# Patient Record
Sex: FEMALE | Race: WHITE | Employment: OTHER | URBAN - NONMETROPOLITAN AREA
[De-identification: names, ages, dates, MRNs, and addresses within clinical notes are randomized per-mention and may not be internally consistent; named-entity substitution may affect disease eponyms.]

---

## 2017-07-14 ENCOUNTER — HOSPITAL ENCOUNTER (EMERGENCY)
Age: 77
Discharge: HOME | End: 2017-07-14
Attending: EMERGENCY MEDICINE
Payer: MEDICARE

## 2017-07-14 ENCOUNTER — APPOINTMENT (OUTPATIENT)
Dept: GENERAL RADIOLOGY | Age: 77
End: 2017-07-14
Payer: MEDICARE

## 2017-07-14 VITALS
HEART RATE: 87 BPM | SYSTOLIC BLOOD PRESSURE: 153 MMHG | DIASTOLIC BLOOD PRESSURE: 86 MMHG | TEMPERATURE: 98.8 F | RESPIRATION RATE: 20 BRPM | OXYGEN SATURATION: 97 %

## 2017-07-14 DIAGNOSIS — M25.552 LEFT HIP PAIN: Primary | ICD-10-CM

## 2017-07-14 DIAGNOSIS — M16.12 PRIMARY OSTEOARTHRITIS OF LEFT HIP: ICD-10-CM

## 2017-07-14 PROCEDURE — 99283 EMERGENCY DEPT VISIT LOW MDM: CPT

## 2017-07-14 PROCEDURE — 73502 X-RAY EXAM HIP UNI 2-3 VIEWS: CPT

## 2017-07-14 PROCEDURE — 6360000002 HC RX W HCPCS: Performed by: EMERGENCY MEDICINE

## 2017-07-14 PROCEDURE — 96372 THER/PROPH/DIAG INJ SC/IM: CPT

## 2017-07-14 RX ORDER — OXYCODONE HYDROCHLORIDE AND ACETAMINOPHEN 5; 325 MG/1; MG/1
1 TABLET ORAL ONCE
Status: DISCONTINUED | OUTPATIENT
Start: 2017-07-14 | End: 2017-07-14 | Stop reason: HOSPADM

## 2017-07-14 RX ORDER — METHYLPREDNISOLONE SODIUM SUCCINATE 125 MG/2ML
125 INJECTION, POWDER, LYOPHILIZED, FOR SOLUTION INTRAMUSCULAR; INTRAVENOUS ONCE
Status: COMPLETED | OUTPATIENT
Start: 2017-07-14 | End: 2017-07-14

## 2017-07-14 RX ORDER — OXYCODONE HYDROCHLORIDE AND ACETAMINOPHEN 5; 325 MG/1; MG/1
1 TABLET ORAL EVERY 4 HOURS PRN
Qty: 20 TABLET | Refills: 0 | Status: SHIPPED | OUTPATIENT
Start: 2017-07-14 | End: 2017-07-21

## 2017-07-14 RX ORDER — LATANOPROST 50 UG/ML
1 SOLUTION/ DROPS OPHTHALMIC NIGHTLY
COMMUNITY

## 2017-07-14 RX ORDER — LISINOPRIL 5 MG/1
5 TABLET ORAL DAILY
COMMUNITY
End: 2018-03-06 | Stop reason: ALTCHOICE

## 2017-07-14 RX ADMIN — METHYLPREDNISOLONE SODIUM SUCCINATE 125 MG: 125 INJECTION, POWDER, FOR SOLUTION INTRAMUSCULAR; INTRAVENOUS at 10:46

## 2017-07-14 ASSESSMENT — ENCOUNTER SYMPTOMS
BACK PAIN: 1
COLOR CHANGE: 0

## 2017-07-14 ASSESSMENT — PAIN SCALES - GENERAL: PAINLEVEL_OUTOF10: 8

## 2017-07-14 ASSESSMENT — PAIN DESCRIPTION - PAIN TYPE: TYPE: ACUTE PAIN

## 2017-07-24 ENCOUNTER — HOSPITAL ENCOUNTER (OUTPATIENT)
Dept: PREADMISSION TESTING | Age: 77
Discharge: HOME | End: 2017-07-24
Payer: MEDICARE

## 2017-07-24 VITALS
HEART RATE: 87 BPM | TEMPERATURE: 97.8 F | RESPIRATION RATE: 20 BRPM | SYSTOLIC BLOOD PRESSURE: 127 MMHG | OXYGEN SATURATION: 97 % | WEIGHT: 151.2 LBS | HEIGHT: 64 IN | BODY MASS INDEX: 25.81 KG/M2 | DIASTOLIC BLOOD PRESSURE: 70 MMHG

## 2017-07-24 LAB
ABSOLUTE EOS #: 0.1 K/UL (ref 0–0.4)
ABSOLUTE LYMPH #: 2.6 K/UL (ref 1–4.8)
ABSOLUTE MONO #: 0.5 K/UL (ref 0–1)
ANION GAP SERPL CALCULATED.3IONS-SCNC: 13 MMOL/L (ref 9–17)
BASOPHILS # BLD: 0 %
BASOPHILS ABSOLUTE: 0 K/UL (ref 0–0.2)
BUN BLDV-MCNC: 11 MG/DL (ref 8–23)
BUN/CREAT BLD: 20 (ref 9–20)
CALCIUM SERPL-MCNC: 9.3 MG/DL (ref 8.6–10.4)
CHLORIDE BLD-SCNC: 98 MMOL/L (ref 98–107)
CO2: 27 MMOL/L (ref 20–31)
CREAT SERPL-MCNC: 0.54 MG/DL (ref 0.5–0.9)
DIFFERENTIAL TYPE: NORMAL
EKG ATRIAL RATE: 80 BPM
EKG P AXIS: 45 DEGREES
EKG P-R INTERVAL: 140 MS
EKG Q-T INTERVAL: 352 MS
EKG QRS DURATION: 82 MS
EKG QTC CALCULATION (BAZETT): 405 MS
EKG R AXIS: 31 DEGREES
EKG T AXIS: 35 DEGREES
EKG VENTRICULAR RATE: 80 BPM
EOSINOPHILS RELATIVE PERCENT: 1 %
GFR AFRICAN AMERICAN: >60 ML/MIN
GFR NON-AFRICAN AMERICAN: >60 ML/MIN
GFR SERPL CREATININE-BSD FRML MDRD: ABNORMAL ML/MIN/{1.73_M2}
GFR SERPL CREATININE-BSD FRML MDRD: ABNORMAL ML/MIN/{1.73_M2}
GLUCOSE BLD-MCNC: 104 MG/DL (ref 70–99)
HCT VFR BLD CALC: 37.7 % (ref 36–46)
HEMOGLOBIN: 12.9 G/DL (ref 12–16)
LYMPHOCYTES # BLD: 25 %
MCH RBC QN AUTO: 32 PG (ref 26–34)
MCHC RBC AUTO-ENTMCNC: 34.2 G/DL (ref 31–37)
MCV RBC AUTO: 93.4 FL (ref 80–100)
MONOCYTES # BLD: 5 %
PDW BLD-RTO: 13.6 % (ref 12.1–15.2)
PLATELET # BLD: 236 K/UL (ref 140–450)
PLATELET ESTIMATE: NORMAL
PMV BLD AUTO: 9.8 FL (ref 6–12)
POTASSIUM SERPL-SCNC: 4.4 MMOL/L (ref 3.7–5.3)
RBC # BLD: 4.04 M/UL (ref 4–5.2)
RBC # BLD: NORMAL 10*6/UL
SEG NEUTROPHILS: 69 %
SEGMENTED NEUTROPHILS ABSOLUTE COUNT: 7.2 K/UL (ref 1.8–7.7)
SODIUM BLD-SCNC: 138 MMOL/L (ref 135–144)
WBC # BLD: 10.5 K/UL (ref 3.5–11)
WBC # BLD: NORMAL 10*3/UL

## 2017-07-24 PROCEDURE — 80048 BASIC METABOLIC PNL TOTAL CA: CPT

## 2017-07-24 PROCEDURE — 93005 ELECTROCARDIOGRAM TRACING: CPT

## 2017-07-24 PROCEDURE — 85025 COMPLETE CBC W/AUTO DIFF WBC: CPT

## 2017-07-24 PROCEDURE — 87641 MR-STAPH DNA AMP PROBE: CPT

## 2017-07-24 PROCEDURE — 36415 COLL VENOUS BLD VENIPUNCTURE: CPT

## 2017-07-24 RX ORDER — ACETAMINOPHEN 325 MG/1
650 TABLET ORAL ONCE
Status: CANCELLED | OUTPATIENT
Start: 2017-07-24 | End: 2017-07-24

## 2017-07-24 ASSESSMENT — PAIN DESCRIPTION - PAIN TYPE: TYPE: CHRONIC PAIN

## 2017-07-24 ASSESSMENT — PAIN DESCRIPTION - LOCATION: LOCATION: HIP

## 2017-07-24 ASSESSMENT — PAIN DESCRIPTION - ORIENTATION: ORIENTATION: LEFT

## 2017-07-24 ASSESSMENT — PAIN SCALES - GENERAL: PAINLEVEL_OUTOF10: 5

## 2017-07-24 ASSESSMENT — PAIN DESCRIPTION - DESCRIPTORS: DESCRIPTORS: ACHING;CONSTANT;DULL

## 2017-07-25 LAB
MRSA, DNA, NASAL: NORMAL
SPECIMEN DESCRIPTION: NORMAL

## 2017-08-03 ENCOUNTER — HOSPITAL ENCOUNTER (OUTPATIENT)
Dept: LAB | Age: 77
Discharge: HOME | End: 2017-08-03
Payer: MEDICARE

## 2017-08-03 PROCEDURE — 86901 BLOOD TYPING SEROLOGIC RH(D): CPT

## 2017-08-03 PROCEDURE — 36415 COLL VENOUS BLD VENIPUNCTURE: CPT

## 2017-08-03 PROCEDURE — 86850 RBC ANTIBODY SCREEN: CPT

## 2017-08-03 PROCEDURE — 86920 COMPATIBILITY TEST SPIN: CPT

## 2017-08-03 PROCEDURE — 86900 BLOOD TYPING SEROLOGIC ABO: CPT

## 2017-08-04 ENCOUNTER — ANESTHESIA EVENT (OUTPATIENT)
Dept: OPERATING ROOM | Age: 77
DRG: 470 | End: 2017-08-04
Payer: MEDICARE

## 2017-08-07 ENCOUNTER — ANESTHESIA (OUTPATIENT)
Dept: OPERATING ROOM | Age: 77
DRG: 470 | End: 2017-08-07
Payer: MEDICARE

## 2017-08-07 ENCOUNTER — APPOINTMENT (OUTPATIENT)
Dept: GENERAL RADIOLOGY | Age: 77
DRG: 470 | End: 2017-08-07
Attending: ORTHOPAEDIC SURGERY
Payer: MEDICARE

## 2017-08-07 ENCOUNTER — HOSPITAL ENCOUNTER (INPATIENT)
Age: 77
LOS: 3 days | Discharge: NURSING, REHAB OR LONG-TERM CARE | DRG: 470 | End: 2017-08-10
Attending: ORTHOPAEDIC SURGERY | Admitting: ORTHOPAEDIC SURGERY
Payer: MEDICARE

## 2017-08-07 VITALS
DIASTOLIC BLOOD PRESSURE: 72 MMHG | SYSTOLIC BLOOD PRESSURE: 120 MMHG | TEMPERATURE: 97.4 F | RESPIRATION RATE: 10 BRPM | OXYGEN SATURATION: 98 %

## 2017-08-07 DIAGNOSIS — M25.552 LEFT HIP PAIN: ICD-10-CM

## 2017-08-07 LAB — GLUCOSE BLD-MCNC: 120 MG/DL (ref 74–100)

## 2017-08-07 PROCEDURE — 82947 ASSAY GLUCOSE BLOOD QUANT: CPT

## 2017-08-07 PROCEDURE — 1200000000 HC SEMI PRIVATE

## 2017-08-07 PROCEDURE — 2500000003 HC RX 250 WO HCPCS

## 2017-08-07 PROCEDURE — 94664 DEMO&/EVAL PT USE INHALER: CPT

## 2017-08-07 PROCEDURE — 7100000000 HC PACU RECOVERY - FIRST 15 MIN: Performed by: ORTHOPAEDIC SURGERY

## 2017-08-07 PROCEDURE — C9290 INJ, BUPIVACAINE LIPOSOME: HCPCS | Performed by: ORTHOPAEDIC SURGERY

## 2017-08-07 PROCEDURE — 6360000002 HC RX W HCPCS: Performed by: NURSE ANESTHETIST, CERTIFIED REGISTERED

## 2017-08-07 PROCEDURE — 6360000002 HC RX W HCPCS: Performed by: ORTHOPAEDIC SURGERY

## 2017-08-07 PROCEDURE — 6370000000 HC RX 637 (ALT 250 FOR IP): Performed by: ORTHOPAEDIC SURGERY

## 2017-08-07 PROCEDURE — C1713 ANCHOR/SCREW BN/BN,TIS/BN: HCPCS | Performed by: ORTHOPAEDIC SURGERY

## 2017-08-07 PROCEDURE — 7100000001 HC PACU RECOVERY - ADDTL 15 MIN: Performed by: ORTHOPAEDIC SURGERY

## 2017-08-07 PROCEDURE — 3600000015 HC SURGERY LEVEL 5 ADDTL 15MIN: Performed by: ORTHOPAEDIC SURGERY

## 2017-08-07 PROCEDURE — S0028 INJECTION, FAMOTIDINE, 20 MG: HCPCS | Performed by: NURSE ANESTHETIST, CERTIFIED REGISTERED

## 2017-08-07 PROCEDURE — 2580000003 HC RX 258: Performed by: ORTHOPAEDIC SURGERY

## 2017-08-07 PROCEDURE — 2500000003 HC RX 250 WO HCPCS: Performed by: ORTHOPAEDIC SURGERY

## 2017-08-07 PROCEDURE — 3700000000 HC ANESTHESIA ATTENDED CARE: Performed by: ORTHOPAEDIC SURGERY

## 2017-08-07 PROCEDURE — 0SRB0J9 REPLACEMENT OF LEFT HIP JOINT WITH SYNTHETIC SUBSTITUTE, CEMENTED, OPEN APPROACH: ICD-10-PCS | Performed by: ORTHOPAEDIC SURGERY

## 2017-08-07 PROCEDURE — 73501 X-RAY EXAM HIP UNI 1 VIEW: CPT

## 2017-08-07 PROCEDURE — 2500000003 HC RX 250 WO HCPCS: Performed by: NURSE ANESTHETIST, CERTIFIED REGISTERED

## 2017-08-07 PROCEDURE — 3700000001 HC ADD 15 MINUTES (ANESTHESIA): Performed by: ORTHOPAEDIC SURGERY

## 2017-08-07 PROCEDURE — 94761 N-INVAS EAR/PLS OXIMETRY MLT: CPT

## 2017-08-07 PROCEDURE — 3600000005 HC SURGERY LEVEL 5 BASE: Performed by: ORTHOPAEDIC SURGERY

## 2017-08-07 PROCEDURE — C1776 JOINT DEVICE (IMPLANTABLE): HCPCS | Performed by: ORTHOPAEDIC SURGERY

## 2017-08-07 DEVICE — SHELL ACET SZ JJ DIA54MM HIP TIV TRABECULAR MTL CLUS H: Type: IMPLANTABLE DEVICE | Site: HIP | Status: FUNCTIONAL

## 2017-08-07 DEVICE — HEAD FEMORAL COCR 12/14 36MM +0: Type: IMPLANTABLE DEVICE | Site: HIP | Status: FUNCTIONAL

## 2017-08-07 DEVICE — IMPLANTABLE DEVICE: Type: IMPLANTABLE DEVICE | Site: HIP | Status: FUNCTIONAL

## 2017-08-07 RX ORDER — ONDANSETRON 2 MG/ML
INJECTION INTRAMUSCULAR; INTRAVENOUS PRN
Status: DISCONTINUED | OUTPATIENT
Start: 2017-08-07 | End: 2017-08-07 | Stop reason: SDUPTHER

## 2017-08-07 RX ORDER — SODIUM CHLORIDE 0.9 % (FLUSH) 0.9 %
10 SYRINGE (ML) INJECTION EVERY 12 HOURS SCHEDULED
Status: DISCONTINUED | OUTPATIENT
Start: 2017-08-07 | End: 2017-08-10 | Stop reason: HOSPADM

## 2017-08-07 RX ORDER — FENTANYL CITRATE 50 UG/ML
INJECTION, SOLUTION INTRAMUSCULAR; INTRAVENOUS PRN
Status: DISCONTINUED | OUTPATIENT
Start: 2017-08-07 | End: 2017-08-07 | Stop reason: SDUPTHER

## 2017-08-07 RX ORDER — ONDANSETRON 2 MG/ML
4 INJECTION INTRAMUSCULAR; INTRAVENOUS
Status: DISCONTINUED | OUTPATIENT
Start: 2017-08-07 | End: 2017-08-07 | Stop reason: HOSPADM

## 2017-08-07 RX ORDER — LATANOPROST 50 UG/ML
1 SOLUTION/ DROPS OPHTHALMIC NIGHTLY
Status: DISCONTINUED | OUTPATIENT
Start: 2017-08-07 | End: 2017-08-10 | Stop reason: HOSPADM

## 2017-08-07 RX ORDER — ACETAMINOPHEN 325 MG/1
650 TABLET ORAL EVERY 6 HOURS PRN
Status: DISCONTINUED | OUTPATIENT
Start: 2017-08-07 | End: 2017-08-07

## 2017-08-07 RX ORDER — HYDROCODONE BITARTRATE AND ACETAMINOPHEN 5; 325 MG/1; MG/1
2 TABLET ORAL EVERY 4 HOURS PRN
Status: DISCONTINUED | OUTPATIENT
Start: 2017-08-07 | End: 2017-08-10 | Stop reason: HOSPADM

## 2017-08-07 RX ORDER — LABETALOL HYDROCHLORIDE 5 MG/ML
5 INJECTION, SOLUTION INTRAVENOUS EVERY 10 MIN PRN
Status: DISCONTINUED | OUTPATIENT
Start: 2017-08-07 | End: 2017-08-07 | Stop reason: HOSPADM

## 2017-08-07 RX ORDER — HYDROCODONE BITARTRATE AND ACETAMINOPHEN 5; 325 MG/1; MG/1
2 TABLET ORAL EVERY 6 HOURS PRN
Status: DISCONTINUED | OUTPATIENT
Start: 2017-08-07 | End: 2017-08-07

## 2017-08-07 RX ORDER — ACETAMINOPHEN 325 MG/1
650 TABLET ORAL EVERY 4 HOURS PRN
Status: DISCONTINUED | OUTPATIENT
Start: 2017-08-07 | End: 2017-08-07

## 2017-08-07 RX ORDER — HYDROCODONE BITARTRATE AND ACETAMINOPHEN 5; 325 MG/1; MG/1
1 TABLET ORAL EVERY 4 HOURS PRN
Status: DISCONTINUED | OUTPATIENT
Start: 2017-08-07 | End: 2017-08-10 | Stop reason: HOSPADM

## 2017-08-07 RX ORDER — FENTANYL CITRATE 50 UG/ML
25 INJECTION, SOLUTION INTRAMUSCULAR; INTRAVENOUS EVERY 5 MIN PRN
Status: DISCONTINUED | OUTPATIENT
Start: 2017-08-07 | End: 2017-08-07 | Stop reason: HOSPADM

## 2017-08-07 RX ORDER — LISINOPRIL 5 MG/1
5 TABLET ORAL DAILY
Status: DISCONTINUED | OUTPATIENT
Start: 2017-08-07 | End: 2017-08-10 | Stop reason: HOSPADM

## 2017-08-07 RX ORDER — ONDANSETRON 2 MG/ML
4 INJECTION INTRAMUSCULAR; INTRAVENOUS EVERY 6 HOURS PRN
Status: DISCONTINUED | OUTPATIENT
Start: 2017-08-07 | End: 2017-08-10 | Stop reason: HOSPADM

## 2017-08-07 RX ORDER — SODIUM CHLORIDE, SODIUM LACTATE, POTASSIUM CHLORIDE, CALCIUM CHLORIDE 600; 310; 30; 20 MG/100ML; MG/100ML; MG/100ML; MG/100ML
INJECTION, SOLUTION INTRAVENOUS CONTINUOUS
Status: DISCONTINUED | OUTPATIENT
Start: 2017-08-07 | End: 2017-08-10 | Stop reason: HOSPADM

## 2017-08-07 RX ORDER — FERROUS SULFATE 325(65) MG
325 TABLET ORAL
Status: DISCONTINUED | OUTPATIENT
Start: 2017-08-08 | End: 2017-08-10 | Stop reason: HOSPADM

## 2017-08-07 RX ORDER — METOCLOPRAMIDE HYDROCHLORIDE 5 MG/ML
10 INJECTION INTRAMUSCULAR; INTRAVENOUS
Status: DISCONTINUED | OUTPATIENT
Start: 2017-08-07 | End: 2017-08-07 | Stop reason: HOSPADM

## 2017-08-07 RX ORDER — ACETAMINOPHEN 325 MG/1
650 TABLET ORAL EVERY 4 HOURS PRN
Status: DISCONTINUED | OUTPATIENT
Start: 2017-08-07 | End: 2017-08-10 | Stop reason: HOSPADM

## 2017-08-07 RX ORDER — SODIUM CHLORIDE, SODIUM LACTATE, POTASSIUM CHLORIDE, CALCIUM CHLORIDE 600; 310; 30; 20 MG/100ML; MG/100ML; MG/100ML; MG/100ML
INJECTION, SOLUTION INTRAVENOUS CONTINUOUS
Status: DISCONTINUED | OUTPATIENT
Start: 2017-08-07 | End: 2017-08-07

## 2017-08-07 RX ORDER — SODIUM CHLORIDE 0.9 % (FLUSH) 0.9 %
10 SYRINGE (ML) INJECTION PRN
Status: DISCONTINUED | OUTPATIENT
Start: 2017-08-07 | End: 2017-08-10 | Stop reason: HOSPADM

## 2017-08-07 RX ORDER — LIDOCAINE HYDROCHLORIDE 20 MG/ML
INJECTION, SOLUTION INFILTRATION; PERINEURAL PRN
Status: DISCONTINUED | OUTPATIENT
Start: 2017-08-07 | End: 2017-08-07 | Stop reason: SDUPTHER

## 2017-08-07 RX ORDER — PROPOFOL 10 MG/ML
INJECTION, EMULSION INTRAVENOUS PRN
Status: DISCONTINUED | OUTPATIENT
Start: 2017-08-07 | End: 2017-08-07 | Stop reason: SDUPTHER

## 2017-08-07 RX ORDER — ROCURONIUM BROMIDE 10 MG/ML
INJECTION, SOLUTION INTRAVENOUS PRN
Status: DISCONTINUED | OUTPATIENT
Start: 2017-08-07 | End: 2017-08-07 | Stop reason: SDUPTHER

## 2017-08-07 RX ORDER — HYDROCODONE BITARTRATE AND ACETAMINOPHEN 5; 325 MG/1; MG/1
1 TABLET ORAL EVERY 4 HOURS PRN
Status: DISCONTINUED | OUTPATIENT
Start: 2017-08-07 | End: 2017-08-07

## 2017-08-07 RX ORDER — DOCUSATE SODIUM 100 MG/1
100 CAPSULE, LIQUID FILLED ORAL 2 TIMES DAILY
Status: DISCONTINUED | OUTPATIENT
Start: 2017-08-07 | End: 2017-08-10 | Stop reason: HOSPADM

## 2017-08-07 RX ORDER — METOCLOPRAMIDE HYDROCHLORIDE 5 MG/ML
INJECTION INTRAMUSCULAR; INTRAVENOUS PRN
Status: DISCONTINUED | OUTPATIENT
Start: 2017-08-07 | End: 2017-08-07 | Stop reason: SDUPTHER

## 2017-08-07 RX ORDER — MEPERIDINE HYDROCHLORIDE 50 MG/ML
12.5 INJECTION INTRAMUSCULAR; INTRAVENOUS; SUBCUTANEOUS EVERY 5 MIN PRN
Status: DISCONTINUED | OUTPATIENT
Start: 2017-08-07 | End: 2017-08-07 | Stop reason: HOSPADM

## 2017-08-07 RX ORDER — ACETAMINOPHEN 325 MG/1
650 TABLET ORAL ONCE
Status: COMPLETED | OUTPATIENT
Start: 2017-08-07 | End: 2017-08-07

## 2017-08-07 RX ORDER — GLYCOPYRROLATE 0.2 MG/ML
INJECTION INTRAMUSCULAR; INTRAVENOUS PRN
Status: DISCONTINUED | OUTPATIENT
Start: 2017-08-07 | End: 2017-08-07 | Stop reason: SDUPTHER

## 2017-08-07 RX ADMIN — METFORMIN HYDROCHLORIDE 500 MG: 500 TABLET, FILM COATED ORAL at 18:51

## 2017-08-07 RX ADMIN — ONDANSETRON 4 MG: 2 INJECTION INTRAMUSCULAR; INTRAVENOUS at 15:26

## 2017-08-07 RX ADMIN — ACETAMINOPHEN 650 MG: 325 TABLET, FILM COATED ORAL at 13:13

## 2017-08-07 RX ADMIN — NEOSTIGMINE METHYLSULFATE 3 MG: 1 INJECTION, SOLUTION INTRAMUSCULAR; INTRAVENOUS; SUBCUTANEOUS at 15:50

## 2017-08-07 RX ADMIN — PHENYLEPHRINE HYDROCHLORIDE 100 MCG: 10 INJECTION INTRAVENOUS at 13:49

## 2017-08-07 RX ADMIN — CEFAZOLIN SODIUM 1 G: 1 SOLUTION INTRAVENOUS at 15:35

## 2017-08-07 RX ADMIN — SODIUM CHLORIDE, POTASSIUM CHLORIDE, SODIUM LACTATE AND CALCIUM CHLORIDE: 600; 310; 30; 20 INJECTION, SOLUTION INTRAVENOUS at 13:16

## 2017-08-07 RX ADMIN — METOCLOPRAMIDE 10 MG: 5 INJECTION, SOLUTION INTRAMUSCULAR; INTRAVENOUS at 14:25

## 2017-08-07 RX ADMIN — PHENYLEPHRINE HYDROCHLORIDE 100 MCG: 10 INJECTION INTRAVENOUS at 15:22

## 2017-08-07 RX ADMIN — PROPOFOL 50 MG: 10 INJECTION, EMULSION INTRAVENOUS at 14:50

## 2017-08-07 RX ADMIN — PROPOFOL 150 MG: 10 INJECTION, EMULSION INTRAVENOUS at 13:35

## 2017-08-07 RX ADMIN — PHENYLEPHRINE HYDROCHLORIDE 100 MCG: 10 INJECTION INTRAVENOUS at 13:56

## 2017-08-07 RX ADMIN — SODIUM CHLORIDE, POTASSIUM CHLORIDE, SODIUM LACTATE AND CALCIUM CHLORIDE: 600; 310; 30; 20 INJECTION, SOLUTION INTRAVENOUS at 13:13

## 2017-08-07 RX ADMIN — LIDOCAINE HYDROCHLORIDE 100 MG: 20 INJECTION, SOLUTION INFILTRATION; PERINEURAL at 13:35

## 2017-08-07 RX ADMIN — SODIUM CHLORIDE, POTASSIUM CHLORIDE, SODIUM LACTATE AND CALCIUM CHLORIDE: 600; 310; 30; 20 INJECTION, SOLUTION INTRAVENOUS at 14:35

## 2017-08-07 RX ADMIN — FENTANYL CITRATE 50 MCG: 50 INJECTION INTRAMUSCULAR; INTRAVENOUS at 13:35

## 2017-08-07 RX ADMIN — FENTANYL CITRATE 50 MCG: 50 INJECTION INTRAMUSCULAR; INTRAVENOUS at 14:58

## 2017-08-07 RX ADMIN — HYDROMORPHONE HYDROCHLORIDE 0.25 MG: 1 INJECTION, SOLUTION INTRAMUSCULAR; INTRAVENOUS; SUBCUTANEOUS at 15:06

## 2017-08-07 RX ADMIN — PHENYLEPHRINE HYDROCHLORIDE 100 MCG: 10 INJECTION INTRAVENOUS at 14:01

## 2017-08-07 RX ADMIN — LISINOPRIL 5 MG: 5 TABLET ORAL at 21:14

## 2017-08-07 RX ADMIN — HYDROMORPHONE HYDROCHLORIDE 0.25 MG: 1 INJECTION, SOLUTION INTRAMUSCULAR; INTRAVENOUS; SUBCUTANEOUS at 15:00

## 2017-08-07 RX ADMIN — Medication 40 MG: at 13:35

## 2017-08-07 RX ADMIN — GLYCOPYRROLATE 0.6 MG: 0.2 INJECTION INTRAMUSCULAR; INTRAVENOUS at 15:50

## 2017-08-07 RX ADMIN — SODIUM CHLORIDE, POTASSIUM CHLORIDE, SODIUM LACTATE AND CALCIUM CHLORIDE: 600; 310; 30; 20 INJECTION, SOLUTION INTRAVENOUS at 18:17

## 2017-08-07 RX ADMIN — CEFAZOLIN SODIUM 2 G: 2 SOLUTION INTRAVENOUS at 13:26

## 2017-08-07 RX ADMIN — FAMOTIDINE 20 MG: 10 INJECTION INTRAVENOUS at 14:25

## 2017-08-07 RX ADMIN — SODIUM CHLORIDE, POTASSIUM CHLORIDE, SODIUM LACTATE AND CALCIUM CHLORIDE: 600; 310; 30; 20 INJECTION, SOLUTION INTRAVENOUS at 16:00

## 2017-08-07 RX ADMIN — CEFAZOLIN SODIUM 2 G: 2 SOLUTION INTRAVENOUS at 21:14

## 2017-08-07 RX ADMIN — DOCUSATE SODIUM 100 MG: 100 CAPSULE, LIQUID FILLED ORAL at 21:15

## 2017-08-07 ASSESSMENT — PAIN DESCRIPTION - ORIENTATION: ORIENTATION: LEFT

## 2017-08-07 ASSESSMENT — PAIN DESCRIPTION - DESCRIPTORS
DESCRIPTORS: ACHING;CONSTANT
DESCRIPTORS: ACHING

## 2017-08-07 ASSESSMENT — PAIN SCALES - GENERAL
PAINLEVEL_OUTOF10: 2
PAINLEVEL_OUTOF10: 3

## 2017-08-07 ASSESSMENT — PAIN DESCRIPTION - PAIN TYPE: TYPE: ACUTE PAIN

## 2017-08-07 ASSESSMENT — PAIN - FUNCTIONAL ASSESSMENT: PAIN_FUNCTIONAL_ASSESSMENT: 0-10

## 2017-08-07 ASSESSMENT — PAIN DESCRIPTION - LOCATION: LOCATION: HIP

## 2017-08-08 ENCOUNTER — APPOINTMENT (OUTPATIENT)
Dept: GENERAL RADIOLOGY | Age: 77
DRG: 470 | End: 2017-08-08
Attending: ORTHOPAEDIC SURGERY
Payer: MEDICARE

## 2017-08-08 PROBLEM — Z96.642 STATUS POST LEFT HIP REPLACEMENT: Status: ACTIVE | Noted: 2017-08-08

## 2017-08-08 LAB
ANION GAP SERPL CALCULATED.3IONS-SCNC: 11 MMOL/L (ref 9–17)
BUN BLDV-MCNC: 11 MG/DL (ref 8–23)
BUN/CREAT BLD: 19 (ref 9–20)
CALCIUM SERPL-MCNC: 8.1 MG/DL (ref 8.6–10.4)
CHLORIDE BLD-SCNC: 96 MMOL/L (ref 98–107)
CO2: 26 MMOL/L (ref 20–31)
CREAT SERPL-MCNC: 0.59 MG/DL (ref 0.5–0.9)
GFR AFRICAN AMERICAN: >60 ML/MIN
GFR NON-AFRICAN AMERICAN: >60 ML/MIN
GFR SERPL CREATININE-BSD FRML MDRD: ABNORMAL ML/MIN/{1.73_M2}
GFR SERPL CREATININE-BSD FRML MDRD: ABNORMAL ML/MIN/{1.73_M2}
GLUCOSE BLD-MCNC: 173 MG/DL (ref 74–100)
GLUCOSE BLD-MCNC: 189 MG/DL (ref 70–99)
GLUCOSE BLD-MCNC: 202 MG/DL (ref 74–100)
HCT VFR BLD CALC: 29.2 % (ref 36–46)
HEMOGLOBIN: 10 G/DL (ref 12–16)
POTASSIUM SERPL-SCNC: 4.7 MMOL/L (ref 3.7–5.3)
SODIUM BLD-SCNC: 133 MMOL/L (ref 135–144)

## 2017-08-08 PROCEDURE — 97166 OT EVAL MOD COMPLEX 45 MIN: CPT

## 2017-08-08 PROCEDURE — 6370000000 HC RX 637 (ALT 250 FOR IP): Performed by: PHYSICIAN ASSISTANT

## 2017-08-08 PROCEDURE — 97110 THERAPEUTIC EXERCISES: CPT

## 2017-08-08 PROCEDURE — 97535 SELF CARE MNGMENT TRAINING: CPT

## 2017-08-08 PROCEDURE — 6370000000 HC RX 637 (ALT 250 FOR IP): Performed by: ORTHOPAEDIC SURGERY

## 2017-08-08 PROCEDURE — 97116 GAIT TRAINING THERAPY: CPT

## 2017-08-08 PROCEDURE — 6360000002 HC RX W HCPCS: Performed by: ORTHOPAEDIC SURGERY

## 2017-08-08 PROCEDURE — G8988 SELF CARE GOAL STATUS: HCPCS

## 2017-08-08 PROCEDURE — 85018 HEMOGLOBIN: CPT

## 2017-08-08 PROCEDURE — G8978 MOBILITY CURRENT STATUS: HCPCS

## 2017-08-08 PROCEDURE — 1200000000 HC SEMI PRIVATE

## 2017-08-08 PROCEDURE — 97162 PT EVAL MOD COMPLEX 30 MIN: CPT

## 2017-08-08 PROCEDURE — 36415 COLL VENOUS BLD VENIPUNCTURE: CPT

## 2017-08-08 PROCEDURE — G8987 SELF CARE CURRENT STATUS: HCPCS

## 2017-08-08 PROCEDURE — 85014 HEMATOCRIT: CPT

## 2017-08-08 PROCEDURE — 2580000003 HC RX 258: Performed by: ORTHOPAEDIC SURGERY

## 2017-08-08 PROCEDURE — 73502 X-RAY EXAM HIP UNI 2-3 VIEWS: CPT

## 2017-08-08 PROCEDURE — G8979 MOBILITY GOAL STATUS: HCPCS

## 2017-08-08 PROCEDURE — 82947 ASSAY GLUCOSE BLOOD QUANT: CPT

## 2017-08-08 PROCEDURE — 80048 BASIC METABOLIC PNL TOTAL CA: CPT

## 2017-08-08 RX ORDER — POLYETHYLENE GLYCOL 3350 17 G/17G
17 POWDER, FOR SOLUTION ORAL DAILY
Status: DISCONTINUED | OUTPATIENT
Start: 2017-08-08 | End: 2017-08-10 | Stop reason: HOSPADM

## 2017-08-08 RX ORDER — NICOTINE POLACRILEX 4 MG
15 LOZENGE BUCCAL PRN
Status: DISCONTINUED | OUTPATIENT
Start: 2017-08-08 | End: 2017-08-10 | Stop reason: HOSPADM

## 2017-08-08 RX ORDER — DEXTROSE MONOHYDRATE 50 MG/ML
100 INJECTION, SOLUTION INTRAVENOUS PRN
Status: DISCONTINUED | OUTPATIENT
Start: 2017-08-08 | End: 2017-08-10 | Stop reason: HOSPADM

## 2017-08-08 RX ORDER — ACETAMINOPHEN 325 MG/1
650 TABLET ORAL EVERY 4 HOURS PRN
Qty: 120 TABLET | Refills: 3 | Status: ON HOLD | OUTPATIENT
Start: 2017-08-08 | End: 2021-11-15 | Stop reason: SINTOL

## 2017-08-08 RX ORDER — HYDROCODONE BITARTRATE AND ACETAMINOPHEN 5; 325 MG/1; MG/1
TABLET ORAL
Qty: 50 TABLET | Refills: 0 | Status: SHIPPED | OUTPATIENT
Start: 2017-08-08 | End: 2017-08-15

## 2017-08-08 RX ORDER — DEXTROSE MONOHYDRATE 25 G/50ML
12.5 INJECTION, SOLUTION INTRAVENOUS PRN
Status: DISCONTINUED | OUTPATIENT
Start: 2017-08-08 | End: 2017-08-10 | Stop reason: HOSPADM

## 2017-08-08 RX ADMIN — METFORMIN HYDROCHLORIDE 500 MG: 500 TABLET, FILM COATED ORAL at 08:33

## 2017-08-08 RX ADMIN — HYDROCODONE BITARTRATE AND ACETAMINOPHEN 2 TABLET: 5; 325 TABLET ORAL at 07:25

## 2017-08-08 RX ADMIN — METFORMIN HYDROCHLORIDE 500 MG: 500 TABLET, FILM COATED ORAL at 17:01

## 2017-08-08 RX ADMIN — FERROUS SULFATE TAB 325 MG (65 MG ELEMENTAL FE) 325 MG: 325 (65 FE) TAB at 08:33

## 2017-08-08 RX ADMIN — CEFAZOLIN SODIUM 2 G: 2 SOLUTION INTRAVENOUS at 04:33

## 2017-08-08 RX ADMIN — POLYETHYLENE GLYCOL 3350 17 G: 17 POWDER, FOR SOLUTION ORAL at 08:34

## 2017-08-08 RX ADMIN — RIVAROXABAN 10 MG: 10 TABLET, FILM COATED ORAL at 08:33

## 2017-08-08 RX ADMIN — DOCUSATE SODIUM 100 MG: 100 CAPSULE, LIQUID FILLED ORAL at 20:26

## 2017-08-08 RX ADMIN — Medication 10 ML: at 10:43

## 2017-08-08 RX ADMIN — INSULIN LISPRO 2 UNITS: 100 INJECTION, SOLUTION INTRAVENOUS; SUBCUTANEOUS at 08:34

## 2017-08-08 RX ADMIN — ONDANSETRON 4 MG: 2 INJECTION INTRAMUSCULAR; INTRAVENOUS at 10:43

## 2017-08-08 RX ADMIN — HYDROMORPHONE HYDROCHLORIDE 0.5 MG: 1 INJECTION, SOLUTION INTRAMUSCULAR; INTRAVENOUS; SUBCUTANEOUS at 01:08

## 2017-08-08 RX ADMIN — INSULIN LISPRO 4 UNITS: 100 INJECTION, SOLUTION INTRAVENOUS; SUBCUTANEOUS at 20:04

## 2017-08-08 RX ADMIN — LISINOPRIL 5 MG: 5 TABLET ORAL at 08:33

## 2017-08-08 RX ADMIN — SODIUM CHLORIDE, POTASSIUM CHLORIDE, SODIUM LACTATE AND CALCIUM CHLORIDE: 600; 310; 30; 20 INJECTION, SOLUTION INTRAVENOUS at 05:57

## 2017-08-08 RX ADMIN — SODIUM CHLORIDE, POTASSIUM CHLORIDE, SODIUM LACTATE AND CALCIUM CHLORIDE: 600; 310; 30; 20 INJECTION, SOLUTION INTRAVENOUS at 19:05

## 2017-08-08 RX ADMIN — DOCUSATE SODIUM 100 MG: 100 CAPSULE, LIQUID FILLED ORAL at 08:33

## 2017-08-08 ASSESSMENT — PAIN DESCRIPTION - FREQUENCY
FREQUENCY: CONTINUOUS
FREQUENCY: CONTINUOUS

## 2017-08-08 ASSESSMENT — PAIN DESCRIPTION - LOCATION
LOCATION: HIP
LOCATION: LEG
LOCATION: LEG

## 2017-08-08 ASSESSMENT — PAIN DESCRIPTION - DESCRIPTORS
DESCRIPTORS: ACHING

## 2017-08-08 ASSESSMENT — PAIN DESCRIPTION - PAIN TYPE
TYPE: SURGICAL PAIN

## 2017-08-08 ASSESSMENT — PAIN SCALES - GENERAL
PAINLEVEL_OUTOF10: 3
PAINLEVEL_OUTOF10: 4
PAINLEVEL_OUTOF10: 3
PAINLEVEL_OUTOF10: 7
PAINLEVEL_OUTOF10: 2

## 2017-08-08 ASSESSMENT — PAIN DESCRIPTION - ORIENTATION
ORIENTATION: LEFT

## 2017-08-09 LAB
GLUCOSE BLD-MCNC: 153 MG/DL (ref 74–100)
GLUCOSE BLD-MCNC: 154 MG/DL (ref 74–100)
HCT VFR BLD CALC: 28.3 % (ref 36–46)
HEMOGLOBIN: 9.6 G/DL (ref 12–16)

## 2017-08-09 PROCEDURE — 97535 SELF CARE MNGMENT TRAINING: CPT

## 2017-08-09 PROCEDURE — 85014 HEMATOCRIT: CPT

## 2017-08-09 PROCEDURE — 85018 HEMOGLOBIN: CPT

## 2017-08-09 PROCEDURE — 1200000000 HC SEMI PRIVATE

## 2017-08-09 PROCEDURE — 97116 GAIT TRAINING THERAPY: CPT

## 2017-08-09 PROCEDURE — 6370000000 HC RX 637 (ALT 250 FOR IP): Performed by: PHYSICIAN ASSISTANT

## 2017-08-09 PROCEDURE — 2580000003 HC RX 258: Performed by: ORTHOPAEDIC SURGERY

## 2017-08-09 PROCEDURE — 6370000000 HC RX 637 (ALT 250 FOR IP): Performed by: ORTHOPAEDIC SURGERY

## 2017-08-09 PROCEDURE — 82947 ASSAY GLUCOSE BLOOD QUANT: CPT

## 2017-08-09 PROCEDURE — 97110 THERAPEUTIC EXERCISES: CPT

## 2017-08-09 PROCEDURE — 36415 COLL VENOUS BLD VENIPUNCTURE: CPT

## 2017-08-09 RX ADMIN — INSULIN LISPRO 2 UNITS: 100 INJECTION, SOLUTION INTRAVENOUS; SUBCUTANEOUS at 17:06

## 2017-08-09 RX ADMIN — METFORMIN HYDROCHLORIDE 500 MG: 500 TABLET, FILM COATED ORAL at 08:46

## 2017-08-09 RX ADMIN — DOCUSATE SODIUM 100 MG: 100 CAPSULE, LIQUID FILLED ORAL at 08:46

## 2017-08-09 RX ADMIN — METFORMIN HYDROCHLORIDE 500 MG: 500 TABLET, FILM COATED ORAL at 17:06

## 2017-08-09 RX ADMIN — FERROUS SULFATE TAB 325 MG (65 MG ELEMENTAL FE) 325 MG: 325 (65 FE) TAB at 08:46

## 2017-08-09 RX ADMIN — DOCUSATE SODIUM 100 MG: 100 CAPSULE, LIQUID FILLED ORAL at 20:03

## 2017-08-09 RX ADMIN — INSULIN LISPRO 2 UNITS: 100 INJECTION, SOLUTION INTRAVENOUS; SUBCUTANEOUS at 08:49

## 2017-08-09 RX ADMIN — POLYETHYLENE GLYCOL 3350 17 G: 17 POWDER, FOR SOLUTION ORAL at 08:46

## 2017-08-09 RX ADMIN — ACETAMINOPHEN 650 MG: 325 TABLET, FILM COATED ORAL at 00:45

## 2017-08-09 RX ADMIN — RIVAROXABAN 10 MG: 10 TABLET, FILM COATED ORAL at 08:46

## 2017-08-09 RX ADMIN — Medication 10 ML: at 20:03

## 2017-08-09 RX ADMIN — LISINOPRIL 5 MG: 5 TABLET ORAL at 20:03

## 2017-08-09 RX ADMIN — Medication 10 ML: at 08:47

## 2017-08-09 RX ADMIN — ACETAMINOPHEN 650 MG: 325 TABLET, FILM COATED ORAL at 13:39

## 2017-08-09 ASSESSMENT — PAIN SCALES - GENERAL
PAINLEVEL_OUTOF10: 5
PAINLEVEL_OUTOF10: 0
PAINLEVEL_OUTOF10: 2
PAINLEVEL_OUTOF10: 0
PAINLEVEL_OUTOF10: 4
PAINLEVEL_OUTOF10: 0

## 2017-08-09 ASSESSMENT — PAIN DESCRIPTION - PAIN TYPE
TYPE: SURGICAL PAIN
TYPE: SURGICAL PAIN

## 2017-08-09 ASSESSMENT — PAIN DESCRIPTION - ORIENTATION
ORIENTATION: LEFT
ORIENTATION: LEFT

## 2017-08-09 ASSESSMENT — PAIN DESCRIPTION - LOCATION
LOCATION: HIP
LOCATION: HIP

## 2017-08-09 ASSESSMENT — PAIN DESCRIPTION - DESCRIPTORS: DESCRIPTORS: ACHING

## 2017-08-10 VITALS
SYSTOLIC BLOOD PRESSURE: 112 MMHG | RESPIRATION RATE: 16 BRPM | HEART RATE: 84 BPM | TEMPERATURE: 98 F | OXYGEN SATURATION: 94 % | HEIGHT: 64 IN | DIASTOLIC BLOOD PRESSURE: 57 MMHG | WEIGHT: 157.9 LBS | BODY MASS INDEX: 26.96 KG/M2

## 2017-08-10 LAB
GLUCOSE BLD-MCNC: 151 MG/DL (ref 74–100)
GLUCOSE BLD-MCNC: 152 MG/DL (ref 74–100)
HCT VFR BLD CALC: 28.2 % (ref 36–46)
HEMOGLOBIN: 9.4 G/DL (ref 12–16)

## 2017-08-10 PROCEDURE — 85018 HEMOGLOBIN: CPT

## 2017-08-10 PROCEDURE — 97110 THERAPEUTIC EXERCISES: CPT

## 2017-08-10 PROCEDURE — 6370000000 HC RX 637 (ALT 250 FOR IP): Performed by: ORTHOPAEDIC SURGERY

## 2017-08-10 PROCEDURE — 6370000000 HC RX 637 (ALT 250 FOR IP): Performed by: PHYSICIAN ASSISTANT

## 2017-08-10 PROCEDURE — 36415 COLL VENOUS BLD VENIPUNCTURE: CPT

## 2017-08-10 PROCEDURE — 2580000003 HC RX 258: Performed by: ORTHOPAEDIC SURGERY

## 2017-08-10 PROCEDURE — 6370000000 HC RX 637 (ALT 250 FOR IP): Performed by: FAMILY MEDICINE

## 2017-08-10 PROCEDURE — 82947 ASSAY GLUCOSE BLOOD QUANT: CPT

## 2017-08-10 PROCEDURE — 85014 HEMATOCRIT: CPT

## 2017-08-10 PROCEDURE — 97535 SELF CARE MNGMENT TRAINING: CPT

## 2017-08-10 PROCEDURE — 97116 GAIT TRAINING THERAPY: CPT

## 2017-08-10 RX ORDER — BISACODYL 10 MG
10 SUPPOSITORY, RECTAL RECTAL ONCE
Status: COMPLETED | OUTPATIENT
Start: 2017-08-10 | End: 2017-08-10

## 2017-08-10 RX ADMIN — ACETAMINOPHEN 650 MG: 325 TABLET, FILM COATED ORAL at 04:12

## 2017-08-10 RX ADMIN — Medication 10 ML: at 08:31

## 2017-08-10 RX ADMIN — METFORMIN HYDROCHLORIDE 500 MG: 500 TABLET, FILM COATED ORAL at 08:30

## 2017-08-10 RX ADMIN — FERROUS SULFATE TAB 325 MG (65 MG ELEMENTAL FE) 325 MG: 325 (65 FE) TAB at 08:30

## 2017-08-10 RX ADMIN — INSULIN LISPRO 2 UNITS: 100 INJECTION, SOLUTION INTRAVENOUS; SUBCUTANEOUS at 08:33

## 2017-08-10 RX ADMIN — ACETAMINOPHEN 650 MG: 325 TABLET, FILM COATED ORAL at 14:26

## 2017-08-10 RX ADMIN — METFORMIN HYDROCHLORIDE 500 MG: 500 TABLET, FILM COATED ORAL at 16:49

## 2017-08-10 RX ADMIN — RIVAROXABAN 10 MG: 10 TABLET, FILM COATED ORAL at 08:30

## 2017-08-10 RX ADMIN — POLYETHYLENE GLYCOL 3350 17 G: 17 POWDER, FOR SOLUTION ORAL at 08:31

## 2017-08-10 RX ADMIN — BISACODYL 10 MG: 10 SUPPOSITORY RECTAL at 09:00

## 2017-08-10 RX ADMIN — INSULIN LISPRO 2 UNITS: 100 INJECTION, SOLUTION INTRAVENOUS; SUBCUTANEOUS at 16:50

## 2017-08-10 RX ADMIN — DOCUSATE SODIUM 100 MG: 100 CAPSULE, LIQUID FILLED ORAL at 08:30

## 2017-08-10 ASSESSMENT — PAIN DESCRIPTION - PAIN TYPE
TYPE: SURGICAL PAIN
TYPE: SURGICAL PAIN

## 2017-08-10 ASSESSMENT — PAIN SCALES - GENERAL
PAINLEVEL_OUTOF10: 1
PAINLEVEL_OUTOF10: 0
PAINLEVEL_OUTOF10: 3
PAINLEVEL_OUTOF10: 3
PAINLEVEL_OUTOF10: 0

## 2017-08-10 ASSESSMENT — PAIN DESCRIPTION - DESCRIPTORS: DESCRIPTORS: ACHING

## 2017-08-10 ASSESSMENT — PAIN DESCRIPTION - LOCATION
LOCATION: HIP
LOCATION: HIP

## 2017-08-10 ASSESSMENT — PAIN DESCRIPTION - ORIENTATION
ORIENTATION: LEFT
ORIENTATION: LEFT

## 2017-08-11 LAB
ABO/RH: NORMAL
ANTIBODY SCREEN: NEGATIVE
ARM BAND NUMBER: NORMAL
BLD PROD TYP BPU: NORMAL
BLD PROD TYP BPU: NORMAL
CROSSMATCH RESULT: NORMAL
CROSSMATCH RESULT: NORMAL
DISPENSE STATUS BLOOD BANK: NORMAL
DISPENSE STATUS BLOOD BANK: NORMAL
EXPIRATION DATE: NORMAL
TRANSFUSION STATUS: NORMAL
TRANSFUSION STATUS: NORMAL
UNIT DIVISION: 0
UNIT DIVISION: 0
UNIT NUMBER: NORMAL
UNIT NUMBER: NORMAL

## 2017-11-28 ENCOUNTER — HOSPITAL ENCOUNTER (OUTPATIENT)
Dept: VASCULAR LAB | Age: 77
Discharge: HOME | End: 2017-11-28
Payer: MEDICARE

## 2017-11-28 DIAGNOSIS — M79.662 PAIN AND SWELLING OF LEFT LOWER LEG: ICD-10-CM

## 2017-11-28 DIAGNOSIS — M79.89 PAIN AND SWELLING OF LEFT LOWER LEG: ICD-10-CM

## 2017-11-28 PROCEDURE — 93971 EXTREMITY STUDY: CPT

## 2018-03-06 ENCOUNTER — HOSPITAL ENCOUNTER (OUTPATIENT)
Dept: PREADMISSION TESTING | Age: 78
Discharge: HOME | End: 2018-03-10
Attending: PODIATRIST
Payer: MEDICARE

## 2018-03-06 ENCOUNTER — HOSPITAL ENCOUNTER (OUTPATIENT)
Dept: PHYSICAL THERAPY | Age: 78
Setting detail: THERAPIES SERIES
Discharge: HOME | End: 2018-03-06
Payer: MEDICARE

## 2018-03-06 VITALS
BODY MASS INDEX: 26.15 KG/M2 | TEMPERATURE: 97.9 F | HEIGHT: 64 IN | DIASTOLIC BLOOD PRESSURE: 78 MMHG | HEART RATE: 100 BPM | OXYGEN SATURATION: 95 % | SYSTOLIC BLOOD PRESSURE: 134 MMHG | WEIGHT: 153.2 LBS | RESPIRATION RATE: 18 BRPM

## 2018-03-06 LAB
ANION GAP SERPL CALCULATED.3IONS-SCNC: 11 MMOL/L (ref 9–17)
BUN BLDV-MCNC: 14 MG/DL (ref 8–23)
BUN/CREAT BLD: 28 (ref 9–20)
CALCIUM SERPL-MCNC: 9.5 MG/DL (ref 8.6–10.4)
CHLORIDE BLD-SCNC: 100 MMOL/L (ref 98–107)
CO2: 29 MMOL/L (ref 20–31)
CREAT SERPL-MCNC: 0.5 MG/DL (ref 0.5–0.9)
GFR AFRICAN AMERICAN: >60 ML/MIN
GFR NON-AFRICAN AMERICAN: >60 ML/MIN
GFR SERPL CREATININE-BSD FRML MDRD: ABNORMAL ML/MIN/{1.73_M2}
GFR SERPL CREATININE-BSD FRML MDRD: ABNORMAL ML/MIN/{1.73_M2}
GLUCOSE BLD-MCNC: 141 MG/DL (ref 70–99)
POTASSIUM SERPL-SCNC: 4.6 MMOL/L (ref 3.7–5.3)
SODIUM BLD-SCNC: 140 MMOL/L (ref 135–144)

## 2018-03-06 PROCEDURE — G8978 MOBILITY CURRENT STATUS: HCPCS

## 2018-03-06 PROCEDURE — 36415 COLL VENOUS BLD VENIPUNCTURE: CPT

## 2018-03-06 PROCEDURE — 80048 BASIC METABOLIC PNL TOTAL CA: CPT

## 2018-03-06 PROCEDURE — G8979 MOBILITY GOAL STATUS: HCPCS

## 2018-03-06 PROCEDURE — 97116 GAIT TRAINING THERAPY: CPT

## 2018-03-06 ASSESSMENT — PAIN DESCRIPTION - LOCATION: LOCATION: FOOT

## 2018-03-06 ASSESSMENT — PAIN DESCRIPTION - PAIN TYPE: TYPE: CHRONIC PAIN

## 2018-03-06 ASSESSMENT — PAIN SCALES - GENERAL: PAINLEVEL_OUTOF10: 2

## 2018-03-06 ASSESSMENT — PAIN DESCRIPTION - ORIENTATION: ORIENTATION: LEFT

## 2018-03-06 NOTE — PROGRESS NOTES
[facility]   Preadmission Testing    Name: Omari Fenton  : 1940  Patient Phone: [phone] (home)     Procedure LEFT FOOT SURGERY  Date of Procedure: 3-9-18  Surgeon: Didi Yao DPM    Ht:  5' 4\" (162.6 cm)  Wt: 153 lb 3.2 oz (69.5 kg)  Wt method: Actual    Allergies: Allergies   Allergen Reactions    Aleve [Naproxen Sodium] Anaphylaxis    Codeine Nausea And Vomiting    Nsaids Other (See Comments)     DIFFICULTY BREATHING      Gabapentin Other (See Comments)     Make her feel funny       Peanut allergy: No    Latex Allergy Screening Tool  Have you ever had a reaction to or been told by a physician that you have an allergy to latex or natural rubber?: No    Vitals:    18 0947   BP: 134/78   Pulse: 100   Resp: 18   Temp: 97.9 °F (36.6 °C)   SpO2: 95%       No LMP recorded. Patient has had a hysterectomy. Do you take blood thinners? [] Yes    [x] No         Instructed to stop blood thinners prior to procedure? [x] Yes    [] No      [] N/A   Do you have sleep apnea? [] Yes    [x] No     Instructed to bring CPAP machine? [] Yes    [] No    [x] N/A   Do you have acid reflux ? [] Yes    [x] No     Do you have  hiatal hernia? [] Yes    [x] No    Do you ever experience motion sickness? [x] Yes    [] No     Have you had a respiratory infection or sore throat in last 4 weeks before surgery? [] Yes    [x] No     Do you have poorly controlled asthma or COPD? [] Yes    [x] No     Do you have a history of angina in the last month or symptomatic arrhythmia? [] Yes    [x] No     Do you have significant central nervous system disease? [] Yes    [x] No     Have you had an EKG, labs, or chest xray in last 12 months?   If yes provide copies to anesthesia   [x] Yes    [] No       [x] Lab    [x] EKG    [] CXR     Have you had a stress test?     [] Yes    [x] No    When/where:     Was it normal?    [] Yes    [] No   Do you or your family have a history of Malignant Hyperthermia? [] Yes    [x] No               PAT Call/Visit Questions  Person Interviewed: PATIENT  Surgery Time Verified: Yes  Arrival Time Verified: 5  Surgery Location Verified: Yes  Patient Language: ENGLISH  Medical History Reviewed: Yes  NPO Status Reinforced: Yes  Ride and Caregiver Arranged: Yes  Ride Caregiver Provider: SON-GENE  Patient Knows to Bring Current Medications: Yes    Pre-AdmissionTesting Checklist  Patient has been to this health system before?: Yes  Does patient refuse blood?: No  Healthcare Directive: Yes, patient has an advance directive for healthcare treatment   needed: No  Patient can read and write?: Yes  History given by: Patient  Providing self care at home?: Yes  Discharge transport (for same day patients): Family    Patient instructed on the pre-operative, intra-operative, and post-operative process? Yes  Medication instructions reviewed with patient? Yes  Pre operative instruction sheet reviewed and given to patient in PAT?   Yes

## 2018-03-08 ENCOUNTER — ANESTHESIA EVENT (OUTPATIENT)
Dept: OPERATING ROOM | Age: 78
End: 2018-03-08
Payer: MEDICARE

## 2018-03-09 ENCOUNTER — APPOINTMENT (OUTPATIENT)
Dept: GENERAL RADIOLOGY | Age: 78
End: 2018-03-09
Attending: PODIATRIST
Payer: MEDICARE

## 2018-03-09 ENCOUNTER — HOSPITAL ENCOUNTER (OUTPATIENT)
Age: 78
Setting detail: OUTPATIENT SURGERY
Discharge: HOME | End: 2018-03-09
Attending: PODIATRIST | Admitting: PODIATRIST
Payer: MEDICARE

## 2018-03-09 ENCOUNTER — ANESTHESIA (OUTPATIENT)
Dept: OPERATING ROOM | Age: 78
End: 2018-03-09
Payer: MEDICARE

## 2018-03-09 VITALS
DIASTOLIC BLOOD PRESSURE: 52 MMHG | SYSTOLIC BLOOD PRESSURE: 83 MMHG | RESPIRATION RATE: 2 BRPM | TEMPERATURE: 96.4 F | OXYGEN SATURATION: 96 %

## 2018-03-09 VITALS
OXYGEN SATURATION: 97 % | TEMPERATURE: 97 F | HEART RATE: 89 BPM | SYSTOLIC BLOOD PRESSURE: 128 MMHG | BODY MASS INDEX: 26.12 KG/M2 | HEIGHT: 64 IN | WEIGHT: 153 LBS | RESPIRATION RATE: 16 BRPM | DIASTOLIC BLOOD PRESSURE: 74 MMHG

## 2018-03-09 LAB — GLUCOSE BLD-MCNC: 156 MG/DL (ref 74–100)

## 2018-03-09 PROCEDURE — 73600 X-RAY EXAM OF ANKLE: CPT

## 2018-03-09 PROCEDURE — 64445 NJX AA&/STRD SCIATIC NRV IMG: CPT | Performed by: NURSE ANESTHETIST, CERTIFIED REGISTERED

## 2018-03-09 PROCEDURE — 7100000011 HC PHASE II RECOVERY - ADDTL 15 MIN: Performed by: PODIATRIST

## 2018-03-09 PROCEDURE — 3700000000 HC ANESTHESIA ATTENDED CARE: Performed by: PODIATRIST

## 2018-03-09 PROCEDURE — 6360000002 HC RX W HCPCS: Performed by: NURSE ANESTHETIST, CERTIFIED REGISTERED

## 2018-03-09 PROCEDURE — 3700000001 HC ADD 15 MINUTES (ANESTHESIA): Performed by: PODIATRIST

## 2018-03-09 PROCEDURE — 7100000000 HC PACU RECOVERY - FIRST 15 MIN: Performed by: PODIATRIST

## 2018-03-09 PROCEDURE — 7100000010 HC PHASE II RECOVERY - FIRST 15 MIN: Performed by: PODIATRIST

## 2018-03-09 PROCEDURE — 88311 DECALCIFY TISSUE: CPT

## 2018-03-09 PROCEDURE — 82947 ASSAY GLUCOSE BLOOD QUANT: CPT

## 2018-03-09 PROCEDURE — 88304 TISSUE EXAM BY PATHOLOGIST: CPT

## 2018-03-09 PROCEDURE — 6360000002 HC RX W HCPCS: Performed by: PODIATRIST

## 2018-03-09 PROCEDURE — 3600000013 HC SURGERY LEVEL 3 ADDTL 15MIN: Performed by: PODIATRIST

## 2018-03-09 PROCEDURE — 7100000001 HC PACU RECOVERY - ADDTL 15 MIN: Performed by: PODIATRIST

## 2018-03-09 PROCEDURE — 2580000003 HC RX 258: Performed by: PODIATRIST

## 2018-03-09 PROCEDURE — 3600000003 HC SURGERY LEVEL 3 BASE: Performed by: PODIATRIST

## 2018-03-09 PROCEDURE — 2500000003 HC RX 250 WO HCPCS: Performed by: NURSE ANESTHETIST, CERTIFIED REGISTERED

## 2018-03-09 RX ORDER — DEXAMETHASONE SODIUM PHOSPHATE 10 MG/ML
INJECTION INTRAMUSCULAR; INTRAVENOUS PRN
Status: DISCONTINUED | OUTPATIENT
Start: 2018-03-09 | End: 2018-03-09 | Stop reason: SDUPTHER

## 2018-03-09 RX ORDER — PROPOFOL 10 MG/ML
INJECTION, EMULSION INTRAVENOUS PRN
Status: DISCONTINUED | OUTPATIENT
Start: 2018-03-09 | End: 2018-03-09 | Stop reason: SDUPTHER

## 2018-03-09 RX ORDER — ROPIVACAINE HYDROCHLORIDE 5 MG/ML
INJECTION, SOLUTION EPIDURAL; INFILTRATION; PERINEURAL PRN
Status: DISCONTINUED | OUTPATIENT
Start: 2018-03-09 | End: 2018-03-09 | Stop reason: SDUPTHER

## 2018-03-09 RX ORDER — SODIUM CHLORIDE, SODIUM LACTATE, POTASSIUM CHLORIDE, CALCIUM CHLORIDE 600; 310; 30; 20 MG/100ML; MG/100ML; MG/100ML; MG/100ML
INJECTION, SOLUTION INTRAVENOUS CONTINUOUS
Status: DISCONTINUED | OUTPATIENT
Start: 2018-03-09 | End: 2018-03-09 | Stop reason: HOSPADM

## 2018-03-09 RX ORDER — ONDANSETRON 2 MG/ML
INJECTION INTRAMUSCULAR; INTRAVENOUS PRN
Status: DISCONTINUED | OUTPATIENT
Start: 2018-03-09 | End: 2018-03-09 | Stop reason: SDUPTHER

## 2018-03-09 RX ORDER — LIDOCAINE HYDROCHLORIDE 10 MG/ML
INJECTION, SOLUTION INFILTRATION; PERINEURAL PRN
Status: DISCONTINUED | OUTPATIENT
Start: 2018-03-09 | End: 2018-03-09 | Stop reason: SDUPTHER

## 2018-03-09 RX ORDER — SODIUM CHLORIDE, SODIUM LACTATE, POTASSIUM CHLORIDE, CALCIUM CHLORIDE 600; 310; 30; 20 MG/100ML; MG/100ML; MG/100ML; MG/100ML
INJECTION, SOLUTION INTRAVENOUS CONTINUOUS PRN
Status: DISCONTINUED | OUTPATIENT
Start: 2018-03-09 | End: 2018-03-09

## 2018-03-09 RX ADMIN — SODIUM CHLORIDE, POTASSIUM CHLORIDE, SODIUM LACTATE AND CALCIUM CHLORIDE: 600; 310; 30; 20 INJECTION, SOLUTION INTRAVENOUS at 07:12

## 2018-03-09 RX ADMIN — CEFAZOLIN SODIUM 2 G: 2 SOLUTION INTRAVENOUS at 08:07

## 2018-03-09 RX ADMIN — DEXAMETHASONE SODIUM PHOSPHATE 5 MG: 10 INJECTION INTRAMUSCULAR; INTRAVENOUS at 07:41

## 2018-03-09 RX ADMIN — ONDANSETRON 4 MG: 2 INJECTION INTRAMUSCULAR; INTRAVENOUS at 09:06

## 2018-03-09 RX ADMIN — LIDOCAINE HYDROCHLORIDE 60 MG: 10 INJECTION, SOLUTION INFILTRATION; PERINEURAL at 08:15

## 2018-03-09 RX ADMIN — PROPOFOL 150 MG: 10 INJECTION, EMULSION INTRAVENOUS at 08:15

## 2018-03-09 RX ADMIN — ROPIVACAINE HYDROCHLORIDE 20 ML: 5 INJECTION, SOLUTION EPIDURAL; INFILTRATION; PERINEURAL at 07:41

## 2018-03-09 ASSESSMENT — PULMONARY FUNCTION TESTS
PIF_VALUE: 2
PIF_VALUE: 3
PIF_VALUE: 2
PIF_VALUE: 2
PIF_VALUE: 16
PIF_VALUE: 2
PIF_VALUE: 2
PIF_VALUE: 0
PIF_VALUE: 22
PIF_VALUE: 15
PIF_VALUE: 2
PIF_VALUE: 3
PIF_VALUE: 4
PIF_VALUE: 2
PIF_VALUE: 0
PIF_VALUE: 2
PIF_VALUE: 1
PIF_VALUE: 2
PIF_VALUE: 3
PIF_VALUE: 2
PIF_VALUE: 2
PIF_VALUE: 3
PIF_VALUE: 23
PIF_VALUE: 17
PIF_VALUE: 2
PIF_VALUE: 0
PIF_VALUE: 2
PIF_VALUE: 5
PIF_VALUE: 2
PIF_VALUE: 8
PIF_VALUE: 5
PIF_VALUE: 2
PIF_VALUE: 21
PIF_VALUE: 0
PIF_VALUE: 2
PIF_VALUE: 2
PIF_VALUE: 0
PIF_VALUE: 1
PIF_VALUE: 3
PIF_VALUE: 20
PIF_VALUE: 2
PIF_VALUE: 2
PIF_VALUE: 1
PIF_VALUE: 3
PIF_VALUE: 2
PIF_VALUE: 16
PIF_VALUE: 2

## 2018-03-09 ASSESSMENT — PAIN SCALES - GENERAL
PAINLEVEL_OUTOF10: 0

## 2018-03-09 ASSESSMENT — ENCOUNTER SYMPTOMS: DYSPNEA ACTIVITY LEVEL: AFTER AMBULATING 1 FLIGHT OF STAIRS

## 2018-03-09 NOTE — ANESTHESIA PRE PROCEDURE
Department of Anesthesiology  Preprocedure Note       Name:  Chey Seay   Age:  76 y.o.  :  1940                                          MRN:  [id number]         Date:  3/9/2018      Surgeon: Moose Lemus):  Britney Elias DPRA    Procedure: Procedure(s):  ACHILLES TENDON LENGTHENING REPAIR/ EXCISION OF OS PERONEUM,PRIMARY REPAIR OF PERONEUS LINGUS TENDON WITH POSSIBLE PERONEAL TENODESIS    Medications prior to admission:   Prior to Admission medications    Medication Sig Start Date End Date Taking? Authorizing Provider   acetaminophen (TYLENOL) 325 MG tablet Take 2 tablets by mouth every 4 hours as needed for Pain 17   Glorine Showers, MD   latanoprost (XALATAN) 0.005 % ophthalmic solution Place 1 drop into both eyes nightly    Historical Provider, MD   Polyvinyl Alcohol-Povidone (TEARS PLUS OP) Apply 1 drop to eye 3 times daily    Historical Provider, MD   metFORMIN (GLUCOPHAGE) 500 MG tablet Take 500 mg by mouth 2 times daily (with meals)    Historical Provider, MD       Current medications:    Current Facility-Administered Medications   Medication Dose Route Frequency Provider Last Rate Last Dose    lactated ringers infusion   Intravenous Continuous Derl Blinks, DPM        ceFAZolin (ANCEF) 2 g in dextrose 3 % 50 mL IVPB (duplex)  2 g Intravenous Once Derl Blinks, DPM         Facility-Administered Medications Ordered in Other Encounters   Medication Dose Route Frequency Provider Last Rate Last Dose    isoproterenol (ISUPREL) 4 mcg/mL in dextrose 5 % 250 mL infusion  1 mcg/min Intravenous Continuous Ginger Staley MD 15 mL/hr at 07/10/13 1045 1 mcg/min at 07/10/13 1045       Allergies:     Allergies   Allergen Reactions    Aleve [Naproxen Sodium] Anaphylaxis    Codeine Nausea And Vomiting    Nsaids Other (See Comments)     DIFFICULTY BREATHING      Gabapentin Other (See Comments)     Make her feel funny       Problem List:    Patient Active Problem List   Diagnosis Code    Aphakia of Cardiovascular:  Exercise tolerance: poor (<4 METS),   (+) hypertension: moderate, MACK: after ambulating 1 flight of stairs,          Beta Blocker:  Not on Beta Blocker         Neuro/Psych:                ROS comment: S/P Cervical and lumbar disc surgeries. S/P Multiple eye surgeries. GI/Hepatic/Renal:        (-) GERD       Endo/Other:    (+) Diabetes (BS 3/9/18 = 156)Type II DM, well controlled, , .                 Abdominal:         (-) obese     Vascular:                                      Anesthesia Plan      general and regional     ASA 3     (Discussed R/B/A of sciatic nerve block, pt agrees and wishes to proceed. )  Induction: intravenous. MIPS: Postoperative opioids intended and Prophylactic antiemetics administered. Anesthetic plan and risks discussed with patient.                       Wm Pires II, CRNA   3/9/2018

## 2018-03-09 NOTE — ANESTHESIA PROCEDURE NOTES
Peripheral Block    Patient location during procedure: pre-op  Start time: 3/9/2018 7:35 AM  End time: 3/9/2018 7:43 AM  Staffing  Resident/CRNA: Debora LUNA  Performed: resident/CRNA   Preanesthetic Checklist  Completed: patient identified, site marked, surgical consent, pre-op evaluation, timeout performed, IV checked, risks and benefits discussed, monitors and equipment checked, anesthesia consent given, oxygen available and patient being monitored  Peripheral Block  Patient position: prone  Prep: ChloraPrep  Patient monitoring: cardiac monitor, continuous pulse ox, frequent blood pressure checks and IV access  Block type: Sciatic  Laterality: left  Injection technique: single-shot  Procedures: ultrasound guided  Local infiltration: ropivacaine and decadron  Infiltration strength: 0.5 %  Dose: 20 mL  Popliteal  Provider prep: mask and sterile gloves  Local infiltration: ropivacaine and decadron  Needle  Needle type: short-bevel   Needle gauge: 22 G  Needle length: 8 cm  Needle localization: nerve stimulator and ultrasound guidance  Assessment  Injection assessment: negative aspiration for heme, no paresthesia on injection and local visualized surrounding nerve on ultrasound  Paresthesia pain: none  Slow fractionated injection: yes  Hemodynamics: stable  Additional Notes  Loss of motor response at 0.35 mA, pt tolerated procedure well.    Reason for block: post-op pain management and at surgeon's request

## 2018-03-09 NOTE — BRIEF OP NOTE
Brief Postoperative Note  ______________________________________________________________    Patient: Martha Pathak  YOB: 1940  MRN: [id number]  Date of Procedure: 3/9/2018    Pre-Op Diagnosis: TEAR OF PERONEAL TENDON LEFT FOOT    Post-Op Diagnosis: Same       Procedure(s):  ACHILLES TENDON LENGTHENING REPAIR/ EXCISION OF OS PERONEUM,PRIMARY REPAIR OF PERONEUS LINGUS TENDON WITH POSSIBLE PERONEAL TENODESIS    Anesthesia: Regional, General    Surgeon(s):  Relda Phalen, DPM    Staff:  Scrub Person First: Joanne Earl  Scrub Person Second: Claudia Choe     Estimated Blood Loss: 5 mL    Complications: None  Specimens:   ID Type Source Tests Collected by Time Destination   A : OS PERONEUML LEFT FOOT Tissue Foot SURGICAL PATHOLOGY Relda Phalen, DPM 3/9/2018 0846        Implants:  None      Drains:  None    Findings: See the operative narrative    Relda Phalen, DPM  Date: 3/9/2018  Time: 9:20 AM

## 2018-03-13 LAB — SURGICAL PATHOLOGY REPORT: NORMAL

## 2018-03-27 ENCOUNTER — HOSPITAL ENCOUNTER (OUTPATIENT)
Dept: PHYSICAL THERAPY | Age: 78
Setting detail: THERAPIES SERIES
Discharge: HOME | End: 2018-03-27
Payer: MEDICARE

## 2018-03-27 PROCEDURE — 97161 PT EVAL LOW COMPLEX 20 MIN: CPT

## 2018-03-27 PROCEDURE — G0283 ELEC STIM OTHER THAN WOUND: HCPCS

## 2018-03-27 PROCEDURE — G8979 MOBILITY GOAL STATUS: HCPCS

## 2018-03-27 PROCEDURE — G8978 MOBILITY CURRENT STATUS: HCPCS

## 2018-03-27 PROCEDURE — 97110 THERAPEUTIC EXERCISES: CPT

## 2018-03-28 NOTE — PROGRESS NOTES
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 Pt states she is currently NWB for next 2-3 weeks. Pt ambulating with boot donned on LLE and use of RW. Pt states pain has been minimal thus far. Pt states she RTD on 18. Pain Screening  Patient Currently in Pain: Denies          Objective     Observation/Palpation  Observation: Incision healing well without concerning characteristics. Edema: Figure 8 measurement= 1in greater on L than R          LLE AROM: PF 48*, DF lacking 13* from neutral       Assessment  Assessment: Pt is a 77 y/o female who presents to PT s/p L ankle surgery. Pt currently ambulating with RW and boot donned on LLE and NWB on LLE. Pt demo limited L ankle AROM compared bilaterally and increased edema compared bilaterally. Pt will benefit from skilled PT services to address impairments and to work toward established functional goals. Prognosis: Good        Decision Making: Medium Complexity    Patient Education  PT POC and HEP. Pt verbalized/demonstrated good understanding:     [x] Yes         [] No, pt required further clarification.       [x] Primary Impairment :   G Code:    [x] Mobility         [] Carry        [] Body Position       [] Self Care

## 2018-03-29 ENCOUNTER — HOSPITAL ENCOUNTER (OUTPATIENT)
Dept: PHYSICAL THERAPY | Age: 78
Setting detail: THERAPIES SERIES
Discharge: HOME | End: 2018-03-29
Payer: MEDICARE

## 2018-03-29 PROCEDURE — 97140 MANUAL THERAPY 1/> REGIONS: CPT

## 2018-03-29 PROCEDURE — G0283 ELEC STIM OTHER THAN WOUND: HCPCS

## 2018-03-29 PROCEDURE — 97110 THERAPEUTIC EXERCISES: CPT

## 2018-04-03 ENCOUNTER — HOSPITAL ENCOUNTER (OUTPATIENT)
Dept: PHYSICAL THERAPY | Age: 78
Setting detail: THERAPIES SERIES
Discharge: HOME | End: 2018-04-03
Payer: MEDICARE

## 2018-04-03 PROCEDURE — 97110 THERAPEUTIC EXERCISES: CPT

## 2018-04-03 PROCEDURE — G0283 ELEC STIM OTHER THAN WOUND: HCPCS

## 2018-04-03 PROCEDURE — 97140 MANUAL THERAPY 1/> REGIONS: CPT

## 2018-04-05 ENCOUNTER — HOSPITAL ENCOUNTER (OUTPATIENT)
Dept: PHYSICAL THERAPY | Age: 78
Setting detail: THERAPIES SERIES
Discharge: HOME | End: 2018-04-05
Payer: MEDICARE

## 2018-04-05 PROCEDURE — G0283 ELEC STIM OTHER THAN WOUND: HCPCS

## 2018-04-05 PROCEDURE — 97110 THERAPEUTIC EXERCISES: CPT

## 2018-04-05 ASSESSMENT — PAIN DESCRIPTION - PAIN TYPE: TYPE: ACUTE PAIN

## 2018-04-05 ASSESSMENT — PAIN DESCRIPTION - LOCATION: LOCATION: ANKLE

## 2018-04-05 ASSESSMENT — PAIN SCALES - GENERAL: PAINLEVEL_OUTOF10: 4

## 2018-04-05 ASSESSMENT — PAIN DESCRIPTION - ORIENTATION: ORIENTATION: LEFT;OUTER

## 2018-04-10 ENCOUNTER — HOSPITAL ENCOUNTER (OUTPATIENT)
Dept: PHYSICAL THERAPY | Age: 78
Setting detail: THERAPIES SERIES
Discharge: HOME | End: 2018-04-10
Payer: MEDICARE

## 2018-04-10 PROCEDURE — G0283 ELEC STIM OTHER THAN WOUND: HCPCS

## 2018-04-10 PROCEDURE — 97140 MANUAL THERAPY 1/> REGIONS: CPT

## 2018-04-10 PROCEDURE — 97110 THERAPEUTIC EXERCISES: CPT

## 2018-04-10 ASSESSMENT — PAIN SCALES - GENERAL: PAINLEVEL_OUTOF10: 3

## 2018-04-12 ENCOUNTER — HOSPITAL ENCOUNTER (OUTPATIENT)
Dept: PHYSICAL THERAPY | Age: 78
Setting detail: THERAPIES SERIES
Discharge: HOME | End: 2018-04-12
Payer: MEDICARE

## 2018-04-12 PROCEDURE — 97110 THERAPEUTIC EXERCISES: CPT

## 2018-04-12 PROCEDURE — G0283 ELEC STIM OTHER THAN WOUND: HCPCS

## 2018-04-12 PROCEDURE — 97140 MANUAL THERAPY 1/> REGIONS: CPT

## 2018-04-17 ENCOUNTER — HOSPITAL ENCOUNTER (OUTPATIENT)
Dept: PHYSICAL THERAPY | Age: 78
Setting detail: THERAPIES SERIES
Discharge: HOME | End: 2018-04-17
Payer: MEDICARE

## 2018-04-17 PROCEDURE — 97110 THERAPEUTIC EXERCISES: CPT

## 2018-04-17 PROCEDURE — G0283 ELEC STIM OTHER THAN WOUND: HCPCS

## 2018-04-19 ENCOUNTER — HOSPITAL ENCOUNTER (OUTPATIENT)
Dept: PHYSICAL THERAPY | Age: 78
Setting detail: THERAPIES SERIES
Discharge: HOME | End: 2018-04-19
Payer: MEDICARE

## 2018-04-19 PROCEDURE — 97110 THERAPEUTIC EXERCISES: CPT

## 2018-04-19 PROCEDURE — G0283 ELEC STIM OTHER THAN WOUND: HCPCS

## 2018-04-19 ASSESSMENT — PAIN SCALES - GENERAL: PAINLEVEL_OUTOF10: 4

## 2018-04-24 ENCOUNTER — HOSPITAL ENCOUNTER (OUTPATIENT)
Dept: PHYSICAL THERAPY | Age: 78
Setting detail: THERAPIES SERIES
Discharge: HOME | End: 2018-04-24
Payer: MEDICARE

## 2018-04-24 PROCEDURE — 97110 THERAPEUTIC EXERCISES: CPT

## 2018-04-24 PROCEDURE — G0283 ELEC STIM OTHER THAN WOUND: HCPCS

## 2018-04-24 PROCEDURE — G8979 MOBILITY GOAL STATUS: HCPCS

## 2018-04-24 PROCEDURE — G8978 MOBILITY CURRENT STATUS: HCPCS

## 2018-04-24 ASSESSMENT — PAIN SCALES - GENERAL: PAINLEVEL_OUTOF10: 3

## 2018-04-26 ENCOUNTER — APPOINTMENT (OUTPATIENT)
Dept: PHYSICAL THERAPY | Age: 78
End: 2018-04-26
Payer: MEDICARE

## 2018-04-27 ENCOUNTER — HOSPITAL ENCOUNTER (OUTPATIENT)
Dept: PHYSICAL THERAPY | Age: 78
Setting detail: THERAPIES SERIES
Discharge: HOME | End: 2018-04-27
Payer: MEDICARE

## 2018-04-27 PROCEDURE — G0283 ELEC STIM OTHER THAN WOUND: HCPCS

## 2018-04-27 PROCEDURE — 97110 THERAPEUTIC EXERCISES: CPT

## 2018-05-01 ENCOUNTER — HOSPITAL ENCOUNTER (OUTPATIENT)
Dept: PHYSICAL THERAPY | Age: 78
Setting detail: THERAPIES SERIES
Discharge: HOME | End: 2018-05-01
Payer: MEDICARE

## 2018-05-01 PROCEDURE — G0283 ELEC STIM OTHER THAN WOUND: HCPCS

## 2018-05-01 PROCEDURE — 97110 THERAPEUTIC EXERCISES: CPT

## 2018-05-03 ENCOUNTER — HOSPITAL ENCOUNTER (OUTPATIENT)
Dept: PHYSICAL THERAPY | Age: 78
Setting detail: THERAPIES SERIES
Discharge: HOME | End: 2018-05-03
Payer: MEDICARE

## 2018-05-03 PROCEDURE — G0283 ELEC STIM OTHER THAN WOUND: HCPCS

## 2018-05-03 PROCEDURE — 97110 THERAPEUTIC EXERCISES: CPT

## 2018-05-03 ASSESSMENT — PAIN SCALES - GENERAL: PAINLEVEL_OUTOF10: 2

## 2018-05-08 ENCOUNTER — HOSPITAL ENCOUNTER (OUTPATIENT)
Dept: PHYSICAL THERAPY | Age: 78
Setting detail: THERAPIES SERIES
Discharge: HOME | End: 2018-05-08
Payer: MEDICARE

## 2018-05-08 PROCEDURE — G0283 ELEC STIM OTHER THAN WOUND: HCPCS

## 2018-05-08 PROCEDURE — 97110 THERAPEUTIC EXERCISES: CPT

## 2018-05-10 ENCOUNTER — HOSPITAL ENCOUNTER (OUTPATIENT)
Dept: PHYSICAL THERAPY | Age: 78
Setting detail: THERAPIES SERIES
Discharge: HOME | End: 2018-05-10
Payer: MEDICARE

## 2018-05-10 PROCEDURE — 97110 THERAPEUTIC EXERCISES: CPT

## 2018-05-15 ENCOUNTER — HOSPITAL ENCOUNTER (OUTPATIENT)
Dept: PHYSICAL THERAPY | Age: 78
Setting detail: THERAPIES SERIES
Discharge: HOME | End: 2018-05-15
Payer: MEDICARE

## 2018-05-15 PROCEDURE — 97110 THERAPEUTIC EXERCISES: CPT

## 2018-05-17 ENCOUNTER — HOSPITAL ENCOUNTER (OUTPATIENT)
Dept: PHYSICAL THERAPY | Age: 78
Setting detail: THERAPIES SERIES
Discharge: HOME | End: 2018-05-17
Payer: MEDICARE

## 2018-05-17 PROCEDURE — 97110 THERAPEUTIC EXERCISES: CPT

## 2018-05-17 ASSESSMENT — PAIN SCALES - GENERAL: PAINLEVEL_OUTOF10: 0

## 2018-05-22 ENCOUNTER — HOSPITAL ENCOUNTER (OUTPATIENT)
Dept: PHYSICAL THERAPY | Age: 78
Setting detail: THERAPIES SERIES
Discharge: HOME | End: 2018-05-22
Payer: MEDICARE

## 2018-05-22 PROCEDURE — 97110 THERAPEUTIC EXERCISES: CPT

## 2018-05-25 ENCOUNTER — HOSPITAL ENCOUNTER (OUTPATIENT)
Dept: PHYSICAL THERAPY | Age: 78
Setting detail: THERAPIES SERIES
Discharge: HOME | End: 2018-05-25
Payer: MEDICARE

## 2018-05-25 PROCEDURE — G8978 MOBILITY CURRENT STATUS: HCPCS

## 2018-05-25 PROCEDURE — G8979 MOBILITY GOAL STATUS: HCPCS

## 2018-05-25 PROCEDURE — G8980 MOBILITY D/C STATUS: HCPCS

## 2018-05-25 PROCEDURE — 97110 THERAPEUTIC EXERCISES: CPT

## 2018-05-29 ENCOUNTER — APPOINTMENT (OUTPATIENT)
Dept: PHYSICAL THERAPY | Age: 78
End: 2018-05-29
Payer: MEDICARE

## 2018-05-31 ENCOUNTER — HOSPITAL ENCOUNTER (OUTPATIENT)
Dept: PHYSICAL THERAPY | Age: 78
Setting detail: THERAPIES SERIES
Discharge: HOME | End: 2018-05-31
Payer: MEDICARE

## 2018-12-27 NOTE — DISCHARGE SUMMARY
Phone: Gigi          Fax: [phone]                            Outpatient Physical Therapy                                                                    Discharge Summary    Patient: Ghassan Rater  : 1940  CSN #: [id number]   Referring physician: No admitting provider for patient encounter. 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)      Date Treatment Initiated: 3/27/18  Date of Last Treatment: 18      PT Visit Information  Onset Date: 18  PT Insurance Information: Medicare/[employer]  Total # of Visits Approved: 18  Total # of Visits to Date:   Plan of Care/Certification Expiration Date: 18  No Show: 0  Canceled Appointment: 0      Frequency/Duration   2 times per week   6 weeks      Treatment Received  [x]HP/CP      [x]Electrical Stim   [x]Therapeutic Exercise      [x]Gait Training  []Aquatics   []Ultrasound         [x]Patient Education/HEP   [x]Manual Therapy  []Traction    []Neuro-blanca        [x]Soft Tissue Mobs            []Home TENS  []Iontophoresis    []Orthotic casting/fitting      []Dry Needling    Assessment  Assessment: Pt completed 17 PT visits for left foot and ankle. At last session, pt was placed on hold due to MD stating to continue with HEP. No other visits were scheduled. We will now discharge. Goals  Short term goals  Time Frame for Short term goals: 3 weeks  Short term goal 1: Pt will initiate HEP. - met  Short term goal 2: Pt will improve L ankle DF AROM to be lacking </=8 degrees from neutral for improved functional mobility. - met    Long term goals  Time Frame for Long term goals : 6 weeks  Long term goal 1: Pt will be independent and compliant with HEP. - MET   Long term goal 2: Pt will demonstrate L ankle AROM to be [facility] compared bilaterally for improved gait quality.   Long term goal 3: Pt will ambulate household and community distances without AD and with min/no antalgia. - MET  Long term goal 4: Pt will demo L ankle strength grossly 4 to 4+/5 for ease of gait and stair navigation.        Reason for Discharge  [] Goals Achieved                       [] Poor Follow Through/Attendance                  [x] Optimal Function Achieved     [] Patient Discharged Self    [] Hospitalization                         [x] Physician discharge      Thank you for this referral      Catalina Machado, PT, DPT, CMPT                    Date: 8/27/2018

## 2019-01-05 ENCOUNTER — HOSPITAL ENCOUNTER (EMERGENCY)
Age: 79
Discharge: HOME | End: 2019-01-05
Attending: EMERGENCY MEDICINE
Payer: MEDICARE

## 2019-01-05 ENCOUNTER — APPOINTMENT (OUTPATIENT)
Dept: GENERAL RADIOLOGY | Age: 79
End: 2019-01-05
Payer: MEDICARE

## 2019-01-05 VITALS
HEART RATE: 92 BPM | BODY MASS INDEX: 24.72 KG/M2 | TEMPERATURE: 97.4 F | WEIGHT: 144 LBS | SYSTOLIC BLOOD PRESSURE: 156 MMHG | RESPIRATION RATE: 16 BRPM | DIASTOLIC BLOOD PRESSURE: 87 MMHG | OXYGEN SATURATION: 98 %

## 2019-01-05 DIAGNOSIS — S61.216A LACERATION OF RIGHT LITTLE FINGER WITHOUT FOREIGN BODY, NAIL DAMAGE STATUS UNSPECIFIED, INITIAL ENCOUNTER: ICD-10-CM

## 2019-01-05 DIAGNOSIS — S61.214A LACERATION OF RIGHT RING FINGER, FOREIGN BODY PRESENCE UNSPECIFIED, NAIL DAMAGE STATUS UNSPECIFIED, INITIAL ENCOUNTER: Primary | ICD-10-CM

## 2019-01-05 PROCEDURE — 12001 RPR S/N/AX/GEN/TRNK 2.5CM/<: CPT

## 2019-01-05 PROCEDURE — 90471 IMMUNIZATION ADMIN: CPT | Performed by: EMERGENCY MEDICINE

## 2019-01-05 PROCEDURE — 90715 TDAP VACCINE 7 YRS/> IM: CPT | Performed by: EMERGENCY MEDICINE

## 2019-01-05 PROCEDURE — 73140 X-RAY EXAM OF FINGER(S): CPT

## 2019-01-05 PROCEDURE — 99282 EMERGENCY DEPT VISIT SF MDM: CPT

## 2019-01-05 PROCEDURE — 6360000002 HC RX W HCPCS: Performed by: EMERGENCY MEDICINE

## 2019-01-05 RX ORDER — LIDOCAINE HYDROCHLORIDE AND EPINEPHRINE 10; 10 MG/ML; UG/ML
20 INJECTION, SOLUTION INFILTRATION; PERINEURAL ONCE
Status: DISCONTINUED | OUTPATIENT
Start: 2019-01-05 | End: 2019-01-05 | Stop reason: HOSPADM

## 2019-01-05 RX ORDER — LIDOCAINE HYDROCHLORIDE 10 MG/ML
INJECTION, SOLUTION INFILTRATION; PERINEURAL
Status: DISCONTINUED
Start: 2019-01-05 | End: 2019-01-05 | Stop reason: HOSPADM

## 2019-01-05 RX ADMIN — TETANUS TOXOID, REDUCED DIPHTHERIA TOXOID AND ACELLULAR PERTUSSIS VACCINE, ADSORBED 0.5 ML: 5; 2.5; 8; 8; 2.5 SUSPENSION INTRAMUSCULAR at 10:36

## 2019-04-02 ENCOUNTER — HOSPITAL ENCOUNTER (OUTPATIENT)
Age: 79
Discharge: HOME | End: 2019-04-02
Payer: MEDICARE

## 2019-04-02 LAB
ABSOLUTE EOS #: 0.16 K/UL (ref 0–0.44)
ABSOLUTE IMMATURE GRANULOCYTE: <0.03 K/UL (ref 0–0.3)
ABSOLUTE LYMPH #: 2.24 K/UL (ref 1.1–3.7)
ABSOLUTE MONO #: 0.59 K/UL (ref 0.1–1.2)
ANION GAP SERPL CALCULATED.3IONS-SCNC: 13 MMOL/L (ref 9–17)
BASOPHILS # BLD: 0 % (ref 0–2)
BASOPHILS ABSOLUTE: 0.03 K/UL (ref 0–0.2)
BUN BLDV-MCNC: 15 MG/DL (ref 8–23)
BUN/CREAT BLD: 23 (ref 9–20)
CALCIUM SERPL-MCNC: 9.8 MG/DL (ref 8.6–10.4)
CHLORIDE BLD-SCNC: 98 MMOL/L (ref 98–107)
CO2: 27 MMOL/L (ref 20–31)
CREAT SERPL-MCNC: 0.64 MG/DL (ref 0.5–0.9)
DIFFERENTIAL TYPE: ABNORMAL
EOSINOPHILS RELATIVE PERCENT: 2 % (ref 1–4)
GFR AFRICAN AMERICAN: >60 ML/MIN
GFR NON-AFRICAN AMERICAN: >60 ML/MIN
GFR SERPL CREATININE-BSD FRML MDRD: ABNORMAL ML/MIN/{1.73_M2}
GFR SERPL CREATININE-BSD FRML MDRD: ABNORMAL ML/MIN/{1.73_M2}
GLUCOSE BLD-MCNC: 129 MG/DL (ref 70–99)
HCT VFR BLD CALC: 38.7 % (ref 36.3–47.1)
HEMOGLOBIN: 12.5 G/DL (ref 11.9–15.1)
IMMATURE GRANULOCYTES: 0 %
LYMPHOCYTES # BLD: 25 % (ref 24–43)
MCH RBC QN AUTO: 30.9 PG (ref 25.2–33.5)
MCHC RBC AUTO-ENTMCNC: 32.3 G/DL (ref 28.4–34.8)
MCV RBC AUTO: 95.6 FL (ref 82.6–102.9)
MONOCYTES # BLD: 7 % (ref 3–12)
NRBC AUTOMATED: 0 PER 100 WBC
PDW BLD-RTO: 12.9 % (ref 11.8–14.4)
PLATELET # BLD: 288 K/UL (ref 138–453)
PLATELET ESTIMATE: ABNORMAL
PMV BLD AUTO: 10.7 FL (ref 8.1–13.5)
POTASSIUM SERPL-SCNC: 4.7 MMOL/L (ref 3.7–5.3)
RBC # BLD: 4.05 M/UL (ref 3.95–5.11)
RBC # BLD: ABNORMAL 10*6/UL
SEG NEUTROPHILS: 66 % (ref 36–65)
SEGMENTED NEUTROPHILS ABSOLUTE COUNT: 5.98 K/UL (ref 1.5–8.1)
SODIUM BLD-SCNC: 138 MMOL/L (ref 135–144)
WBC # BLD: 9 K/UL (ref 3.5–11.3)
WBC # BLD: ABNORMAL 10*3/UL

## 2019-04-02 PROCEDURE — 36415 COLL VENOUS BLD VENIPUNCTURE: CPT

## 2019-04-02 PROCEDURE — 80048 BASIC METABOLIC PNL TOTAL CA: CPT

## 2019-04-02 PROCEDURE — 93005 ELECTROCARDIOGRAM TRACING: CPT

## 2019-04-02 PROCEDURE — 85025 COMPLETE CBC W/AUTO DIFF WBC: CPT

## 2019-04-03 LAB
EKG ATRIAL RATE: 86 BPM
EKG P AXIS: 55 DEGREES
EKG P-R INTERVAL: 144 MS
EKG Q-T INTERVAL: 328 MS
EKG QRS DURATION: 78 MS
EKG QTC CALCULATION (BAZETT): 392 MS
EKG R AXIS: 52 DEGREES
EKG T AXIS: 57 DEGREES
EKG VENTRICULAR RATE: 86 BPM

## 2019-04-12 ENCOUNTER — ANESTHESIA EVENT (OUTPATIENT)
Dept: OPERATING ROOM | Age: 79
End: 2019-04-12
Payer: MEDICARE

## 2019-04-15 ENCOUNTER — HOSPITAL ENCOUNTER (OUTPATIENT)
Age: 79
Setting detail: OUTPATIENT SURGERY
Discharge: HOME | End: 2019-04-15
Attending: ORTHOPAEDIC SURGERY | Admitting: ORTHOPAEDIC SURGERY
Payer: MEDICARE

## 2019-04-15 ENCOUNTER — ANESTHESIA (OUTPATIENT)
Dept: OPERATING ROOM | Age: 79
End: 2019-04-15
Payer: MEDICARE

## 2019-04-15 VITALS
TEMPERATURE: 97.2 F | OXYGEN SATURATION: 97 % | RESPIRATION RATE: 18 BRPM | BODY MASS INDEX: 24.24 KG/M2 | HEART RATE: 95 BPM | WEIGHT: 142 LBS | SYSTOLIC BLOOD PRESSURE: 120 MMHG | DIASTOLIC BLOOD PRESSURE: 76 MMHG | HEIGHT: 64 IN

## 2019-04-15 VITALS — DIASTOLIC BLOOD PRESSURE: 87 MMHG | OXYGEN SATURATION: 96 % | SYSTOLIC BLOOD PRESSURE: 158 MMHG

## 2019-04-15 DIAGNOSIS — Z98.890 S/P TRIGGER FINGER RELEASE: Primary | ICD-10-CM

## 2019-04-15 LAB — GLUCOSE BLD-MCNC: 128 MG/DL (ref 74–100)

## 2019-04-15 PROCEDURE — 82947 ASSAY GLUCOSE BLOOD QUANT: CPT

## 2019-04-15 PROCEDURE — 3600000003 HC SURGERY LEVEL 3 BASE: Performed by: ORTHOPAEDIC SURGERY

## 2019-04-15 PROCEDURE — 6360000002 HC RX W HCPCS: Performed by: ORTHOPAEDIC SURGERY

## 2019-04-15 PROCEDURE — 2709999900 HC NON-CHARGEABLE SUPPLY: Performed by: ORTHOPAEDIC SURGERY

## 2019-04-15 PROCEDURE — 2580000003 HC RX 258: Performed by: ORTHOPAEDIC SURGERY

## 2019-04-15 PROCEDURE — 3700000000 HC ANESTHESIA ATTENDED CARE: Performed by: ORTHOPAEDIC SURGERY

## 2019-04-15 PROCEDURE — 3700000001 HC ADD 15 MINUTES (ANESTHESIA): Performed by: ORTHOPAEDIC SURGERY

## 2019-04-15 PROCEDURE — 3600000013 HC SURGERY LEVEL 3 ADDTL 15MIN: Performed by: ORTHOPAEDIC SURGERY

## 2019-04-15 RX ORDER — SODIUM CHLORIDE, SODIUM LACTATE, POTASSIUM CHLORIDE, CALCIUM CHLORIDE 600; 310; 30; 20 MG/100ML; MG/100ML; MG/100ML; MG/100ML
INJECTION, SOLUTION INTRAVENOUS CONTINUOUS
Status: DISCONTINUED | OUTPATIENT
Start: 2019-04-15 | End: 2019-04-15 | Stop reason: HOSPADM

## 2019-04-15 RX ORDER — HYDROCODONE BITARTRATE AND ACETAMINOPHEN 5; 325 MG/1; MG/1
1 TABLET ORAL EVERY 6 HOURS PRN
Qty: 12 TABLET | Refills: 0 | Status: SHIPPED | OUTPATIENT
Start: 2019-04-15 | End: 2019-04-18

## 2019-04-15 RX ORDER — ROPIVACAINE HYDROCHLORIDE 5 MG/ML
INJECTION, SOLUTION EPIDURAL; INFILTRATION; PERINEURAL PRN
Status: DISCONTINUED | OUTPATIENT
Start: 2019-04-15 | End: 2019-04-15 | Stop reason: ALTCHOICE

## 2019-04-15 RX ADMIN — CEFAZOLIN SODIUM 2 G: 1 INJECTION, POWDER, FOR SOLUTION INTRAMUSCULAR; INTRAVENOUS at 11:45

## 2019-04-15 RX ADMIN — SODIUM CHLORIDE, POTASSIUM CHLORIDE, SODIUM LACTATE AND CALCIUM CHLORIDE: 600; 310; 30; 20 INJECTION, SOLUTION INTRAVENOUS at 10:38

## 2019-04-15 ASSESSMENT — PULMONARY FUNCTION TESTS
PIF_VALUE: 0

## 2019-04-15 ASSESSMENT — PAIN - FUNCTIONAL ASSESSMENT: PAIN_FUNCTIONAL_ASSESSMENT: 0-10

## 2019-04-15 NOTE — BRIEF OP NOTE
Brief Postoperative Note  ______________________________________________________________    Patient: Regina Hodge  YOB: 1940  MRN: [id number]  Date of Procedure: 4/15/2019    Pre-Op Diagnosis: left middle trigger finger release    Post-Op Diagnosis: Same       Procedure(s):   FINGER TRIGGER RELEASE-MIDDLE FINGER    Anesthesia: Monitor Anesthesia Care    Surgeon(s):  Asim Gilman MD    Assistant:     Estimated Blood Loss (mL): less than 50     Complications: None    Specimens:   * No specimens in log *    Implants:  * No implants in log *      Drains: * No LDAs found *    Findings:     Lisa Cain MD  Date: 4/15/2019  Time: 12:11 PM

## 2019-04-15 NOTE — ANESTHESIA POSTPROCEDURE EVALUATION
Department of Anesthesiology  Postprocedure Note    Patient: Nelson Cummings  MRN: [id number]  YOB: 1940  Date of evaluation: 4/15/2019  Time:  12:15 PM     Procedure Summary     Date:  04/15/19 Room / Location:  [address] [place] [address] [facility]    Anesthesia Start:  1147 Anesthesia Stop:  8685    Procedure:  FINGER TRIGGER RELEASE-MIDDLE FINGER (Left ) Diagnosis:  (left middle trigger finger release)    Surgeon:  Fredis Shipman MD Responsible Provider:  LARRY Nunez CRNA    Anesthesia Type:  MAC ASA Status:  3          Anesthesia Type: MAC    Davi Phase I:      Davi Phase II:      Last vitals: Reviewed and per EMR flowsheets.        Anesthesia Post Evaluation    Patient location during evaluation: PACU  Patient participation: complete - patient participated  Level of consciousness: awake and alert  Pain score: 0  Airway patency: patent  Nausea & Vomiting: no nausea and no vomiting  Complications: no  Cardiovascular status: blood pressure returned to baseline  Respiratory status: acceptable  Hydration status: euvolemic

## 2019-04-15 NOTE — ANESTHESIA PRE PROCEDURE
Department of Anesthesiology  Preprocedure Note       Name:  Giovani Thomas   Age:  [de-identified] y.o.  :  1940                                          MRN:  [id number]         Date:  4/15/2019      Surgeon: Laureano Nj):  Erwin Caba MD    Procedure: FINGER TRIGGER RELEASE-MIDDLE FINGER (Left )    Medications prior to admission:   Prior to Admission medications    Medication Sig Start Date End Date Taking? Authorizing Provider   latanoprost (XALATAN) 0.005 % ophthalmic solution Place 1 drop into both eyes nightly   Yes Historical Provider, MD   Polyvinyl Alcohol-Povidone (TEARS PLUS OP) Apply 1 drop to eye 3 times daily   Yes Historical Provider, MD   metFORMIN (GLUCOPHAGE) 500 MG tablet Take 500 mg by mouth 2 times daily (with meals)   Yes Historical Provider, MD   acetaminophen (TYLENOL) 325 MG tablet Take 2 tablets by mouth every 4 hours as needed for Pain 17   Erwin Caba MD       Current medications:    Current Facility-Administered Medications   Medication Dose Route Frequency Provider Last Rate Last Dose    ceFAZolin (ANCEF) 2 g in dextrose 5 % 50 mL IVPB  2 g Intravenous Once Erwin Caba MD        lactated ringers infusion   Intravenous Continuous Erwin Caba  mL/hr at 04/15/19 1038       Facility-Administered Medications Ordered in Other Encounters   Medication Dose Route Frequency Provider Last Rate Last Dose    isoproterenol (ISUPREL) 4 mcg/mL in dextrose 5 % 250 mL infusion  1 mcg/min Intravenous Continuous Lindsay Huynh MD 15 mL/hr at 07/10/13 1045 1 mcg/min at 07/10/13 1045       Allergies:     Allergies   Allergen Reactions    Aleve [Naproxen Sodium] Anaphylaxis    Codeine Nausea And Vomiting    Nsaids Other (See Comments)     DIFFICULTY BREATHING      Gabapentin Other (See Comments)     Make her feel funny       Problem List:    Patient Active Problem List   Diagnosis Code    Aphakia of eye, left H27.02    Corneal edema, secondary, left H18.232    Partial recent retinal detachment with multiple defects H33.029    Type 2 diabetes mellitus without complication ([facility]) B87.8    Closed right hip fracture ([facility] [address] S72.001A    Essential hypertension Z84    Diastolic dysfunction with chronic heart failure ([facility]) I50.32    Mild protein malnutrition ([facility]) E44.1    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    Status post left hip replacement Z96.642       Past Medical History:        Diagnosis Date    Achilles tendon rupture 2011    LEFT    Chronic SI joint pain     left    Claustrophobia     Diabetes mellitus ([address] 2010    ON ORAL MED    Esophageal stricture 2011    PT HAD DILATATION    [place] (hard of hearing)     WEARS [place] HEARING AIDS    Hyperlipidemia 2012    Hypertension     PT HAD TILT TABLE TEST BP MED DISCONTINUED    Retinal detachment 10/2012    LEFT    Trauma to eye, left        Past Surgical History:        Procedure Laterality Date    BLADDER SUSPENSION  1994    WITH HYSTERECTOMY    BLEPHAROPLASTY Left 5/3/2013    CATARACT REMOVAL Bilateral     with iol    CERVICAL DISC SURGERY  1984    cervical spine    ESOPHAGEAL DILATATION      EYE SURGERY Left 06/15/2013    REPAIR OF WOUND, WITH LENS REMOVAL     EYE SURGERY Bilateral 2008    BILAT CATARACT REMOVAL WITH IOL    EYE SURGERY Left ,7/13,12/12,6/13    vitrectomy    FOOT AMPUTATION      FOOT TENDON SURGERY Left 03/09/2018    Excision of Os Peroneum, Primary Repair of Peroneus Longus Tendon - Dr. Ian Taylor Right 06/21/2016    [facility] Dr. Odin Costa    lower back    DC FOOT/TOES SURGERY PROC UNLISTED Left 3/9/2018    ACHILLES TENDON LENGTHENING REPAIR/ EXCISION OF OS PERONEUM,PRIMARY REPAIR OF PERONEUS LINGUS TENDON WITH POSSIBLE PERONEAL TENODESIS performed by Brett Levine DPM at [place] [address] Left 8/7/2017    HIP TOTAL ARTHROPLASTY performed by Juan Daniel Sexton MD at [address] [place] [address] GASTROINTESTINAL ENDOSCOPY      esoph dilation    VITRECTOMY Left 10/3/13       Social History:    Social History     Tobacco Use    Smoking status: Never Smoker    Smokeless tobacco: Never Used   Substance Use Topics    Alcohol use: No                                Counseling given: Not Answered      Vital Signs (Current):   Vitals:    04/03/19 1007 04/15/19 0946   BP:  120/76   Pulse:  95   Resp:  18   Temp:  36.2 °C (97.2 °F)   TempSrc:  Temporal   SpO2:  97%   Weight: 144 lb 8 oz (65.5 kg) 142 lb (64.4 kg)   Height: 5' 3.5\" (1.613 m) 5' 3.5\" (1.613 m)                                              BP Readings from Last 3 Encounters:   04/15/19 120/76   01/05/19 (!) 156/87   03/06/18 134/78       NPO Status: Time of last liquid consumption: 2230                        Time of last solid consumption: 2200                        Date of last liquid consumption: 04/14/19                        Date of last solid food consumption: 04/14/19    BMI:   Wt Readings from Last 3 Encounters:   04/15/19 142 lb (64.4 kg)   01/05/19 144 lb (65.3 kg)   03/06/18 153 lb 3.2 oz (69.5 kg)     Body mass index is 24.76 kg/m².     CBC:   Lab Results   Component Value Date    WBC 9.0 04/02/2019    RBC 4.05 04/02/2019    RBC 3.98 11/16/2011    HGB 12.5 04/02/2019    HCT 38.7 04/02/2019    MCV 95.6 04/02/2019    RDW 12.9 04/02/2019     04/02/2019     11/16/2011       CMP:   Lab Results   Component Value Date     04/02/2019    K 4.7 04/02/2019    CL 98 04/02/2019    CO2 27 04/02/2019    BUN 15 04/02/2019    CREATININE 0.64 04/02/2019    GFRAA >60 04/02/2019    LABGLOM >60 04/02/2019    GLUCOSE 129 04/02/2019    GLUCOSE 116 11/16/2011    PROT 6.6 06/21/2016    CALCIUM 9.8 04/02/2019    BILITOT 0.41 06/21/2016    ALKPHOS 57 06/21/2016    AST 15 06/21/2016    ALT 20 06/21/2016       POC Tests:   Recent Labs     04/15/19  1039   POCGLU 128*       Coags:   Lab Results   Component Value Date    PROTIME 10.7 06/20/2016 INR 1.0 06/20/2016    APTT 25.5 06/20/2016       HCG (If Applicable): No results found for: PREGTESTUR, PREGSERUM, HCG, HCGQUANT     ABGs: No results found for: PHART, PO2ART, FLO4KAP, PFN6RQM, BEART, A1NTUJOI     Type & Screen (If Applicable):  No results found for: LABABO, [address]    Anesthesia Evaluation  Patient summary reviewed and Nursing notes reviewed no history of anesthetic complications:   Airway: Mallampati: III  TM distance: <3 FB   Neck ROM: limited  Mouth opening: < 3 FB Dental:      Comment: Very poor dentition     Pulmonary:Negative Pulmonary ROS breath sounds clear to auscultation                             Cardiovascular:    (+) hypertension (not on BP meds anymore ):, valvular problems/murmurs (moderate TR ):, CHF (mild ): diastolic, pulmonary hypertension: moderate,         Rhythm: regular  Rate: normal                    Neuro/Psych:                ROS comment: Retinal detachment left, blind     [place] GI/Hepatic/Renal:            ROS comment: Esophageal stricture, hx of dilation     Bladder suspension surgery . Endo/Other:    (+) DiabetesType II DM, , : arthritis: OA., .                  ROS comment: Hx of right hip fracture with replacement Abdominal:           Vascular: negative vascular ROS. Anesthesia Plan      MAC     ASA 3     (Pt does not want to be completely asleep. Is okay with just light or no sedation. Risks and benefits explained. All questions answered )  Induction: intravenous. Anesthetic plan and risks discussed with patient. Plan discussed with CRNA.                   LARRY Minaya - CRNA   4/15/2019

## 2019-04-15 NOTE — PROGRESS NOTES
Pt received no anesthesia or IV sedation. Dressing C/D/I and pt denies any pain at this time. Discharge instructions given to patient, pt verbalized understanding, no further questions at this time. Pt sitting comfortably in chair and awaiting ride to come pick her up.

## 2019-04-16 NOTE — OP NOTE
[address]                                OPERATIVE REPORT    PATIENT NAME: Rhonda Mckeon                    :        1940  MED REC NO:   [id number]                              ROOM:  ACCOUNT NO:   [de-identified]                           ADMIT DATE: 04/15/2019  PROVIDER:     Evette Corona    DATE OF PROCEDURE:  04/15/2019    PREOPERATIVE DIAGNOSIS:  Left middle trigger finger. POSTOPERATIVE DIAGNOSIS:  Left middle trigger finger. PROCEDURE PERFORMED:  Left middle trigger finger release. SURGEON:  Dr. Evette Corona. ANESTHESIA:  Local MAC. DESCRIPTION OF PROCEDURE:  The patient was brought into the operating  room and placed in the supine position on the operating table, left arm  on the arm board. The left upper extremity was prepped with ChloraPrep  and draped in a sterile fashion. The skin was anesthetized with 0.5%  Naropin plain. A 1.5-cm oblique skin incision was made over the A1  pulley through the left middle finger. Dissection was taken down of the  pulley. The pulley was identified and sharply incised. Good excursion  of the tendon was noted following this. There was some slight scuffing  of the tendon noted. The wound was irrigated out, and then the skin was  closed with 4-0 undyed nylon simple and horizontal mattress sutures,  dressed with Xeroform gauze, fluffs, sterile Webril, and a 3-inch Ace  wrap. The patient tolerated the procedure well, was transferred to  Recovery in stable condition, will be discharged home. Norco for  discomfort. To be followed up in the office in two weeks. SALUD Louise    D: 04/15/2019 12:26:19       T: 04/15/2019 16:29:11     [email or web address]  Job#: [id number]     Doc#: [id number]    CC:

## 2019-09-27 ENCOUNTER — TELEPHONE (OUTPATIENT)
Dept: GASTROENTEROLOGY | Age: 79
End: 2019-09-27

## 2019-09-27 DIAGNOSIS — Z12.11 COLON CANCER SCREENING: Primary | ICD-10-CM

## 2019-10-04 PROBLEM — Z12.11 COLON CANCER SCREENING: Status: ACTIVE | Noted: 2019-10-04

## 2019-10-04 RX ORDER — SODIUM, POTASSIUM,MAG SULFATES 17.5-3.13G
SOLUTION, RECONSTITUTED, ORAL ORAL
Qty: 2 BOTTLE | Refills: 0 | Status: ON HOLD | OUTPATIENT
Start: 2019-10-04 | End: 2019-10-30 | Stop reason: HOSPADM

## 2019-10-30 ENCOUNTER — HOSPITAL ENCOUNTER (OUTPATIENT)
Age: 79
Setting detail: OUTPATIENT SURGERY
Discharge: HOME | End: 2019-10-30
Attending: INTERNAL MEDICINE | Admitting: INTERNAL MEDICINE
Payer: MEDICARE

## 2019-10-30 ENCOUNTER — TELEPHONE (OUTPATIENT)
Dept: GASTROENTEROLOGY | Age: 79
End: 2019-10-30

## 2019-10-30 ENCOUNTER — ANESTHESIA EVENT (OUTPATIENT)
Dept: OPERATING ROOM | Age: 79
End: 2019-10-30
Payer: MEDICARE

## 2019-10-30 ENCOUNTER — ANESTHESIA (OUTPATIENT)
Dept: OPERATING ROOM | Age: 79
End: 2019-10-30
Payer: MEDICARE

## 2019-10-30 VITALS
OXYGEN SATURATION: 100 % | SYSTOLIC BLOOD PRESSURE: 92 MMHG | RESPIRATION RATE: 19 BRPM | DIASTOLIC BLOOD PRESSURE: 49 MMHG

## 2019-10-30 VITALS
HEART RATE: 52 BPM | HEIGHT: 64 IN | DIASTOLIC BLOOD PRESSURE: 88 MMHG | OXYGEN SATURATION: 98 % | TEMPERATURE: 97.8 F | BODY MASS INDEX: 24.59 KG/M2 | RESPIRATION RATE: 20 BRPM | SYSTOLIC BLOOD PRESSURE: 120 MMHG | WEIGHT: 144 LBS

## 2019-10-30 DIAGNOSIS — C18.7 MALIGNANT NEOPLASM OF SIGMOID COLON (HCC): Primary | ICD-10-CM

## 2019-10-30 LAB — GLUCOSE BLD-MCNC: 149 MG/DL (ref 74–100)

## 2019-10-30 PROCEDURE — 88341 IMHCHEM/IMCYTCHM EA ADD ANTB: CPT

## 2019-10-30 PROCEDURE — 3700000001 HC ADD 15 MINUTES (ANESTHESIA): Performed by: INTERNAL MEDICINE

## 2019-10-30 PROCEDURE — 7100000010 HC PHASE II RECOVERY - FIRST 15 MIN: Performed by: INTERNAL MEDICINE

## 2019-10-30 PROCEDURE — 3609008400 HC SIGMOIDOSCOPY DIAGNOSTIC: Performed by: INTERNAL MEDICINE

## 2019-10-30 PROCEDURE — 82947 ASSAY GLUCOSE BLOOD QUANT: CPT

## 2019-10-30 PROCEDURE — 88305 TISSUE EXAM BY PATHOLOGIST: CPT

## 2019-10-30 PROCEDURE — 6360000002 HC RX W HCPCS: Performed by: NURSE ANESTHETIST, CERTIFIED REGISTERED

## 2019-10-30 PROCEDURE — 45331 SIGMOIDOSCOPY AND BIOPSY: CPT | Performed by: INTERNAL MEDICINE

## 2019-10-30 PROCEDURE — 3700000000 HC ANESTHESIA ATTENDED CARE: Performed by: INTERNAL MEDICINE

## 2019-10-30 PROCEDURE — 2500000003 HC RX 250 WO HCPCS: Performed by: NURSE ANESTHETIST, CERTIFIED REGISTERED

## 2019-10-30 PROCEDURE — 2709999900 HC NON-CHARGEABLE SUPPLY: Performed by: INTERNAL MEDICINE

## 2019-10-30 PROCEDURE — 88342 IMHCHEM/IMCYTCHM 1ST ANTB: CPT

## 2019-10-30 PROCEDURE — 2580000003 HC RX 258: Performed by: INTERNAL MEDICINE

## 2019-10-30 PROCEDURE — 7100000011 HC PHASE II RECOVERY - ADDTL 15 MIN: Performed by: INTERNAL MEDICINE

## 2019-10-30 RX ORDER — PROPOFOL 10 MG/ML
INJECTION, EMULSION INTRAVENOUS PRN
Status: DISCONTINUED | OUTPATIENT
Start: 2019-10-30 | End: 2019-10-30 | Stop reason: SDUPTHER

## 2019-10-30 RX ORDER — SODIUM CHLORIDE, SODIUM LACTATE, POTASSIUM CHLORIDE, CALCIUM CHLORIDE 600; 310; 30; 20 MG/100ML; MG/100ML; MG/100ML; MG/100ML
INJECTION, SOLUTION INTRAVENOUS CONTINUOUS
Status: DISCONTINUED | OUTPATIENT
Start: 2019-10-30 | End: 2019-10-30 | Stop reason: HOSPADM

## 2019-10-30 RX ORDER — PROPOFOL 10 MG/ML
INJECTION, EMULSION INTRAVENOUS CONTINUOUS PRN
Status: DISCONTINUED | OUTPATIENT
Start: 2019-10-30 | End: 2019-10-30 | Stop reason: SDUPTHER

## 2019-10-30 RX ORDER — LIDOCAINE HYDROCHLORIDE 20 MG/ML
INJECTION, SOLUTION EPIDURAL; INFILTRATION; INTRACAUDAL; PERINEURAL PRN
Status: DISCONTINUED | OUTPATIENT
Start: 2019-10-30 | End: 2019-10-30 | Stop reason: SDUPTHER

## 2019-10-30 RX ADMIN — SODIUM CHLORIDE, POTASSIUM CHLORIDE, SODIUM LACTATE AND CALCIUM CHLORIDE: 600; 310; 30; 20 INJECTION, SOLUTION INTRAVENOUS at 09:19

## 2019-10-30 RX ADMIN — LIDOCAINE HYDROCHLORIDE 100 MG: 20 INJECTION, SOLUTION EPIDURAL; INFILTRATION; INTRACAUDAL at 11:01

## 2019-10-30 RX ADMIN — PROPOFOL 80 MG: 10 INJECTION, EMULSION INTRAVENOUS at 11:01

## 2019-10-30 RX ADMIN — PROPOFOL 200 MCG/KG/MIN: 10 INJECTION, EMULSION INTRAVENOUS at 11:01

## 2019-10-30 ASSESSMENT — LIFESTYLE VARIABLES: SMOKING_STATUS: 0

## 2019-10-30 ASSESSMENT — PAIN - FUNCTIONAL ASSESSMENT: PAIN_FUNCTIONAL_ASSESSMENT: 0-10

## 2019-11-01 LAB — SURGICAL PATHOLOGY REPORT: NORMAL

## 2019-11-24 PROBLEM — Z12.11 COLON CANCER SCREENING: Status: RESOLVED | Noted: 2019-10-04 | Resolved: 2019-11-24

## 2020-07-22 ENCOUNTER — HOSPITAL ENCOUNTER (OUTPATIENT)
Dept: PREADMISSION TESTING | Age: 80
Setting detail: SPECIMEN
Discharge: HOME | End: 2020-07-26
Attending: ORTHOPAEDIC SURGERY
Payer: MEDICARE

## 2020-07-22 VITALS
WEIGHT: 148.8 LBS | DIASTOLIC BLOOD PRESSURE: 72 MMHG | BODY MASS INDEX: 26.36 KG/M2 | HEART RATE: 87 BPM | SYSTOLIC BLOOD PRESSURE: 132 MMHG | OXYGEN SATURATION: 95 % | HEIGHT: 63 IN | RESPIRATION RATE: 20 BRPM | TEMPERATURE: 98 F

## 2020-07-22 PROCEDURE — 87641 MR-STAPH DNA AMP PROBE: CPT

## 2020-07-22 PROCEDURE — 87081 CULTURE SCREEN ONLY: CPT

## 2020-07-22 RX ORDER — TRANEXAMIC ACID 650 1/1
1300 TABLET ORAL ONCE
Status: CANCELLED | OUTPATIENT
Start: 2020-07-22 | End: 2020-07-22

## 2020-07-22 RX ORDER — DOCUSATE SODIUM 100 MG/1
100 CAPSULE, LIQUID FILLED ORAL 2 TIMES DAILY
COMMUNITY

## 2020-07-22 RX ORDER — CEFAZOLIN SODIUM 1 G/50ML
1 SOLUTION INTRAVENOUS ONCE
Status: CANCELLED | OUTPATIENT
Start: 2020-07-22 | End: 2020-07-22

## 2020-07-22 RX ORDER — SODIUM CHLORIDE, SODIUM LACTATE, POTASSIUM CHLORIDE, CALCIUM CHLORIDE 600; 310; 30; 20 MG/100ML; MG/100ML; MG/100ML; MG/100ML
INJECTION, SOLUTION INTRAVENOUS CONTINUOUS
Status: CANCELLED | OUTPATIENT
Start: 2020-07-22

## 2020-07-22 RX ORDER — ACETAMINOPHEN 325 MG/1
650 TABLET ORAL ONCE
Status: CANCELLED | OUTPATIENT
Start: 2020-07-22 | End: 2020-07-22

## 2020-07-22 NOTE — PROGRESS NOTES
Patient instructed on the pre-operative, intra-operative, and post-operative process. Patient instructed on NPO status. Medication instructions reviewed with patient. Pre operative instruction sheet reviewed and given to patient in PAT.
months? If yes provide copies to anesthesia   [x] Yes    [] No       [x] Lab    [x] EKG    [] CXR     Have you had a stress test?     [x] Yes    [] No    When/where: > 10 years ago [facility]  Was it normal?    [] Yes    [] No   Do you or your family have a history of Malignant Hyperthermia? [] Yes    [x] No               PAT Call/Visit Questions  Person Interviewed: PATIENT  Surgery Time Verified: Yes  Surgery Location Verified: Yes  Patient Language: ENGLISH  Medical History Reviewed: Yes  NPO Status Reinforced: Yes  Ride and Caregiver Arranged: Yes  Ride Caregiver Provider: DAUGHTER IN [place]  Patient Knows to Bring Current Medications: Yes    Pre-AdmissionTesting Checklist  Patient has been to this health system before?: Yes  Does patient refuse blood?: No  Healthcare Directive: Yes, patient has an advance directive for healthcare treatment   needed: No  Patient can read and write?: Yes  Meds-to-Beds: Does the patient want to have any new prescriptions delivered to bedside prior to discharge?: No  History given by: Patient  Providing self care at home?: Yes  Discharge transport (for same day patients): Family    Patient instructed on the pre-operative, intra-operative, and post-operative process? Yes  Medication instructions reviewed with patient? Yes  Pre operative instruction sheet reviewed and given to patient in PAT?   Yes

## 2020-07-25 LAB
CULTURE: NORMAL
Lab: NORMAL
SPECIMEN DESCRIPTION: NORMAL

## 2020-08-04 ENCOUNTER — HOSPITAL ENCOUNTER (OUTPATIENT)
Dept: LAB | Age: 80
Discharge: HOME | End: 2020-08-04
Payer: MEDICARE

## 2020-08-04 ENCOUNTER — HOSPITAL ENCOUNTER (OUTPATIENT)
Dept: PREADMISSION TESTING | Age: 80
Setting detail: SPECIMEN
Discharge: HOME | End: 2020-08-08
Payer: MEDICARE

## 2020-08-04 DIAGNOSIS — Z01.818 PREOP TESTING: Primary | ICD-10-CM

## 2020-08-04 LAB
ABO/RH: NORMAL
ANTIBODY SCREEN: NEGATIVE
ARM BAND NUMBER: NORMAL
EXPIRATION DATE: NORMAL

## 2020-08-04 PROCEDURE — 86850 RBC ANTIBODY SCREEN: CPT

## 2020-08-04 PROCEDURE — 86900 BLOOD TYPING SEROLOGIC ABO: CPT

## 2020-08-04 PROCEDURE — 36415 COLL VENOUS BLD VENIPUNCTURE: CPT

## 2020-08-04 PROCEDURE — U0003 INFECTIOUS AGENT DETECTION BY NUCLEIC ACID (DNA OR RNA); SEVERE ACUTE RESPIRATORY SYNDROME CORONAVIRUS 2 (SARS-COV-2) (CORONAVIRUS DISEASE [COVID-19]), AMPLIFIED PROBE TECHNIQUE, MAKING USE OF HIGH THROUGHPUT TECHNOLOGIES AS DESCRIBED BY CMS-2020-01-R: HCPCS

## 2020-08-04 PROCEDURE — 86901 BLOOD TYPING SEROLOGIC RH(D): CPT

## 2020-08-06 LAB — SARS-COV-2, NAA: NOT DETECTED

## 2020-08-07 ENCOUNTER — ANESTHESIA EVENT (OUTPATIENT)
Dept: OPERATING ROOM | Age: 80
End: 2020-08-07
Payer: MEDICARE

## 2020-08-07 ENCOUNTER — TELEPHONE (OUTPATIENT)
Dept: PRIMARY CARE CLINIC | Age: 80
End: 2020-08-07

## 2020-08-10 ENCOUNTER — ANESTHESIA (OUTPATIENT)
Dept: OPERATING ROOM | Age: 80
End: 2020-08-10
Payer: MEDICARE

## 2020-08-10 ENCOUNTER — HOSPITAL ENCOUNTER (OUTPATIENT)
Age: 80
Setting detail: OBSERVATION
Discharge: NURSING, REHAB OR LONG-TERM CARE | End: 2020-08-12
Attending: ORTHOPAEDIC SURGERY | Admitting: ORTHOPAEDIC SURGERY
Payer: MEDICARE

## 2020-08-10 VITALS — SYSTOLIC BLOOD PRESSURE: 135 MMHG | DIASTOLIC BLOOD PRESSURE: 62 MMHG | TEMPERATURE: 99.3 F | OXYGEN SATURATION: 97 %

## 2020-08-10 PROBLEM — Z96.651 S/P TOTAL KNEE ARTHROPLASTY, RIGHT: Status: ACTIVE | Noted: 2020-08-10

## 2020-08-10 LAB
GLUCOSE BLD-MCNC: 120 MG/DL (ref 74–100)
GLUCOSE BLD-MCNC: 325 MG/DL (ref 74–100)

## 2020-08-10 PROCEDURE — 6360000002 HC RX W HCPCS: Performed by: ORTHOPAEDIC SURGERY

## 2020-08-10 PROCEDURE — 2580000003 HC RX 258: Performed by: ORTHOPAEDIC SURGERY

## 2020-08-10 PROCEDURE — 3600000015 HC SURGERY LEVEL 5 ADDTL 15MIN: Performed by: ORTHOPAEDIC SURGERY

## 2020-08-10 PROCEDURE — 2500000003 HC RX 250 WO HCPCS: Performed by: NURSE ANESTHETIST, CERTIFIED REGISTERED

## 2020-08-10 PROCEDURE — 96365 THER/PROPH/DIAG IV INF INIT: CPT

## 2020-08-10 PROCEDURE — 2709999900 HC NON-CHARGEABLE SUPPLY: Performed by: ORTHOPAEDIC SURGERY

## 2020-08-10 PROCEDURE — 3600000005 HC SURGERY LEVEL 5 BASE: Performed by: ORTHOPAEDIC SURGERY

## 2020-08-10 PROCEDURE — 2580000003 HC RX 258: Performed by: NURSE ANESTHETIST, CERTIFIED REGISTERED

## 2020-08-10 PROCEDURE — 64447 NJX AA&/STRD FEMORAL NRV IMG: CPT | Performed by: NURSE ANESTHETIST, CERTIFIED REGISTERED

## 2020-08-10 PROCEDURE — 82947 ASSAY GLUCOSE BLOOD QUANT: CPT

## 2020-08-10 PROCEDURE — 3700000000 HC ANESTHESIA ATTENDED CARE: Performed by: ORTHOPAEDIC SURGERY

## 2020-08-10 PROCEDURE — 6360000002 HC RX W HCPCS: Performed by: NURSE ANESTHETIST, CERTIFIED REGISTERED

## 2020-08-10 PROCEDURE — 7100000000 HC PACU RECOVERY - FIRST 15 MIN: Performed by: ORTHOPAEDIC SURGERY

## 2020-08-10 PROCEDURE — 6370000000 HC RX 637 (ALT 250 FOR IP): Performed by: ORTHOPAEDIC SURGERY

## 2020-08-10 PROCEDURE — 6370000000 HC RX 637 (ALT 250 FOR IP): Performed by: FAMILY MEDICINE

## 2020-08-10 PROCEDURE — 2500000003 HC RX 250 WO HCPCS: Performed by: ORTHOPAEDIC SURGERY

## 2020-08-10 PROCEDURE — 3700000001 HC ADD 15 MINUTES (ANESTHESIA): Performed by: ORTHOPAEDIC SURGERY

## 2020-08-10 PROCEDURE — 7100000001 HC PACU RECOVERY - ADDTL 15 MIN: Performed by: ORTHOPAEDIC SURGERY

## 2020-08-10 PROCEDURE — C1776 JOINT DEVICE (IMPLANTABLE): HCPCS | Performed by: ORTHOPAEDIC SURGERY

## 2020-08-10 PROCEDURE — G0378 HOSPITAL OBSERVATION PER HR: HCPCS

## 2020-08-10 PROCEDURE — C1713 ANCHOR/SCREW BN/BN,TIS/BN: HCPCS | Performed by: ORTHOPAEDIC SURGERY

## 2020-08-10 DEVICE — IMPL PSN FEM CP CMT CCR STD SZ8R: Type: IMPLANTABLE DEVICE | Site: KNEE | Status: FUNCTIONAL

## 2020-08-10 DEVICE — COMPONENT PAT DIA35MM THK9MM KNEE POLY CEM CONVENTIONAL: Type: IMPLANTABLE DEVICE | Site: KNEE | Status: FUNCTIONAL

## 2020-08-10 DEVICE — PSN TIB STM 5 DEG SZ F R: Type: IMPLANTABLE DEVICE | Site: KNEE | Status: FUNCTIONAL

## 2020-08-10 DEVICE — PSN MC VE ASF R 10MM 8-11 EF: Type: IMPLANTABLE DEVICE | Site: KNEE | Status: FUNCTIONAL

## 2020-08-10 DEVICE — CEMENT BNE 40GM HI VISC RADPQ FOR REV SURG: Type: IMPLANTABLE DEVICE | Site: KNEE | Status: FUNCTIONAL

## 2020-08-10 RX ORDER — BUPIVACAINE/KETOROLAC/KETAMINE 150-60/50
SYRINGE (ML) INJECTION
Status: DISCONTINUED
Start: 2020-08-10 | End: 2020-08-10

## 2020-08-10 RX ORDER — DEXTROSE MONOHYDRATE 50 MG/ML
100 INJECTION, SOLUTION INTRAVENOUS PRN
Status: DISCONTINUED | OUTPATIENT
Start: 2020-08-10 | End: 2020-08-12 | Stop reason: HOSPADM

## 2020-08-10 RX ORDER — SODIUM CHLORIDE 0.9 % (FLUSH) 0.9 %
10 SYRINGE (ML) INJECTION PRN
Status: DISCONTINUED | OUTPATIENT
Start: 2020-08-10 | End: 2020-08-10 | Stop reason: HOSPADM

## 2020-08-10 RX ORDER — SODIUM CHLORIDE 9 MG/ML
INJECTION INTRAVENOUS PRN
Status: DISCONTINUED | OUTPATIENT
Start: 2020-08-10 | End: 2020-08-10 | Stop reason: SDUPTHER

## 2020-08-10 RX ORDER — DEXTROSE, SODIUM CHLORIDE, SODIUM LACTATE, POTASSIUM CHLORIDE, AND CALCIUM CHLORIDE 5; .6; .31; .03; .02 G/100ML; G/100ML; G/100ML; G/100ML; G/100ML
INJECTION, SOLUTION INTRAVENOUS CONTINUOUS
Status: DISCONTINUED | OUTPATIENT
Start: 2020-08-10 | End: 2020-08-11

## 2020-08-10 RX ORDER — ACETAMINOPHEN 325 MG/1
650 TABLET ORAL ONCE
Status: DISCONTINUED | OUTPATIENT
Start: 2020-08-10 | End: 2020-08-10 | Stop reason: HOSPADM

## 2020-08-10 RX ORDER — HYDROCODONE BITARTRATE AND ACETAMINOPHEN 5; 325 MG/1; MG/1
1 TABLET ORAL EVERY 4 HOURS PRN
Status: DISCONTINUED | OUTPATIENT
Start: 2020-08-10 | End: 2020-08-12 | Stop reason: HOSPADM

## 2020-08-10 RX ORDER — SODIUM CHLORIDE, SODIUM LACTATE, POTASSIUM CHLORIDE, CALCIUM CHLORIDE 600; 310; 30; 20 MG/100ML; MG/100ML; MG/100ML; MG/100ML
INJECTION, SOLUTION INTRAVENOUS CONTINUOUS
Status: DISCONTINUED | OUTPATIENT
Start: 2020-08-10 | End: 2020-08-10

## 2020-08-10 RX ORDER — ACETAMINOPHEN 325 MG/1
650 TABLET ORAL EVERY 4 HOURS PRN
Status: DISCONTINUED | OUTPATIENT
Start: 2020-08-10 | End: 2020-08-12 | Stop reason: HOSPADM

## 2020-08-10 RX ORDER — 0.9 % SODIUM CHLORIDE 0.9 %
500 INTRAVENOUS SOLUTION INTRAVENOUS
Status: DISCONTINUED | OUTPATIENT
Start: 2020-08-10 | End: 2020-08-10 | Stop reason: HOSPADM

## 2020-08-10 RX ORDER — DOCUSATE SODIUM 100 MG/1
100 CAPSULE, LIQUID FILLED ORAL 2 TIMES DAILY
Status: DISCONTINUED | OUTPATIENT
Start: 2020-08-10 | End: 2020-08-12 | Stop reason: HOSPADM

## 2020-08-10 RX ORDER — TRANEXAMIC ACID 650 1/1
1300 TABLET ORAL ONCE
Status: COMPLETED | OUTPATIENT
Start: 2020-08-10 | End: 2020-08-10

## 2020-08-10 RX ORDER — DEXAMETHASONE SODIUM PHOSPHATE 10 MG/ML
INJECTION, SOLUTION INTRAMUSCULAR; INTRAVENOUS PRN
Status: DISCONTINUED | OUTPATIENT
Start: 2020-08-10 | End: 2020-08-10 | Stop reason: SDUPTHER

## 2020-08-10 RX ORDER — TRANEXAMIC ACID 100 MG/ML
INJECTION, SOLUTION INTRAVENOUS PRN
Status: DISCONTINUED | OUTPATIENT
Start: 2020-08-10 | End: 2020-08-10 | Stop reason: ALTCHOICE

## 2020-08-10 RX ORDER — METOCLOPRAMIDE HYDROCHLORIDE 5 MG/ML
INJECTION INTRAMUSCULAR; INTRAVENOUS PRN
Status: DISCONTINUED | OUTPATIENT
Start: 2020-08-10 | End: 2020-08-10 | Stop reason: SDUPTHER

## 2020-08-10 RX ORDER — DEXTROSE MONOHYDRATE 25 G/50ML
12.5 INJECTION, SOLUTION INTRAVENOUS PRN
Status: DISCONTINUED | OUTPATIENT
Start: 2020-08-10 | End: 2020-08-12 | Stop reason: HOSPADM

## 2020-08-10 RX ORDER — FENTANYL CITRATE 50 UG/ML
50 INJECTION, SOLUTION INTRAMUSCULAR; INTRAVENOUS EVERY 5 MIN PRN
Status: DISCONTINUED | OUTPATIENT
Start: 2020-08-10 | End: 2020-08-10 | Stop reason: HOSPADM

## 2020-08-10 RX ORDER — TRANEXAMIC ACID 650 1/1
1300 TABLET ORAL ONCE
Status: COMPLETED | OUTPATIENT
Start: 2020-08-11 | End: 2020-08-11

## 2020-08-10 RX ORDER — BUPIVACAINE HYDROCHLORIDE 5 MG/ML
INJECTION, SOLUTION EPIDURAL; INTRACAUDAL PRN
Status: DISCONTINUED | OUTPATIENT
Start: 2020-08-10 | End: 2020-08-10 | Stop reason: ALTCHOICE

## 2020-08-10 RX ORDER — DIMENHYDRINATE 50 MG/1
50 TABLET ORAL ONCE
Status: DISCONTINUED | OUTPATIENT
Start: 2020-08-10 | End: 2020-08-10 | Stop reason: HOSPADM

## 2020-08-10 RX ORDER — PROMETHAZINE HYDROCHLORIDE 25 MG/ML
6.25 INJECTION, SOLUTION INTRAMUSCULAR; INTRAVENOUS
Status: DISCONTINUED | OUTPATIENT
Start: 2020-08-10 | End: 2020-08-10 | Stop reason: HOSPADM

## 2020-08-10 RX ORDER — GENTAMICIN SULFATE 40 MG/ML
INJECTION, SOLUTION INTRAMUSCULAR; INTRAVENOUS
Status: DISCONTINUED
Start: 2020-08-10 | End: 2020-08-10

## 2020-08-10 RX ORDER — PROPOFOL 10 MG/ML
INJECTION, EMULSION INTRAVENOUS CONTINUOUS PRN
Status: DISCONTINUED | OUTPATIENT
Start: 2020-08-10 | End: 2020-08-10 | Stop reason: SDUPTHER

## 2020-08-10 RX ORDER — NICOTINE POLACRILEX 4 MG
15 LOZENGE BUCCAL PRN
Status: DISCONTINUED | OUTPATIENT
Start: 2020-08-10 | End: 2020-08-12 | Stop reason: HOSPADM

## 2020-08-10 RX ORDER — BUPIVACAINE HYDROCHLORIDE 5 MG/ML
INJECTION, SOLUTION EPIDURAL; INTRACAUDAL PRN
Status: DISCONTINUED | OUTPATIENT
Start: 2020-08-10 | End: 2020-08-10 | Stop reason: SDUPTHER

## 2020-08-10 RX ORDER — SODIUM CHLORIDE 0.9 % (FLUSH) 0.9 %
10 SYRINGE (ML) INJECTION EVERY 12 HOURS SCHEDULED
Status: DISCONTINUED | OUTPATIENT
Start: 2020-08-10 | End: 2020-08-10 | Stop reason: HOSPADM

## 2020-08-10 RX ORDER — FENTANYL CITRATE 50 UG/ML
25 INJECTION, SOLUTION INTRAMUSCULAR; INTRAVENOUS EVERY 5 MIN PRN
Status: DISCONTINUED | OUTPATIENT
Start: 2020-08-10 | End: 2020-08-10 | Stop reason: HOSPADM

## 2020-08-10 RX ORDER — PROPOFOL 10 MG/ML
INJECTION, EMULSION INTRAVENOUS PRN
Status: DISCONTINUED | OUTPATIENT
Start: 2020-08-10 | End: 2020-08-10 | Stop reason: SDUPTHER

## 2020-08-10 RX ORDER — ROPIVACAINE HYDROCHLORIDE 5 MG/ML
INJECTION, SOLUTION EPIDURAL; INFILTRATION; PERINEURAL PRN
Status: DISCONTINUED | OUTPATIENT
Start: 2020-08-10 | End: 2020-08-10 | Stop reason: SDUPTHER

## 2020-08-10 RX ORDER — TRANEXAMIC ACID 650 1/1
1300 TABLET ORAL ONCE
Status: DISCONTINUED | OUTPATIENT
Start: 2020-08-10 | End: 2020-08-10

## 2020-08-10 RX ORDER — LATANOPROST 50 UG/ML
1 SOLUTION/ DROPS OPHTHALMIC NIGHTLY
Status: DISCONTINUED | OUTPATIENT
Start: 2020-08-10 | End: 2020-08-12 | Stop reason: HOSPADM

## 2020-08-10 RX ORDER — DOCUSATE SODIUM 100 MG/1
100 CAPSULE, LIQUID FILLED ORAL DAILY
Status: DISCONTINUED | OUTPATIENT
Start: 2020-08-10 | End: 2020-08-10

## 2020-08-10 RX ORDER — ONDANSETRON 2 MG/ML
4 INJECTION INTRAMUSCULAR; INTRAVENOUS
Status: DISCONTINUED | OUTPATIENT
Start: 2020-08-10 | End: 2020-08-10 | Stop reason: HOSPADM

## 2020-08-10 RX ORDER — FENTANYL CITRATE 50 UG/ML
INJECTION, SOLUTION INTRAMUSCULAR; INTRAVENOUS PRN
Status: DISCONTINUED | OUTPATIENT
Start: 2020-08-10 | End: 2020-08-10 | Stop reason: SDUPTHER

## 2020-08-10 RX ORDER — HYDROCODONE BITARTRATE AND ACETAMINOPHEN 5; 325 MG/1; MG/1
2 TABLET ORAL EVERY 6 HOURS PRN
Status: DISCONTINUED | OUTPATIENT
Start: 2020-08-10 | End: 2020-08-12 | Stop reason: HOSPADM

## 2020-08-10 RX ADMIN — DOCUSATE SODIUM 100 MG: 100 CAPSULE, LIQUID FILLED ORAL at 22:39

## 2020-08-10 RX ADMIN — DEXAMETHASONE SODIUM PHOSPHATE 5 MG: 10 INJECTION, SOLUTION INTRAMUSCULAR; INTRAVENOUS at 10:56

## 2020-08-10 RX ADMIN — BUPIVACAINE HYDROCHLORIDE 3 ML: 5 INJECTION, SOLUTION EPIDURAL; INTRACAUDAL; PERINEURAL at 11:50

## 2020-08-10 RX ADMIN — CEFAZOLIN 1 G: 1 INJECTION, POWDER, FOR SOLUTION INTRAMUSCULAR; INTRAVENOUS; PARENTERAL at 13:49

## 2020-08-10 RX ADMIN — LATANOPROST 1 DROP: 50 SOLUTION OPHTHALMIC at 22:40

## 2020-08-10 RX ADMIN — PROPOFOL 50 MG: 10 INJECTION, EMULSION INTRAVENOUS at 11:59

## 2020-08-10 RX ADMIN — SODIUM CHLORIDE, POTASSIUM CHLORIDE, SODIUM LACTATE AND CALCIUM CHLORIDE: 600; 310; 30; 20 INJECTION, SOLUTION INTRAVENOUS at 11:04

## 2020-08-10 RX ADMIN — PROPOFOL 50 MCG/KG/MIN: 10 INJECTION, EMULSION INTRAVENOUS at 11:59

## 2020-08-10 RX ADMIN — TRANEXAMIC ACID 1300 MG: 650 TABLET ORAL at 22:39

## 2020-08-10 RX ADMIN — ROPIVACAINE HYDROCHLORIDE 5 ML: 5 INJECTION, SOLUTION EPIDURAL; INFILTRATION; PERINEURAL at 10:56

## 2020-08-10 RX ADMIN — ROPIVACAINE HYDROCHLORIDE 10 ML: 5 INJECTION, SOLUTION EPIDURAL; INFILTRATION; PERINEURAL at 10:59

## 2020-08-10 RX ADMIN — FAMOTIDINE 20 MG: 10 INJECTION, SOLUTION INTRAVENOUS at 11:38

## 2020-08-10 RX ADMIN — INSULIN LISPRO 2 UNITS: 100 INJECTION, SOLUTION INTRAVENOUS; SUBCUTANEOUS at 22:44

## 2020-08-10 RX ADMIN — DEXTROSE MONOHYDRATE 1 G: 50 INJECTION, SOLUTION INTRAVENOUS at 22:35

## 2020-08-10 RX ADMIN — DEXAMETHASONE SODIUM PHOSPHATE 5 MG: 10 INJECTION, SOLUTION INTRAMUSCULAR; INTRAVENOUS at 10:59

## 2020-08-10 RX ADMIN — METOCLOPRAMIDE 10 MG: 5 INJECTION, SOLUTION INTRAMUSCULAR; INTRAVENOUS at 11:38

## 2020-08-10 RX ADMIN — SODIUM CHLORIDE 5 ML: 9 INJECTION, SOLUTION INTRAMUSCULAR; INTRAVENOUS; SUBCUTANEOUS at 10:56

## 2020-08-10 RX ADMIN — METFORMIN HYDROCHLORIDE 500 MG: 500 TABLET ORAL at 17:43

## 2020-08-10 RX ADMIN — SODIUM CHLORIDE, SODIUM LACTATE, POTASSIUM CHLORIDE, CALCIUM CHLORIDE AND DEXTROSE MONOHYDRATE: 5; 600; 310; 30; 20 INJECTION, SOLUTION INTRAVENOUS at 18:07

## 2020-08-10 RX ADMIN — FENTANYL CITRATE 100 MCG: 50 INJECTION, SOLUTION INTRAMUSCULAR; INTRAVENOUS at 10:50

## 2020-08-10 RX ADMIN — HYDROCODONE BITARTRATE AND ACETAMINOPHEN 1 TABLET: 5; 325 TABLET ORAL at 22:39

## 2020-08-10 RX ADMIN — DEXTROSE MONOHYDRATE 2 G: 50 INJECTION, SOLUTION INTRAVENOUS at 11:31

## 2020-08-10 RX ADMIN — SODIUM CHLORIDE, POTASSIUM CHLORIDE, SODIUM LACTATE AND CALCIUM CHLORIDE: 600; 310; 30; 20 INJECTION, SOLUTION INTRAVENOUS at 12:48

## 2020-08-10 RX ADMIN — SODIUM CHLORIDE 10 ML: 9 INJECTION, SOLUTION INTRAMUSCULAR; INTRAVENOUS; SUBCUTANEOUS at 10:59

## 2020-08-10 RX ADMIN — SODIUM CHLORIDE, POTASSIUM CHLORIDE, SODIUM LACTATE AND CALCIUM CHLORIDE: 600; 310; 30; 20 INJECTION, SOLUTION INTRAVENOUS at 10:26

## 2020-08-10 ASSESSMENT — PAIN SCALES - GENERAL
PAINLEVEL_OUTOF10: 0
PAINLEVEL_OUTOF10: 3
PAINLEVEL_OUTOF10: 0
PAINLEVEL_OUTOF10: 3
PAINLEVEL_OUTOF10: 0

## 2020-08-10 ASSESSMENT — PAIN DESCRIPTION - PAIN TYPE: TYPE: ACUTE PAIN;SURGICAL PAIN

## 2020-08-10 ASSESSMENT — PAIN DESCRIPTION - LOCATION: LOCATION: KNEE

## 2020-08-10 ASSESSMENT — LIFESTYLE VARIABLES: SMOKING_STATUS: 0

## 2020-08-10 ASSESSMENT — PAIN - FUNCTIONAL ASSESSMENT
PAIN_FUNCTIONAL_ASSESSMENT: 0-10
PAIN_FUNCTIONAL_ASSESSMENT: PREVENTS OR INTERFERES SOME ACTIVE ACTIVITIES AND ADLS

## 2020-08-10 ASSESSMENT — PAIN DESCRIPTION - ORIENTATION: ORIENTATION: RIGHT

## 2020-08-10 ASSESSMENT — PAIN DESCRIPTION - DESCRIPTORS: DESCRIPTORS: ACHING

## 2020-08-10 ASSESSMENT — PAIN DESCRIPTION - FREQUENCY: FREQUENCY: CONTINUOUS

## 2020-08-10 NOTE — PROGRESS NOTES
Report given to Uzma Youngblood RN. Steri strips, fluffs, abd, kerlix ace. KEON drain secured to ace bandage with silk tape.

## 2020-08-10 NOTE — ANESTHESIA PROCEDURE NOTES
Spinal Block    Patient location during procedure: OR  Start time: 8/10/2020 12:42 PM  End time: 8/10/2020 12:50 PM  Reason for block: primary anesthetic  Staffing  Resident/CRNA: LARRY Dumont CRNA  Performed: resident/CRNA   Preanesthetic Checklist  Completed: patient identified, surgical consent, pre-op evaluation, timeout performed, IV checked, risks and benefits discussed, monitors and equipment checked, anesthesia consent given, oxygen available and patient being monitored  Spinal Block  Patient position: sitting  Prep: ChloraPrep  Patient monitoring: continuous pulse ox and frequent blood pressure checks  Approach: right paramedian  Location: L2/L3  Provider prep: mask and sterile gloves  Local infiltration: lidocaine  Agent: bupivacaine  Needle  Needle type: Pencan   Needle gauge: 24 G  Needle length: 3.5 in  Assessment  Events: cerebrospinal fluid  Swirl obtained: Yes  CSF: clear  Attempts: 3+ (3 attempts. After repositioning 3rd attempt right paramedian successful. )  Hemodynamics: stable  Additional Notes  Meds as charted. Pt tolerated procedure well. 1 attempt midline with multiple redirects, 1 attempt right paramedian with multiple redirects, then patient repositioned and right paramedian attempted again with success.

## 2020-08-10 NOTE — ANESTHESIA PROCEDURE NOTES
Peripheral Block    Patient location during procedure: pre-op  Start time: 8/10/2020 10:50 AM  End time: 8/10/2020 10:59 AM  Staffing  Resident/CRNA: LARRY Dc CRNA  Performed: resident/CRNA   Preanesthetic Checklist  Completed: patient identified, site marked, surgical consent, pre-op evaluation, timeout performed, IV checked, risks and benefits discussed, monitors and equipment checked, anesthesia consent given, oxygen available and patient being monitored  Peripheral Block  Patient position: supine  Prep: ChloraPrep  Patient monitoring: continuous pulse ox, frequent blood pressure checks and IV access  Block type: Femoral and iPacks (Adductor canal approach to femoral block. )  Laterality: right  Injection technique: single-shot  Procedures: ultrasound guided  Local infiltration: ropivacaine and decadron  Provider prep: mask and sterile gloves  Local infiltration: ropivacaine and decadron  Needle  Needle type: [place] sono tap. Needle gauge: 21 G  Needle length: 8 cm  Needle localization: ultrasound guidance  Assessment  Injection assessment: negative aspiration for heme, no paresthesia on injection and local visualized surrounding nerve on ultrasound  Paresthesia pain: none  Slow fractionated injection: yes  Hemodynamics: stable  Additional Notes  Meds as charted. Pt tolerated procedure well.    Reason for block: post-op pain management and at surgeon's request

## 2020-08-10 NOTE — ANESTHESIA POSTPROCEDURE EVALUATION
Department of Anesthesiology  Postprocedure Note    Patient: Coco Pearson  MRN: [id number]  YOB: 1940  Date of evaluation: 8/10/2020  Time:  5:22 PM     Procedure Summary     Date:  08/10/20 Room / Location:  [address] [place] [address]    Anesthesia Start:  2149 Anesthesia Stop:  1406    Procedure:  KNEE TOTAL ARTHROPLASTY (Right ) Diagnosis:  (RT KNEE ARTHRITIS)    Surgeon:  Jasmyne Gilman MD Responsible Provider:  LARRY Cortez CRNA    Anesthesia Type:  spinal, regional ASA Status:  3          Anesthesia Type: spinal, regional    Davi Phase I: Davi Score: 9    Davi Phase II:      Last vitals: Reviewed and per EMR flowsheets.        Anesthesia Post Evaluation    Patient location during evaluation: PACU  Patient participation: complete - patient participated  Level of consciousness: awake and alert  Pain score: 0  Airway patency: patent  Nausea & Vomiting: no nausea and no vomiting  Complications: no  Cardiovascular status: blood pressure returned to baseline  Respiratory status: acceptable and room air  Hydration status: stable  Comments: Spinal still in effect, patient has no movement of bilat LE's at this time

## 2020-08-10 NOTE — PROGRESS NOTES
Patient brought up to room 321 for admission to [facility] at this time. Vitals and assessment and navigator completed as charted. Pt is alert and oriented x4. Pt c/o numbness throughout all of her legs. Pt is unable to  her feet or toes due to nerve block. Pt denies any pain at present time. RLE pulses are +2 bilaterally. Dressing is clean, dry and intact. Hemovac is in place. Will continue to monitor.

## 2020-08-10 NOTE — PROGRESS NOTES
Discharge Criteria    Inpatients must meet Criteria 1 through 7. All other patients are either YES or N/A. If a NO is chosen then Anesthesia or Surgeon must be notified. 1.  Minimum 30 minutes after last dose of sedative medication, minimum 120 minutes after last dose of reversal agent. Yes      2. Systolic BP stable within 20 mmHg for 30 minutes & systolic BP between 90 & 067 or within 10 mmHg of baseline. Yes      3. Pulse between 60 and 100 or within 10 bpm of baseline. Yes      4. Spontaneous respiratory rate >/= 10 per minute. Yes      5. SaO2 >/= 95 or  >/= baseline. Yes      6. Able to cough and swallow or return to baseline function. Yes      7. Alert and oriented or return to baseline mental status.     Yes

## 2020-08-10 NOTE — OP NOTE
[address] [place] [address]                                OPERATIVE REPORT    PATIENT NAME: Elizabeth Seay                    :        1940  MED REC NO:   [id number]                              ROOM:       0321  ACCOUNT NO:   [de-identified]                           ADMIT DATE: 08/10/2020  PROVIDER:     Tran Corona    DATE OF PROCEDURE:  08/10/2020    PREOPERATIVE DIAGNOSIS:  Degenerative arthritis, right knee. POSTOPERATIVE DIAGNOSIS:  Degenerative arthritis, right knee. PROCEDURE PERFORMED:  Right total knee replacement using a Adwoa  Persona knee size E cemented femoral component, size F stemmed tibial  plateau with a 10 mm MC poly and a 35 mm patellar dome. SURGEON:  Dr. Tran Corona. ANESTHESIA:  Spinal with regional block. DESCRIPTION OF PROCEDURE:  The patient was brought into the operating  room and placed in the supine position on the operating table. Spinal  anesthetic was administered. The patient received Ancef. Right lower  extremity was prepped with ChloraPrep and draped in a sterile fashion. A 10 cm medial parapatellar quad-sparing incision was made. This taken  down through the skin and subcu tissue. Arthrotomy was made along the  medial border of the patellar tendon through the medial retinaculum and  a medial quad snip was made at the superior pole of the patella. Fat  pad was excised. Central drill hole was placed down the femoral canal  and distal femoral cut was set at 5 degrees of valgus. Femoral tower  marked for 3 degrees of external rotation and a size 8 four-in-one  cutting block was used. Surface of the tibia was then osteotomized with  a tibial jig, elected to go with a size F tibial component. Surface of  the patella was then osteotomized, elected to go with a 35 mm patellar  dome. [place] holes were placed.   The knee was then cleansed with the  Simpulse with gentamicin in irrigation solution. Trial reduction was  carried out. Knee was noted to be well balanced both in flexion and  extension. Trial components removed. Tourniquet was inflated to 350  mmHg. The knee was again cleansed with the Simpulse. Methylmethacrylate was prepared. The tibial component was then cemented  in place. This was followed by cementing the femoral component in place  and finally patellar dome was cemented in place. All excess cement was  removed. The deep fascial structures were injected with 0.5% Marcaine  with epinephrine, 30 mL diluted to 60 mL. A gram of TXA was instilled  into the knee joint. Deep fascial layer was closed with #1 Vicryl  figure-of-eight sutures over two Hemovac drains, 2-0 Vicryl in the  subcu, and skin was reapproximated with 3-0 undyed Monocryl subcuticular  stitch. The margins had been injected with the remainder of the  Marcaine. Dressed with Steri-Strips, Adaptic, fluffs, ABD, Kerlix roll,  and Ace wrap from toe to groin. The tourniquet was released. Toes  pinked immediately. The patient was transferred to Recovery in stable  condition. Estimated blood loss 300 mL. SALUD PARHAM    D: 08/10/2020 14:19:04       T: 08/10/2020 14:23:32     [email or web address]  Job#: [id number]     Doc#: [id number]    CC:

## 2020-08-10 NOTE — PROGRESS NOTES
Dr. Fernando Sharma contacted at this time regarding lack of orders. States \"I will look in to it\" and hangs up the phone.

## 2020-08-10 NOTE — ANESTHESIA PRE PROCEDURE
ceFAZolin (ANCEF) 1 g in dextrose 5 % 50 mL IVPB (mini-bag)  1 g Intravenous Once Marisa Franklin MD        ceFAZolin (ANCEF) 2 g in dextrose 5 % 100 mL IVPB  2 g Intravenous Once Marisa Franklin MD        lactated ringers infusion   Intravenous Continuous Marisa Franklin MD        tranexamic acid (CYKLOKAPRON) 1,000 mg in dextrose 5 % 100 mL IVPB  1,000 mg Intravenous Once Marisa Franklin MD        isoproterenol (ISUPREL) 4 mcg/mL in dextrose 5 % 250 mL infusion  1 mcg/min Intravenous Continuous Dorothy Kaufman MD 15 mL/hr at 07/10/13 1045 1 mcg/min at 07/10/13 1045       Allergies:     Allergies   Allergen Reactions    Aleve [Naproxen Sodium] Anaphylaxis    Aspirin Anaphylaxis    Codeine Nausea And Vomiting    Nsaids Other (See Comments)     DIFFICULTY BREATHING      Align [Lactase-Lactobacillus]     Amitriptyline     Gabapentin Other (See Comments)     Make her feel funny    Lidocaine     Lyrica [Pregabalin] Hives    Omnipaque [Iohexol] Other (See Comments)     SNEEZING, COUGH, INCREASED BP, TACHYCARDIA    Other      VAN-PEN    Tetracaine        Problem List:    Patient Active Problem List   Diagnosis Code    Aphakia of eye, left H27.02    Corneal edema, secondary, left O34.907    Partial recent retinal detachment with multiple defects H33.029    Type 2 diabetes mellitus without complication (HCC) L38.9    Closed right hip fracture (HCC) S72.001A    Essential hypertension Q55    Diastolic dysfunction with chronic heart failure (HCC) I50.32    Mild protein malnutrition (HCC) E44.1    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    Status post left hip replacement Z96.642    Malignant neoplasm of sigmoid colon (HCC) C18.7       Past Medical History:        Diagnosis Date    Achilles tendon rupture 2011    LEFT    Cancer ([address]     Chronic SI joint pain     left    Claustrophobia     Diabetes mellitus ([address] 2010    ON ORAL MED    Esophageal stricture 2011    PT HAD DILATATION    [place] (hard of hearing)     WEARS [place] HEARING AIDS    Hyperlipidemia 2012    Hypertension     PT HAD TILT TABLE TEST BP MED DISCONTINUED    Retinal detachment 10/2012    LEFT    Trauma to eye, left        Past Surgical History:        Procedure Laterality Date    BLADDER SUSPENSION  1994    WITH HYSTERECTOMY    BLEPHAROPLASTY Left 5/3/2013    CATARACT REMOVAL Bilateral     with iol    CERVICAL DISC SURGERY  1984    cervical spine    COLECTOMY      ROBOTIC LOWER COLON RESECTION    ESOPHAGEAL DILATATION      EYE SURGERY Left 06/15/2013    REPAIR OF WOUND, WITH LENS REMOVAL     EYE SURGERY Bilateral 2008    BILAT CATARACT REMOVAL WITH IOL    EYE SURGERY Left ,7/13,12/12,6/13    vitrectomy    FINGER TRIGGER RELEASE Left 4/15/2019    FINGER TRIGGER RELEASE-MIDDLE FINGER performed by Anat Wallace MD at [facility] Left 03/09/2018    Excision of Os Peroneum, Primary Repair of Peroneus Longus Tendon - Dr. Felix Shields Right 06/21/2016    [facility] Dr. Michi Jackson    lower back    [place] FOOT/TOES SURGERY PROC UNLISTED Left 3/9/2018    ACHILLES TENDON LENGTHENING REPAIR/ EXCISION OF OS PERONEUM,PRIMARY REPAIR OF PERONEUS LINGUS TENDON WITH POSSIBLE PERONEAL TENODESIS performed by Violeta Hernandez DPM at [address] 10/30/2019    ANAL PROCTO SIGMOIDOSCOPY FLEXIBLE with biopsies performed by Carola Briceno MD at [address]  10/30/2019    -sigmoid colon mass(invasive ulcerated adenocarcinoma)    TOTAL HIP ARTHROPLASTY Left 8/7/2017    HIP TOTAL ARTHROPLASTY performed by Anat Wallace MD at [facility] dilation    VITRECTOMY Left 10/3/13       Social History:    Social History     Tobacco Use    Smoking status: Never Smoker    Smokeless tobacco: Never Used   Substance Use Topics    Alcohol use: No                                Counseling given: Not Answered      Vital Signs (Current): There were no vitals filed for this visit. BP Readings from Last 3 Encounters:   08/10/20 133/73   07/22/20 132/72   10/30/19 120/88       NPO Status:                                                                                 BMI:   Wt Readings from Last 3 Encounters:   08/10/20 148 lb (67.1 kg)   07/22/20 148 lb 12.8 oz (67.5 kg)   10/30/19 144 lb (65.3 kg)     There is no height or weight on file to calculate BMI.    CBC:   Lab Results   Component Value Date    WBC 9.0 04/02/2019    RBC 4.05 04/02/2019    RBC 3.98 11/16/2011    HGB 12.5 04/02/2019    HCT 38.7 04/02/2019    MCV 95.6 04/02/2019    RDW 12.9 04/02/2019     04/02/2019     11/16/2011       CMP:   Lab Results   Component Value Date     04/02/2019    K 4.7 04/02/2019    CL 98 04/02/2019    CO2 27 04/02/2019    BUN 15 04/02/2019    CREATININE 0.64 04/02/2019    GFRAA >60 04/02/2019    LABGLOM >60 04/02/2019    GLUCOSE 129 04/02/2019    GLUCOSE 116 11/16/2011    PROT 6.6 06/21/2016    CALCIUM 9.8 04/02/2019    BILITOT 0.41 06/21/2016    ALKPHOS 57 06/21/2016    AST 15 06/21/2016    ALT 20 06/21/2016       POC Tests:   Recent Labs     08/10/20  1027   POCGLU 120*       Coags:   Lab Results   Component Value Date    PROTIME 10.7 06/20/2016    INR 1.0 06/20/2016    APTT 25.5 06/20/2016       HCG (If Applicable): No results found for: PREGTESTUR, PREGSERUM, HCG, HCGQUANT     ABGs: No results found for: PHART, PO2ART, BBY9MMS, GFZ4UDY, BEART, I3TXQDHE     Type & Screen (If Applicable):  No results found for: LABABO, LABRH    Anesthesia Evaluation   no history of anesthetic complications:   Airway: Mallampati: III  TM distance: <3 FB   Neck ROM: limited  Comment: Limited extension and rotation to neck.    Mouth opening: < 3 FB Dental:      Comment: Very poor dentition     Pulmonary:Negative Pulmonary administered. Anesthetic plan and risks discussed with patient. Plan discussed with CRNA.                   [address], APRN - CRNA   8/10/2020

## 2020-08-10 NOTE — PROGRESS NOTES
Pt given jello during nausea episode. Pt ate the whole thing and states she is feeling better and tolerating the jello well with no c/o nausea.

## 2020-08-11 LAB
ESTIMATED AVERAGE GLUCOSE: 157 MG/DL
GLUCOSE BLD-MCNC: 154 MG/DL (ref 74–100)
GLUCOSE BLD-MCNC: 162 MG/DL (ref 74–100)
HBA1C MFR BLD: 7.1 % (ref 4.8–5.9)

## 2020-08-11 PROCEDURE — 6360000002 HC RX W HCPCS: Performed by: ORTHOPAEDIC SURGERY

## 2020-08-11 PROCEDURE — G0378 HOSPITAL OBSERVATION PER HR: HCPCS

## 2020-08-11 PROCEDURE — 83036 HEMOGLOBIN GLYCOSYLATED A1C: CPT

## 2020-08-11 PROCEDURE — 36415 COLL VENOUS BLD VENIPUNCTURE: CPT

## 2020-08-11 PROCEDURE — 96366 THER/PROPH/DIAG IV INF ADDON: CPT

## 2020-08-11 PROCEDURE — 97530 THERAPEUTIC ACTIVITIES: CPT

## 2020-08-11 PROCEDURE — 6370000000 HC RX 637 (ALT 250 FOR IP): Performed by: ORTHOPAEDIC SURGERY

## 2020-08-11 PROCEDURE — 82947 ASSAY GLUCOSE BLOOD QUANT: CPT

## 2020-08-11 PROCEDURE — 97166 OT EVAL MOD COMPLEX 45 MIN: CPT

## 2020-08-11 PROCEDURE — 97162 PT EVAL MOD COMPLEX 30 MIN: CPT

## 2020-08-11 PROCEDURE — 97535 SELF CARE MNGMENT TRAINING: CPT

## 2020-08-11 PROCEDURE — 2580000003 HC RX 258: Performed by: FAMILY MEDICINE

## 2020-08-11 PROCEDURE — 97116 GAIT TRAINING THERAPY: CPT

## 2020-08-11 PROCEDURE — 97110 THERAPEUTIC EXERCISES: CPT

## 2020-08-11 PROCEDURE — 6370000000 HC RX 637 (ALT 250 FOR IP): Performed by: FAMILY MEDICINE

## 2020-08-11 PROCEDURE — 2580000003 HC RX 258: Performed by: ORTHOPAEDIC SURGERY

## 2020-08-11 RX ORDER — SODIUM CHLORIDE 9 MG/ML
INJECTION, SOLUTION INTRAVENOUS CONTINUOUS
Status: DISCONTINUED | OUTPATIENT
Start: 2020-08-11 | End: 2020-08-12 | Stop reason: HOSPADM

## 2020-08-11 RX ORDER — HYDROCODONE BITARTRATE AND ACETAMINOPHEN 5; 325 MG/1; MG/1
1-2 TABLET ORAL EVERY 6 HOURS PRN
Qty: 40 TABLET | Refills: 0 | Status: SHIPPED | OUTPATIENT
Start: 2020-08-11 | End: 2020-08-14

## 2020-08-11 RX ADMIN — SODIUM CHLORIDE: 9 INJECTION, SOLUTION INTRAVENOUS at 05:45

## 2020-08-11 RX ADMIN — RIVAROXABAN 10 MG: 10 TABLET, FILM COATED ORAL at 09:17

## 2020-08-11 RX ADMIN — HYDROCODONE BITARTRATE AND ACETAMINOPHEN 2 TABLET: 5; 325 TABLET ORAL at 12:47

## 2020-08-11 RX ADMIN — DOCUSATE SODIUM 100 MG: 100 CAPSULE, LIQUID FILLED ORAL at 09:17

## 2020-08-11 RX ADMIN — SODIUM CHLORIDE: 9 INJECTION, SOLUTION INTRAVENOUS at 18:40

## 2020-08-11 RX ADMIN — METFORMIN HYDROCHLORIDE 500 MG: 500 TABLET ORAL at 09:18

## 2020-08-11 RX ADMIN — METFORMIN HYDROCHLORIDE 500 MG: 500 TABLET ORAL at 17:34

## 2020-08-11 RX ADMIN — DOCUSATE SODIUM 100 MG: 100 CAPSULE, LIQUID FILLED ORAL at 21:15

## 2020-08-11 RX ADMIN — HYDROCODONE BITARTRATE AND ACETAMINOPHEN 2 TABLET: 5; 325 TABLET ORAL at 21:15

## 2020-08-11 RX ADMIN — DEXTROSE MONOHYDRATE 1 G: 50 INJECTION, SOLUTION INTRAVENOUS at 05:45

## 2020-08-11 RX ADMIN — TRANEXAMIC ACID 1300 MG: 650 TABLET ORAL at 03:36

## 2020-08-11 RX ADMIN — LATANOPROST 1 DROP: 50 SOLUTION OPHTHALMIC at 21:15

## 2020-08-11 ASSESSMENT — PAIN DESCRIPTION - ORIENTATION
ORIENTATION: RIGHT
ORIENTATION: RIGHT

## 2020-08-11 ASSESSMENT — PAIN DESCRIPTION - PAIN TYPE
TYPE: SURGICAL PAIN
TYPE: SURGICAL PAIN

## 2020-08-11 ASSESSMENT — PAIN SCALES - GENERAL
PAINLEVEL_OUTOF10: 8
PAINLEVEL_OUTOF10: 0
PAINLEVEL_OUTOF10: 9
PAINLEVEL_OUTOF10: 0
PAINLEVEL_OUTOF10: 8
PAINLEVEL_OUTOF10: 8
PAINLEVEL_OUTOF10: 2
PAINLEVEL_OUTOF10: 0

## 2020-08-11 ASSESSMENT — PAIN DESCRIPTION - LOCATION
LOCATION: KNEE
LOCATION: KNEE

## 2020-08-11 ASSESSMENT — PAIN DESCRIPTION - FREQUENCY: FREQUENCY: CONTINUOUS

## 2020-08-11 ASSESSMENT — PAIN - FUNCTIONAL ASSESSMENT: PAIN_FUNCTIONAL_ASSESSMENT: PREVENTS OR INTERFERES SOME ACTIVE ACTIVITIES AND ADLS

## 2020-08-11 ASSESSMENT — PAIN DESCRIPTION - DESCRIPTORS: DESCRIPTORS: ACHING

## 2020-08-11 NOTE — PROGRESS NOTES
Occupational Therapy  Facility/Department: [facility] MED SURG  Daily Treatment Note  NAME: Shady Merchant  : 1940  MRN: [id number]    Date of Service: 2020    Discharge Recommendations:  Continue to assess pending progress       Assessment   OT Education: OT Role;Energy Conservation;Plan of Care;IADL Safety;Home Exercise Program;Precautions; ADL Adaptive Strategies;Transfer Training;Equipment  Patient Education: Patient provided with education on safe functional mobility including rationale for using FWW and proper technique during transfers. Patient also educated on DME including reacher and sock aide for increased independence with LB ADLs. Patient also educated on D/C folder including home safety and fall prevention (e.g. grab bars, removal of throw rugs) to maximize safety upon returning home. Barriers to Learning: None  REQUIRES OT FOLLOW UP: Yes  Activity Tolerance  Activity Tolerance: Patient limited by pain  Safety Devices  Safety Devices in place: Yes  Type of devices: Left in chair;Call light within reach         Patient Diagnosis(es): The encounter diagnosis was S/P total knee arthroplasty, right.      has a past medical history of Achilles tendon rupture, Cancer ([address] Chronic SI joint pain, Claustrophobia, Diabetes mellitus ([address] Esophageal stricture, [place] (hard of hearing), Hyperlipidemia, Hypertension, Retinal detachment, and Trauma to eye, left.   has a past surgical history that includes Cervical disc surgery (); Lumbar disc surgery (); Hysterectomy (); bladder suspension (); Blepharoplasty (Left, 5/3/2013); Eye surgery (Left, 06/15/2013); Eye surgery (Bilateral, ); Upper gastrointestinal endoscopy; Esophagus dilation; Cataract removal (Bilateral); eye surgery (Left, ,,,); vitrectomy (Left, 10/3/13); Total hip arthroplasty (Left, 2017); joint replacement (Right, 2016); Foot Amputation;  Foot Tendon Surgery (Left, 2018); pr foot/toes surgery proc unlisted (Left, 3/9/2018); Finger trigger release (Left, 4/15/2019); proctosigmoidoscopy (N/A, 10/30/2019); proctosigmoidoscopy (10/30/2019); colectomy; and Knee Arthroplasty (Right, 08/10/2020). Restrictions  Restrictions/Precautions  Restrictions/Precautions: Weight Bearing, Fall Risk  Lower Extremity Weight Bearing Restrictions  Right Lower Extremity Weight Bearing: Weight Bearing As Tolerated     Subjective   General  Chart Reviewed: Yes  Patient assessed for rehabilitation services?: Yes  Response to previous treatment: Patient with no complaints from previous session  Family / Caregiver Present: No  Referring Practitioner: Dr. Cintia Silva  Diagnosis: S/P R total knee arthrpolasty  Subjective  Subjective: Patient reports 9/10 pain in RLE and states \"I just had 2 pain pills. .. finally! \"  General Comment  Comments: Patient sitting in bedside chair upon OT arrival, reluctant to OT tx initially due to pain but agreeable with encouragement/education           Objective    ADL  Equipment Provided: Reacher;Sock aid  Feeding: Independent  Grooming: Modified independent   UE Bathing: Modified independent   LE Bathing: Maximum assistance  UE Dressing: Modified independent   LE Dressing: Maximum assistance  Toileting: Contact guard assistance        Balance  Sitting Balance: Modified independent   Standing Balance: Contact guard assistance  Standing Balance  Time: ~1-3 min  Activity: ADLs & functional mobility  Comment: Patient demonstrated Fair balance, no LOB, no safety concerns noted  Functional Mobility  Functional Mobility Device: Rolling Walker  Activity: Other  Assist Level: Contact guard assistance   Functional Mobility Comments: Patient educated on safety and importance of using FWW to minimize fall risk     Transfers  Stand Pivot Transfers: Contact guard assistance  Sit to stand: Contact guard assistance  Stand to sit: Contact guard assistance   Transfer Comments: Patient educated on hand/foot placement, anterior weighshifting and safe, controlled ascent/descent to minimize pain in RLE and maximize safety                                                     Plan   Plan  Times per week: 7x/week  Times per day: Daily  Current Treatment Recommendations: Strengthening, Safety Education & Training, Patient/Caregiver Education & Training, Self-Care / ADL, Balance Training, Functional Mobility Training, Endurance Training          Goals  Short term goals  Time Frame for Short term goals: 10 visits  Short term goal 1: Pt will complete LB dressing/bathing with SUP with use of AE PRN to improve safety and independence with ADL tasks. Short term goal 2: Pt will verbalize good understanding of discharge folder. Short term goal 3: Pt will complete functional mobility during ADL tasks with SUP with LRAD to increase independence and safety with tasks. Short term goal 4: Pt will tolerate standing while WB through RLE for greater than 3 minutes with no rest breaks to improve endurance and safety with ADL and functional transfers.        Therapy Time   Individual Concurrent Group Co-treatment   Time In 6280         Time Out 1322         Minutes 24         Timed Code Treatment Minutes: [address] [place], [place]

## 2020-08-11 NOTE — PROGRESS NOTES
Progress Note  Elif Lee MD    SUBJECTIVE: Patient is seen for Principal Problem:    S/P total knee arthroplasty, right  Active Problems:    Type 2 diabetes mellitus without complication ([facility] [address]    Essential hypertension    Diastolic dysfunction with chronic heart failure ([facility] [address]  Resolved Problems:    * No resolved hospital problems. *     pt has some pain    OBJECTIVE:    Vitals:   Vitals:    08/11/20 0745   BP: 117/70   Pulse: 79   Resp: 16   Temp: 97.6 °F (36.4 °C)   SpO2: 96%     Weight: 162 lb 4.8 oz (73.6 kg)   Height: 5' 3\" (160 cm)   -----------------------------------------------------------------  Exam:    CONSTITUTIONAL:  awake, alert, cooperative, no apparent distress, and appears stated age  EYES:  Aphakia lt eyeENT:  Normocephalic, without obvious abnormality, atraumatic, sinuses nontender on palpation, external ears without lesions, oral pharynx with moist mucus membranes, tonsils without erythema or exudates, gums normal and good dentition.   NECK:  Supple, symmetrical, trachea midline, no adenopathy, thyroid symmetric, not enlarged and no tenderness, skin normal  HEMATOLOGIC/LYMPHATICS:  no cervical lymphadenopathy  LUNGS:  No increased work of breathing, good air exchange, clear to auscultation bilaterally, no crackles or wheezing  CARDIOVASCULAR:  Normal apical impulse, regular rate and rhythm, normal S1 and S2, no S3 or S4, and no murmur noted  ABDOMEN:  Soft,non tender    MUSCULOSKELETAL:  Knee dressing intact  NEUROLOGIC:  No motor or sensory deficit  SKIN:  no bruising or bleeding  EXT:     no cyanosis, clubbing or edema present    -----------------------------------------------------------------  Diagnostic Data: Reviewed    More Recent Labs:    Results for orders placed or performed during the hospital encounter of 08/10/20   Glucose, Whole Blood   Result Value Ref Range    POC Glucose 120 (H) 74 - 100 mg/dL   Glucose, Whole Blood   Result Value Ref Range    POC Glucose 325 (H) 74 - 100 mg/dL   Hemoglobin A1c   Result Value Ref Range    Hemoglobin A1C 7.1 (H) 4.8 - 5.9 %    Estimated Avg Glucose 157 mg/dL   Glucose, Whole Blood   Result Value Ref Range    POC Glucose 154 (H) 74 - 100 mg/dL       ASSESSMENT:   Patient Active Problem List    Diagnosis Date Noted    Type 2 diabetes mellitus without complication ([facility] [address] 57/02/2586     Priority: High    Closed right hip fracture ([facility] [address] 06/20/2016     Priority: High    Essential hypertension 06/20/2016     Priority: High    S/P total knee arthroplasty, right 08/10/2020    Malignant neoplasm of sigmoid colon ([facility] [address]     Status post left hip replacement 08/08/2017    Status post total replacement of right hip 22/46/0055    Diastolic dysfunction with chronic heart failure ([facility] [address] 06/21/2016    Mild protein malnutrition ([facility] [address] 06/21/2016    Partial recent retinal detachment with multiple defects 10/03/2013    Aphakia of eye, left 07/23/2013    Corneal edema, secondary, left 07/23/2013         PLAN:  · Type 2 dm- monitor blood sugar  · htn-stable  · S/p tka- xarelto for dvt prophylaxis      Martha Goldberg M.D.

## 2020-08-11 NOTE — PROGRESS NOTES
The patient is resting in her bed quietly. Vitals have been checked and are within the normal. Physical assessment is completed at this time. Patient stated that she has started having some sensation to her lower extremities and her pain is tolerable as of now. Pedal pulses on right foot is palpable. Patient is alert and oriented and calls out appropriately as needed. Needs are assessed. Bedside table and call light are within the reach. Will continue to monitor.

## 2020-08-11 NOTE — PROGRESS NOTES
Progress Note    Subjective:     Post-Operative Day: 1 Status Post right Total Knee Arthroplasty  Systemic or Specific Complaints:No Complaints    Objective:     Patient Vitals for the past 24 hrs:   BP Temp Temp src Pulse Resp SpO2 Height Weight   08/11/20 1007 -- -- -- -- -- -- 5' 3\" (1.6 m) --   08/11/20 0745 117/70 97.6 °F (36.4 °C) Temporal 79 16 96 % -- --   08/11/20 0445 -- -- -- -- -- -- -- 162 lb 4.8 oz (73.6 kg)   08/11/20 0338 113/70 97.2 °F (36.2 °C) Temporal 95 18 97 % -- --   08/11/20 0015 133/70 97.2 °F (36.2 °C) Temporal 89 16 96 % -- --   08/10/20 2000 113/70 97 °F (36.1 °C) Temporal 99 18 96 % -- --   08/10/20 1750 112/62 97.9 °F (36.6 °C) Temporal 89 16 95 % -- --   08/10/20 1650 116/64 97.2 °F (36.2 °C) Temporal 88 16 97 % -- --   08/10/20 1550 122/65 98 °F (36.7 °C) Temporal 88 16 95 % -- --   08/10/20 1520 (!) 148/71 97.8 °F (36.6 °C) Temporal 88 16 94 % -- --   08/10/20 1450 135/75 98.6 °F (37 °C) Temporal 92 16 95 % 5' 3\" (1.6 m) 167 lb (75.8 kg)   08/10/20 1430 (!) 144/69 -- -- 96 16 95 % -- --   08/10/20 1420 132/76 -- -- 99 18 96 % -- --   08/10/20 1415 135/74 -- -- 98 10 94 % -- --   08/10/20 1410 132/83 -- -- 101 24 95 % -- --   08/10/20 1403 (!) 147/73 98 °F (36.7 °C) Temporal 103 18 95 % -- --       General: alert, appears stated age and cooperative   Wound: Wound clean and dry no evidence of infection. Motion: Painless Range of Motion   DVT Exam: No evidence of DVT seen on physical exam.       Knee swollen but thigh soft to palpation. Moving foot and ankle. Good distal pulses. Data Review  CBC:   Lab Results   Component Value Date    WBC 9.0 04/02/2019    RBC 4.05 04/02/2019    RBC 3.98 11/16/2011    HGB 12.5 04/02/2019    HCT 38.7 04/02/2019     04/02/2019     11/16/2011       Assessment:     Status Post right Total Knee Arthroplasty. Doing well postoperatively.      Plan:      1: Discharge today, Return to Clinic: 2 weeks :  2:  Continue Deep venous thrombosis prophylaxis  3:  Continue physical therapy  4:  Continue Pain Control

## 2020-08-11 NOTE — PROGRESS NOTES
Occupational Therapy   Occupational Therapy Initial Assessment  Date: 2020   Patient Name: Tiara Foreman  MRN: [id number]     : 1940    Date of Service: 2020    Discharge Recommendations:  Continue to assess pending progress       Assessment   Performance deficits / Impairments: Decreased functional mobility ; Decreased balance;Decreased coordination;Decreased safe awareness;Decreased ADL status; Decreased endurance;Decreased strength  Assessment: Pt is an 78year old female admitted s/p R total knee arthroplasty. Pt presents with ecreased functional mobility, safety, and balance, as well as decreased independence with ADL tasks, specifically LB due to limitations secondary to knee replacement. Pt would benefit from skilled occcpuational therapy services to address deficits, provide education, and improve patient's ADL independence and safety. Prognosis: Good  Decision Making: Medium Complexity  OT Education: OT Role;Plan of Care;Transfer Training  Patient Education: Pt educated on OT role, as well as OT POC. Pt also educated on safety wtih toilet transfers at home, as pt states that she doesn't have grab bars, but she just puts her walker in front of her and pulls herself up with that. Educated pt on safety with walker and the risks of pulling on walker to assist with transfer. Pt reports that she \"knows how to be safe. \" Pt also educated on proper hand placement with sit to stand and stand to sit transfers and making she that she can feel the back of her knees on the chair before attempting to reach back to sit. Pt also educated on importance of moving her knee and putting weight through RLE, as pt is hesitant and reports increased pain with movement.   REQUIRES OT FOLLOW UP: Yes  Activity Tolerance  Activity Tolerance: Patient Tolerated treatment well  Safety Devices  Safety Devices in place: Yes  Type of devices: Left in chair;Call light within reach           Patient Diagnosis(es): There were no encounter diagnoses. has a past medical history of Achilles tendon rupture, Cancer ([facility] [address] Chronic SI joint pain, Claustrophobia, Diabetes mellitus ([address] Esophageal stricture, [place] (hard of hearing), Hyperlipidemia, Hypertension, Retinal detachment, and Trauma to eye, left.   has a past surgical history that includes Cervical disc surgery (1984); Lumbar disc surgery (1974); Hysterectomy (1994); bladder suspension (1994); Blepharoplasty (Left, 5/3/2013); Eye surgery (Left, 06/15/2013); Eye surgery (Bilateral, 2008); Upper gastrointestinal endoscopy; Esophagus dilation; Cataract removal (Bilateral); eye surgery (Left, ,7/13,12/12,6/13); vitrectomy (Left, 10/3/13); Total hip arthroplasty (Left, 8/7/2017); joint replacement (Right, 06/21/2016); Foot Amputation; Foot Tendon Surgery (Left, 03/09/2018); pr foot/toes surgery proc unlisted (Left, 3/9/2018); Finger trigger release (Left, 4/15/2019); proctosigmoidoscopy (N/A, 10/30/2019); proctosigmoidoscopy (10/30/2019); colectomy; and Knee Arthroplasty (Right, 08/10/2020). Restrictions  Restrictions/Precautions  Restrictions/Precautions: Weight Bearing, Fall Risk  Lower Extremity Weight Bearing Restrictions  Right Lower Extremity Weight Bearing: Weight Bearing As Tolerated    Subjective   General  Patient assessed for rehabilitation services?: Yes  Referring Practitioner: Dr. Heath España  Diagnosis: S/P R total knee arthrpolasty  Subjective  Subjective: Pt reports no pain at time of evaluation. Pt states \"I had 2 pain pills this morning at 5 o clock! \"  General Comment  Comments: Pt sitting upright in bediside chair on therapist arrival, had just finished breakfast. Agreeable to OT evaluation.     Social/Functional History  Social/Functional History  Lives With: Son  Type of Home: House  Home Layout: One level  Home Access: Stairs to enter without rails  Entrance Stairs - Number of Steps: 2 steps to initially get into house, then 4 more after that per pt report. Bathroom Shower/Tub: Tub/Shower unit, Shower chair without back  Bathroom Toilet: Standard  Bathroom Equipment: Grab bars in shower  Bathroom Accessibility: Accessible  Home Equipment: Murray walkerStanley  Receives Help From: Family  ADL Assistance: Independent  Homemaking Assistance: Independent  Homemaking Responsibilities: Yes  Ambulation Assistance: Independent  Transfer Assistance: Independent  Active : Yes  Mode of Transportation: Car  Additional Comments: Pt reports that she lives with her son, daughter-in-law, and  two grandsons who will help her when she gets home if needed. Objective   Vision: Impaired  Vision Exceptions: Wears glasses for reading  Hearing: Exceptions to [facility]  Hearing Exceptions: Hard of hearing/hearing concerns(Pt states that she has hearing aids, but she can't wear them because they make her ears sore.)          Functional Mobility  Functional - Mobility Device: Rolling Walker  Activity: Other  Assist Level: Contact guard assistance  ADL  Feeding: Independent  Grooming: Setup  UE Bathing: Modified independent   LE Bathing: Moderate assistance;Maximum assistance  UE Dressing: Modified independent   LE Dressing: Moderate assistance;Maximum assistance  Toileting: Contact guard assistance;Minimal assistance        Bed mobility  Comment: Pt in chair for evaluation. Bed mobility not assessed. Transfers  Sit to stand: Contact guard assistance  Stand to sit: Contact guard assistance  Transfer Comments: Education/verbal cueing provided for proper hand placement as well as making sure pt can feel chair on back of knees before attempting to sit.            LUE AROM : WFL  Left Hand AROM: WFL  RUE AROM : WFL  Right Hand AROM: WFL  LUE Strength  Gross LUE Strength: WFL  LUE Strength Comment: Grossly 4-/5  RUE Strength  Gross RUE Strength: WFL  RUE Strength Comment: Grossly 4-/5        Plan   Plan  Times per week: 7x/week  Times per day: Daily  Current Treatment Recommendations: Strengthening, Safety Education & Training, Patient/Caregiver Education & Training, Self-Care / ADL, Balance Training, Functional Mobility Training, Endurance Training      AM-PAC Score  AM-PAC Inpatient Daily Activity Raw Score: 19 (08/11/20 0902)  AM-PAC Inpatient ADL T-Scale Score : 40.22 (08/11/20 0902)  ADL Inpatient CMS 0-100% Score: 42.8 (08/11/20 0902)  ADL Inpatient CMS G-Code Modifier : CK (08/11/20 0902)    Goals  Short term goals  Time Frame for Short term goals: 10 visits  Short term goal 1: Pt will complete LB dressing/bathing with SUP with use of AE PRN to improve safety and independence with ADL tasks. Short term goal 2: Pt will verbalize good understanding of discharge folder. Short term goal 3: Pt will complete functional mobility during ADL tasks with SUP with LRAD to increase independence and safety with tasks. Short term goal 4: Pt will tolerate standing while WB through RLE for greater than 3 minutes with no rest breaks to improve endurance and safety with ADL and functional transfers.        Therapy Time   Individual Concurrent Group Co-treatment   Time In [facility]         Time Out 0846         Minutes 106 Sarah Negro

## 2020-08-11 NOTE — PROGRESS NOTES
Nutrition Assessment     Type and Reason for Visit: Initial, Positive Nutrition Screen(MST 1)    Nutrition Recommendations/Plan: continue current diet     Nutrition Assessment:  Increased nutrient needs r/t acute injury/trauma aeb healing needs from s/p R TKA. A1c 7.1 with good control with advanced age. Glucose up to 325 last night. Encourage protein foods to aid healing needs. Pt reports she is getting too much food, eats 5-6 times per day. Pt limiting non starchy vegetable portions, discussed with Pt about non starchy vegetables being lower in carbohydrate and no need to restrict portion on vegetables other than corn, peas, lima beans, and potatoes. Pt splits omelette's in half if too many vegetables in it. Denied PO changes despite MST 1 score. Wants smaller portions, Dietary notified. Pt encouraged to eat protein foods to aid healing needs. Malnutrition Assessment:  Malnutrition Status: At risk for malnutrition (Comment)      Nutrition Related Findings: mild malnutriton indices      Current Nutrition Therapies:    DIET GENERAL; Anthropometric Measures:  · Height: 5' 3\" (160 cm)  · Current Body Wt: 162 lb 4.8 oz (73.6 kg)   · BMI: 28.8    Nutrition Diagnosis:   · Increased nutrient needs related to acute injury/trauma as evidenced by wounds      Nutrition Interventions:   Food and/or Nutrient Delivery:  Continue Current Diet  Nutrition Education/Counseling:  Education not indicated   Coordination of Nutrition Care:  Continued Inpatient Monitoring  No results for input(s): NA, K, CL, CO2, BUN, CREATININE, GLUCOSE, ALT, ALB, ALKPHOS, GFR in the last 72 hours.     Invalid input(s): CA,  AST,  BILITOT, OSMO   Lab Results   Component Value Date    LABALBU 3.4 06/21/2016    LABALBU 4.3 11/16/2011      Goals:  PO > 75% of meals       Nutrition Monitoring and Evaluation:   Behavioral-Environmental Outcomes:  Knowledge or Skill   Food/Nutrient Intake Outcomes:  Food and Nutrient Intake  Physical Signs/Symptoms Outcomes:  Biochemical Data, Skin, Weight     Discharge Planning:    Continue current diet     Electronically signed by Theo Flowers RD, LD on 8/11/20 at 10:15 AM EDT    Contact: [phone]

## 2020-08-11 NOTE — PROGRESS NOTES
Patient refuses insulin at this time. States every time she takes her insulin her FSBS is elevated. Educated on insulin, but patient continues to refuse. States she will talk to her PCP about it.

## 2020-08-11 NOTE — PROGRESS NOTES
Son called and said that the patient needs to go to SNF for a week because no one will be home to take care of her. They would like her to go to [place]. Referral made and paper work fax.     CONOR Gomez

## 2020-08-11 NOTE — PROGRESS NOTES
Physical Therapy    Facility/Department: [facility]  Initial Assessment    NAME: Jose Armando Aldana  : 1940  MRN: [id number]    Date of Service: 2020    Discharge Recommendations:  Continue to assess pending progress, Home with assist PRN, Outpatient PT, Home with Home health PT   PT Equipment Recommendations  Equipment Needed: No    Assessment   Body structures, Functions, Activity limitations: Decreased functional mobility ; Decreased ADL status; Decreased ROM; Decreased strength;Decreased endurance;Decreased balance; Increased pain  Assessment: Pt is an 78year old female that presents post op R TKA. She demonstrates decreased R knee ROM, decreased strength, decreased endurance, and increased pain which affects her ability to participate in ADLs, functional mobility tasks, and household ambulation. The pt would benefit from skilled therapy to address these deficits. Treatment Diagnosis: R TKA  Prognosis: Good  Decision Making: Medium Complexity  REQUIRES PT FOLLOW UP: Yes  Activity Tolerance  Activity Tolerance: Patient limited by pain       Patient Diagnosis(es): There were no encounter diagnoses. has a past medical history of Achilles tendon rupture, Cancer ([address] Chronic SI joint pain, Claustrophobia, Diabetes mellitus ([address] Esophageal stricture, [place] (hard of hearing), Hyperlipidemia, Hypertension, Retinal detachment, and Trauma to eye, left.   has a past surgical history that includes Cervical disc surgery (); Lumbar disc surgery (); Hysterectomy (); bladder suspension (); Blepharoplasty (Left, 5/3/2013); Eye surgery (Left, 06/15/2013); Eye surgery (Bilateral, ); Upper gastrointestinal endoscopy; Esophagus dilation; Cataract removal (Bilateral); eye surgery (Left, ,,,); vitrectomy (Left, 10/3/13); Total hip arthroplasty (Left, 2017); joint replacement (Right, 2016); Foot Amputation;  Foot Tendon Surgery (Left, 2018); pr foot/toes surgery proc unlisted (Left, 3/9/2018); Finger trigger release (Left, 4/15/2019); proctosigmoidoscopy (N/A, 10/30/2019); proctosigmoidoscopy (10/30/2019); colectomy; and Knee Arthroplasty (Right, 08/10/2020). Restrictions  Restrictions/Precautions  Restrictions/Precautions: Weight Bearing, Fall Risk  Lower Extremity Weight Bearing Restrictions  Right Lower Extremity Weight Bearing: Weight Bearing As Tolerated  Vision/Hearing  Vision: Impaired  Vision Exceptions: Wears glasses for reading  Hearing: Exceptions to [facility]  Hearing Exceptions: Hard of hearing/hearing concerns     Subjective  General  Chart Reviewed: Yes  Patient assessed for rehabilitation services?: Yes  Family / Caregiver Present: No  Referring Practitioner: Ambar David MD  Referral Date : 08/10/20  Diagnosis: R TKA  Follows Commands: Within Functional Limits  Subjective  Subjective: Pt reports 5-6/10 R knee pain. Pt states she is tired today but willing to participate in therapy. Pain Screening  Patient Currently in Pain: Yes    Orientation  Orientation  Overall Orientation Status: Within Functional Limits  Social/Functional History  Social/Functional History  Lives With: Son  Type of Home: House  Home Layout: One level  Home Access: Stairs to enter without rails  Entrance Stairs - Number of Steps: 2 steps to initially get into house, then 4 more after that per pt report.   Bathroom Shower/Tub: Tub/Shower unit, Shower chair without back  Bathroom Toilet: Standard  Bathroom Equipment: Grab bars in shower  Bathroom Accessibility: Accessible  Home Equipment: Rolling walker, Cane  Receives Help From: Family  ADL Assistance: Independent  Homemaking Assistance: Independent  Homemaking Responsibilities: Yes  Ambulation Assistance: Independent  Transfer Assistance: Independent  Active : Yes  Mode of Transportation: Car  Additional Comments: Pt reports that she lives with her son, daughter-in-law, and  two grandsons who will help her when she gets home if needed. Cognition   Cognition  Overall Cognitive Status: WNL    Objective  AROM RLE (degrees)  RLE General AROM: Flexion and extension limited due to pain  PROM LLE (degrees)  LLE PROM: WNL  Strength RLE  Strength RLE: Exception  R Hip Flexion: 3-/5  R Knee Flexion: 3-/5  R Knee Extension: 3-/5  R Ankle Dorsiflexion: 4-/5  Strength LLE  Strength LLE: Exception  L Hip Flexion: 4/5  L Knee Flexion: 4/5  L Knee Extension: 4/5  L Ankle Dorsiflexion: 4+/5  Bed mobility  Rolling to Left: Minimal assistance  Rolling to Right: Minimal assistance  Supine to Sit: Minimal assistance  Sit to Supine: Minimal assistance  Scooting: Minimal assistance  Transfers  Sit to Stand: Contact guard assistance  Stand to sit: Contact guard assistance  Bed to Chair: Contact guard assistance  Ambulation  Ambulation?: Yes  WB Status: WBAT  Ambulation 1  Surface: level tile  Device: Rolling Walker  Assistance: Contact guard assistance  Quality of Gait: Increased knee flexion during stance phase  Gait Deviations: Slow Vika; Increased ANEUDY; Decreased step length;Decreased arm swing  Distance: 5'  Balance  Posture: Fair  Sitting - Static: Good  Sitting - Dynamic: Good  Standing - Static: Fair;+  Standing - Dynamic: Fair;+        Plan   Plan  Times per week: 7  Times per day: Twice a day  Current Treatment Recommendations: Strengthening, ROM, Balance Training, Functional Mobility Training, Transfer Training, Stair training, Gait Training, Endurance Training, Neuromuscular Re-education, Manual Therapy - Soft Tissue Mobilization, Pain Management, Modalities, Patient/Caregiver Education & Training, Safety Education & Training, Home Exercise Program  Plan Comment: once a day on weekends    AM-PAC Score     AM-PAC Inpatient Mobility without Stair Climbing Raw Score : 15 (08/11/20 0914)  AM-PAC Inpatient without Stair Climbing T-Scale Score : 43.03 (08/11/20 0914)  Mobility Inpatient CMS 0-100% Score: 47.43 (08/11/20 0914)  Mobility Inpatient without Rakan CMS G-Code Modifier : CK (08/11/20 0914)       Goals  Short term goals  Time Frame for Short term goals: 10 days  Short term goal 1: Pt will intate HEP with good understanding. Short term goal 2: Pt will perform bed mobility with supervision to prepare for return to home. Short term goal 3: Pt will perform functional transfers with supervision to assist with functional mobility tasks. Short term goal 4: Pt will ambulate 48' with FWW and supervision to assist with functional mobility tasks and household ambulation. Patient Goals   Patient goals :  To go home       Therapy Time   Individual Concurrent Group Co-treatment   Time In 0710         Time Out 0725         Minutes 15         Timed Code Treatment Minutes: 15 Minutes     Treated by MEGAN Snyder, PT, DPT NNP/Suzanne PEREYRAP

## 2020-08-11 NOTE — PROGRESS NOTES
[facility]  OCCUPATIONAL THERAPY  No Visit Note    [] ICU    [x] Acute   Patient: Igor Thomason  Room: [facility]/7304-87      Igor Thomason not seen on 8/11/2020 at 11:03 AM due to PTA at bedside for PT tx. Will re-attempt OT tx later this date.           Signature: Jeannie Edward, OTR/L

## 2020-08-11 NOTE — CONSULTS
Right 08/10/2020    Dr. Melissa Lewis    lower back    ID FOOT/TOES SURGERY PROC UNLISTED Left 3/9/2018    ACHILLES TENDON LENGTHENING REPAIR/ EXCISION OF OS PERONEUM,PRIMARY REPAIR OF PERONEUS LINGUS TENDON WITH POSSIBLE PERONEAL TENODESIS performed by Gonzalez Dos Santos DPM at [address] N/A 10/30/2019    ANAL PROCTO SIGMOIDOSCOPY FLEXIBLE with biopsies performed by Corinne Belt, MD at [address]  10/30/2019    -sigmoid colon mass(invasive ulcerated adenocarcinoma)    TOTAL HIP ARTHROPLASTY Left 8/7/2017    HIP TOTAL ARTHROPLASTY performed by Madi Ross MD at [address] [facility] dilation    VITRECTOMY Left 10/3/13       Medications Prior to Admission:    Prior to Admission medications    Medication Sig Start Date End Date Taking? Authorizing Provider   FIBER PO Take by mouth daily   Yes Historical Provider, MD   docusate sodium (COLACE) 100 MG capsule Take 100 mg by mouth 2 times daily   Yes Historical Provider, MD   acetaminophen (TYLENOL) 325 MG tablet Take 2 tablets by mouth every 4 hours as needed for Pain 8/8/17  Yes Madi Ross MD   latanoprost (XALATAN) 0.005 % ophthalmic solution Place 1 drop into both eyes nightly   Yes Historical Provider, MD   Polyvinyl Alcohol-Povidone (TEARS PLUS OP) Apply 1 drop to eye 3 times daily   Yes Historical Provider, MD   metFORMIN (GLUCOPHAGE) 500 MG tablet Take 500 mg by mouth 2 times daily (with meals)   Yes Historical Provider, MD       Allergies:  Aleve [naproxen sodium]; Aspirin; Codeine; Nsaids; Align [lactase-lactobacillus]; Amitriptyline; Gabapentin; Lidocaine; Lyrica [pregabalin]; Omnipaque [iohexol]; Other; and Tetracaine    Social History:   TOBACCO:   reports that she has never smoked. She has never used smokeless tobacco.  ETOH:   reports no history of alcohol use.     Family History:       Problem Relation Age of Onset    Heart Disease

## 2020-08-11 NOTE — PROGRESS NOTES
Physical Therapy  Facility/Department: [facility] AT THE [facility] MED SURG  Daily Treatment Note  NAME: Governor Golden  : 1940  MRN: [id number]    Date of Service: 2020    Discharge Recommendations:  Continue to assess pending progress, Home with assist PRN, Outpatient PT, Home with Home health PT        Assessment   Treatment Diagnosis: R TKA  Prognosis: Good  Decision Making: Medium Complexity  PT Education: Gait Training;Transfer Training  REQUIRES PT FOLLOW UP: Yes  Activity Tolerance  Activity Tolerance: Patient limited by pain     Patient Diagnosis(es): The encounter diagnosis was S/P total knee arthroplasty, right.     has a past medical history of Achilles tendon rupture, Cancer ([facility] [address] Chronic SI joint pain, Claustrophobia, Diabetes mellitus ([facility] [address] Esophageal stricture, [place] (hard of hearing), Hyperlipidemia, Hypertension, Retinal detachment, and Trauma to eye, left.   has a past surgical history that includes Cervical disc surgery (); Lumbar disc surgery (); Hysterectomy (); bladder suspension (); Blepharoplasty (Left, 5/3/2013); Eye surgery (Left, 06/15/2013); Eye surgery (Bilateral, ); Upper gastrointestinal endoscopy; Esophagus dilation; Cataract removal (Bilateral); eye surgery (Left, ,,,); vitrectomy (Left, 10/3/13); Total hip arthroplasty (Left, 2017); joint replacement (Right, 2016); Foot Amputation; Foot Tendon Surgery (Left, 2018); pr foot/toes surgery proc unlisted (Left, 3/9/2018); Finger trigger release (Left, 4/15/2019); proctosigmoidoscopy (N/A, 10/30/2019); proctosigmoidoscopy (10/30/2019); colectomy; and Knee Arthroplasty (Right, 08/10/2020).     Restrictions  Restrictions/Precautions  Restrictions/Precautions: Weight Bearing, Fall Risk  Lower Extremity Weight Bearing Restrictions  Right Lower Extremity Weight Bearing: Weight Bearing As Tolerated  Subjective   General  Chart Reviewed: Yes  Family / Caregiver Present: No  Referring Practitioner: Ariella Iglesias Modalities, Patient/Caregiver Education & Training, Safety Education & Training, Home Exercise Program  Plan Comment: once a day on weekends  Safety Devices  Type of devices:  All fall risk precautions in place, Nurse notified, Left in chair, Call light within reach, Gait belt, Patient at risk for falls     Therapy Time   Individual Concurrent Group Co-treatment   Time In 1100         Time Out 1136         Minutes 36         Timed Code Treatment Minutes: [address] [place], [place]

## 2020-08-11 NOTE — PROGRESS NOTES
Patient is still stating some numbness to her lower extremities due to the block given during surgery, so the writer did not feel comfortable getting the patient up to progress ambulation. Will wait until the PT/OT gets her up. Will let the day nurse know.

## 2020-08-11 NOTE — PROGRESS NOTES
Discussed discharge plans with the patient. Patient is a 78year old female here with Right total knee replacement . She is alert and oriented. Patient is  and lives with family. She has a walker , cane , crutches, grab bars, and shower chair at home. Patient does her own cooking and cleaning. She is independent with her ADL's. Patient manages her own medications and drives. Her PCP is Dr. Lillie Oliva MD. She has medical insurance that helps with medication costs. The discharge plan is home with out-patient PT at [facility]. She is set up for 8/11/2020 @ 11:45 AM for PT. She has advance directives but not on file. LSW to monitor and assist with any needs or concerns as they arise.     CONOR Baird

## 2020-08-12 VITALS
HEIGHT: 63 IN | DIASTOLIC BLOOD PRESSURE: 76 MMHG | TEMPERATURE: 97 F | HEART RATE: 82 BPM | BODY MASS INDEX: 28.63 KG/M2 | SYSTOLIC BLOOD PRESSURE: 129 MMHG | WEIGHT: 161.6 LBS | RESPIRATION RATE: 16 BRPM | OXYGEN SATURATION: 97 %

## 2020-08-12 LAB — GLUCOSE BLD-MCNC: 157 MG/DL (ref 74–100)

## 2020-08-12 PROCEDURE — 82947 ASSAY GLUCOSE BLOOD QUANT: CPT

## 2020-08-12 PROCEDURE — G0378 HOSPITAL OBSERVATION PER HR: HCPCS

## 2020-08-12 PROCEDURE — 6370000000 HC RX 637 (ALT 250 FOR IP): Performed by: NURSE PRACTITIONER

## 2020-08-12 PROCEDURE — 6370000000 HC RX 637 (ALT 250 FOR IP): Performed by: ORTHOPAEDIC SURGERY

## 2020-08-12 PROCEDURE — 97116 GAIT TRAINING THERAPY: CPT

## 2020-08-12 PROCEDURE — 97110 THERAPEUTIC EXERCISES: CPT

## 2020-08-12 PROCEDURE — 6370000000 HC RX 637 (ALT 250 FOR IP): Performed by: FAMILY MEDICINE

## 2020-08-12 RX ORDER — ONDANSETRON 4 MG/1
4 TABLET, ORALLY DISINTEGRATING ORAL EVERY 6 HOURS PRN
Status: DISCONTINUED | OUTPATIENT
Start: 2020-08-12 | End: 2020-08-12 | Stop reason: HOSPADM

## 2020-08-12 RX ADMIN — HYDROCODONE BITARTRATE AND ACETAMINOPHEN 2 TABLET: 5; 325 TABLET ORAL at 10:51

## 2020-08-12 RX ADMIN — ONDANSETRON 4 MG: 4 TABLET, ORALLY DISINTEGRATING ORAL at 10:00

## 2020-08-12 RX ADMIN — HYDROCODONE BITARTRATE AND ACETAMINOPHEN 1 TABLET: 5; 325 TABLET ORAL at 06:33

## 2020-08-12 RX ADMIN — METFORMIN HYDROCHLORIDE 500 MG: 500 TABLET ORAL at 08:27

## 2020-08-12 RX ADMIN — RIVAROXABAN 10 MG: 10 TABLET, FILM COATED ORAL at 08:26

## 2020-08-12 RX ADMIN — DOCUSATE SODIUM 100 MG: 100 CAPSULE, LIQUID FILLED ORAL at 08:26

## 2020-08-12 ASSESSMENT — PAIN SCALES - GENERAL
PAINLEVEL_OUTOF10: 7
PAINLEVEL_OUTOF10: 4
PAINLEVEL_OUTOF10: 0
PAINLEVEL_OUTOF10: 2

## 2020-08-12 ASSESSMENT — PAIN DESCRIPTION - DESCRIPTORS: DESCRIPTORS: ACHING

## 2020-08-12 ASSESSMENT — PAIN DESCRIPTION - LOCATION: LOCATION: KNEE

## 2020-08-12 ASSESSMENT — PAIN DESCRIPTION - ORIENTATION: ORIENTATION: RIGHT

## 2020-08-12 ASSESSMENT — PAIN DESCRIPTION - FREQUENCY: FREQUENCY: CONTINUOUS

## 2020-08-12 ASSESSMENT — PAIN DESCRIPTION - PAIN TYPE: TYPE: SURGICAL PAIN

## 2020-08-12 NOTE — PROGRESS NOTES
Occupational Therapy  Facility/Department: [facility] AT THE [facility] MED SURG  Daily Treatment Note  NAME: Balwinder Russo  : 1940  MRN: [id number]    Date of Service: 2020    Discharge Recommendations:  Continue to assess pending progress       Assessment      Activity Tolerance  Activity Tolerance: Patient Tolerated treatment well;Patient limited by fatigue  Safety Devices  Safety Devices in place: Yes  Type of devices: Call light within reach; Left in chair  Restraints  Initially in place: Yes         Patient Diagnosis(es): The encounter diagnosis was S/P total knee arthroplasty, right.      has a past medical history of Achilles tendon rupture, Cancer ([facility] [address] Chronic SI joint pain, Claustrophobia, Diabetes mellitus ([facility] [address] Esophageal stricture, [place] (hard of hearing), Hyperlipidemia, Hypertension, Retinal detachment, and Trauma to eye, left.   has a past surgical history that includes Cervical disc surgery (); Lumbar disc surgery (); Hysterectomy (); bladder suspension (); Blepharoplasty (Left, 5/3/2013); Eye surgery (Left, 06/15/2013); Eye surgery (Bilateral, ); Upper gastrointestinal endoscopy; Esophagus dilation; Cataract removal (Bilateral); eye surgery (Left, ,,,); vitrectomy (Left, 10/3/13); Total hip arthroplasty (Left, 2017); joint replacement (Right, 2016); Foot Amputation; Foot Tendon Surgery (Left, 2018); pr foot/toes surgery proc unlisted (Left, 3/9/2018); Finger trigger release (Left, 4/15/2019); proctosigmoidoscopy (N/A, 10/30/2019); proctosigmoidoscopy (10/30/2019); colectomy; and Knee Arthroplasty (Right, 08/10/2020).     Restrictions  Restrictions/Precautions  Restrictions/Precautions: Weight Bearing, General Precautions, Fall Risk  Lower Extremity Weight Bearing Restrictions  Right Lower Extremity Weight Bearing: Weight Bearing As Tolerated  Subjective   General  Chart Reviewed: Yes  Patient assessed for rehabilitation services?: Yes  Response to previous treatment: Patient with no complaints from previous session  Family / Caregiver Present: No  Referring Practitioner: Dr. Brenda Pelletier  Diagnosis: S/P R total knee arthrpolasty  Subjective  Subjective: Pt reported 7/10 R knee this date. General Comment  Comments: Pt sitting up in bedside chair upon arrival. Pt agreed to therapy session this date. Orientation     Objective    ADL  Additional Comments: Pt declined all ADL tasks this date stating \"I just don't feel up to that today. I don't want my knee to hurt anymore than it already is\". Additional Activities Comment  Additional Activities: Pt educated on D/C folder, AE/DME, and safety this date. Pt verbalized G understanding stating \"I have all this stuff at home\". Type of ROM/Therapeutic Exercise  Type of ROM/Therapeutic Exercise: AROM  Comment: Pt tolerated BUE ther ex with 2# dumbbell x 5 planes x 20 reps x 1 set to increase UE strength and endurance in order to ease completion of ADL tasks. Pt required RBs as needed secondary to fatigue. Plan   Plan  Times per week: 7x/week  Times per day: Daily  Current Treatment Recommendations: Strengthening, Safety Education & Training, Patient/Caregiver Education & Training, Self-Care / ADL, Balance Training, Functional Mobility Training, Endurance Training  G-Code     OutComes Score                                                  AM-PAC Score             Goals  Short term goals  Time Frame for Short term goals: 10 visits  Short term goal 1: Pt will complete LB dressing/bathing with SUP with use of AE PRN to improve safety and independence with ADL tasks. Short term goal 2: Pt will verbalize good understanding of discharge folder. Short term goal 3: Pt will complete functional mobility during ADL tasks with SUP with LRAD to increase independence and safety with tasks.   Short term goal 4: Pt will tolerate standing while WB through RLE for greater than 3 minutes with no rest breaks to improve endurance and safety with ADL and functional transfers.        Therapy Time   Individual Concurrent Group Co-treatment   Time In 0908         Time Out 0938         Minutes 30                 DOUG Nunez/KENTON

## 2020-08-12 NOTE — DISCHARGE INSTR - COC
Continuity of Care Form    Patient Name: Zaynab Novoa   :  1940  MRN:  [id number]    Admit date:  8/10/2020  Discharge date:  20    Code Status Order: Full Code   Advance Directives:   Advance Care Flowsheet Documentation     Date/Time Healthcare Directive Type of Healthcare Directive Copy in [address] Agent's Name Healthcare Agent's Phone Number    08/10/20 1501  Yes, patient has an advance directive for healthcare treatment  Living will;Durable power of  for health care  No, copy requested from family  --  --  --    08/10/20 1011  Yes, patient has an advance directive for healthcare treatment  --  No, copy requested from medical records  --  --  --          Admitting Physician:  Khanh Weeks MD  PCP: Kathleen Tim MD    Discharging Nurse:  [address] Unit/Room#: 0321/0321-01  Discharging Unit Phone Number: [phone]    Emergency Contact:   Extended Emergency Contact Information  Primary Emergency Contact: Narinder Flight  Address: [address] [place] [address], [address] [place] [address] Phone: [phone]  Relation: Child    Past Surgical History:  Past Surgical History:   Procedure Laterality Date    BLADDER SUSPENSION      WITH HYSTERECTOMY    BLEPHAROPLASTY Left 5/3/2013    CATARACT REMOVAL Bilateral     with iol    CERVICAL [facility]    cervical spine    COLECTOMY      ROBOTIC LOWER COLON RESECTION    ESOPHAGEAL DILATATION      EYE SURGERY Left 06/15/2013    REPAIR OF WOUND, WITH LENS REMOVAL     EYE SURGERY Bilateral     BILAT CATARACT REMOVAL WITH IOL    EYE SURGERY Left ,,,    vitrectomy    FINGER TRIGGER RELEASE Left 4/15/2019    FINGER TRIGGER RELEASE-MIDDLE FINGER performed by Khanh Weeks MD at [address] [facility] Left 2018    Excision of Os Peroneum, Primary Repair of Peroneus Longus Tendon - Dr. Saumya Mast Rule-Out Test Resulted          Nurse Assessment:  Last Vital Signs: /76   Pulse 82   Temp 97 °F (36.1 °C) (Temporal)   Resp 16   Ht 5' 3\" (1.6 m)   Wt 161 lb 9.6 oz (73.3 kg)   SpO2 97%   BMI 28.63 kg/m²     Last documented pain score (0-10 scale): Pain Level: 4  Last Weight:   Wt Readings from Last 1 Encounters:   08/12/20 161 lb 9.6 oz (73.3 kg)     Mental Status:  oriented, alert, logical and thought processes intact    IV Access:  - None    Nursing Mobility/ADLs:  Walking   Assisted  Transfer  Assisted  Bathing  Assisted  Dressing  Assisted  Toileting  Assisted  Feeding  Independent  Med Admin  Independent  Med Delivery   whole    Wound Care Documentation and Therapy:  Incision 05/03/13 Face Left (Active)   Number of days: 5776       Incision 06/15/13 Eye Left (Active)   Number of days: 5897       Incision 07/23/13 Eye Left (Active)   Number of days: 0121       Incision 10/03/13 Eye Left (Active)   Number of days: 7833       Incision 06/21/16 Hip Right; Outer (Active)   Number of days: 1512       Incision 08/07/17 Hip Left (Active)   Number of days: 1100       Incision 03/09/18 Foot Left (Active)   Number of days: 886        Elimination:  Continence:   · Bowel: Yes  · Bladder: Yes  Urinary Catheter: None   Colostomy/Ileostomy/Ileal Conduit: No       Date of Last BM: 08/10/20    Intake/Output Summary (Last 24 hours) at 8/12/2020 0658  Last data filed at 8/12/2020 0415  Gross per 24 hour   Intake 3029.7 ml   Output 1750 ml   Net 1279.7 ml     I/O last 3 completed shifts: In: 3002.7 [P.O.:1560; I.V.:1442.7]  Out: 1350 [Urine:1050; Drains:300]    Safety Concerns:     History of Falls (last 30 days) and At Risk for Falls    Impairments/Disabilities:      Vision    Nutrition Therapy:  Current Nutrition Therapy:   - Oral Diet:  General    Routes of Feeding: Oral  Liquids:  Thin Liquids  Daily Fluid Restriction: no  Last Modified Barium Swallow with Video (Video Swallowing Test): not done    Treatments at the Time of Hospital Discharge:   Respiratory Treatments: n/a  Oxygen Therapy:  is not on home oxygen therapy. Ventilator:    - No ventilator support    Rehab Therapies: Physical Therapy and Occupational Therapy  Weight Bearing Status/Restrictions: No weight bearing restirctions  Other Medical Equipment (for information only, NOT a DME order):  walker  Other Treatments: ***    Patient's personal belongings (please select all that are sent with patient):  {[facility] DME Belongings:950437726}    RN SIGNATURE:  {Esignature:[id number]}    CASE MANAGEMENT/SOCIAL WORK SECTION    Inpatient Status Date: oos    Readmission Risk Assessment Score:  Readmission Risk              Risk of Unplanned Readmission:        0           Discharging to Facility/ Agency   · Name: Natali Holden  · Address:[address] [place] [address] dr Lara GONSALES:[phone]  · [phone]    Dialysis Facility (if applicable)   · Name:  · Address:  · Dialysis Schedule:  · Phone:  · Fax:    / signature: Electronically signed by CONOR Amaro on 8/12/20 at 6:58 AM EDT    PHYSICIAN SECTION    Prognosis: {Prognosis:7686365805}    Condition at Discharge: Portillo Gross Patient Condition:[id number]}    Rehab Potential (if transferring to Rehab): {Prognosis:5246499509}    Recommended Labs or Other Treatments After Discharge: ***    Physician Certification: I certify the above information and transfer of Margarita Gramajo  is necessary for the continuing treatment of the diagnosis listed and that she requires [facility] for less 30 days.      Update Admission H&P: {P DME Changes in FSXOW:082485253}    PHYSICIAN SIGNATURE:  {Esignature:[id number]}

## 2020-08-12 NOTE — PROGRESS NOTES
Spoke with Dr. Genevieve Silveira by phone. Informed pt has been accepted to [facility]. N.O.  Discontinue hemovac, Discharge patient to [facility]  Pt aware

## 2020-08-12 NOTE — PROGRESS NOTES
Patient has been taken to the bathroom to urinate and she tolerated the ambulation very well. Upon returning back to the bed she stated she is feeling mildly nauseous and has a scant amount of emesis with indigested food. Patient was asked if she wanted something to help with nausea, she stated she is okay for now. Will continue to monitor.

## 2020-08-12 NOTE — PROGRESS NOTES
Patient is discharge to [facility] at 11 am by [facility] today. Discharge paper work done and fax to SNF.   CONOR Mauricio

## 2020-08-12 NOTE — PROGRESS NOTES
Physical Therapy  Facility/Department: [facility] AT THE [facility] MED SURG  Daily Treatment Note  NAME: Brandy Treadwell  : 1940  MRN: [id number]    Date of Service: 2020    Discharge Recommendations:  Continue to assess pending progress, Home with assist PRN, Outpatient PT, Home with Home health PT        Assessment   Treatment Diagnosis: R TKA  Prognosis: Good  PT Education: Gait Training;Transfer Training  Patient Education: Positioning of R knee when sitting and precautions of TKA. REQUIRES PT FOLLOW UP: Yes  Activity Tolerance  Activity Tolerance: Patient limited by pain     Patient Diagnosis(es): The encounter diagnosis was S/P total knee arthroplasty, right.     has a past medical history of Achilles tendon rupture, Cancer ([address] Chronic SI joint pain, Claustrophobia, Diabetes mellitus ([address] Esophageal stricture, [place] (hard of hearing), Hyperlipidemia, Hypertension, Retinal detachment, and Trauma to eye, left.   has a past surgical history that includes Cervical disc surgery (); Lumbar disc surgery (); Hysterectomy (); bladder suspension (); Blepharoplasty (Left, 5/3/2013); Eye surgery (Left, 06/15/2013); Eye surgery (Bilateral, ); Upper gastrointestinal endoscopy; Esophagus dilation; Cataract removal (Bilateral); eye surgery (Left, ,,,); vitrectomy (Left, 10/3/13); Total hip arthroplasty (Left, 2017); joint replacement (Right, 2016); Foot Amputation; Foot Tendon Surgery (Left, 2018); pr foot/toes surgery proc unlisted (Left, 3/9/2018); Finger trigger release (Left, 4/15/2019); proctosigmoidoscopy (N/A, 10/30/2019); proctosigmoidoscopy (10/30/2019); colectomy; and Knee Arthroplasty (Right, 08/10/2020).     Restrictions  Restrictions/Precautions  Restrictions/Precautions: Weight Bearing, General Precautions, Fall Risk  Lower Extremity Weight Bearing Restrictions  Right Lower Extremity Weight Bearing: Weight Bearing As Tolerated  Subjective   General  Chart Reviewed: Training, Endurance Training, Neuromuscular Re-education, Manual Therapy - Soft Tissue Mobilization, Pain Management, Modalities, Patient/Caregiver Education & Training, Safety Education & Training, Home Exercise Program  Plan Comment: once a day on weekends  Safety Devices  Type of devices:  All fall risk precautions in place, Nurse notified, Left in chair, Call light within reach, Gait belt, Patient at risk for falls(Nursing in room upon departure to remove drain.)     Therapy Time   Individual Concurrent Group Co-treatment   Time In 0943         Time Out 1009         Minutes VANESSA Morin

## 2020-08-12 NOTE — PROGRESS NOTES
Discharge packet prepared and sent with pt to [facility]. Report called to [facility] at Critical access hospital. [facility] informed that dry dressing to right knee is C/D/I, to remain in place until POD #4, then RENATO if no drainage. Pt d/c'd off unit at this time, via wheelchair, to Critical access hospital, no family present upon d/c. Belongings in hand. No issues noted. [facility] informed pt en route via [facility] at this time.

## 2020-08-12 NOTE — PROGRESS NOTES
The patient calls to use bathroom but insisted on using bedpan instead of walking to the bathroom. The writer encouraged and educated the patient on the importance of ambulation but she kept refusing saying she feels sick to get up. The writer has helped her bedpan for now and will encourage her to ambulate to the bathroom throughout the shift.

## 2020-08-12 NOTE — PROGRESS NOTES
The patient is resting in her bed quietly as of now. Vitals have been checked and are within the normal. Physical assessment is also completed at this time. Patient alert and oriented and uses call light as needed. Needs are assessed. Bedside table and call light are within the reach. Will continue to monitor.

## 2020-08-12 NOTE — PLAN OF CARE
Problem: Falls - Risk of:  Goal: Will remain free from falls  Description: Will remain free from falls  8/11/2020 2352 by Jeniffer Newman RN  Outcome: Ongoing  Note: Patient is a high fall risk. Patient is alert and oriented and calls out appropriately. Bed wheels locked and kept in lowest position. Nonskid wear on, fall sign posted and fall sticker on. Bedside table and call light within reach. Needs assessed with hourly rounding and environment scanned for any safety issue. Problem: Skin Integrity:  Goal: Will show no infection signs and symptoms  Description: Will show no infection signs and symptoms  8/12/2020 0011 by Jeniffer Newman RN  Outcome: Ongoing  Note: Patient has been afebrile so far. Prophylactic antibiotics were given post op as ordered. There are no any signs and symptoms of surgical infection noted as of now. Will continue to monitor. Problem: Pain:  Goal: Pain level will decrease  Description: Pain level will decrease  8/12/2020 0011 by Jeniffer Newman RN  Outcome: Ongoing  Note: Patient is able to communicate when pain intervention is required. Patient is also able to rate the pain on a scale of 0-10. Pain is assessed with hourly rounding while patient is awake and pain medication administered as needed and reassessed. Problem: Musculor/Skeletal Functional Status  Goal: Highest potential functional level  8/12/2020 0011 by Jeniffer Newman RN  Outcome: Ongoing  Note: Patient has been getting up with 1 assist and a front wheel walker and has been tolerating the ambulation fairly well. Will continue to  monitor and encourage more ambulation.

## 2020-08-12 NOTE — PROGRESS NOTES
The patient refused her insulin sliding scale at this time. He FSBS at this time is 162. She stated insulin makes her fluctuate her BS very much. The patient has been educated on insulin but insisted not taking it.

## 2020-08-12 NOTE — CONSULTS
Hospitalist Consult Note    SUBJECTIVE:    Up in chair with complaints of nausea. States she gets that way with pain medication. Encourage her to take Zofran with pain medication. Pain is well controlled. No new complaints    OBJECTIVE:    Vitals:  Temp: 97 °F (36.1 °C)  BP: 129/76  Resp: 16  Pulse: 82  SpO2: 97 %  24HR INTAKE/OUTPUT:      Intake/Output Summary (Last 24 hours) at 8/12/2020 1243  Last data filed at 8/12/2020 0916  Gross per 24 hour   Intake 3029.7 ml   Output 2150 ml   Net 879.7 ml     -----------------------------------------------------------------    Review of Systems:  Constitutional:negative  for fevers, and negative for chills. Eyes: negative for visual disturbance   ENT: negative for sore throat, negative nasal congestion, and negative for earache  Respiratory: negative for shortness of breath, negative for cough, and negative for wheezing  Cardiovascular: negative for chest pain, negative for palpitations, and negative for syncope  Gastrointestinal: negative for abdominal pain, positive for nausea,negative for vomiting, negative for diarrhea, negative for constipation, and negative for hematochezia or melena  Genitourinary: negative for dysuria, negative for urinary urgency, negative for urinary frequency, and negative for hematuria  Skin: negative for skin rash, and negative for skin lesions  Neurological: negative for unilateral weakness, numbness or tingling. Exam:  GEN:    Awake, alert and oriented x 3. no acute distress  NECK:  Supple. normal  CVS:    RRR, no murmur, rub or gallop  PULM:  CTA, no wheezes, rales or rhonchi  ABD:    Bowels sounds normal.  Abdomen is soft. No distention. No tenderness. EXT:   no  edema. Pedal pulses are intact. No calf tenderness  NEURO: Motor and sensory are intact  SKIN:  Incision is clean, dry and intact.   No surrounding erythema.  -----------------------------------------------------------------  Diagnostic Data:    · All available data reviewed    ASSESSMENT:      · Post-op medical management of:        Principal Problem:    S/P total knee arthroplasty, right  Active Problems:    Type 2 diabetes mellitus without complication (HCC)    Essential hypertension    Diastolic dysfunction with chronic heart failure ([address]  Resolved Problems:    * No resolved hospital problems.  *    · s/p right total Knee arthroplasty    PLAN:  · Continue current physical and occupational therapy  · Continue DVT prophylaxis  · Discharge planning -- today to [facility]  · Zofran to take with pain meds to control nausea  ·     LARRY Camargo, NP-C  8/12/2020, 12:43 PM

## 2020-08-16 LAB
GLUCOSE BLD-MCNC: 161 MG/DL (ref 74–100)
GLUCOSE BLD-MCNC: 161 MG/DL (ref 74–100)
GLUCOSE BLD-MCNC: 195 MG/DL (ref 74–100)
GLUCOSE BLD-MCNC: 195 MG/DL (ref 74–100)

## 2020-08-17 ENCOUNTER — HOSPITAL ENCOUNTER (OUTPATIENT)
Age: 80
Setting detail: SPECIMEN
Discharge: HOME | End: 2020-08-17
Payer: MEDICARE

## 2020-08-17 LAB
ABSOLUTE EOS #: 0.93 K/UL (ref 0–0.44)
ABSOLUTE IMMATURE GRANULOCYTE: <0.03 K/UL (ref 0–0.3)
ABSOLUTE LYMPH #: 1.71 K/UL (ref 1.1–3.7)
ABSOLUTE MONO #: 0.61 K/UL (ref 0.1–1.2)
ALBUMIN SERPL-MCNC: 3.8 G/DL (ref 3.5–5.2)
ALBUMIN/GLOBULIN RATIO: 1.2 (ref 1–2.5)
ALP BLD-CCNC: 73 U/L (ref 35–104)
ALT SERPL-CCNC: 15 U/L (ref 5–33)
ANION GAP SERPL CALCULATED.3IONS-SCNC: 10 MMOL/L (ref 9–17)
AST SERPL-CCNC: 15 U/L
BASOPHILS # BLD: 0 % (ref 0–2)
BASOPHILS ABSOLUTE: 0.04 K/UL (ref 0–0.2)
BILIRUB SERPL-MCNC: 0.52 MG/DL (ref 0.3–1.2)
BUN BLDV-MCNC: 12 MG/DL (ref 8–23)
BUN/CREAT BLD: 24 (ref 9–20)
CALCIUM SERPL-MCNC: 8.9 MG/DL (ref 8.6–10.4)
CHLORIDE BLD-SCNC: 97 MMOL/L (ref 98–107)
CO2: 27 MMOL/L (ref 20–31)
CREAT SERPL-MCNC: 0.5 MG/DL (ref 0.5–0.9)
DIFFERENTIAL TYPE: ABNORMAL
EOSINOPHILS RELATIVE PERCENT: 10 % (ref 1–4)
GFR AFRICAN AMERICAN: >60 ML/MIN
GFR NON-AFRICAN AMERICAN: >60 ML/MIN
GFR SERPL CREATININE-BSD FRML MDRD: ABNORMAL ML/MIN/{1.73_M2}
GFR SERPL CREATININE-BSD FRML MDRD: ABNORMAL ML/MIN/{1.73_M2}
GLUCOSE BLD-MCNC: 175 MG/DL (ref 70–99)
HCT VFR BLD CALC: 31.8 % (ref 36.3–47.1)
HEMOGLOBIN: 10.4 G/DL (ref 11.9–15.1)
IMMATURE GRANULOCYTES: 0 %
LYMPHOCYTES # BLD: 19 % (ref 24–43)
MCH RBC QN AUTO: 31.4 PG (ref 25.2–33.5)
MCHC RBC AUTO-ENTMCNC: 32.7 G/DL (ref 28.4–34.8)
MCV RBC AUTO: 96.1 FL (ref 82.6–102.9)
MONOCYTES # BLD: 7 % (ref 3–12)
NRBC AUTOMATED: 0 PER 100 WBC
PDW BLD-RTO: 12.9 % (ref 11.8–14.4)
PLATELET # BLD: 286 K/UL (ref 138–453)
PLATELET ESTIMATE: ABNORMAL
PMV BLD AUTO: 10.7 FL (ref 8.1–13.5)
POTASSIUM SERPL-SCNC: 4.2 MMOL/L (ref 3.7–5.3)
RBC # BLD: 3.31 M/UL (ref 3.95–5.11)
RBC # BLD: ABNORMAL 10*6/UL
SEG NEUTROPHILS: 64 % (ref 36–65)
SEGMENTED NEUTROPHILS ABSOLUTE COUNT: 5.75 K/UL (ref 1.5–8.1)
SODIUM BLD-SCNC: 134 MMOL/L (ref 135–144)
TOTAL PROTEIN: 7 G/DL (ref 6.4–8.3)
WBC # BLD: 9.1 K/UL (ref 3.5–11.3)
WBC # BLD: ABNORMAL 10*3/UL

## 2020-08-17 PROCEDURE — 85025 COMPLETE CBC W/AUTO DIFF WBC: CPT

## 2020-08-17 PROCEDURE — P9604 ONE-WAY ALLOW PRORATED TRIP: HCPCS

## 2020-08-17 PROCEDURE — 36415 COLL VENOUS BLD VENIPUNCTURE: CPT

## 2020-08-17 PROCEDURE — 80053 COMPREHEN METABOLIC PANEL: CPT

## 2020-08-20 ENCOUNTER — HOSPITAL ENCOUNTER (OUTPATIENT)
Dept: PHYSICAL THERAPY | Age: 80
Setting detail: THERAPIES SERIES
Discharge: HOME | End: 2020-08-20
Payer: MEDICARE

## 2020-08-20 PROCEDURE — 97161 PT EVAL LOW COMPLEX 20 MIN: CPT

## 2020-08-20 PROCEDURE — 97110 THERAPEUTIC EXERCISES: CPT

## 2020-08-20 NOTE — PLAN OF CARE
[facility]           Phone: [phone]             Outpatient Physical Therapy  Fax: [phone]                                           Date: 2020  Patient: Charley Koenig : 1940 [facility] #: [id number]   Referring Practitioner:  Dr. Yuri Cuevas MD Referral Date:  20       [x] Plan of Care   [] Updated Plan of Care    Dates of Service to Include: 2020 to 20    Diagnosis:  Primary OA of R knee, M17.11    Rehab (Treatment) Diagnosis:  R knee pain             Onset Date:  08/10/20    Attendance  Total # of Visits to Date: 1 No Show: 0 Canceled Appointment: 0    Assessment  Assessment: Patient is 78year old female s/p R TKA and increased pain 4/10 in R knee. Pt amb with walker and mild antalgic gait and R knee flexion due to weakness and ROM deficits. Moderate R LE edema. Patient has decreased R knee ROM 15-85* with pain. Patient with decreased R LE strength:hip flex: 3/5, abd/add: 4-/5; knee ext: 3-/5, flex: 3+/5; ankle DF: 4-/5. Patient to benefit from physical therapy to improve R knee ROM and strength and normalize gait pattern with LRAD to improve functional mobility and return to PLOF. Goals  Short term goals  Time Frame for Short term goals: 2 weeks  Short term goal 1: Patient to initiate HEP for improved R knee ROM and strength. Short term goal 2: Patient to have improved R knee ROM 5-90* for improved mobility. Short term goal 3: Initiate manual techniques/modalities prn to decrease pain and edema and improve mobility. Long term goals  Time Frame for Long term goals : 4 weeks  Long term goal 1: Patient to be independent and compliant with HEP. Long term goal 2: Patient to have improved R knee ROM 0-110* for improved mobility. Long term goal 3: Patient to have improved  R LE strength >/=4+/5 grossly for improve ease with transfers and gait.   Long term goal 4: Pt to be able to walk community distances with cane or no AD and minimal gait deviations for improved mobility and endurance.      Prognosis  Prognosis: Good    Treatment Plan   Times per week: 3  Plan weeks: 4  [x] HP/CP      [x] Electrical Stim   [x] Therapeutic Exercise      [x] Gait Training  [] Aquatics   [] Ultrasound         [x] Patient Education/HEP   [x] Manual Therapy  [] Traction    [x] Neuro-blanca        [x] Soft Tissue Mobs            [] Home TENS  [] Iontophoresis    [] Orthotic casting/fitting      [] Dry Needling             Electronically signed by: Julianne Torres PT, DPT    Date: 8/20/2020      ______________________________________ Date: 8/20/2020   Physician Signature

## 2020-08-20 NOTE — PROGRESS NOTES
Phone: [address]          Fax: [phone]                      Outpatient Physical Therapy                                                                      Evaluation  Date: 2020  Patient: Colt Storm  : 1940  CSN #: [id number]  Referring Practitioner: Dr. Blu Rinaldi MD    Referral Date : 20     Medical Diagnosis: Primary OA of R knee, M17.11    Treatment Diagnosis: R knee pain  Onset Date: 08/10/20  PT Insurance Information: Medicare/[employer] insurance  Total # of Visits Approved: 12   Total # of Visits to Date: 1  No Show: 0  Canceled Appointment: 0     Subjective  Subjective: Patient reports 4/10 R knee achiness and has some swelling. Patient would like to be able to walk with a cane or no AD, instead of a walker. Patient has stair lift down to the basement. Additional Pertinent Hx: DM, HLD, HTN, L eye blind; hx of L achilles tendon rupture, B MARY's, hx of CA    Objective     Observation/Palpation  Posture: Fair  Observation: Pt amb with walker and mild antalgic gait and R knee flexion due to weakness and ROM deficits  Edema:  Moderate R LE edema  Scar: R knee incision covered with post op dressing    RLE General AROM: Knee: 15-85* with pain  LLE General AROM: Knee: 1-135*    Strength RLE  Comment: hip flex: 3/5, abd/add: 4-/5; knee ext: 3-/5, flex: 3+/5; ankle DF: 4-/5  Strength LLE  Comment: 4+/5 grossly    Functional Outcome Measures  Any of your usual work, housework, or school activities: Quite a Bit of Difficulty  Your usual hobbies, recreational, or sporting activities: Extreme Difficulty or Unable to Perform Activity  Getting into or out of the bath: Quite a Bit of Difficulty  Walking between rooms: Quite a Bit of Difficulty  Putting on your shoes or socks: Quite a Bit of Difficulty  Squatting: Extreme Difficulty or Unable to Perform Activity  Lifting an object, like a bag of groceries from the floor: Quite a Bit of manual techniques/modalities prn to decrease pain and edema and improve mobility. Long term goals  Time Frame for Long term goals : 4 weeks  Long term goal 1: Patient to be independent and compliant with HEP. Long term goal 2: Patient to have improved R knee ROM 0-110* for improved mobility. Long term goal 3: Patient to have improved  R LE strength >/=4+/5 grossly for improve ease with transfers and gait. Long term goal 4: Pt to be able to walk community distances with cane or no AD and minimal gait deviations for improved mobility and endurance. Patient goals :  \"To be back to normal\"        Minutes Tracking:  Time In: 1332  Time Out: 1405  Minutes: 33  Timed Code Treatment Minutes: [facility] 4385, PT, DPT    8/20/2020

## 2020-08-24 ENCOUNTER — HOSPITAL ENCOUNTER (OUTPATIENT)
Age: 80
Setting detail: SPECIMEN
Discharge: HOME | End: 2020-08-24
Payer: MEDICARE

## 2020-08-24 ENCOUNTER — HOSPITAL ENCOUNTER (OUTPATIENT)
Dept: PHYSICAL THERAPY | Age: 80
Setting detail: THERAPIES SERIES
Discharge: HOME | End: 2020-08-24
Payer: MEDICARE

## 2020-08-24 PROCEDURE — 97110 THERAPEUTIC EXERCISES: CPT

## 2020-08-24 PROCEDURE — 97140 MANUAL THERAPY 1/> REGIONS: CPT

## 2020-08-24 NOTE — PROGRESS NOTES
Phone: Gigi           Fax: [phone]                           Outpatient Physical Therapy                                                                            Daily Note    Patient: Brandy Treadwell : 1940  [facility] #: [id number]   Referring Practitioner:  Dr. Brenda Pelletier MD    Referral Date : 20     Date: 2020    Diagnosis: Primary OA of R knee, M17.11  Treatment Diagnosis: R knee pain    Onset Date: 08/10/20  PT Insurance Information: Medicare/[employer] insurance  Total # of Visits Approved: 12 Per Physician Order  Total # of Visits to Date: 2  No Show: 0  Canceled Appointment: 0      Pre-Treatment Pain:  0/10  Subjective: Pt with minimal c/o pain upon arrival, pt states her biggest issue is \"my leg feeling dead. \"  Pt reports stiffness has been an issue. Pt states she is able to do ADLs around home without help. Exercises:  Exercise 1: HEP: heel prop, heel slides, LAQ, quad sets  Exercise 2: Bike x3 min no resistance  Exercise 3: Heel slide with strap 10\" hold x10  Exercise 4: SAQ/SLR with AAROM x10  Exercise 5: Heel prop 1 min x2    Manual:  PROM: R knee    Modalities:  Cryotherapy (Minutes\Location): CP x15min to R knee to decrease pain/edema       Assessment  Assessment: Today was pt's first tx and was limited with ROM d/t pain and stiffness. R knee ext AROM = 10* lacking. R knee flex PROM= 95*. Pt with moderate RLE edema, states she hasn't been wearing compression stockings. Educated on wearing compression stockings, increase frequency of HEP and ice/elevation at home. Will progress. Activity Tolerance  Activity Tolerance: Patient Tolerated treatment well, Patient limited by pain    Patient Education  Exercise rationale, HEP, ice/elevation and compression stockings. Pt verbalized/demonstrated good understanding:     [x] Yes         [] No, pt required further clarification.        Post Treatment Pain:  0/10      Plan  Times per week: 3  Plan weeks: 4      Goals  (Total # of Visits to Date: 2)   Short Term Goals - Time Frame for Short term goals: 2 weeks     Short term goal 1: Patient to initiate HEP for improved R knee ROM and strength. []Met   []Partially met  []Not met   Short term goal 2: Patient to have improved R knee ROM 5-90* for improved mobility. []Met   []Partially met  []Not met   Short term goal 3: Initiate manual techniques/modalities prn to decrease pain and edema and improve mobility. []Met   []Partially met  []Not met      []Met   []Partially met  []Not met     Long Term Goals - Time Frame for Long term goals : 4 weeks  Long term goal 1: Patient to be independent and compliant with HEP. []Met  []Partially met  []Not met   Long term goal 2: Patient to have improved R knee ROM 0-110* for improved mobility. []Met  []Partially met  []Not met   Long term goal 3: Patient to have improved  R LE strength >/=4+/5 grossly for improve ease with transfers and gait. []Met  []Partially met  []Not met   Long term goal 4: Pt to be able to walk community distances with cane or no AD and minimal gait deviations for improved mobility and endurance.  []Met  []Partially met  []Not met     []Met  []Partially met  []Not met       Minutes Tracking:  Time In: 1000  Time Out: [address]  Minutes: [address] [facility], PTA     Date: 8/24/2020

## 2020-08-26 ENCOUNTER — HOSPITAL ENCOUNTER (OUTPATIENT)
Dept: PHYSICAL THERAPY | Age: 80
Setting detail: THERAPIES SERIES
Discharge: HOME | End: 2020-08-26
Payer: MEDICARE

## 2020-08-26 PROCEDURE — 97110 THERAPEUTIC EXERCISES: CPT

## 2020-08-26 NOTE — PROGRESS NOTES
Phone: Gigi           Fax: [phone]                           Outpatient Physical Therapy                                                                            Daily Note    Patient: Balwinder Russo : 1940  CSN #: [id number]   Referring Practitioner:  Dr. Heath España MD    Referral Date : 20     Date: 2020    Diagnosis: Primary OA of R knee, M17.11  Treatment Diagnosis: R knee pain    Onset Date: 08/10/20  PT Insurance Information: Medicare/[employer] insurance  Total # of Visits Approved: 12 Per Physician Order  Total # of Visits to Date: 3  No Show: 0  Canceled Appointment: 0      Pre-Treatment Pain:  2/10  Subjective: Patient reports she went to the doctor and he is pleased with her progress so far but encouraged her to bend it more. No pain today just achiness. She is still looking for her compression stockings. Exercises:  Exercise 1: HEP: heel prop, heel slides, LAQ, quad sets  Exercise 2: Bike x10 min no resistance  Exercise 3: Heel slide with strap 10\" hold x10  Exercise 4: SAQ/SLR with AAROM x10  Exercise 5: Heel prop 1 min x2  Exercise 6: Sink exercises x15  Exercise 7: Quad sets 10x 5sec holds    Manual:  PROM: R knee    Modalities:  Cryotherapy (Minutes\Location): CP x15min to R knee to decrease pain/edema       Assessment  Assessment: R knee ROM is progressing with 8-95* AAROM this date. Added standing sink exercises for B LE's to improve balance and functional strength. Educated patient on importance of wearing compression stockings to control edema which is moderate today and patient reports she will keep looking for them at home. Leon with SLR's this date with lack of TKE. Will progress as able.     Activity Tolerance  Activity Tolerance: Patient Tolerated treatment well    Patient Education  Exercise technique and progression; using compression stockings    Pt verbalized/demonstrated good understanding:     [x] Yes

## 2020-08-28 ENCOUNTER — HOSPITAL ENCOUNTER (OUTPATIENT)
Dept: PHYSICAL THERAPY | Age: 80
Setting detail: THERAPIES SERIES
Discharge: HOME | End: 2020-08-28
Payer: MEDICARE

## 2020-08-28 PROCEDURE — 97110 THERAPEUTIC EXERCISES: CPT

## 2020-08-28 NOTE — PROGRESS NOTES
Phone: Gigi           Fax: [phone]                           Outpatient Physical Therapy                                                                            Daily Note    Patient: Thomasene Rubinstein : 1940  CSN #: [id number]   Referring Practitioner:  Dr. Arik Alfred MD    Referral Date : 20     Date: 2020    Diagnosis: Primary OA of R knee, M17.11  Treatment Diagnosis: R knee pain    Onset Date: 08/10/20  PT Insurance Information: Medicare/[employer] insurance  Total # of Visits Approved: 12 Per Physician Order  Total # of Visits to Date: 4  No Show: 0  Canceled Appointment: 0      Pre-Treatment Pain:  0/10  Subjective: Pt states she took a couple Tylenol earlier and currently has no pain. Pt states she has been using her cane short distances within the house. Exercises:  Exercise 1: HEP: heel prop, heel slides, LAQ, quad sets  Exercise 2: Bike x10 min no resistance  Exercise 3: Heel slide with strap 10\" hold x10  Exercise 4: SAQ/SLR with AAROM x10  Exercise 5: Heel prop 1 min x2  Exercise 6: Sink exercises x15  Exercise 7: Quad sets 10x 5sec holds  Exercise 8: Step flex and ext stretch x10sec-- pt only able to tolerate 3x with each    Modalities:  Cryotherapy (Minutes\Location): CP x15min to R knee to decrease pain/edema       Assessment  Assessment: Progressed exercise program this date to improve knee flex and ext ROM this date. R knee flexion AAROM reaching 96* with supine heel slide. Pt cont to require some assistance with SLR on RLE d/t pain and weakness. CP applied at end of tx for pain and edema. Will progress as tolerated. Activity Tolerance  Activity Tolerance: Patient Tolerated treatment well    Patient Education  Patient Education: Exercise progression  Pt verbalized/demonstrated good understanding:     [x] Yes         [] No, pt required further clarification.        Post Treatment Pain:  0/10      Plan  Times per week: 3  Plan weeks: 4      Goals  (Total # of Visits to Date: 4)   Short Term Goals - Time Frame for Short term goals: 2 weeks     Short term goal 1: Patient to initiate HEP for improved R knee ROM and strength. -MET                                        []Met   []Partially met  []Not met   Short term goal 2: Patient to have improved R knee ROM 5-90* for improved mobility. -progressing (8-95*)  []Met   []Partially met  []Not met   Short term goal 3: Initiate manual techniques/modalities prn to decrease pain and edema and improve mobility. -MET  []Met   []Partially met  []Not met      []Met   []Partially met  []Not met     Long Term Goals - Time Frame for Long term goals : 4 weeks  Long term goal 1: Patient to be independent and compliant with HEP. []Met  []Partially met  []Not met   Long term goal 2: Patient to have improved R knee ROM 0-110* for improved mobility. []Met  []Partially met  []Not met   Long term goal 3: Patient to have improved  R LE strength >/=4+/5 grossly for improve ease with transfers and gait. []Met  []Partially met  []Not met   Long term goal 4: Pt to be able to walk community distances with cane or no AD and minimal gait deviations for improved mobility and endurance.  []Met  []Partially met  []Not met     []Met  []Partially met  []Not met       Minutes Tracking:  Time In: 1449  Time Out: [address]  Minutes: 58  Timed Code Treatment Minutes: [address], [address] [facility], [facility]     Date: 8/28/2020

## 2020-08-31 ENCOUNTER — HOSPITAL ENCOUNTER (OUTPATIENT)
Dept: PHYSICAL THERAPY | Age: 80
Setting detail: THERAPIES SERIES
Discharge: HOME | End: 2020-08-31
Payer: MEDICARE

## 2020-08-31 PROCEDURE — 97140 MANUAL THERAPY 1/> REGIONS: CPT

## 2020-08-31 PROCEDURE — 97110 THERAPEUTIC EXERCISES: CPT

## 2020-08-31 NOTE — PROGRESS NOTES
week: 3  Plan weeks: 4      Goals  (Total # of Visits to Date: 5)   Short Term Goals - Time Frame for Short term goals: 2 weeks     Short term goal 1: Patient to initiate HEP for improved R knee ROM and strength. -MET                                        []Met   []Partially met  []Not met   Short term goal 2: Patient to have improved R knee ROM 5-90* for improved mobility. -progressing (8-95*)  []Met   []Partially met  []Not met   Short term goal 3: Initiate manual techniques/modalities prn to decrease pain and edema and improve mobility. -MET  []Met   []Partially met  []Not met      []Met   []Partially met  []Not met     Long Term Goals - Time Frame for Long term goals : 4 weeks  Long term goal 1: Patient to be independent and compliant with HEP. []Met  []Partially met  []Not met   Long term goal 2: Patient to have improved R knee ROM 0-110* for improved mobility. []Met  []Partially met  []Not met   Long term goal 3: Patient to have improved  R LE strength >/=4+/5 grossly for improve ease with transfers and gait. []Met  []Partially met  []Not met   Long term goal 4: Pt to be able to walk community distances with cane or no AD and minimal gait deviations for improved mobility and endurance.  []Met  []Partially met  []Not met     []Met  []Partially met  []Not met       Minutes Tracking:  Time In: 1000  Time Out: 1100  Minutes: [address] [employer],[address], PTA     Date: 8/31/2020

## 2020-09-02 ENCOUNTER — HOSPITAL ENCOUNTER (OUTPATIENT)
Dept: PHYSICAL THERAPY | Age: 80
Setting detail: THERAPIES SERIES
Discharge: HOME | End: 2020-09-02
Payer: MEDICARE

## 2020-09-02 PROCEDURE — 97110 THERAPEUTIC EXERCISES: CPT

## 2020-09-02 NOTE — PROGRESS NOTES
Date: 6)   Short Term Goals - Time Frame for Short term goals: 2 weeks     Short term goal 1: Patient to initiate HEP for improved R knee ROM and strength. -MET                                        []Met   []Partially met  []Not met   Short term goal 2: Patient to have improved R knee ROM 5-90* for improved mobility. -met (5-95*)  []Met   []Partially met  []Not met   Short term goal 3: Initiate manual techniques/modalities prn to decrease pain and edema and improve mobility. -MET  []Met   []Partially met  []Not met      []Met   []Partially met  []Not met     Long Term Goals - Time Frame for Long term goals : 4 weeks  Long term goal 1: Patient to be independent and compliant with HEP. []Met  []Partially met  []Not met   Long term goal 2: Patient to have improved R knee ROM 0-110* for improved mobility. []Met  []Partially met  []Not met   Long term goal 3: Patient to have improved  R LE strength >/=4+/5 grossly for improve ease with transfers and gait.  []Met  []Partially met  []Not met   Long term goal 4: Pt to be able to walk community distances with cane or no AD and minimal gait deviations for improved mobility and endurance.-progressing []Met  []Partially met  []Not met     []Met  []Partially met  []Not met       Minutes Tracking:  Time In: 1344  Time Out: 1435  Minutes: 51  Timed Code Treatment Minutes: Hamzah Herrera 62. , PT, DPT     Date: 9/2/2020

## 2020-09-04 ENCOUNTER — HOSPITAL ENCOUNTER (OUTPATIENT)
Dept: PHYSICAL THERAPY | Age: 80
Setting detail: THERAPIES SERIES
Discharge: HOME | End: 2020-09-04
Payer: MEDICARE

## 2020-09-04 PROCEDURE — 97110 THERAPEUTIC EXERCISES: CPT

## 2020-09-04 PROCEDURE — 97140 MANUAL THERAPY 1/> REGIONS: CPT

## 2020-09-04 NOTE — PROGRESS NOTES
Phone: Gigi           Fax: [phone]                           Outpatient Physical Therapy                                                                            Daily Note    Patient: Warren Marrero : 1940  CSN #: [id number]   Referring Practitioner:  Dr. Braxton Morrow MD    Referral Date : 20     Date: 2020    Diagnosis: Primary OA of R knee, M17.11  Treatment Diagnosis: R knee pain    Onset Date: 08/10/20  PT Insurance Information: Medicare/[employer] insurance  Total # of Visits Approved: 12 Per Physician Order  Total # of Visits to Date: 7  No Show: 0  Canceled Appointment: 0      Pre-Treatment Pain:  0/10 stiff/sore  Subjective: Pt with noted increased antalgic gait today, ambulating into appt with SPC and compression stockings donned. Pt reports her knee \"doesn't hurt\" but more \"stiff and tight\"  and states she \"gets some twinges. \"    Exercises:  Exercise 1: HEP: heel prop, heel slides, LAQ, quad sets (updated: Sink ex)  Exercise 2: Bike x10 min 1.5 hills  Exercise 3: Heel slide with strap 10\" hold x10  Exercise 4: LAQ/SAQ/SLR with AROM x15  Exercise 5: Heel prop x3 min  (1 min of therapist adding overpressure)  Exercise 6: Sink exercises x15  Exercise 9: Sit to stand BUE 2x10  Exercise 10: YTB TKE x10    Manual:  PROM: R knee    Modalities:  Cryotherapy (Minutes\Location): CP x10min to R knee to decrease pain/edema       Assessment  Assessment: Pt reports \"doing a lot of exercises at home\" , states she \" does most of the exercises we do in here, especially the standing ones and step ones. \"   Educated pt on completing what was issued on HEP to avoid further aggrivation and increased soreness. pt with good understanding, therapist re-issued updated HEP. Progressed strengthening and ROM today with pt having occasional c/o increased R knee pain. R knee ext=5* and flex with strap= 102*. WIll progress.     Activity Tolerance  Activity Tolerance: Patient Tolerated treatment well    Patient Education  Exercise progression and HEP rationale  Pt verbalized/demonstrated good understanding:     [x] Yes         [] No, pt required further clarification. Post Treatment Pain:  0/10      Plan  Times per week: 3  Plan weeks: 4      Goals  (Total # of Visits to Date: 7)   Short Term Goals - Time Frame for Short term goals: 2 weeks     Short term goal 1: Patient to initiate HEP for improved R knee ROM and strength. -MET                                        []Met   []Partially met  []Not met   Short term goal 2: Patient to have improved R knee ROM 5-90* for improved mobility. -met (5-95*)  []Met   []Partially met  []Not met   Short term goal 3: Initiate manual techniques/modalities prn to decrease pain and edema and improve mobility. -MET  []Met   []Partially met  []Not met      []Met   []Partially met  []Not met     Long Term Goals - Time Frame for Long term goals : 4 weeks  Long term goal 1: Patient to be independent and compliant with HEP. []Met  []Partially met  []Not met   Long term goal 2: Patient to have improved R knee ROM 0-110* for improved mobility. []Met  []Partially met  []Not met   Long term goal 3: Patient to have improved  R LE strength >/=4+/5 grossly for improve ease with transfers and gait.  []Met  []Partially met  []Not met   Long term goal 4: Pt to be able to walk community distances with cane or no AD and minimal gait deviations for improved mobility and endurance.-progressing []Met  []Partially met  []Not met     []Met  []Partially met  []Not met       Minutes Tracking:  Time In: 1100  Time Out: [address]  Minutes: Iva Chávez [address]., PTA     Date: 9/4/2020

## 2020-09-09 ENCOUNTER — HOSPITAL ENCOUNTER (OUTPATIENT)
Dept: PHYSICAL THERAPY | Age: 80
Setting detail: THERAPIES SERIES
Discharge: HOME | End: 2020-09-09
Payer: MEDICARE

## 2020-09-09 PROCEDURE — 97110 THERAPEUTIC EXERCISES: CPT

## 2020-09-09 PROCEDURE — 97140 MANUAL THERAPY 1/> REGIONS: CPT

## 2020-09-09 NOTE — PROGRESS NOTES
Phone: Gigi           Fax: [phone]                           Outpatient Physical Therapy                                                                            Daily Note    Patient: Colt Storm : 1940  CSN #: [id number]   Referring Practitioner:  Dr. Blu Rinaldi MD    Referral Date : 20     Date: 2020    Diagnosis: Primary OA of R knee, M17.11  Treatment Diagnosis: R knee pain    Onset Date: 08/10/20  PT Insurance Information: Medicare/[employer]  Total # of Visits Approved: 12 Per Physician Order  Total # of Visits to Date: 8  No Show: 0  Canceled Appointment: 0      Pre-Treatment Pain:  4/10  Subjective: Pt reports having \"twinges\" in R knee when at rest which \"is very uncomfortable. \"  Pt rates current R knee pain a 4/10. Exercises:  Exercise 1: HEP: heel prop, heel slides, LAQ, quad sets (updated: Sink ex)  Exercise 2: Bike x10 min 1.5 hills  Exercise 3: Heel slide with strap 10\" hold x10  Exercise 5: Heel prop x3 min  (1 min of therapist adding overpressure)  Exercise 6: Sink exercises x15  Exercise 8: Step flex and ext stretch 30 sec x3  Exercise 9: Sit to stand BUE 2x10  Exercise 10: YTB TKE 2x10  Exercise 11: 3 way hip B YTB x10    Manual:  PROM: R knee    Modalities:  Cryotherapy (Minutes\Location): CP x10min to R knee to decrease pain/edema       Assessment  Assessment: Progressed BLE strengthening today with pt fatiguing quickly, took 3 seated RBs. Educated pt on decreased frequency and avoiding painful range with HEP d/t pt reporting increased soreness/pain during. R knee flex with overpressure= 105* , ext= 5* lacking. CP to R knee post tx to decrease pain/swelling. WIll continue to progress.     Activity Tolerance  Activity Tolerance: Patient Tolerated treatment well, Patient limited by fatigue    Patient Education  Exercise progression, HEP, DOMS   Pt verbalized/demonstrated good understanding:     [x] Yes [] No, pt required further clarification. Post Treatment Pain:  1/10      Plan  Times per week: 3  Plan weeks: 4      Goals  (Total # of Visits to Date: 8)   Short Term Goals - Time Frame for Short term goals: 2 weeks     Short term goal 1: Patient to initiate HEP for improved R knee ROM and strength. -MET                                        []Met   []Partially met  []Not met   Short term goal 2: Patient to have improved R knee ROM 5-90* for improved mobility. -met (5-95*)  []Met   []Partially met  []Not met   Short term goal 3: Initiate manual techniques/modalities prn to decrease pain and edema and improve mobility. -MET  []Met   []Partially met  []Not met      []Met   []Partially met  []Not met     Long Term Goals - Time Frame for Long term goals : 4 weeks  Long term goal 1: Patient to be independent and compliant with HEP. []Met  []Partially met  []Not met   Long term goal 2: Patient to have improved R knee ROM 0-110* for improved mobility. []Met  []Partially met  []Not met   Long term goal 3: Patient to have improved  R LE strength >/=4+/5 grossly for improve ease with transfers and gait.  []Met  []Partially met  []Not met   Long term goal 4: Pt to be able to walk community distances with cane or no AD and minimal gait deviations for improved mobility and endurance.-progressing []Met  []Partially met  []Not met     []Met  []Partially met  []Not met       Minutes Tracking:  Time In: 1300  Time Out: [address]  Minutes: [address] [facility], [facility]     Date: 9/9/2020

## 2020-09-11 ENCOUNTER — HOSPITAL ENCOUNTER (OUTPATIENT)
Dept: PHYSICAL THERAPY | Age: 80
Setting detail: THERAPIES SERIES
Discharge: HOME | End: 2020-09-11
Payer: MEDICARE

## 2020-09-11 PROCEDURE — 97110 THERAPEUTIC EXERCISES: CPT

## 2020-09-11 NOTE — PROGRESS NOTES
Phone: Gigi           Fax: [phone]                           Outpatient Physical Therapy                                                                            Daily Note    Patient: Tiara Foreman : 1940  CSN #: [id number]   Referring Practitioner:  Dr. Clifford Vazquez MD    Referral Date : 20     Date: 2020    Diagnosis: Primary OA of R knee, M17.11  Treatment Diagnosis: R knee pain    Onset Date: 08/10/20  PT Insurance Information: Medicare/[employer]  Total # of Visits Approved: 12 Per Physician Order  Total # of Visits to Date: 9  No Show: 0  Canceled Appointment: 0      Pre-Treatment Pain:  0/10  Subjective: Patient reports no pain in the R knee except at night when trying to sleep. Exercises:  Exercise 1: HEP: heel prop, heel slides, LAQ, quad sets (updated: Sink ex)  Exercise 2: Bike x10 min 2.0 hills  Exercise 3: Heel slide with strap 10\" hold x10  Exercise 4: FSU/LSU 10x ea way R LE  Exercise 5: Heel prop x3 min  (1 min of therapist adding overpressure)  Exercise 7: Quad sets 10x 5sec holds  Exercise 8: Step flex and ext stretch 30 sec x3  Exercise 9: Sit to stand BUE 2x10; second set no hha  Exercise 10: YTB TKE 2x10    Manual:  PROM: R knee    Modalities:  Cryotherapy (Minutes\Location): CP x10min to R knee to decrease pain/edema       Assessment  Assessment: Progressed to FSU/LSU and sit/stand with no UE assist to progress functional strength of B LE's with patient reporting mild discomfort in L knee but not too bad for R knee. AAROM 5-100* this date. Encouraged patient to continue stretching at home. Will progress as able. Activity Tolerance  Activity Tolerance: Patient Tolerated treatment well    Patient Education  Exercise technique and progression    Pt verbalized/demonstrated good understanding:     [x] Yes         [] No, pt required further clarification.        Post Treatment Pain:  0/10      Plan  Times per week: 3  Plan weeks: 4      Goals  (Total # of Visits to Date: 5)   Short Term Goals - Time Frame for Short term goals: 2 weeks     Short term goal 1: Patient to initiate HEP for improved R knee ROM and strength. -MET                                        []Met   []Partially met  []Not met   Short term goal 2: Patient to have improved R knee ROM 5-90* for improved mobility. -met (5-95*)  []Met   []Partially met  []Not met   Short term goal 3: Initiate manual techniques/modalities prn to decrease pain and edema and improve mobility. -MET  []Met   []Partially met  []Not met      []Met   []Partially met  []Not met     Long Term Goals - Time Frame for Long term goals : 4 weeks  Long term goal 1: Patient to be independent and compliant with HEP. []Met  []Partially met  []Not met   Long term goal 2: Patient to have improved R knee ROM 0-110* for improved mobility. []Met  []Partially met  []Not met   Long term goal 3: Patient to have improved  R LE strength >/=4+/5 grossly for improve ease with transfers and gait.  []Met  []Partially met  []Not met   Long term goal 4: Pt to be able to walk community distances with cane or no AD and minimal gait deviations for improved mobility and endurance.-progressing []Met  []Partially met  []Not met     []Met  []Partially met  []Not met       Minutes Tracking:  Time In: 1059  Time Out: 1155  Minutes: 56  Timed Code Treatment Minutes: [address] , PT, DPT     Date: 9/11/2020

## 2020-09-15 ENCOUNTER — HOSPITAL ENCOUNTER (OUTPATIENT)
Dept: PHYSICAL THERAPY | Age: 80
Setting detail: THERAPIES SERIES
Discharge: HOME | End: 2020-09-15
Payer: MEDICARE

## 2020-09-15 PROCEDURE — 97110 THERAPEUTIC EXERCISES: CPT

## 2020-09-15 NOTE — PROGRESS NOTES
Phone: Gigi           Fax: [phone]                           Outpatient Physical Therapy                                                                            Daily Note    Patient: Sesar Reid : 1940  CSN #: [id number]   Referring Practitioner:  Dr. Zach Colin MD    Referral Date : 20     Date: 9/15/2020    Diagnosis: Primary OA of R knee, M17.11  Treatment Diagnosis: R knee pain    Onset Date: 08/10/20  PT Insurance Information: Medicare/[employer]  Total # of Visits Approved: 24 Per Physician Order  Total # of Visits to Date: 10  No Show: 0  Canceled Appointment: 0      Pre-Treatment Pain:  5/10  Subjective: Patient reports a lot of shooting pain 5/10 in the R knee and she went to see Dr. Zach Colin to make sure everything was alright and he was pleased with her progress and told her to keep working on stretching and see him in 4 weeks. Exercises:  Exercise 1: HEP: heel prop, heel slides, LAQ, quad sets (updated: Sink ex)  Exercise 2: Bike x10 min 2.0 hills  Exercise 3: Heel slide with strap 10\" hold x10  Exercise 4: FSU/LSU 10x ea way R LE  Exercise 5: Heel prop x3 min  (1 min of therapist adding overpressure)  Exercise 8: Step flex and ext stretch 30 sec x3  Exercise 9: Sit to stand BUE 2x10; second set no hha    Manual:  Joint mobilization: Patellar mob's  PROM: R knee    Modalities:  Cryotherapy (Minutes\Location): CP x10min to R knee to decrease pain/edema       Assessment  Assessment: Pt has attended 10 PT visits for R TKA and is making steady progress toward goals with R hip flex: 4-/5, abd/add: 4/5; knee flex: 4-/5; ext: 4/5. R knee AAROM 4-101*. Pt to benefit from physical therapy to meet long term goals for improved strength and ROM and improve gait with LRAD. Will progress as able.     Activity Tolerance  Activity Tolerance: Patient Tolerated treatment well    Patient Education  UPOC, progression toward goals    Pt verbalized/demonstrated good understanding:     [x] Yes         [] No, pt required further clarification. Post Treatment Pain:  4/10      Plan  Times per week: 3  Plan weeks: 4      Goals  (Total # of Visits to Date: 10)   Short Term Goals - Time Frame for Short term goals: 2 weeks     Short term goal 1: Patient to initiate HEP for improved R knee ROM and strength. -MET                                        []Met   []Partially met  []Not met   Short term goal 2: Patient to have improved R knee ROM 5-90* for improved mobility. -met (5-95*)  []Met   []Partially met  []Not met   Short term goal 3: Initiate manual techniques/modalities prn to decrease pain and edema and improve mobility. -MET  []Met   []Partially met  []Not met      []Met   []Partially met  []Not met     Long Term Goals - Time Frame for Long term goals : 4 weeks  Long term goal 1: Patient to be independent and compliant with HEP. []Met  []Partially met  []Not met   Long term goal 2: Patient to have improved R knee ROM 0-110* for improved mobility. -progressing (4-101*) []Met  []Partially met  []Not met   Long term goal 3: Patient to have improved  R LE strength >/=4+/5 grossly for improve ease with transfers and gait.  -progressing (hip flex: 4-/5 abd/add: 4/5, knee flex: 4-/5, ext: 4/5) []Met  []Partially met  []Not met   Long term goal 4: Pt to be able to walk community distances with cane or no AD and minimal gait deviations for improved mobility and endurance.-progressing []Met  []Partially met  []Not met     []Met  []Partially met  []Not met       Minutes Tracking:  Time In: 1033  Time Out: 1121  Minutes: 48  Timed Code Treatment Minutes: 2021 Sandra Renee , PT, DPT     Date: 9/15/2020

## 2020-09-18 ENCOUNTER — HOSPITAL ENCOUNTER (OUTPATIENT)
Dept: PHYSICAL THERAPY | Age: 80
Setting detail: THERAPIES SERIES
Discharge: HOME | End: 2020-09-18
Payer: MEDICARE

## 2020-09-18 PROCEDURE — 97140 MANUAL THERAPY 1/> REGIONS: CPT

## 2020-09-18 PROCEDURE — 97110 THERAPEUTIC EXERCISES: CPT

## 2020-09-18 NOTE — PROGRESS NOTES
Phone: Gigi           Fax: [phone]                           Outpatient Physical Therapy                                                                            Daily Note    Patient: Gatito Sheets : 1940  CSN #: [id number]   Referring Practitioner:  Dr. Jordan Foote MD    Referral Date : 20     Date: 2020    Diagnosis: Primary OA of R knee, M17.11  Treatment Diagnosis: R knee pain    Onset Date: 08/10/20  PT Insurance Information: Medicare/[employer]  Total # of Visits Approved: 24 Per Physician Order  Total # of Visits to Date: 11  No Show: 0  Canceled Appointment: 0      Pre-Treatment Pain:  2/10  Subjective: Patient reports 2/10 R knee achiness but no real pain; continues to have warmth and swelling. Exercises:  Exercise 1: HEP: heel prop, heel slides, LAQ, quad sets (updated: Sink ex)  Exercise 2: Bike x10 min 2.5 hills  Exercise 4: FSU/LSU 15x ea way R LE  Exercise 5: Heel prop x3 min  (1 min of therapist adding overpressure)  Exercise 8: Step flex and ext stretch 30 sec x3  Exercise 9: Sit to stand BUE 2x10; second set no hha  Exercise 10: YTB TKE 2x10    Manual:  Joint mobilization: Patellar mob's  PROM: R knee    Modalities:  Cryotherapy (Minutes\Location): CP x10min to R knee to decrease pain/edema       Assessment  Assessment: Focused on progressing strength of R knee with increased reps of step ups. Patient now able to complete 10 sit/stands with no UE assist and no LOB noted. R knee ext continues to lack 3-4* with overpressure. CP at end of session to decrease soreness and inflammation. Activity Tolerance  Activity Tolerance: Patient Tolerated treatment well    Patient Education  Exercise technique    Pt verbalized/demonstrated good understanding:     [x] Yes         [] No, pt required further clarification.        Post Treatment Pain:  2/10      Plan  Times per week: 3  Plan weeks: 4      Goals  (Total # of

## 2020-09-21 ENCOUNTER — HOSPITAL ENCOUNTER (OUTPATIENT)
Dept: PHYSICAL THERAPY | Age: 80
Setting detail: THERAPIES SERIES
Discharge: HOME | End: 2020-09-21
Payer: MEDICARE

## 2020-09-21 PROCEDURE — 97110 THERAPEUTIC EXERCISES: CPT

## 2020-09-21 NOTE — PROGRESS NOTES
Time Frame for Short term goals: 2 weeks     Short term goal 1: Patient to initiate HEP for improved R knee ROM and strength. -MET                                        []Met   []Partially met  []Not met   Short term goal 2: Patient to have improved R knee ROM 5-90* for improved mobility. -met (5-95*)  []Met   []Partially met  []Not met   Short term goal 3: Initiate manual techniques/modalities prn to decrease pain and edema and improve mobility. -MET  []Met   []Partially met  []Not met      []Met   []Partially met  []Not met     Long Term Goals - Time Frame for Long term goals : 4 weeks  Long term goal 1: Patient to be independent and compliant with HEP. []Met  []Partially met  []Not met   Long term goal 2: Patient to have improved R knee ROM 0-110* for improved mobility. -progressing (4-101*) []Met  []Partially met  []Not met   Long term goal 3: Patient to have improved  R LE strength >/=4+/5 grossly for improve ease with transfers and gait.  -progressing (hip flex: 4-/5 abd/add: 4/5, knee flex: 4-/5, ext: 4/5) []Met  []Partially met  []Not met   Long term goal 4: Pt to be able to walk community distances with cane or no AD and minimal gait deviations for improved mobility and endurance.-progressing []Met  []Partially met  []Not met     []Met  []Partially met  []Not met       Minutes Tracking:  Time In: 0918  Time Out: 1014  Minutes: 56  Timed Code Treatment Minutes: [address] Amada Carroll DPT     Date: 9/21/2020

## 2020-09-23 ENCOUNTER — HOSPITAL ENCOUNTER (OUTPATIENT)
Dept: PHYSICAL THERAPY | Age: 80
Setting detail: THERAPIES SERIES
Discharge: HOME | End: 2020-09-23
Payer: MEDICARE

## 2020-09-23 PROCEDURE — 97116 GAIT TRAINING THERAPY: CPT

## 2020-09-23 PROCEDURE — 97110 THERAPEUTIC EXERCISES: CPT

## 2020-09-23 PROCEDURE — 97140 MANUAL THERAPY 1/> REGIONS: CPT

## 2020-09-23 NOTE — PROGRESS NOTES
Phone: Gigi           Fax: [phone]                           Outpatient Physical Therapy                                                                            Daily Note    Patient: Adali Grayson : 1940  CSN #: [id number]   Referring Practitioner:  Dr. Fernando Sharma MD    Referral Date : 20     Date: 2020    Diagnosis: Primary OA of R knee, M17.11  Treatment Diagnosis: R knee pain    Onset Date: 08/10/20  PT Insurance Information: Medicare/[employer]  Total # of Visits Approved: 24 Per Physician Order  Total # of Visits to Date: 13  No Show: 0  Canceled Appointment: 0      Pre-Treatment Pain:  210  Subjective: Pt reports some shooting pain at night in the R knee but not too bad right now. Exercises:  Exercise 1: HEP: heel prop, heel slides, LAQ, quad sets (updated: Sink ex)  Exercise 2: Bike x10 min 2.5 hills  Exercise 3: Heel slide with strap 10\" hold x10  Exercise 4: FSU/LSU 20x ea way R LE  Exercise 5: Heel prop x3 min  (~2mins with 5# wt)  Exercise 7: Quad sets 10x 5sec holds  Exercise 8: Step flex and ext stretch 30 sec x3  Exercise 9: Sit to stand 2x10 no hha  Exercise 12: Gait with SPC one big loop with vc's for TKE and heel strike    Manual:  Joint mobilization: Patellar mob's  PROM: R knee    Modalities:  Cryotherapy (Minutes\Location): CP x10min to R knee to decrease pain/edema       Assessment  Assessment: Functional strength is progressing. Patient able to complete 20 sit/stands with no UE assist and no LOB. ROM 3-101* of R knee this date. Pt with improved gait with near TKE of R knee and good heel strike with no antalgia noted. Will continue. Activity Tolerance  Activity Tolerance: Patient Tolerated treatment well    Patient Education  Exercise technique; gait pattern    Pt verbalized/demonstrated good understanding:     [x] Yes         [] No, pt required further clarification.        Post Treatment Pain: 2/10      Plan  Times per week: 3  Plan weeks: 4      Goals  (Total # of Visits to Date: 15)   Short Term Goals - Time Frame for Short term goals: 2 weeks     Short term goal 1: Patient to initiate HEP for improved R knee ROM and strength. -MET                                        []Met   []Partially met  []Not met   Short term goal 2: Patient to have improved R knee ROM 5-90* for improved mobility. -met (5-95*)  []Met   []Partially met  []Not met   Short term goal 3: Initiate manual techniques/modalities prn to decrease pain and edema and improve mobility. -MET  []Met   []Partially met  []Not met      []Met   []Partially met  []Not met     Long Term Goals - Time Frame for Long term goals : 4 weeks  Long term goal 1: Patient to be independent and compliant with HEP. []Met  []Partially met  []Not met   Long term goal 2: Patient to have improved R knee ROM 0-110* for improved mobility. -progressing (4-101*) []Met  []Partially met  []Not met   Long term goal 3: Patient to have improved  R LE strength >/=4+/5 grossly for improve ease with transfers and gait. -progressing (hip flex: 4-/5 abd/add: 4/5, knee flex: 4-/5, ext: 4/5) []Met  []Partially met  []Not met   Long term goal 4: Pt to be able to walk community distances with cane or no AD and minimal gait deviations for improved mobility and endurance. -met []Met  []Partially met  []Not met     []Met  []Partially met  []Not met       Minutes Tracking:  Time In: 1105  Time Out: [address]  Minutes: 57  Timed Code Treatment Minutes: [address] [facility] , PT, DPT     Date: 9/23/2020

## 2020-09-23 NOTE — PLAN OF CARE
[facility]           Phone: [phone]             Outpatient Physical Therapy  Fax: [phone]                                           Date: 9/15/2020  Patient: Warren Marrero : 1940 CSN #: [id number]   Referring Practitioner:  Dr. Braxton Morrow MD Referral Date:  20       [] Plan of Care   [x] Updated Plan of Care    Dates of Service to Include: 9/15/2020 to 10/13/20    Diagnosis:  Primary OA of R knee, M17.11    Rehab (Treatment) Diagnosis:  R knee pain             Onset Date:  08/10/20    Attendance  Total # of Visits to Date: 10 No Show: 0 Canceled Appointment: 0    Assessment  Assessment: Pt has attended 10 PT visits for R TKA and is making steady progress toward goals with R hip flex: 4-/5, abd/add: 4/5; knee flex: 4-/5; ext: 4/5. R knee AAROM 4-101*. Pt to benefit from physical therapy to meet long term goals for improved strength and ROM and improve gait with LRAD. Will progress as able      Goals  Short term goals  Time Frame for Short term goals: 2 weeks  Short term goal 1: Patient to initiate HEP for improved R knee ROM and strength. -MET  Short term goal 2: Patient to have improved R knee ROM 5-90* for improved mobility. -met (5-95*)  Short term goal 3: Initiate manual techniques/modalities prn to decrease pain and edema and improve mobility. -MET  Long term goals  Time Frame for Long term goals : 4 weeks  Long term goal 1: Patient to be independent and compliant with HEP. Long term goal 2: Patient to have improved R knee ROM 0-110* for improved mobility. -progressing (4-101*)  Long term goal 3: Patient to have improved  R LE strength >/=4+/5 grossly for improve ease with transfers and gait.  -progressing (hip flex: 4-/5 abd/add: 4/5, knee flex: 4-/5, ext: 4/5)  Long term goal 4: Pt to be able to walk community distances with cane or no AD and minimal gait deviations for improved mobility and

## 2020-09-25 ENCOUNTER — HOSPITAL ENCOUNTER (OUTPATIENT)
Dept: PHYSICAL THERAPY | Age: 80
Setting detail: THERAPIES SERIES
Discharge: HOME | End: 2020-09-25
Payer: MEDICARE

## 2020-09-25 PROCEDURE — 97110 THERAPEUTIC EXERCISES: CPT

## 2020-09-25 PROCEDURE — 97140 MANUAL THERAPY 1/> REGIONS: CPT

## 2020-09-25 NOTE — PROGRESS NOTES
Phone: Gigi           Fax: [phone]                           Outpatient Physical Therapy                                                                            Daily Note    Patient: Archie Jackman : 1940  CSN #: [id number]   Referring Practitioner:  Dr. Sabrina Almonte MD    Referral Date : 20     Date: 2020    Diagnosis: Primary OA of R knee, M17.11  Treatment Diagnosis: R knee pain    Onset Date: 08/10/20  PT Insurance Information: Medicare/[employer]  Total # of Visits Approved: 24 Per Physician Order  Total # of Visits to Date: 14  No Show: 0  Canceled Appointment: 0      Pre-Treatment Pain:  2/10  Subjective: Patient reports stiffness and pain in the R knee. Exercises:  Exercise 1: HEP: heel prop, heel slides, LAQ, quad sets (updated: Sink ex)  Exercise 2: Bike x10 min 2.5 hills  Exercise 3: Heel slide with strap 10\" hold x10  Exercise 4: FSU/LSU 20x ea way R LE  Exercise 5: Heel prop x3 min  (~2mins with 5# wt)  Exercise 8: Step flex and ext stretch 30 sec x3  Exercise 9: Sit to stand 2x10 no hha  Exercise 10: YTB TKE 2x10    Manual:  Joint mobilization: Patellar mob's  PROM: R knee    Modalities:  Cryotherapy (Minutes\Location): CP x10min to R knee to decrease pain/edema       Assessment  Assessment: Patient with R knee ROM 3-105* with overpressure from therapist progressing toward long term goal. Initiated step downs to improve eccentric control of R quad. Will continue to progress. Activity Tolerance  Activity Tolerance: Patient Tolerated treatment well    Patient Education  Exercise technique    Pt verbalized/demonstrated good understanding:     [x] Yes         [] No, pt required further clarification.        Post Treatment Pain:  2/10      Plan  Times per week: 3  Plan weeks: 4      Goals  (Total # of Visits to Date: 15)   Short Term Goals - Time Frame for Short term goals: 2 weeks     Short term goal 1: Patient to initiate HEP for improved R knee ROM and strength. -MET                                        []Met   []Partially met  []Not met   Short term goal 2: Patient to have improved R knee ROM 5-90* for improved mobility. -met (5-95*)  []Met   []Partially met  []Not met   Short term goal 3: Initiate manual techniques/modalities prn to decrease pain and edema and improve mobility. -MET  []Met   []Partially met  []Not met      []Met   []Partially met  []Not met     Long Term Goals - Time Frame for Long term goals : 4 weeks  Long term goal 1: Patient to be independent and compliant with HEP. []Met  []Partially met  []Not met   Long term goal 2: Patient to have improved R knee ROM 0-110* for improved mobility. -progressing (3-105*) []Met  []Partially met  []Not met   Long term goal 3: Patient to have improved  R LE strength >/=4+/5 grossly for improve ease with transfers and gait. -progressing (hip flex: 4-/5 abd/add: 4/5, knee flex: 4-/5, ext: 4/5) []Met  []Partially met  []Not met   Long term goal 4: Pt to be able to walk community distances with cane or no AD and minimal gait deviations for improved mobility and endurance. -met []Met  []Partially met  []Not met     []Met  []Partially met  []Not met       Minutes Tracking:  Time In: 1059  Time Out: 1157     Timed Code Treatment Minutes: [address] , PT, DPT     Date: 9/25/2020

## 2020-09-29 ENCOUNTER — HOSPITAL ENCOUNTER (OUTPATIENT)
Dept: PHYSICAL THERAPY | Age: 80
Setting detail: THERAPIES SERIES
Discharge: HOME | End: 2020-09-29
Payer: MEDICARE

## 2020-09-29 PROCEDURE — 97110 THERAPEUTIC EXERCISES: CPT

## 2020-09-29 PROCEDURE — 97140 MANUAL THERAPY 1/> REGIONS: CPT

## 2020-09-29 NOTE — PROGRESS NOTES
improved R knee ROM and strength. -MET                                        []Met   []Partially met  []Not met   Short term goal 2: Patient to have improved R knee ROM 5-90* for improved mobility. -met (5-95*)  []Met   []Partially met  []Not met   Short term goal 3: Initiate manual techniques/modalities prn to decrease pain and edema and improve mobility. -MET  []Met   []Partially met  []Not met      []Met   []Partially met  []Not met     Long Term Goals - Time Frame for Long term goals : 4 weeks  Long term goal 1: Patient to be independent and compliant with HEP. []Met  []Partially met  []Not met   Long term goal 2: Patient to have improved R knee ROM 0-110* for improved mobility. -progressing (3-105*) []Met  []Partially met  []Not met   Long term goal 3: Patient to have improved  R LE strength >/=4+/5 grossly for improve ease with transfers and gait. -progressing (hip flex: 4-/5 abd/add: 4/5, knee flex: 4-/5, ext: 4/5) []Met  []Partially met  []Not met   Long term goal 4: Pt to be able to walk community distances with cane or no AD and minimal gait deviations for improved mobility and endurance. -met []Met  []Partially met  []Not met     []Met  []Partially met  []Not met       Minutes Tracking:  Time In: 1027  Time Out: [address]  Minutes: [id number] [facility]     Date: 9/29/2020

## 2020-10-01 ENCOUNTER — HOSPITAL ENCOUNTER (OUTPATIENT)
Dept: PHYSICAL THERAPY | Age: 80
Setting detail: THERAPIES SERIES
Discharge: HOME | End: 2020-10-01
Payer: MEDICARE

## 2020-10-01 PROCEDURE — 97140 MANUAL THERAPY 1/> REGIONS: CPT

## 2020-10-01 PROCEDURE — 97110 THERAPEUTIC EXERCISES: CPT

## 2020-10-01 NOTE — PROGRESS NOTES
Phone: Gigi           Fax: [phone]                           Outpatient Physical Therapy                                                                            Daily Note    Patient: Tracy Jones : 1940  CSN #: [id number]   Referring Practitioner:  Dr. Sherine Mina MD    Referral Date : 20     Date: 10/1/2020    Diagnosis: Primary OA of R knee, M17.11  Treatment Diagnosis: R knee pain    Onset Date: 08/10/20  PT Insurance Information: Medicare/[employer]  Total # of Visits Approved: 24 Per Physician Order  Total # of Visits to Date: 16  No Show: 0  Canceled Appointment: 0      Pre-Treatment Pain:  2/10  Subjective: Patient reports stiffness in the knee this morning and it feels really tight. Exercises:  Exercise 1: HEP: heel prop, heel slides, LAQ, quad sets (updated: Sink ex)  Exercise 2: Bike x10 min 2.5 hills  Exercise 3: Heel slide with strap 10\" hold x10  Exercise 4: FSU/LSU 20x ea way R LE  Exercise 5: Heel prop x3 min  (~2mins with 5# wt)  Exercise 7: Quad sets 10x 5sec holds  Exercise 8: Step flex and ext stretch 30 sec x3  Exercise 9: Sit to stand 2x10 no hha  Exercise 10: BTB TKE x20    Manual:  Joint mobilization: Patellar mob's  PROM: R knee    Modalities:  Cryotherapy (Minutes\Location): CP x10min to R knee to decrease pain/edema       Assessment  Assessment: Continued all ther ex to improve R knee ROM and strength; patient tolerates well. Patient able to complete 15 sit/stands with no rest break indicating improved functional strength. CP to decreased soreness. Activity Tolerance  Activity Tolerance: Patient Tolerated treatment well    Patient Education  Exercise technique    Pt verbalized/demonstrated good understanding:     [x] Yes         [] No, pt required further clarification.        Post Treatment Pain:  2/10      Plan  Times per week: 3  Plan weeks: 4      Goals  (Total # of Visits to Date: 12)   Short Term Goals - Time Frame for Short term goals: 2 weeks     Short term goal 1: Patient to initiate HEP for improved R knee ROM and strength. -MET                                        []Met   []Partially met  []Not met   Short term goal 2: Patient to have improved R knee ROM 5-90* for improved mobility. -met (5-95*)  []Met   []Partially met  []Not met   Short term goal 3: Initiate manual techniques/modalities prn to decrease pain and edema and improve mobility. -MET  []Met   []Partially met  []Not met      []Met   []Partially met  []Not met     Long Term Goals - Time Frame for Long term goals : 4 weeks  Long term goal 1: Patient to be independent and compliant with HEP. []Met  []Partially met  []Not met   Long term goal 2: Patient to have improved R knee ROM 0-110* for improved mobility. -progressing (3-105*) []Met  []Partially met  []Not met   Long term goal 3: Patient to have improved  R LE strength >/=4+/5 grossly for improve ease with transfers and gait. -progressing (hip flex: 4-/5 abd/add: 4/5, knee flex: 4-/5, ext: 4/5) []Met  []Partially met  []Not met   Long term goal 4: Pt to be able to walk community distances with cane or no AD and minimal gait deviations for improved mobility and endurance. -met []Met  []Partially met  []Not met     []Met  []Partially met  []Not met       Minutes Tracking:  Time In: 1100  Time Out: 1150  Minutes: 50  Timed Code Treatment Minutes: [address] [facility] , PT, DPT     Date: 10/1/2020

## 2020-10-02 ENCOUNTER — HOSPITAL ENCOUNTER (OUTPATIENT)
Dept: PHYSICAL THERAPY | Age: 80
Setting detail: THERAPIES SERIES
Discharge: HOME | End: 2020-10-02
Payer: MEDICARE

## 2020-10-02 PROCEDURE — 97110 THERAPEUTIC EXERCISES: CPT

## 2020-10-02 PROCEDURE — 97140 MANUAL THERAPY 1/> REGIONS: CPT

## 2020-10-02 NOTE — PROGRESS NOTES
Phone: Gigi           Fax: [phone]                           Outpatient Physical Therapy                                                                            Daily Note    Patient: Frederic Harvey : 1940  [facility] #: [id number]   Referring Practitioner:  Dr. Marti Ladd MD    Referral Date : 20     Date: 10/2/2020    Diagnosis: Primary OA of R knee, M17.11  Treatment Diagnosis: R knee pain    Onset Date: 08/10/20  PT Insurance Information: Medicare/[employer]  Total # of Visits Approved: 24 Per Physician Order  Total # of Visits to Date:   No Show: 0  Canceled Appointment: 0      Pre-Treatment Pain:  2/10  Subjective: Patient reports soreness in the knee today but not too bad overall. Exercises:  Exercise 1: HEP: heel prop, heel slides, LAQ, quad sets (updated: Sink ex)  Exercise 2: Bike x10 min 2.5 hills  Exercise 3: Heel slide with strap 10\" hold x10  Exercise 4: FSU/LSU 20x ea way R LE  Exercise 5: Heel prop x3 min  (~2mins with 5# wt)  Exercise 7: Quad sets 10x 5sec holds  Exercise 8: Step flex and ext stretch 30 sec x3  Exercise 9: Sit to stand 2x10 no hha  Exercise 10: BTB TKE x20    Manual:  Joint mobilization: Patellar mob's  PROM: R knee  Soft Tissue Mobalization: DTM to R quad and HS and calf to decreased tone and improve mobility. Modalities:  Cryotherapy (Minutes\Location): CP x10min to R knee to decrease pain/edema       Assessment  Assessment: Patient with improved R knee AAROM 2-108* progressing toward LTG slowly; ROM taken following manual techniques to decrease tone and improve mobility. Will progress as able. Activity Tolerance  Activity Tolerance: Patient Tolerated treatment well    Patient Education  Exercise technique    Pt verbalized/demonstrated good understanding:     [x] Yes         [] No, pt required further clarification.        Post Treatment Pain:  1/10      Plan  Times per week: 3  Plan weeks: 4      Goals  (Total # of Visits to Date: 16)   Short Term Goals - Time Frame for Short term goals: 2 weeks     Short term goal 1: Patient to initiate HEP for improved R knee ROM and strength. -MET                                        []Met   []Partially met  []Not met   Short term goal 2: Patient to have improved R knee ROM 5-90* for improved mobility. -met (5-95*)  []Met   []Partially met  []Not met   Short term goal 3: Initiate manual techniques/modalities prn to decrease pain and edema and improve mobility. -MET  []Met   []Partially met  []Not met      []Met   []Partially met  []Not met     Long Term Goals - Time Frame for Long term goals : 4 weeks  Long term goal 1: Patient to be independent and compliant with HEP. []Met  []Partially met  []Not met   Long term goal 2: Patient to have improved R knee ROM 0-110* for improved mobility. -progressing (3-108*) []Met  []Partially met  []Not met   Long term goal 3: Patient to have improved  R LE strength >/=4+/5 grossly for improve ease with transfers and gait. -progressing (hip flex: 4-/5 abd/add: 4/5, knee flex: 4-/5, ext: 4/5) []Met  []Partially met  []Not met   Long term goal 4: Pt to be able to walk community distances with cane or no AD and minimal gait deviations for improved mobility and endurance. -met []Met  []Partially met  []Not met     []Met  []Partially met  []Not met       Minutes Tracking:  Time In: 1058  Time Out: [address] [employer]  Minutes: 51  Timed Code Treatment Minutes: Hamzah Herrera 62. , PT, DPT     Date: 10/2/2020

## 2020-10-05 ENCOUNTER — HOSPITAL ENCOUNTER (OUTPATIENT)
Dept: PHYSICAL THERAPY | Age: 80
Setting detail: THERAPIES SERIES
Discharge: HOME | End: 2020-10-05
Payer: MEDICARE

## 2020-10-05 PROCEDURE — 97140 MANUAL THERAPY 1/> REGIONS: CPT

## 2020-10-05 PROCEDURE — 97110 THERAPEUTIC EXERCISES: CPT

## 2020-10-05 NOTE — PROGRESS NOTES
Phone: Gigi           Fax: [phone]                           Outpatient Physical Therapy                                                                            Daily Note    Patient: Gladis Macias : 1940  CSN #: [id number]   Referring Practitioner:  Dr. Enrico Rushing MD    Referral Date : 20     Date: 10/5/2020    Diagnosis: Primary OA of R knee, M17.11  Treatment Diagnosis: R knee pain    Onset Date: 08/10/20  PT Insurance Information: Medicare/[employer]  Total # of Visits Approved: 24 Per Physician Order  Total # of Visits to Date: 18  No Show: 0  Canceled Appointment: 0      Pre-Treatment Pain: 0 /10  Subjective: Pt reports having \"a little bit of pain\" when she first got up but states she feels okay now. Reports only having stiffness. Exercises:  Exercise 1: HEP: heel prop, heel slides, LAQ, quad sets (updated: Sink ex)  Exercise 2: Bike x10 min 3.0 hills  Exercise 3: Heel slide with strap 10\" hold x10  Exercise 4: FSU/LSU 20x ea way R LE  Exercise 5: Heel prop x3 min  (~2mins with 5# wt)  Exercise 8: Step flex and ext stretch 30 sec x3  Exercise 9: Sit to stand 2x10 no hha  Exercise 10: BTB TKE x20    Manual:  Joint mobilization: Patellar mob's  PROM: R knee  Other: Contract/relax for increasing flexion ROM with pt prone    Modalities:  Cryotherapy (Minutes\Location): CP x10min to R knee to decrease pain/edema       Assessment  Assessment: Added contract/relax stretch today for increased R knee flex ROM. Pt achieved 109* of AAROM on R knee following manual techniques today. Will continue. Activity Tolerance  Activity Tolerance: Patient Tolerated treatment well    Patient Education  Contract/relax rationale  Pt verbalized/demonstrated good understanding:     [x] Yes         [] No, pt required further clarification.        Post Treatment Pain: 0 /10      Plan  Times per week: 3  Plan weeks: 4      Goals  (Total # of Visits to Date: 25)   Short Term Goals - Time Frame for Short term goals: 2 weeks     Short term goal 1: Patient to initiate HEP for improved R knee ROM and strength. -MET                                        []Met   []Partially met  []Not met   Short term goal 2: Patient to have improved R knee ROM 5-90* for improved mobility. -met (5-95*)  []Met   []Partially met  []Not met   Short term goal 3: Initiate manual techniques/modalities prn to decrease pain and edema and improve mobility. -MET  []Met   []Partially met  []Not met      []Met   []Partially met  []Not met     Long Term Goals - Time Frame for Long term goals : 4 weeks  Long term goal 1: Patient to be independent and compliant with HEP. []Met  []Partially met  []Not met   Long term goal 2: Patient to have improved R knee ROM 0-110* for improved mobility. -progressing (3-109*) []Met  []Partially met  []Not met   Long term goal 3: Patient to have improved  R LE strength >/=4+/5 grossly for improve ease with transfers and gait. -progressing (hip flex: 4-/5 abd/add: 4/5, knee flex: 4-/5, ext: 4/5) []Met  []Partially met  []Not met   Long term goal 4: Pt to be able to walk community distances with cane or no AD and minimal gait deviations for improved mobility and endurance. -met []Met  []Partially met  []Not met     []Met  []Partially met  []Not met       Minutes Tracking:  Time In: [facility]  Time Out: [address]  Minutes: [address] , [facility]     Date: 10/5/2020

## 2020-10-07 ENCOUNTER — HOSPITAL ENCOUNTER (OUTPATIENT)
Dept: PHYSICAL THERAPY | Age: 80
Setting detail: THERAPIES SERIES
Discharge: HOME | End: 2020-10-07
Payer: MEDICARE

## 2020-10-07 PROCEDURE — 97140 MANUAL THERAPY 1/> REGIONS: CPT

## 2020-10-07 PROCEDURE — 97110 THERAPEUTIC EXERCISES: CPT

## 2020-10-07 NOTE — PROGRESS NOTES
Phone: Gigi           Fax: [phone]                           Outpatient Physical Therapy                                                                            Daily Note    Patient: Dede Molina : 1940  CSN #: [id number]   Referring Practitioner:  Dr. Keith Ward MD    Referral Date : 20     Date: 10/7/2020    Diagnosis: Primary OA of R knee, M17.11  Treatment Diagnosis: R knee pain    Onset Date: 08/10/20  PT Insurance Information: Medicare/[employer]  Total # of Visits Approved: 24 Per Physician Order  Total # of Visits to Date: 23  No Show: 0  Canceled Appointment: 0      Pre-Treatment Pain:  0/10  Subjective: Patient reports she is feeling well and is eager to be done with therapy soon. Exercises:  Exercise 1: HEP: heel prop, heel slides, LAQ, quad sets (updated: Sink ex)  Exercise 2: Bike x10 min 3.0 hills  Exercise 3: Heel slide with strap 10\" hold x10  Exercise 4: FSU/LSU 20x ea way R LE  Exercise 5: Heel prop x3 min  (~2mins with 5# wt)  Exercise 8: Step flex and ext stretch 30 sec x3  Exercise 9: Sit to stand 2x10 no hha; 3#  Exercise 10: BTB TKE x20    Manual:  Joint mobilization: Patellar mob's  PROM: R knee    Modalities:  Cryotherapy (Minutes\Location): CP x10min to R knee to decrease pain/edema       Assessment  Assessment: Patient demo's improved AROM to 105* this date; 110* with overpressure in supine. Progressed sit/stands; patient able to hold 3# weight during sit/stands with no LOB or fatigue noted. Will continue to progress. Activity Tolerance  Activity Tolerance: Patient Tolerated treatment well    Patient Education  Exercise technique and progression    Pt verbalized/demonstrated good understanding:     [x] Yes         [] No, pt required further clarification.        Post Treatment Pain:  0/10      Plan  Times per week: 3  Plan weeks: 4      Goals  (Total # of Visits to Date: 23)   Short Term Goals - Time Frame for Short term goals: 2 weeks     Short term goal 1: Patient to initiate HEP for improved R knee ROM and strength. -MET                                        []Met   []Partially met  []Not met   Short term goal 2: Patient to have improved R knee ROM 5-90* for improved mobility. -met (5-95*)  []Met   []Partially met  []Not met   Short term goal 3: Initiate manual techniques/modalities prn to decrease pain and edema and improve mobility. -MET  []Met   []Partially met  []Not met      []Met   []Partially met  []Not met     Long Term Goals - Time Frame for Long term goals : 4 weeks  Long term goal 1: Patient to be independent and compliant with HEP. []Met  []Partially met  []Not met   Long term goal 2: Patient to have improved R knee ROM 0-110* for improved mobility. -progressing (3-109*) []Met  []Partially met  []Not met   Long term goal 3: Patient to have improved  R LE strength >/=4+/5 grossly for improve ease with transfers and gait. -progressing (hip flex: 4-/5 abd/add: 4/5, knee flex: 4-/5, ext: 4/5) []Met  []Partially met  []Not met   Long term goal 4: Pt to be able to walk community distances with cane or no AD and minimal gait deviations for improved mobility and endurance. -met []Met  []Partially met  []Not met     []Met  []Partially met  []Not met       Minutes Tracking:  Time In: 1100  Time Out: 1153  Minutes: 53  Timed Code Treatment Minutes: 9[address]12 Oral Bynum PT, DPT     Date: 10/7/2020

## 2020-10-09 ENCOUNTER — HOSPITAL ENCOUNTER (OUTPATIENT)
Dept: PHYSICAL THERAPY | Age: 80
Setting detail: THERAPIES SERIES
Discharge: HOME | End: 2020-10-09
Payer: MEDICARE

## 2020-10-09 PROCEDURE — 97140 MANUAL THERAPY 1/> REGIONS: CPT

## 2020-10-09 PROCEDURE — 97110 THERAPEUTIC EXERCISES: CPT

## 2020-10-09 NOTE — PROGRESS NOTES
Phone: Gigi           Fax: [phone]                           Outpatient Physical Therapy                                                                            Daily Note    Patient: Frederic Harvey : 1940  [facility] #: [id number]   Referring Practitioner:  Dr. Marti Ladd MD    Referral Date : 20     Date: 10/9/2020    Diagnosis: Primary OA of R knee, M17.11  Treatment Diagnosis: R knee pain    Onset Date: 08/10/20  PT Insurance Information: Medicare/[employer]  Total # of Visits Approved: 24 Per Physician Order  Total # of Visits to Date: 20  No Show: 0  Canceled Appointment: 0      Pre-Treatment Pain:  0/10  Subjective: Patient states her R lower leg still feels numb compared to the L leg and she is going to ask her doctor about it. Exercises:  Exercise 1: HEP: heel prop, heel slides, LAQ, quad sets (updated: Sink ex)  Exercise 2: Bike x10 min 3.2 hills  Exercise 3: Heel slide with strap 10\" hold x10  Exercise 4: FSU/LSU 20x ea way R LE  Exercise 5: Heel prop x3 min  (~2mins with 5# wt)  Exercise 8: Step flex and ext stretch 30 sec x3  Exercise 9: Sit to stand x10, x15 no hha; 5#  Exercise 10: BTB TKE x20    Manual:  Joint mobilization: Patellar mob's  PROM: R knee  Soft Tissue Mobalization: DTM to R quad and HS and calf to decreased tone and improve mobility. Modalities:  Cryotherapy (Minutes\Location): CP x10min to R knee to decrease pain/edema       Assessment  Assessment: Progressed to 15 sit/stands with 5# weight indicating increased functional strength. Patient has increased tone in distal hamstrings; manual techniques to decrease tone and soreness. Will progress as able. Activity Tolerance  Activity Tolerance: Patient Tolerated treatment well    Patient Education  Exercise technique    Pt verbalized/demonstrated good understanding:     [x] Yes         [] No, pt required further clarification.        Post Treatment Pain: 0/10      Plan  Times per week: 3  Plan weeks: 4      Goals  (Total # of Visits to Date: 21)   Short Term Goals - Time Frame for Short term goals: 2 weeks     Short term goal 1: Patient to initiate HEP for improved R knee ROM and strength. -MET                                        []Met   []Partially met  []Not met   Short term goal 2: Patient to have improved R knee ROM 5-90* for improved mobility. -met (5-95*)  []Met   []Partially met  []Not met   Short term goal 3: Initiate manual techniques/modalities prn to decrease pain and edema and improve mobility. -MET  []Met   []Partially met  []Not met      []Met   []Partially met  []Not met     Long Term Goals - Time Frame for Long term goals : 4 weeks  Long term goal 1: Patient to be independent and compliant with HEP. []Met  []Partially met  []Not met   Long term goal 2: Patient to have improved R knee ROM 0-110* for improved mobility. -progressing (3-109*) []Met  []Partially met  []Not met   Long term goal 3: Patient to have improved  R LE strength >/=4+/5 grossly for improve ease with transfers and gait. -progressing (hip flex: 4-/5 abd/add: 4/5, knee flex: 4-/5, ext: 4/5) []Met  []Partially met  []Not met   Long term goal 4: Pt to be able to walk community distances with cane or no AD and minimal gait deviations for improved mobility and endurance. -met []Met  []Partially met  []Not met     []Met  []Partially met  []Not met       Minutes Tracking:  Time In: 1100  Time Out: [address]  Minutes: 51  Timed Code Treatment Minutes: Hamzah Herrera 62. , PT, DPT     Date: 10/9/2020

## 2020-10-12 ENCOUNTER — HOSPITAL ENCOUNTER (OUTPATIENT)
Dept: PHYSICAL THERAPY | Age: 80
Setting detail: THERAPIES SERIES
Discharge: HOME | End: 2020-10-12
Payer: MEDICARE

## 2020-10-12 PROCEDURE — 97110 THERAPEUTIC EXERCISES: CPT

## 2020-10-12 NOTE — PROGRESS NOTES
Phone: Gigi           Fax: [phone]                           Outpatient Physical Therapy                                                                            Daily Note    Patient: Abby Tabares : 1940  CSN #: [id number]   Referring Practitioner:  Dr. Kirsten Gonzales MD    Referral Date : 20     Date: 10/12/2020    Diagnosis: Primary OA of R knee, M17.11  Treatment Diagnosis: R knee pain    Onset Date: 08/10/20  PT Insurance Information: Medicare/[employer]  Total # of Visits Approved: 24 Per Physician Order  Total # of Visits to Date: 21  No Show: 0  Canceled Appointment: 0      Pre-Treatment Pain:  0/10  Subjective: Pt denies pain upon arrival, states knee sometimes feels achey but otherwise no new issues/concerns. Pt states she was a little sore after last visit. Exercises:  Exercise 2: Bike x10 min 3.2 hills  Exercise 3: Heel slide with strap 10\" hold x10  Exercise 4: FSU/LSU 20x ea way R LE  Exercise 5: Heel prop x3 min  (~2mins with 5# wt)  Exercise 8: Step flex and ext stretch 30 sec x3  Exercise 9: Sit to stand x10, x15 no hha; 5#  Exercise 10: BTB TKE x20  Exercise 12: Gait without AD -- 1 big loop      Modalities:  Cryotherapy (Minutes\Location): CP x10min to R knee to decrease pain/edema       Assessment  Assessment: R knee flexion AAROM reaching 1068 with heel slides this date. Pt amb ~150ft without SPC with no LOB or unsteadiness observed. Pt cont to use SPC for outdoor ambulation. Activity Tolerance  Activity Tolerance: Patient Tolerated treatment well    Patient Education  Patient Education: Educated on safety with ambulation. Pt verbalized/demonstrated good understanding:     [x] Yes         [] No, pt required further clarification.        Post Treatment Pain:  0/10      Plan  Times per week: 3  Plan weeks: 4      Goals  (Total # of Visits to Date: 24)   Short Term Goals - Time Frame for Short term goals: 2 weeks Short term goal 1: Patient to initiate HEP for improved R knee ROM and strength. -MET                                        []Met   []Partially met  []Not met   Short term goal 2: Patient to have improved R knee ROM 5-90* for improved mobility. -met (5-95*)  []Met   []Partially met  []Not met   Short term goal 3: Initiate manual techniques/modalities prn to decrease pain and edema and improve mobility. -MET  []Met   []Partially met  []Not met      []Met   []Partially met  []Not met     Long Term Goals - Time Frame for Long term goals : 4 weeks  Long term goal 1: Patient to be independent and compliant with HEP. []Met  []Partially met  []Not met   Long term goal 2: Patient to have improved R knee ROM 0-110* for improved mobility. -progressing (3-109*) []Met  []Partially met  []Not met   Long term goal 3: Patient to have improved  R LE strength >/=4+/5 grossly for improve ease with transfers and gait. -progressing (hip flex: 4-/5 abd/add: 4/5, knee flex: 4-/5, ext: 4/5) []Met  []Partially met  []Not met   Long term goal 4: Pt to be able to walk community distances with cane or no AD and minimal gait deviations for improved mobility and endurance. -met []Met  []Partially met  []Not met     []Met  []Partially met  []Not met       Minutes Tracking:  Time In: 1514  Time Out: [address]  Minutes: 53  Timed Code Treatment Minutes: [address] [facility], [address] [facility], [facility]      Date: 10/12/2020

## 2020-10-14 ENCOUNTER — HOSPITAL ENCOUNTER (OUTPATIENT)
Dept: PHYSICAL THERAPY | Age: 80
Setting detail: THERAPIES SERIES
Discharge: HOME | End: 2020-10-14
Payer: MEDICARE

## 2020-10-14 PROCEDURE — 97110 THERAPEUTIC EXERCISES: CPT

## 2020-10-14 PROCEDURE — 97140 MANUAL THERAPY 1/> REGIONS: CPT

## 2020-10-14 NOTE — PROGRESS NOTES
Phone: Gigi           Fax: [phone]                           Outpatient Physical Therapy                                                                            Daily Note    Patient: Megan David : 1940  CSN #: [id number]   Referring Practitioner:  Dr. Constance Huynh MD    Referral Date : 20     Date: 10/14/2020    Diagnosis: Primary OA of R knee, M17.11  Treatment Diagnosis: R knee pain    Onset Date: 08/10/20  PT Insurance Information: Medicare/[employer]  Total # of Visits Approved: 24 Per Physician Order  Total # of Visits to Date:   No Show: 0  Canceled Appointment: 0      Pre-Treatment Pain:  0/10  Subjective: Patient reports no pain; her follow up with the doctor was re-scheduled for 10/22/2020. Exercises:  Exercise 1: HEP: heel prop, heel slides, LAQ, quad sets (updated: Sink ex)  Exercise 2: Bike x10 min 3.5 hills  Exercise 4: FSU/LSU 20x ea way R LE  Exercise 8: Step flex and ext stretch 30 sec x3  Exercise 9: Sit to stand x10, x15 no hha; 5#  Exercise 10: BTB TKE x20  Exercise 13: Step downs x15 4in    Manual:  Joint mobilization: Patellar mob's  PROM: R knee  Soft Tissue Mobalization: DTM to R quad and HS and calf to decreased tone and improve mobility. Modalities:  Cryotherapy (Minutes\Location): CP x10min to R knee to decrease pain/edema       Assessment  Assessment: Added step downs to progress eccentric strength of R quad. Patient lacking 2 degrees of R knee extension with overpressure following manual techniques to decrease HS tone. Patient with one visit remaining. Activity Tolerance  Activity Tolerance: Patient Tolerated treatment well    Patient Education  Exercise technique and progression    Pt verbalized/demonstrated good understanding:     [x] Yes         [] No, pt required further clarification.        Post Treatment Pain:  0/10      Plan  Times per week: 3  Plan weeks: 4      Goals  (Total # of Visits to Date: 25)   Short Term Goals - Time Frame for Short term goals: 2 weeks     Short term goal 1: Patient to initiate HEP for improved R knee ROM and strength. -MET                                        []Met   []Partially met  []Not met   Short term goal 2: Patient to have improved R knee ROM 5-90* for improved mobility. -met (5-95*)  []Met   []Partially met  []Not met   Short term goal 3: Initiate manual techniques/modalities prn to decrease pain and edema and improve mobility. -MET  []Met   []Partially met  []Not met      []Met   []Partially met  []Not met     Long Term Goals - Time Frame for Long term goals : 4 weeks  Long term goal 1: Patient to be independent and compliant with HEP. []Met  []Partially met  []Not met   Long term goal 2: Patient to have improved R knee ROM 0-110* for improved mobility. -progressing (3-109*) []Met  []Partially met  []Not met   Long term goal 3: Patient to have improved  R LE strength >/=4+/5 grossly for improve ease with transfers and gait. -progressing (hip flex: 4-/5 abd/add: 4/5, knee flex: 4-/5, ext: 4/5) []Met  []Partially met  []Not met   Long term goal 4: Pt to be able to walk community distances with cane or no AD and minimal gait deviations for improved mobility and endurance. -met []Met  []Partially met  []Not met     []Met  []Partially met  []Not met       Minutes Tracking:  Time In: 1059  Time Out: [address]  Minutes: 55  Timed Code Treatment Minutes: Nelson Haas , PT, DPT     Date: 10/14/2020

## 2020-10-16 ENCOUNTER — HOSPITAL ENCOUNTER (OUTPATIENT)
Dept: PHYSICAL THERAPY | Age: 80
Setting detail: THERAPIES SERIES
Discharge: HOME | End: 2020-10-16
Payer: MEDICARE

## 2020-10-16 PROCEDURE — 97140 MANUAL THERAPY 1/> REGIONS: CPT

## 2020-10-16 PROCEDURE — 97110 THERAPEUTIC EXERCISES: CPT

## 2020-10-16 NOTE — PROGRESS NOTES
Phone: Gigi           Fax: [phone]                           Outpatient Physical Therapy                                                                            Daily Note    Patient: Bruce Haines : 1940  [facility] #: [id number]   Referring Practitioner:  Dr. Abby Bhandari MD    Referral Date : 20     Date: 10/16/2020    Diagnosis: Primary OA of R knee, M17.11  Treatment Diagnosis: R knee pain    Onset Date: 08/10/20  PT Insurance Information: Medicare/[employer]  Total # of Visits Approved: 24 Per Physician Order  Total # of Visits to Date:   No Show: 0  Canceled Appointment: 0      Pre-Treatment Pain:  0/10  Subjective: Patient reports no changes since last session; states she is ready to be done with therapy today. Exercises:  Exercise 1: HEP: heel prop, heel slides, LAQ, quad sets (updated: Sink ex)  Exercise 2: Bike x10 min 3.5 hills  Exercise 3: Heel slide with strap 10\" hold x10  Exercise 4: FSU/LSU 20x ea way R LE  Exercise 5: Heel prop x3 min  (~2mins with 5# wt)  Exercise 8: Step flex and ext stretch 30 sec x3  Exercise 9: Sit to stand x10, x15 no hha; 5#  Exercise 10: BTB TKE x20    Manual:  Joint mobilization: Patellar mob's  Soft Tissue Mobalization: DTM to R quad and HS and calf to decreased tone and improve mobility. Modalities:  Cryotherapy (Minutes\Location): CP x10min to R knee to decrease pain/edema       Assessment  Assessment: Patient has attended 23 PT visits and met goals for independence with HEP and improved gait with community distance ambulation. Patient with R knee ROM 2-103* and R LE strength 4- to 4/5 grossly. Will place patient on two week hold and then d/c prn d/t meeting maximum function at this time.     Activity Tolerance  Activity Tolerance: Patient Tolerated treatment well    Patient Education  Continue HEP    Pt verbalized/demonstrated good understanding:     [x] Yes         [] No, pt required further clarification. Post Treatment Pain:  0/10      Plan  Times per week: 3  Plan weeks: 4      Goals  (Total # of Visits to Date: 21)   Short Term Goals - Time Frame for Short term goals: 2 weeks     Short term goal 1: Patient to initiate HEP for improved R knee ROM and strength. -MET                                        []Met   []Partially met  []Not met   Short term goal 2: Patient to have improved R knee ROM 5-90* for improved mobility. -met (5-95*)  []Met   []Partially met  []Not met   Short term goal 3: Initiate manual techniques/modalities prn to decrease pain and edema and improve mobility. -MET  []Met   []Partially met  []Not met      []Met   []Partially met  []Not met     Long Term Goals - Time Frame for Long term goals : 4 weeks  Long term goal 1: Patient to be independent and compliant with HEP. []Met  []Partially met  []Not met   Long term goal 2: Patient to have improved R knee ROM 0-110* for improved mobility. -progressing (3-109*) []Met  []Partially met  []Not met   Long term goal 3: Patient to have improved  R LE strength >/=4+/5 grossly for improve ease with transfers and gait. -progressing (hip flex: 4-/5 abd/add: 4/5, knee flex: 4-/5, ext: 4/5) []Met  []Partially met  []Not met   Long term goal 4: Pt to be able to walk community distances with cane or no AD and minimal gait deviations for improved mobility and endurance. -met []Met  []Partially met  []Not met     []Met  []Partially met  []Not met       Minutes Tracking:  Time In: 1146  Time Out: 1236  Minutes: 50  Timed Code Treatment Minutes: 111 [facility] , PT, DPT     Date: 10/16/2020

## 2020-11-11 ENCOUNTER — HOSPITAL ENCOUNTER (OUTPATIENT)
Dept: CT IMAGING | Age: 80
Discharge: HOME | End: 2020-11-13
Payer: MEDICARE

## 2020-11-11 ENCOUNTER — HOSPITAL ENCOUNTER (OUTPATIENT)
Dept: LAB | Age: 80
Discharge: HOME | End: 2020-11-11
Payer: MEDICARE

## 2020-11-11 LAB
BUN BLDV-MCNC: 17 MG/DL (ref 8–23)
CREAT SERPL-MCNC: 0.61 MG/DL (ref 0.5–0.9)
GFR AFRICAN AMERICAN: >60 ML/MIN
GFR NON-AFRICAN AMERICAN: >60 ML/MIN
GFR SERPL CREATININE-BSD FRML MDRD: NORMAL ML/MIN/{1.73_M2}
GFR SERPL CREATININE-BSD FRML MDRD: NORMAL ML/MIN/{1.73_M2}

## 2020-11-11 PROCEDURE — 36415 COLL VENOUS BLD VENIPUNCTURE: CPT

## 2020-11-11 PROCEDURE — 82565 ASSAY OF CREATININE: CPT

## 2020-11-11 PROCEDURE — 74176 CT ABD & PELVIS W/O CONTRAST: CPT

## 2020-11-11 PROCEDURE — 84520 ASSAY OF UREA NITROGEN: CPT

## 2020-12-22 NOTE — DISCHARGE SUMMARY
Phone: Gigi          Fax: [phone]                            Outpatient Physical Therapy                                                                    Discharge Summary    Patient: Birdie Petersen  : 1940  [facility] #: [id number]   Referring physician: No admitting provider for patient encounter. Referring Practitioner: Dr. Trish Ernst MD      Diagnosis: Primary OA of R knee, M17.11      Date Treatment Initiated: 2020  Date of Last Treatment: 10/16/2020      PT Visit Information  Onset Date: 08/10/20  PT Insurance Information: Medicare/[employer]  Total # of Visits Approved: 24  Total # of Visits to Date:   Plan of Care/Certification Expiration Date: 10/13/20  No Show: 0  Canceled Appointment: 0      Frequency/Duration  3 times per week  4 weeks      Treatment Received  [x] HP/CP      [x] Electrical Stim   [x] Therapeutic Exercise      [x] Gait Training  [] Aquatics   [] Ultrasound         [x] Patient Education/HEP   [x] Manual Therapy  [] Traction    [x] Neuro-blanca        [x] Soft Tissue Mobs            [] Home TENS  [] Iontophoresis    [] Orthotic casting/fitting      [] Dry Needling    Assessment  Assessment: Patient attended 23 PT visits and met goals for independence with HEP and improved gait in the community and made good progress toward goals for improved ROM and strength. Patient was then placed on hold and did not return for further PT d/t meeting maximum function. Will d/c patient at this time. Goals  Short term goals  Time Frame for Short term goals: 2 weeks  Short term goal 1: Patient to initiate HEP for improved R knee ROM and strength. -MET  Short term goal 2: Patient to have improved R knee ROM 5-90* for improved mobility. -met (5-95*)  Short term goal 3: Initiate manual techniques/modalities prn to decrease pain and edema and improve mobility. -MET    Long term goals  Time Frame for Long term goals : 4 weeks  Long term goal 1: Patient to be independent and compliant with HEP. Long term goal 2: Patient to have improved R knee ROM 0-110* for improved mobility. -progressing (3-109*)  Long term goal 3: Patient to have improved  R LE strength >/=4+/5 grossly for improve ease with transfers and gait. -progressing (hip flex: 4-/5 abd/add: 4/5, knee flex: 4-/5, ext: 4/5)  Long term goal 4: Pt to be able to walk community distances with cane or no AD and minimal gait deviations for improved mobility and endurance. -met      Reason for Discharge  [] Goals Achieved                        []  Poor Follow Through/Attendance                  [x]  Optimal Function Achieved     [x]  Patient Discharged Self    []  Hospitalization                         []  Physician discharge      Thank you for this referral      Denton Duran PT, DPT               Date: 12/22/2020

## 2020-12-24 ENCOUNTER — HOSPITAL ENCOUNTER (OUTPATIENT)
Dept: PHYSICAL THERAPY | Age: 80
Setting detail: THERAPIES SERIES
Discharge: HOME | End: 2020-12-24
Payer: MEDICARE

## 2020-12-24 PROCEDURE — 97162 PT EVAL MOD COMPLEX 30 MIN: CPT

## 2020-12-24 PROCEDURE — 97140 MANUAL THERAPY 1/> REGIONS: CPT

## 2020-12-24 PROCEDURE — 97110 THERAPEUTIC EXERCISES: CPT

## 2020-12-24 NOTE — PLAN OF CARE
[facility]           Phone: [phone]             Outpatient Physical Therapy  Fax: [phone]                                           Date: 2020  Patient: Blaise Bhagat : 1940 [facility] #: [id number]   Referring Practitioner:  Tiffany Dubois MD Referral Date:  20       [x] Plan of Care   [] Updated Plan of Care    Dates of Service to Include: 2020 to 21    Diagnosis:  OA of spine, M47.892    Rehab (Treatment) Diagnosis:  neck pain             Onset Date:  09/15/20    Attendance  Total # of Visits to Date: 1 No Show: 0 Canceled Appointment: 0    Assessment  Assessment: Pt is 79 y/o female with complaints of chronic neck pain. Mild forward head posture. Moderate tenderness right upper cervical spine. CROM in sittin flex with stretching, 26 degrees ext without pain, 18 right SB without ERP, 12 left SB with pulling felt, 30 degrees right rot, 24 left rot with increase pain. No myotomal weakness bilateral UE's; strength bilateral lower and middle trap 2+/5. Neg compression, centralized pain with right spurlings. Pt will benefit from PT to address deficits. Goals  Short term goals  Time Frame for Short term goals: 3 weeks  Short term goal 1: Pt to be instructed in home program.  Short term goal 2: Pt to begin gentle strengthening ex for improved postural alignment. Long term goals  Time Frame for Long term goals : 6 weeks  Long term goal 1: Pt to report independence and compliance with home program.  Long term goal 2: Pt to report pain no greater than 2/10 in right side of neck during ADL's. Long term goal 3: Pt to have no complaints of pain with right Spurlings test.  Long term goal 4: Pt to have little to no tenderness with palpation to right c-spine region. Long term goal 5: Pt to achieve 45 degrees of bilateral c-spine rotation to assist with driving. Prognosis  Prognosis: Good    Treatment Plan   Times per week: 2  Plan weeks: 6  [x] HP/CP      [x] Electrical Stim   [x] Therapeutic Exercise      [] Gait Training  [] Aquatics   [x] Ultrasound         [x] Patient Education/HEP   [x] Manual Therapy  [x] Traction    [x] Neuro-blanca        [x] Soft Tissue Mobs            [] Home TENS  [] Iontophoresis    [] Orthotic casting/fitting      [] Dry Needling             Electronically signed by: Lukas Vasquez PT, DPT, CMPT    Date: 12/24/2020      ______________________________________ Date: 12/24/2020   Physician Signature

## 2020-12-24 NOTE — PROGRESS NOTES
with pulling felt, 30 degrees right rot, 24 left rot with increase pain. No myotomal weakness bilateral UE's; strength bilateral lower and middle trap 2+/5. Neg compression, centralized pain with right spurlings. Pt will benefit from PT to address deficits. Prognosis: Good        Decision Making: Medium Complexity    Patient Education  Patient Education: PT eval, POC, and HEP  Pt verbalized/demonstrated good understanding:     [X] Yes         [] No, pt required further clarification. Goals  Short term goals  Time Frame for Short term goals: 3 weeks  Short term goal 1: Pt to be instructed in home program.  Short term goal 2: Pt to begin gentle strengthening ex for improved postural alignment. Long term goals  Time Frame for Long term goals : 6 weeks  Long term goal 1: Pt to report independence and compliance with home program.  Long term goal 2: Pt to report pain no greater than 2/10 in right side of neck during ADL's. Long term goal 3: Pt to have no complaints of pain with right Spurlings test.  Long term goal 4: Pt to have little to no tenderness with palpation to right c-spine region. Long term goal 5: Pt to achieve 45 degrees of bilateral c-spine rotation to assist with driving.       Patient goals : \"Not to have surgery\"        Minutes Tracking:  Time In: 7355  Time Out: 0945  Minutes: 56  Timed Code Treatment Minutes: 175 Marti Bynum PT, DPT, CMPT    12/24/2020

## 2020-12-29 ENCOUNTER — HOSPITAL ENCOUNTER (OUTPATIENT)
Dept: PHYSICAL THERAPY | Age: 80
Setting detail: THERAPIES SERIES
Discharge: HOME | End: 2020-12-29
Payer: MEDICARE

## 2020-12-29 PROCEDURE — 97110 THERAPEUTIC EXERCISES: CPT

## 2020-12-29 PROCEDURE — 97140 MANUAL THERAPY 1/> REGIONS: CPT

## 2020-12-29 NOTE — PROGRESS NOTES
Phone: Gigi           Fax: [phone]                           Outpatient Physical Therapy                                                                            Daily Note    Patient: Karely Patiño : 1940  CSN #: [id number]   Referring Practitioner:  Chinedu Fine MD    Referral Date : 20     Date: 2020    Diagnosis: OA of spine, M47.892  Treatment Diagnosis: neck pain    Onset Date: 09/15/20  PT Insurance Information: Medicare  Total # of Visits Approved: 18 Per Physician Order  Total # of Visits to Date: 2  No Show: 0  Canceled Appointment: 0      Pre-Treatment Pain:  4/10  Subjective: Pt reports she has been doing her exercises at home but her neck is still really bothering her. Pt rates current neck pain a 3-4/10. Exercises:  Exercise 2: yes/no 15x ea  Exercise 3: UT stretch 3x30  Exercise 4: UBE retro 4 mins    Manual:  Manual traction: Gentle cervical traction,  Soft Tissue Mobalization: STM with heated thermoprobe in sitting x 8 mins    Modalities:  Moist heat: 15 mins to neck and LB in supine     Assessment  Assessment: Began postural strengthening ex and manual techniques to decrease cervical tension. Pt reported relief today, will cont. Activity Tolerance  Activity Tolerance: Patient Tolerated treatment well    Patient Education  Patient Education: New ex rationale. Pt verbalized/demonstrated good understanding:     [x] Yes         [] No, pt required further clarification. Post Treatment Pain:  3/10    Plan  Times per week: 2  Plan weeks: 6      Goals  (Total # of Visits to Date: 2)      Short term goals  Time Frame for Short term goals: 3 weeks  Short term goal 1: Pt to be instructed in home program.-MET  Short term goal 2: Pt to begin gentle strengthening ex for improved postural alignment. -MET    Long term goals  Time Frame for Long term goals : 6 weeks  Long term goal 1: Pt to report independence and compliance with home program.  Long term goal 2: Pt to report pain no greater than 2/10 in right side of neck during ADL's. Long term goal 3: Pt to have no complaints of pain with right Spurlings test.  Long term goal 4: Pt to have little to no tenderness with palpation to right c-spine region. Long term goal 5: Pt to achieve 45 degrees of bilateral c-spine rotation to assist with driving.     Minutes Tracking:  Time In: 4135  Time Out: 417 [facility]  Minutes: 63  Timed Code Treatment Minutes: [address] [place], [place]       Date: 12/29/2020

## 2020-12-30 ENCOUNTER — HOSPITAL ENCOUNTER (OUTPATIENT)
Dept: PHYSICAL THERAPY | Age: 80
Setting detail: THERAPIES SERIES
Discharge: HOME | End: 2020-12-30
Payer: MEDICARE

## 2020-12-30 PROCEDURE — 97140 MANUAL THERAPY 1/> REGIONS: CPT

## 2020-12-30 PROCEDURE — 97110 THERAPEUTIC EXERCISES: CPT

## 2020-12-31 NOTE — PROGRESS NOTES
Phone: Gigi           Fax: [phone]                           Outpatient Physical Therapy                                                                            Daily Note    Patient: Sy Valdes : 1940  CSN #: [id number]   Referring Practitioner:  Clementine Banks MD    Referral Date : 20     Date: 2020    Diagnosis: OA of spine, M47.892  Treatment Diagnosis: neck pain    Onset Date: 09/15/20  PT Insurance Information: Medicare  Total # of Visits Approved: 18 Per Physician Order  Total # of Visits to Date: 3  No Show: 0  Canceled Appointment: 0      Pre-Treatment Pain:  3/10  Subjective: Pt states her right knee is hurting a bit more today; not sure why. Neck isn't doing to bad. Pain in neck is about 3/10, right knee is 7/10. Exercises:  Exercise 4: UBE retro 8 mins  Exercise 5: pulleys - 4 mins    Manual:  Manual traction: Gentle cervical traction, manual UT and LS stretches  Soft Tissue Mobalization: STM with heated thermoprobe in sitting x 8 mins    Modalities:  Moist heat: 15 mins to neck, right knee and LB in supine       Assessment  Assessment: Limited progression of ex this date. Pt notes relief with therapist stretching. Will continue to progress ex as pt tolerates. Activity Tolerance  Activity Tolerance: Patient Tolerated treatment well    Patient Education  Patient Education: Limited standing this date due to knee pain  Pt verbalized/demonstrated good understanding:     [x] Yes         [] No, pt required further clarification. Post Treatment Pain:  2/10      Plan  Times per week: 2  Plan weeks: 6      Goals  (Total # of Visits to Date: 3)      Short term goals  Time Frame for Short term goals: 3 weeks  Short term goal 1: Pt to be instructed in home program.-MET  Short term goal 2: Pt to begin gentle strengthening ex for improved postural alignment. -MET    Long term goals  Time Frame for Long term goals : 6 weeks  Long term goal 1: Pt to report independence and compliance with home program.  Long term goal 2: Pt to report pain no greater than 2/10 in right side of neck during ADL's. Long term goal 3: Pt to have no complaints of pain with right Spurlings test.  Long term goal 4: Pt to have little to no tenderness with palpation to right c-spine region. Long term goal 5: Pt to achieve 45 degrees of bilateral c-spine rotation to assist with driving.     Minutes Tracking:  Time In: 6924  Time Out: 1116  Minutes: 61  Timed Code Treatment Minutes: 175 Marti Bynum PT, DPT, CMPT      Date: 12/30/2020

## 2021-01-06 ENCOUNTER — HOSPITAL ENCOUNTER (OUTPATIENT)
Dept: PHYSICAL THERAPY | Age: 81
Setting detail: THERAPIES SERIES
Discharge: HOME | End: 2021-01-06
Payer: MEDICARE

## 2021-01-06 PROCEDURE — 97140 MANUAL THERAPY 1/> REGIONS: CPT

## 2021-01-06 PROCEDURE — 97110 THERAPEUTIC EXERCISES: CPT

## 2021-01-07 NOTE — PROGRESS NOTES
Phone: Gigi           Fax: [phone]                           Outpatient Physical Therapy                                                                            Daily Note    Patient: Carry Shirts : 1940  CSN #: [id number]   Referring Practitioner:  Tam Mayberry MD    Referral Date : 20     Date: 2021    Diagnosis: OA of spine, M47.892  Treatment Diagnosis: neck pain    Onset Date: 09/15/20  PT Insurance Information: Medicare  Total # of Visits Approved: 18 Per Physician Order  Total # of Visits to Date: 4  No Show: 0  Canceled Appointment: 0      Pre-Treatment Pain:  8/10  Subjective: Pt states pain is about 8/10 today in neck. Put neck brace on today due to pain. Not sure why pain is so bad. Exercises:  Exercise 3: UT stretch 3x30  Exercise 4: UBE retro 8 mins    Manual:  Manual traction: Gentle cervical traction, manual UT and LS stretches  Soft Tissue Mobalization: STM with heated thermoprobe in sitting x 8 mins    Modalities:  Moist heat: 15 mins to neck, right knee and LB in supine       Assessment  Assessment: Pt with moderate to severe tenderness with palpation. Continues to report decrease pain with manual therapy. Discussed avoiding prolong use of soft coller; pt voiced good understanding. Activity Tolerance  Activity Tolerance: Patient Tolerated treatment well    Patient Education  Patient Education: limit use of soft collar if able throughout the day  Pt verbalized/demonstrated good understanding:     [x] Yes         [] No, pt required further clarification. Post Treatment Pain:  6/10      Plan  Times per week: 2  Plan weeks: 6      Goals  (Total # of Visits to Date: 4)      Short term goals  Time Frame for Short term goals: 3 weeks  Short term goal 1: Pt to be instructed in home program.-MET  Short term goal 2: Pt to begin gentle strengthening ex for improved postural alignment. -MET  Short term goal 3: Initiate manual techniques/modalities prn to decrease pain and edema and improve mobility. -MET    Long term goals  Time Frame for Long term goals : 6 weeks  Long term goal 1: Pt to report independence and compliance with home program.  Long term goal 2: Pt to report pain no greater than 2/10 in right side of neck during ADL's. Long term goal 3: Pt to have no complaints of pain with right Spurlings test.  Long term goal 4: Pt to have little to no tenderness with palpation to right c-spine region. Long term goal 5: Pt to achieve 45 degrees of bilateral c-spine rotation to assist with driving.     Minutes Tracking:  Time In: 3012  Time Out: 1197  Minutes: 45  Timed Code Treatment Minutes: [address] PT, DPT, CMPT      Date: 1/6/2021

## 2021-01-08 ENCOUNTER — HOSPITAL ENCOUNTER (OUTPATIENT)
Dept: PHYSICAL THERAPY | Age: 81
Setting detail: THERAPIES SERIES
Discharge: HOME | End: 2021-01-08
Payer: MEDICARE

## 2021-01-08 PROCEDURE — 97110 THERAPEUTIC EXERCISES: CPT

## 2021-01-08 PROCEDURE — 97140 MANUAL THERAPY 1/> REGIONS: CPT

## 2021-01-11 ENCOUNTER — HOSPITAL ENCOUNTER (OUTPATIENT)
Dept: MRI IMAGING | Age: 81
Discharge: HOME | End: 2021-01-13
Payer: MEDICARE

## 2021-01-11 DIAGNOSIS — M50.30 DDD (DEGENERATIVE DISC DISEASE), CERVICAL: ICD-10-CM

## 2021-01-11 PROCEDURE — 72141 MRI NECK SPINE W/O DYE: CPT

## 2021-01-11 NOTE — PROGRESS NOTES
Phone: Gigi           Fax: [phone]                           Outpatient Physical Therapy                                                                            Daily Note    Patient: Thor Bustillo : 1940  CSN #: [id number]   Referring Practitioner:  Barb Morgan MD    Referral Date : 20     Date: 2021    Diagnosis: OA of spine, M47.892  Treatment Diagnosis: neck pain    Onset Date: 09/15/20  PT Insurance Information: Medicare  Total # of Visits Approved: 18 Per Physician Order  Total # of Visits to Date: 5  No Show: 0  Canceled Appointment: 0      Pre-Treatment Pain:  2/10  Subjective: Feeling much better today. States treatment last time helped quite a bit. Took neck brace off after she got home and has not had to put back on. Pain today rates about 2/10. Exercises:  Exercise 1: HEP: tband rows and ext  Exercise 2: yes/no 15x ea  Exercise 3: UT stretch 3x30  Exercise 5: pulleys - 6 mins  Exercise 6: tband - rows and ext - 15x green    Manual:  Manual traction: Gentle cervical traction, manual UT and LS stretches  Soft Tissue Mobalization: STM with heated thermoprobe in sitting x 8 mins    Modalities:  Moist heat: 15 mins to neck, right knee and LB in supine       Assessment  Assessment: Pt with significantly less pain behaviors this date compared to last session. Added tband ex this date and issued green tband for home program. Will continue to progress as pt tolerates. Activity Tolerance  Activity Tolerance: Patient Tolerated treatment well    Patient Education  Patient Education: purpose of new ex and progression of HEP  Pt verbalized/demonstrated good understanding:     [x] Yes         [] No, pt required further clarification.        Post Treatment Pain:  1/10      Plan  Times per week: 2  Plan weeks: 6      Goals  (Total # of Visits to Date: 5)      Short term goals  Time Frame for Short term goals: 3 weeks  Short term goal 1: Pt to be instructed in home program.-MET  Short term goal 2: Pt to begin gentle strengthening ex for improved postural alignment. -MET  Short term goal 3: Initiate manual techniques/modalities prn to decrease pain and edema and improve mobility. -MET    Long term goals  Time Frame for Long term goals : 6 weeks  Long term goal 1: Pt to report independence and compliance with home program.  Long term goal 2: Pt to report pain no greater than 2/10 in right side of neck during ADL's. Long term goal 3: Pt to have no complaints of pain with right Spurlings test.  Long term goal 4: Pt to have little to no tenderness with palpation to right c-spine region. Long term goal 5: Pt to achieve 45 degrees of bilateral c-spine rotation to assist with driving.     Minutes Tracking:  Time In: 0901  Time Out: 1004  Minutes: 63  Timed Code Treatment Minutes: [address] PT, DPT, CMPT      Date: 1/8/2021

## 2021-01-13 ENCOUNTER — HOSPITAL ENCOUNTER (OUTPATIENT)
Dept: PHYSICAL THERAPY | Age: 81
Setting detail: THERAPIES SERIES
Discharge: HOME | End: 2021-01-13
Payer: MEDICARE

## 2021-01-13 PROCEDURE — 97140 MANUAL THERAPY 1/> REGIONS: CPT

## 2021-01-13 PROCEDURE — 97110 THERAPEUTIC EXERCISES: CPT

## 2021-01-13 NOTE — PROGRESS NOTES
Phone: Gigi           Fax: [phone]                           Outpatient Physical Therapy                                                                            Daily Note    Patient: Manolo Condon : 1940  CSN #: [id number]   Referring Practitioner:  Marian Hurst MD    Referral Date : 20     Date: 2021    Diagnosis: OA of spine, M47.892  Treatment Diagnosis: neck pain    Onset Date: 09/15/20  PT Insurance Information: Medicare  Total # of Visits Approved: 18 Per Physician Order  Total # of Visits to Date: 6  No Show: 0  Canceled Appointment: 0      Pre-Treatment Pain:  3/10  Subjective: Not feeling too bad today. Doing ex at home with tband. Still some tightness right side. Had MRI yesterday and will see Dr tomorrow. Exercises:  Exercise 2: yes/no 15x ea  Exercise 3: UT stretch 3x30  Exercise 4: UBE retro 10 mins  Exercise 5: pulleys - 6 mins  Exercise 6: tband - rows and ext - 15x2 green    Manual:  Manual traction: Gentle cervical traction, manual UT and LS stretches  Soft Tissue Mobalization: STM with heated thermoprobe in sitting x 8 mins    Modalities:  Moist heat: 15 mins to neck, right knee and LB in supine       Assessment  Assessment: Min tenderness bilateral UT's this date. Mild discomfort at end range of cervical ext. Will continue to progress as pt tolerates. Activity Tolerance  Activity Tolerance: Patient Tolerated treatment well    Patient Education  Patient Education: continued UT stretching at home  Pt verbalized/demonstrated good understanding:     [x] Yes         [] No, pt required further clarification.        Post Treatment Pain:  2/10      Plan  Times per week: 2  Plan weeks: 6      Goals  (Total # of Visits to Date: 6)      Short term goals  Time Frame for Short term goals: 3 weeks  Short term goal 1: Pt to be instructed in home program.-MET  Short term goal 2: Pt to begin gentle strengthening ex for improved postural alignment. -MET  Short term goal 3: Initiate manual techniques/modalities prn to decrease pain and edema and improve mobility. -MET    Long term goals  Time Frame for Long term goals : 6 weeks  Long term goal 1: Pt to report independence and compliance with home program.  Long term goal 2: Pt to report pain no greater than 2/10 in right side of neck during ADL's. Long term goal 3: Pt to have no complaints of pain with right Spurlings test.  Long term goal 4: Pt to have little to no tenderness with palpation to right c-spine region. Long term goal 5: Pt to achieve 45 degrees of bilateral c-spine rotation to assist with driving.     Minutes Tracking:  Time In: 0831  Time Out: 0930  Minutes: 59  Timed Code Treatment Minutes: [address] , PT, DPT, CMPT      Date: 1/13/2021

## 2021-01-15 ENCOUNTER — HOSPITAL ENCOUNTER (OUTPATIENT)
Dept: PHYSICAL THERAPY | Age: 81
Setting detail: THERAPIES SERIES
Discharge: HOME | End: 2021-01-15
Payer: MEDICARE

## 2021-01-15 PROCEDURE — 97140 MANUAL THERAPY 1/> REGIONS: CPT

## 2021-01-15 PROCEDURE — 97110 THERAPEUTIC EXERCISES: CPT

## 2021-01-15 NOTE — PROGRESS NOTES
Phone: Gigi           Fax: [phone]                           Outpatient Physical Therapy                                                                            Daily Note    Patient: Anatoly Can : 1940  CSN #: [id number]   Referring Practitioner:  Sona Hayward MD    Referral Date : 20     Date: 1/15/2021    Diagnosis: OA of spine, M47.892  Treatment Diagnosis: neck pain    Onset Date: 09/15/20  PT Insurance Information: Medicare  Total # of Visits Approved: 18 Per Physician Order  Total # of Visits to Date: 7  No Show: 0  Canceled Appointment: 0      Pre-Treatment Pain:  2/10  Subjective: Pt reports her pain today is probably a 1-2/10. Pt states her R UT is still a little sore and giving her problems. Exercises:  Exercise 1: HEP: tband rows and ext  Exercise 2: yes/no 15x ea  Exercise 3: UT stretch 3x30  Exercise 4: UBE retro 10 mins  Exercise 5: pulleys - 6 mins  Exercise 6: tband - rows and ext - 15x2 green    Manual:  Manual traction: Gentle cervical traction, manual UT and LS stretches  Soft Tissue Mobalization: STM with heated thermoprobe in sitting x 8 mins    Modalities:  Moist heat: 15 mins to neck, right knee and LB in supine     Assessment  Assessment: Pt L cervical rot 30* compared to R which is 50*. Pt continues to report point tenderness on R UT. Activity Tolerance  Activity Tolerance: Patient Tolerated treatment well    Patient Education  Patient Education: Parminder Menchaca. Pt verbalized/demonstrated good understanding:     [x] Yes         [] No, pt required further clarification. Post Treatment Pain:  1/10    Plan  Times per week: 2  Plan weeks: 6    Goals  (Total # of Visits to Date: 7)      Short term goals  Time Frame for Short term goals: 3 weeks  Short term goal 1: Pt to be instructed in home program.-MET  Short term goal 2: Pt to begin gentle strengthening ex for improved postural alignment. -MET  Short term goal

## 2021-01-19 ENCOUNTER — APPOINTMENT (OUTPATIENT)
Dept: PHYSICAL THERAPY | Age: 81
End: 2021-01-19
Payer: MEDICARE

## 2021-01-20 ENCOUNTER — HOSPITAL ENCOUNTER (OUTPATIENT)
Dept: PHYSICAL THERAPY | Age: 81
Setting detail: THERAPIES SERIES
Discharge: HOME | End: 2021-01-20
Payer: MEDICARE

## 2021-01-20 PROCEDURE — 97140 MANUAL THERAPY 1/> REGIONS: CPT

## 2021-01-20 PROCEDURE — 97110 THERAPEUTIC EXERCISES: CPT

## 2021-01-20 NOTE — PROGRESS NOTES
Phone: Gigi           Fax: [phone]                           Outpatient Physical Therapy                                                                            Daily Note    Patient: Srinivas Jaquez : 1940  CSN #: [id number]   Referring Practitioner:  Edward Tabor MD    Referral Date : 20     Date: 2021    Diagnosis: OA of spine, M47.892  Treatment Diagnosis: neck pain    Onset Date: 09/15/20  PT Insurance Information: Medicare  Total # of Visits Approved: 18 Per Physician Order  Total # of Visits to Date: 8  No Show: 0  Canceled Appointment: 0      Pre-Treatment Pain:  2/10  Subjective: Patient reports her neck is not feeling too bad today. Reports compliance with HEP. Exercises:  Exercise 1: HEP: tband rows and ext  Exercise 2: yes/no 15x ea  Exercise 3: UT stretch 3x30  Exercise 4: UBE retro 10 mins  Exercise 5: pulleys - 6 mins  Exercise 6: tband - rows and ext - 15x2 green  Exercise 7: Shoulder flex/abd 10x ea way 1#    Manual:  Manual traction: Gentle cervical traction, manual UT and LS stretches  Soft Tissue Mobalization: STM with heated thermoprobe in sitting x 8 mins    Modalities:  Moist heat: 15 mins to neck, right knee and LB in supine       Assessment  Assessment: Added shoulder flex/abd with 1# to progress scapular and postural strength. Continued hot thermaprobe and manual techniques to decrease tone and pain. Will progress as able. Activity Tolerance  Activity Tolerance: Patient Tolerated treatment well    Patient Education  Exercise technique and progression     Pt verbalized/demonstrated good understanding:     [x] Yes         [] No, pt required further clarification.        Post Treatment Pain:  1/10      Plan  Times per week: 2  Plan weeks: 6      Goals  (Total # of Visits to Date: 8)      Short term goals  Time Frame for Short term goals: 3 weeks  Short term goal 1: Pt to be instructed in home program.-MET  Short term goal 2: Pt to begin gentle strengthening ex for improved postural alignment. -MET  Short term goal 3: Initiate manual techniques/modalities prn to decrease pain and edema and improve mobility. -MET    Long term goals  Time Frame for Long term goals : 6 weeks  Long term goal 1: Pt to report independence and compliance with home program.  Long term goal 2: Pt to report pain no greater than 2/10 in right side of neck during ADL's. Long term goal 3: Pt to have no complaints of pain with right Spurlings test.  Long term goal 4: Pt to have little to no tenderness with palpation to right c-spine region. Long term goal 5: Pt to achieve 45 degrees of bilateral c-spine rotation to assist with driving. -progressing.     Minutes Tracking:  Time In: 1101  Time Out: 1201  Minutes: 60  Timed Code Treatment Minutes: Alcides De La Cruz 40 , [place], DPT     Date: 1/20/2021

## 2021-01-21 ENCOUNTER — HOSPITAL ENCOUNTER (OUTPATIENT)
Dept: PHYSICAL THERAPY | Age: 81
Setting detail: THERAPIES SERIES
Discharge: HOME | End: 2021-01-21
Payer: MEDICARE

## 2021-01-21 PROCEDURE — 97140 MANUAL THERAPY 1/> REGIONS: CPT

## 2021-01-21 PROCEDURE — 97110 THERAPEUTIC EXERCISES: CPT

## 2021-01-22 ENCOUNTER — APPOINTMENT (OUTPATIENT)
Dept: PHYSICAL THERAPY | Age: 81
End: 2021-01-22
Payer: MEDICARE

## 2021-01-22 NOTE — PROGRESS NOTES
3: Initiate manual techniques/modalities prn to decrease pain and edema and improve mobility. -MET    Long term goals  Time Frame for Long term goals : 6 weeks  Long term goal 1: Pt to report independence and compliance with home program.  Long term goal 2: Pt to report pain no greater than 2/10 in right side of neck during ADL's. Long term goal 3: Pt to have no complaints of pain with right Spurlings test.  Long term goal 4: Pt to have little to no tenderness with palpation to right c-spine region. Long term goal 5: Pt to achieve 45 degrees of bilateral c-spine rotation to assist with driving. -progressing.     Minutes Tracking:  Time In: 4748  Time Out: 1046  Minutes: 44  Timed Code Treatment Minutes: MAHAD Trimble, DPT, CMPT      Date: 1/21/2021

## 2021-01-27 ENCOUNTER — HOSPITAL ENCOUNTER (OUTPATIENT)
Dept: PHYSICAL THERAPY | Age: 81
Setting detail: THERAPIES SERIES
Discharge: HOME | End: 2021-01-27
Payer: MEDICARE

## 2021-01-27 PROCEDURE — 97110 THERAPEUTIC EXERCISES: CPT

## 2021-01-27 PROCEDURE — 97140 MANUAL THERAPY 1/> REGIONS: CPT

## 2021-01-29 ENCOUNTER — HOSPITAL ENCOUNTER (OUTPATIENT)
Dept: PHYSICAL THERAPY | Age: 81
Setting detail: THERAPIES SERIES
Discharge: HOME | End: 2021-01-29
Payer: MEDICARE

## 2021-01-29 PROCEDURE — 97140 MANUAL THERAPY 1/> REGIONS: CPT

## 2021-01-29 PROCEDURE — 97110 THERAPEUTIC EXERCISES: CPT

## 2021-01-29 NOTE — PROGRESS NOTES
Phone: Gigi           Fax: [phone]                           Outpatient Physical Therapy                                                                            Daily Note    Patient: Lillie Shaikh : 1940  CSN #: [id number]   Referring Practitioner:  Natalie Harrington MD    Referral Date : 20     Date: 2021    Diagnosis: OA of spine, M47.892  Treatment Diagnosis: neck pain    Onset Date: 09/15/20  PT Insurance Information: Medicare  Total # of Visits Approved: 18 Per Physician Order  Total # of Visits to Date: 11  No Show: 0  Canceled Appointment: 0      Pre-Treatment Pain:  0/10  Subjective: Pt reports shes really doing good. Pt rates current pain a 0/10. Exercises:  Exercise 1: HEP: tband rows and ext  Exercise 2: yes/no 15x ea  Exercise 4: UBE retro 10 mins  Exercise 5: pulleys - 6 mins  Exercise 6: tband - rows and ext - 15x2 green  Exercise 7: Shoulder flex/abd 10x ea way 1#    Manual:  Manual traction: Gentle cervical traction, manual UT and LS stretches  Soft Tissue Mobalization: STM with heated thermoprobe in sitting x 6 mins    Modalities:  Moist heat: 15 mins to neck, right knee and LB in supine     Assessment  Assessment: CROM in sitting this date: 48 degrees right rot, 36 degrees left rotation. Pt reports 95% overall improvement in symptoms since begining therapy with Pt on most days a 0/10. No increased pain noted with R spurlings test.  Plan is to d/c to HEP at this date. Activity Tolerance  Activity Tolerance: Patient Tolerated treatment well    Patient Education  Patient Education: Reviewed HEP. Pt verbalized/demonstrated good understanding:     [x] Yes         [] No, pt required further clarification.      Post Treatment Pain:  0/10    Plan  Times per week: 2  Plan weeks: 6    Goals  (Total # of Visits to Date: 6)      Short term goals  Time Frame for Short term goals: 3 weeks  Short term goal 1: Pt to be instructed in home program.-MET  Short term goal 2: Pt to begin gentle strengthening ex for improved postural alignment. -MET  Short term goal 3: Initiate manual techniques/modalities prn to decrease pain and edema and improve mobility. -MET    Long term goals  Time Frame for Long term goals : 6 weeks  Long term goal 1: Pt to report independence and compliance with home program. -MET  Long term goal 2: Pt to report pain no greater than 2/10 in right side of neck during ADL's. -MET  Long term goal 3: Pt to have no complaints of pain with right Spurlings test. -  Long term goal 4: Pt to have little to no tenderness with palpation to right c-spine region. -MET  Long term goal 5: Pt to achieve 45 degrees of bilateral c-spine rotation to assist with driving. -progressing.     Minutes Tracking:  Time In: 0801  Time Out: 0902  Minutes: 61  Timed Code Treatment Minutes: [address], [place]       Date: 1/29/2021

## 2021-01-29 NOTE — PROGRESS NOTES
Phone: Gigi           Fax: [phone]                           Outpatient Physical Therapy                                                                            Daily Note    Patient: Maya Morfin : 1940  CSN #: [id number]   Referring Practitioner:  Robin Combs MD    Referral Date : 20     Date: 2021    Diagnosis: OA of spine, M47.892  Treatment Diagnosis: neck pain    Onset Date: 09/15/20  PT Insurance Information: Medicare  Total # of Visits Approved: 18 Per Physician Order  Total # of Visits to Date: 10  No Show: 0  Canceled Appointment: 0      Pre-Treatment Pain:  2/10  Subjective: Neck continues to do well. Pleased with progress so far. Exercises:  Exercise 2: yes/no 15x ea  Exercise 3: UT stretch 3x30  Exercise 4: UBE retro 10 mins  Exercise 5: pulleys - 6 mins  Exercise 6: tband - rows and ext - 15x2 green    Manual:  Manual traction: Gentle cervical traction, manual UT and LS stretches  Soft Tissue Mobalization: STM with heated thermoprobe in sitting x 8 mins    Modalities:  Moist heat: 15 mins to neck, right knee and LB in supine       Assessment  Assessment: Pt progressing well. Anticipate discharge following next session. Activity Tolerance  Activity Tolerance: Patient Tolerated treatment well    Patient Education  Patient Education: continue HEP  Pt verbalized/demonstrated good understanding:     [x] Yes         [] No, pt required further clarification. Post Treatment Pain:  2/10      Plan  Times per week: 2  Plan weeks: 6      Goals  (Total # of Visits to Date: 10)      Short term goals  Time Frame for Short term goals: 3 weeks  Short term goal 1: Pt to be instructed in home program.-MET  Short term goal 2: Pt to begin gentle strengthening ex for improved postural alignment. -MET  Short term goal 3: Initiate manual techniques/modalities prn to decrease pain and edema and improve mobility. -MET    Long term goals  Time Frame for Long term goals : 6 weeks  Long term goal 1: Pt to report independence and compliance with home program. -MET  Long term goal 2: Pt to report pain no greater than 2/10 in right side of neck during ADL's. -MET  Long term goal 3: Pt to have no complaints of pain with right Spurlings test. -  Long term goal 4: Pt to have little to no tenderness with palpation to right c-spine region. -MET  Long term goal 5: Pt to achieve 45 degrees of bilateral c-spine rotation to assist with driving. -progressing.     Minutes Tracking:  Time In: 1001  Time Out: 1059  Minutes: 58  Timed Code Treatment Minutes: 175 Marti Bynum PT, DPT, CMPT      Date: 1/27/2021

## 2021-02-01 NOTE — DISCHARGE SUMMARY
Phone: Gigi          Fax: [phone]                            Outpatient Physical Therapy                                                                    Discharge Summary    Patient: Anatoly Can  : 1940  CSN #: [id number]   Referring physician: No admitting provider for patient encounter. Referring Practitioner: Sona Hayward MD      Diagnosis: OA of spine, V49.116      Date Treatment Initiated: 2020  Date of Last Treatment: 2021      PT Visit Information  Onset Date: 09/15/20  PT Insurance Information: Medicare  Total # of Visits Approved: 18  Total # of Visits to Date: 11  Plan of Care/Certification Expiration Date: 21  No Show: 0  Canceled Appointment: 0      Frequency/Duration  2 times per week  6 weeks      Treatment Received  [x] HP/CP      [x] Electrical Stim   [x] Therapeutic Exercise      [] Gait Training  [] Aquatics   [x] Ultrasound         [x] Patient Education/HEP   [x] Manual Therapy  [] Traction    [] Neuro-blanca        [x] Soft Tissue Mobs            [] Home TENS  [] Iontophoresis    [] Orthotic casting/fitting      [] Dry Needling    Assessment  Assessment: Pt has compeleted 11 PT visits for neck pain. CROM is currently 48 degrees right rot and 36 degrees left rot. Pain is much improved and pt reporting compliance with home program. We will now discharge. Goals  Short term goals  Time Frame for Short term goals: 3 weeks  Short term goal 1: Pt to be instructed in home program.-MET  Short term goal 2: Pt to begin gentle strengthening ex for improved postural alignment. -MET  Short term goal 3: Initiate manual techniques/modalities prn to decrease pain and edema and improve mobility. -MET    Long term goals  Time Frame for Long term goals : 6 weeks  Long term goal 1: Pt to report independence and compliance with home program. -MET  Long term goal 2: Pt to report pain no greater than 2/10 in right side of neck during ADL's. -MET  Long term goal 3: Pt to have no complaints of pain with right Spurlings test. -  Long term goal 4: Pt to have little to no tenderness with palpation to right c-spine region. -MET  Long term goal 5: Pt to achieve 45 degrees of bilateral c-spine rotation to assist with driving. -progressing.       Reason for Discharge  [x] Goals Achieved                        []  Poor Follow Through/Attendance                  [x]  Optimal Function Achieved     []  Patient Discharged Self    []  Hospitalization                         []  Physician discharge      Thank you for this referral      Dedra Oscar PT, DPT, CMPT               Date: 1/29/2021

## 2021-07-07 ENCOUNTER — HOSPITAL ENCOUNTER (OUTPATIENT)
Dept: OCCUPATIONAL THERAPY | Age: 81
Setting detail: THERAPIES SERIES
Discharge: HOME | End: 2021-07-07
Payer: MEDICARE

## 2021-07-07 PROCEDURE — 97110 THERAPEUTIC EXERCISES: CPT

## 2021-07-07 PROCEDURE — 97166 OT EVAL MOD COMPLEX 45 MIN: CPT

## 2021-07-07 PROCEDURE — 97022 WHIRLPOOL THERAPY: CPT

## 2021-07-07 NOTE — PLAN OF CARE
Phone: [address]           Fax: [phone]                           OutpatientOccupational Therapy                                                                            Initial Evaluation      Name:  Dede Molina  Date: 7/7/2021  Referring Practitioner: Wolf Farr MD  Medical Diagnosis: M25.641 Stiffness of R hand  Rehab Diagnosis/ICD-10#s: Weakness, M62.81; Lack of coordination, R27.8; Stiffness R hand, M25.641  Insurance: OT Insurance Information: Gail Stacy / [facility]  Total # of Visits to Date: 1  Next  Appointment: August, patient to update on exact date  Chief Complaint: R hand pain and stiffness  [facility] #: [id number]  Onset of Injury/Condition: Onset Date: 06/01/21    Mechanism of Injury: unknown    Surgery Date: n/a    Precautions:   [x]None  [] Fall Risk  []WB Status  [] Pacemaker   []Other:            Involved Extremity:      [] Left [x] Right  Dominant: [] Left [x]Right    Work Status:   [] Normal [] Restricted [] Off D/T Injury/Condition [x] Retired [] Unemployed [] Disabled []Other:  Critical Job/Daily Tasks:     Orthosis:     [x] Currently has  [] To be custom fabricated  []Planned for subsequent visit  Type: Thumb spica; patient educated on wrist cock up vs. Thumb spica as it limits thumb use and could potentially cause issues down the road. States that she does take off several times throughout the day as well as nighttime. Wears for comfort. Subjective: Patient presents this date post ortho appt for increased R hand stiffness and swelling. Patient states that it just came on approx 4 weeks ago. Patient states that she is unable to close hand, pain reaches 5/10 at worst and only relief is when she holds her hand across her chest while lying down. Patient does state OA, obvious nodules at PIP and DIP jts.  Patient states that she did have x-ray, indicating severe OA in R wrist.    PMH: CA (cleared), OA, DM; hx back and neck surgery, eye surgery, B hip replacements, L foot surgery, LLF trigger finger release, CA surgery, and knee replacement.        Pain: Intensity: 5  /10 Location:   R hand  Pain Type: [x] Constant [] Intermittent   [  ] with pain meds at rest   [] With movement/Resistive activity [] With Sedentary activity      Objective:    RIGHT HAND Index Long Ring Small   MP 0-56 0-63 0-61 0-73   PIP 0-85 31-79 30-85 60-83   DIP 15-44 0-15 0-12 26-35   JIMENEZ 170 126 128 105     LEFT HAND Index Long Ring Small   MP 0-75 0-83 0-81 0-90   PIP 0-105 0-105 5-105 0-97   DIP 5-55 5-55 0-30 0-32   JIMENEZ 230 238 211 219     WRIST AROM Right Left   Wrist Flexion 35* 50   Wrist Extension 55 61   Ulnar Deviation 21 32   Radial Deviation 25 21   *pain c testing    THUMB ROM RIGHT LEFT   MP 35 35   IP 44 65   Souza AB 13.0 cm 12.5 cm   Radial AB 12.5 cm 14.5 cm     /PINCH STRENGTH  (measured in #) RIGHT LEFT    10 33   2 pt pinch 1 6   3 pt pinch 0 6   Key/lateral pinch 4 5       EDEMA (measured in cm) RIGHT LEFT   Long PIP 7.8 6.3   Ring PIP 6.8 6.1       Functional Impairment:  [] Mobility:    [] Current [] Goal [] Discharge  [x] Carry: [x] Current [x] Goal [] Discharge  [] Body Position:  [] Current [] Goal [] Discharge  [] Self Care:    [] Current [] Goal [] Discharge  [] Other:    [] Current [] Goal [] Discharge    Functional Impairment Current: Functional Impairment Goal:   [] 0%(CH)    [] 0%(CH)  [] 1-19%(CI)     [] 1-19%(CI)  [] 20-39%(CJ)    [x] 20-39%(CJ)  [x] 40-59%(CK)    [] 40-59%(CK)   [] 60-79%(CL)     [] 60-79%(CL)    [] 80-99%(CM)    [] 80-99%(CM)    [] 100%(CN)    [] 100%(CN)  [] NA  [] NA           Functional Impairment determined by:  [] Clinical Judgment   [x] Outcome Measure: DASH: 48.3% impairment        Patient Goals:    functional use of R hand again    Problems:     [x] Pain   [] Adherent Scar    [x] ROM  [x] Edema  [x] Strength  [] Open Wound  [x] Function  [x] Coordination               [] Sensation            [x] Falls: History/Risk of      Goals:   Short term goals  Time Frame for Short term goals: 4 weeks  Short term goal 1: Patient to state compliance and independence c HEP. Short term goal 2: Patient to improve JIMENEZ R digits 3-5 to >140 to improve functional use. Long term goals  Time Frame for Long term goals : 6 weeks  Long term goal 1: Patient to state <30% impairments throughout daily tasks, as measured by the DASH. Long term goal 2: Patient to improve JIMENEZ R digits >210 to improve ability to make full composite fist and improve functional use of hand throughout daily tasks. Long term goal 3: Patient to improve R wrist flexion to 45 and UD to 30 to improve ROM for functional use. Long term goal 4: Patient to improve R  to >20#, 2 pt to 4# and 3 pt to 4# to improve strength of R hand for improved functional use. Long term goal 5: Patient to demonstrate decreased edema PIP of RLF <7.0 cm and PIP of RRF to <6.5 cm. Treatment Potential: [x]Good []Fair []Poor    Comments/Assessment:  Patient presents this date with OT order for eval/tx for R hand stiffness, swelling and pain. Patient c noted nodules on PIP and DIP of B digits as well as increased swelling throughout digits, specifically RRF and RLF. Patient c decreased ROM and as well as strength throughout. Patient would benefit from OT services to address pain, stiffness and swelling to improve functional use of dominant hand and improve QOL. Treatment this date:  Fluidotherapy x15 minutes R hand for pain, swelling and stiffness. Mod agitation and heat. Patient tolerated well c skin intact pre and post treatment. AAROM/AROM R wrist and digits to improve ROM. Patient issued HEP c education. Cont to review.      Home Program Initiated:   [ x ] Written    [ x ] Verbal    [ x ] Demo   Comprehension of Education: [x] Yes [] No [] Needs Review  [x]Plans /[x] Goals, [x]Risks/ [x] Benefits discussed with [x] Patient []Family    ______________________________________________________________________    Plan of Care dates of services to include:  7/7/2021 to 08/18/21       Treatment Plan:  Frequency/Duration:   2 Times a week, for  6  Weeks. [x] Therapeutic Exercise 04100 [x] Electrical Stim [id number]    [x] Manual Therapy 64408                   [] Attended Electrical Stim [id number]  [x] Therapeutic Activities 46238          [x] Sharyon John Paul 93855  [x] Written Home Program                  [x] Hot Pack/Cold Pack 76540  [] Iontophoresis 29624  [x] Ultrasound 90047  [x]Neuro Re-Ed  36197  [x] Fluidotherapy/[place]irlpool 07092   [x] AROM                                          [x] Ortho Fit/Train 60556  [x] PROM/Stretching                     [x] Ortho Re-Fit/Check  12185  ______________________________________________________________________       HEP Weight/  Level Reps/Time Comments    Tendon Glides   Initiated as HEP    Putty   Initiated as HEP    Joint Blocks   Initiated as HEP    Contrast Baths   Initiated as HEP    Wrist ROM   Initiated as HEP            Electronically signed by MIKE Yates,7/7/2021 4:36 PM    Minutes Tracking:  Time In: 7461  Time Out: 0359  Minutes: 55  Timed Code Treatment Minutes: 53 Minutes    Medicare/Regulatory Requirements  We must have a signed plan of care statement by the physician for the current time frame of the prescription. [] I have reviewed this plan of care and certify the need for medically necessary services.     Physician Signature: _________________________ Date: _______________

## 2021-07-12 ENCOUNTER — HOSPITAL ENCOUNTER (OUTPATIENT)
Dept: OCCUPATIONAL THERAPY | Age: 81
Setting detail: THERAPIES SERIES
Discharge: HOME | End: 2021-07-12
Payer: MEDICARE

## 2021-07-12 PROCEDURE — 97035 APP MDLTY 1+ULTRASOUND EA 15: CPT

## 2021-07-12 PROCEDURE — 97022 WHIRLPOOL THERAPY: CPT

## 2021-07-12 PROCEDURE — 97140 MANUAL THERAPY 1/> REGIONS: CPT

## 2021-07-12 NOTE — PROGRESS NOTES
Phone: Gigi    Fax: [phone]                       Outpatient Occupational Therapy                 DAILY TREATMENT NOTE    Date: 7/12/2021  Patients Name:  Salvador Amor  YOB: 1940 (78 y.o.)  Gender:  female  MRN:  [id number]  [facility] #: [id number]  Medical Diagnosis: M25.641 Stiffness of R hand    Referring Practitioner: Aleks Cazares MD     INSURANCE  OT Insurance Information: [employer] / [employer]       Total # of Visits to Date: 2       PAIN  []No     [x]Yes      Location:  R hand  Pain Rating (0-10 pain scale): 5/10  Pain Description:     SUBJECTIVE   Patient states that she has completing HEP c exception of contrast baths. Flow Sheet  Exercise /   Manual treatment Weight/  Level Reps/Time Comments    Joint distractions and mobilizations  x x R wrist and all joints in digits to improve ROM for functional use    IASTM x 2' each Small beveled instrument on RLF, RRF for soft tissue mob for increased ROM    PROM x x R wrist and digits for increased ROM    Retrograde massage x x RLF, RRF for decrased swelling for improved ROM                                                                                                                       Modality Flow Sheet:  START STOP Tx Modality    15 minutes Fluidotherapy: 15 minutes R hand for pain and stiffness c mod heat and agitation. Patient tolerated well c skin intact pre and post treatment    12 minutes Ultrasound: _.8__ W/cm2 x _6__ mins  Duty factor: __100%  _x_50%  __20% __10%  Head size:   MHz: __1mHz __2 mHz  _x_3mHz  Location: R dorsal and medial/lateral PIP joints for pain. Patient tolerated well c skin intact pre and post treatment     Hot Pack:     Paraffin:     Cold Pack:     GOALS/ TREATMENT SESSION:     Time Frame for Long term goals : 6 weeks     Long term goal 1: Patient to state <30% impairments throughout daily tasks, as measured by the DASH.  Continue []Met  [x]Partially met  []Not met Long term goal 2: Patient to improve JIMENEZ R digits >210 to improve ability to make full composite fist and improve functional use of hand throughout daily tasks. Continue     []Met  [x]Partially met  []Not met   Long term goal 3: Patient to improve R wrist flexion to 45 and UD to 30 to improve ROM for functional use. Continue   []Met  [x]Partially met  []Not met   Long term goal 4: Patient to improve R  to >20#, 2 pt to 4# and 3 pt to 4# to improve strength of R hand for improved functional use. Continue   []Met  [x]Partially met  []Not met   Long term goal 5: Patient to demonstrate decreased edema PIP of RLF <7.0 cm and PIP of RRF to <6.5 cm. Continue  RLF: 7.4 cm  RRF: 6.5 cm   []Met  [x]Partially met  []Not met   Time Frame for Short term goals: 4 weeks     Short term goal 1: Patient to state compliance and independence c HEP. Continue   []Met  [x]Partially met  []Not met   Short term goal 2: Patient to improve JIMENEZ R digits 3-5 to >140 to improve functional use.  Continue   []Met  [x]Partially met  []Not met   [address] Education provided to patient/family/caregiver:    []Yes:     [x]No (Continued review of prior education)   If yes Education Provided:     Method of Education:     [x]Discussion     []Demonstration    [] Written     []Other  Evaluation of Patients Response to Education:         [x]Patient and or caregiver verbalized understanding  []Patient and or Caregiver Demonstrated without assistance   []Patient and or Caregiver Demonstrated with assistance  []Needs additional instruction to demonstrate understanding of education    ASSESSMENT  Patient tolerated todays treatment session:    [x] Good   []  Fair   []  Poor  Limitations/difficulties with treatment session due to:   []Pain     []Fatigue     []Other medical complications     []Other  Goal Assessment: [] No Change    [x]Improved      Therapists observations or comments: decreased edema at PIP jts R hand      Minutes Tracking:  Time In: 0930  Time Out: [address]  Minutes: 58  Timed Code Treatment Minutes: 55 Minutes    PLAN  [x]Continue with current plan of care  [][facility]  []IHold per patient request  [] Change Treatment plan:  [] Insurance hold  __ Other        Electronically signed by MIKE Villalpando,  7/12/2021 11:34 AM

## 2021-07-14 ENCOUNTER — HOSPITAL ENCOUNTER (OUTPATIENT)
Dept: OCCUPATIONAL THERAPY | Age: 81
Setting detail: THERAPIES SERIES
Discharge: HOME | End: 2021-07-14
Payer: MEDICARE

## 2021-07-14 PROCEDURE — 97022 WHIRLPOOL THERAPY: CPT

## 2021-07-14 PROCEDURE — 97140 MANUAL THERAPY 1/> REGIONS: CPT

## 2021-07-14 NOTE — PROGRESS NOTES
Phone: Gigi    Fax: [phone]                       Outpatient Occupational Therapy                 DAILY TREATMENT NOTE    Date: 7/14/2021  Patients Name:  Dontrell Chase  YOB: 1940 (78 y.o.)  Gender:  female  MRN:  [id number]  [facility] #: [id number]  Medical Diagnosis: M25.641 Stiffness of R hand    Referring Practitioner: Emory Costello MD     INSURANCE  OT Insurance Information: [employer] / [employer]       Total # of Visits to Date: 3       PAIN  []No     [x]Yes      Location: R hand  Pain Rating (0-10 pain scale): 3/10  Pain Description:     SUBJECTIVE   Patient reported that she likes the contrast baths. Flow Sheet  Exercise /   Manual treatment Weight/  Level Reps/Time Comments    Joint distractions and mobilizations  x x R wrist and all joints in digits to improve ROM for functional use    IASTM x x6' total Small beveled instrument on wrist, palm, RLF, RRF for soft tissue mob for increased ROM    PROM x x R wrist and digits for increased ROM    Retrograde massage x x RLF, RRF for decrased swelling for improved ROM   K-tape x x 1\" I strip cut x 3 placed on dorsal RLF, RRF surrounding PIP jt to dorsum of hand to assist c edema management and extension of PIP jts.                                                                                                                                                Modality Flow Sheet:  START STOP Tx Modality     15 minutes Fluidotherapy: 15 minutes R hand for pain and stiffness c mod heat and agitation. Patient tolerated well c skin intact pre and post treatment     8 minutes Ultrasound: __1.0_ W/cm2 x _8__ mins  Duty factor: _x_100%  __50%  __20% __10%  Head size:   MHz: __1mHz __2 mHz  _x_3mHz  Location: R dorsal and medial/lateral LF PIP joint for pain.  Patient tolerated well c skin intact pre and post treatment       Hot Pack:       Paraffin:       Cold Pack:      GOALS/ TREATMENT SESSION:      Time Frame for Long term goals : 6 weeks       Long term goal 1: Patient to state <30% impairments throughout daily tasks, as measured by the DASH. Continue []? Met  [x]? Partially met  []? Not met   Long term goal 2: Patient to improve JIMENEZ R digits >210 to improve ability to make full composite fist and improve functional use of hand throughout daily tasks. Continue       []? Met  [x]? Partially met  []? Not met   Long term goal 3: Patient to improve R wrist flexion to 45 and UD to 30 to improve ROM for functional use. Continue    []? Met  [x]? Partially met  []? Not met   Long term goal 4: Patient to improve R  to >20#, 2 pt to 4# and 3 pt to 4# to improve strength of R hand for improved functional use. Continue    []? Met  [x]? Partially met  []? Not met   Long term goal 5: Patient to demonstrate decreased edema PIP of RLF <7.0 cm and PIP of RRF to <6.5 cm. Continue      []? Met  [x]? Partially met  []? Not met   Time Frame for Short term goals: 4 weeks       Short term goal 1: Patient to state compliance and independence c HEP. Met - upgrade as patient progresses [x]? Met  []? Partially met  []? Not met   Short term goal 2: Patient to improve JIMENEZ R digits 3-5 to >140 to improve functional use. Met- see below    [x]? Met  []? Partially met  []? Not met   ADDITIONAL COMMENTS          RIGHT HAND Index Long Ring Small   MP 0-60 0-65 0-60 0-62   PIP 0-90 11-90 15-90 55-90   DIP 7-50 0-35 0-25 0-45   JIMENEZ 197 179 160 142       EDUCATION  New Education provided to patient/family/caregiver:    [x]Yes:     []No (Continued review of prior education)   If yes Education Provided: k-tape    Method of Education:     [x]Discussion     []Demonstration    [] Written     []Other  Evaluation of Patients Response to Education:         [x]Patient and or caregiver verbalized understanding  []Patient and or Caregiver Demonstrated without assistance   []Patient and or Caregiver Demonstrated with assistance  []Needs additional instruction to demonstrate understanding of education    ASSESSMENT  Patient tolerated todays treatment session:    [x] Good   []  Fair   []  Poor  Limitations/difficulties with treatment session due to:   []Pain     []Fatigue     []Other medical complications     []Other  Goal Assessment: [] No Change    [x]Improved      Therapists observations or comments: improved JIMENEZ of R digits     Minutes Tracking:  Time In: 4274  Time Out: 1120  Minutes: 52  Timed Code Treatment Minutes: 50 Minutes      PLAN  [x]Continue with current plan of care  [][facility]  []IHold per patient request  [] Change Treatment plan:  [] Insurance hold  __ Other        Electronically signed by JOVAN Jj/L,  7/14/2021 11:58 AM

## 2021-07-19 ENCOUNTER — HOSPITAL ENCOUNTER (OUTPATIENT)
Dept: OCCUPATIONAL THERAPY | Age: 81
Setting detail: THERAPIES SERIES
Discharge: HOME | End: 2021-07-19
Payer: MEDICARE

## 2021-07-19 PROCEDURE — 97110 THERAPEUTIC EXERCISES: CPT

## 2021-07-19 PROCEDURE — 97022 WHIRLPOOL THERAPY: CPT

## 2021-07-19 PROCEDURE — 97140 MANUAL THERAPY 1/> REGIONS: CPT

## 2021-07-19 PROCEDURE — 97035 APP MDLTY 1+ULTRASOUND EA 15: CPT

## 2021-07-19 NOTE — PROGRESS NOTES
Phone: Gigi    Fax: [phone]                       Outpatient Occupational Therapy                 DAILY TREATMENT NOTE    Date: 7/19/2021  Patients Name:  Dontrell Chase  YOB: 1940 (78 y.o.)  Gender:  female  MRN:  [id number]  [facility] #: [id number]  Medical Diagnosis: M25.641 Stiffness of R hand    Referring Practitioner: Emory Costello MD     INSURANCE  OT Insurance Information: [employer] / [employer]       Total # of Visits to Date: 4       PAIN  [x]No     []Yes      Location:   Pain Rating (0-10 pain scale):   Pain Description:     SUBJECTIVE   Reports more soreness. States that she really uses       Flow Sheet  Exercise /   Manual treatment Weight/  Level Reps/Time Comments    Joint distractions and mobilizations  x x R wrist and all joints in digits to improve ROM for functional use    IASTM x x4' total Small beveled instrument to RLF, RRF for soft tissue mob for increased ROM    PROM x x R wrist and digits for increased ROM    Retrograde massage x x RLF, RRF for decrased swelling for improved ROM   K-tape x x 1\" Y strip cut placed on dorsal and volar  RLF, RRF surrounding PIP jt to assist c edema management and extension of PIP jts.    Flexbar  Yellow  x20 Twist to improve strength    Power web Yellow  x20 MP flexion and extension to improve strength     Squeegie  Hot Dog x30 Squeezes to improve strength and ROM    Pennies x x Retrieval and placement as well as holding to improve digit ROM                                                                                                                       Modality Flow Sheet:  START STOP Tx Modality     15 minutes Fluidotherapy: 15 minutes R hand for pain and stiffness c mod heat and agitation.  Patient tolerated well c skin intact pre and post treatment     8 minutes Ultrasound: __1.0_ W/cm2 x _8__ mins  Duty factor: _x_100%  __50%  __20% __10%  Head size:   MHz: __1mHz __2 mHz  _x_3mHz  Location: R dorsal and medial/lateral LF PIP joint for pain. Patient tolerated well c skin intact pre and post treatment       Hot Pack:       Paraffin:       Cold Pack:      GOALS/ TREATMENT SESSION:      Time Frame for Long term goals : 6 weeks       Long term goal 1: Patient to state <30% impairments throughout daily tasks, as measured by the DASH. Continue []? ?Met  [x]? ?Partially met  []? ? Not met   Long term goal 2: Patient to improve JIMENEZ R digits >210 to improve ability to make full composite fist and improve functional use of hand throughout daily tasks. Continue       []? ?Met  [x]? ?Partially met  []? ? Not met   Long term goal 3: Patient to improve R wrist flexion to 45 and UD to 30 to improve ROM for functional use. Continue    []? ?Met  [x]? ?Partially met  []? ? Not met   Long term goal 4: Patient to improve R  to >20#, 2 pt to 4# and 3 pt to 4# to improve strength of R hand for improved functional use. Continue    []? ?Met  [x]? ?Partially met  []? ? Not met   Long term goal 5: Patient to demonstrate decreased edema PIP of RLF <7.0 cm and PIP of RRF to <6.5 cm. Continue   PIP RLF: 7.2       []? ?Met  [x]? ?Partially met  []? ? Not met   Time Frame for Short term goals: 4 weeks       Short term goal 1: Patient to state compliance and independence c HEP. Met - upgrade as patient progresses [x]? ? Met  []? ?Partially met  []? ? Not met   Short term goal 2: Patient to improve JIMENEZ R digits 3-5 to >140 to improve functional use. Me    [x]? ? Met  []? ?Partially met  []? ? Not met   ADDITIONAL COMMENTS            EDUCATION  New Education provided to patient/family/caregiver:    []Yes:     [x]No (Continued review of prior education)   If yes Education Provided:     Method of Education:     [x]Discussion     []Demonstration    [] Written     []Other  Evaluation of Patients Response to Education:         [x]Patient and or caregiver verbalized understanding  []Patient and or Caregiver Demonstrated without assistance   []Patient and or Caregiver Demonstrated with assistance  []Needs additional instruction to demonstrate understanding of education    ASSESSMENT  Patient tolerated todays treatment session:    [x] Good   []  Fair   []  Poor  Limitations/difficulties with treatment session due to:   []Pain     []Fatigue     []Other medical complications     []Other  Goal Assessment: [] No Change    [x]Improved      Minutes Tracking:  Time In: 1020  Time Out: 0060  Minutes: 58  Timed Code Treatment Minutes: 56 Minutes      PLAN  [x]Continue with current plan of care  [][facility]  []IHold per patient request  [] Change Treatment plan:  [] Insurance hold  __ Other        Electronically signed by JOVAN Trevino/L, 7/19/2021 11:28 AM

## 2021-07-21 ENCOUNTER — HOSPITAL ENCOUNTER (OUTPATIENT)
Dept: OCCUPATIONAL THERAPY | Age: 81
Setting detail: THERAPIES SERIES
Discharge: HOME | End: 2021-07-21
Payer: MEDICARE

## 2021-07-21 PROCEDURE — 97018 PARAFFIN BATH THERAPY: CPT

## 2021-07-21 PROCEDURE — 97140 MANUAL THERAPY 1/> REGIONS: CPT

## 2021-07-21 PROCEDURE — 97022 WHIRLPOOL THERAPY: CPT

## 2021-07-21 NOTE — PROGRESS NOTES
Phone: Gigi    Fax: [phone]                       Outpatient Occupational Therapy                 DAILY TREATMENT NOTE    Date: 7/21/2021  Patients Name:  Cornelia Thakur  YOB: 1940 (78 y.o.)  Gender:  female  MRN:  [id number]  [facility] #: [id number]  Medical Diagnosis: M25.641 Stiffness of R hand    Referring Practitioner: Bozena Truong MD     INSURANCE  OT Insurance Information: [facility] / [employer]       Total # of Visits to Date: 5       PAIN  [x]No     []Yes      Location:   Pain Rating (0-10 pain scale):   Pain Description:     SUBJECTIVE     States no pain, just achey. Flow Sheet  Exercise /   Manual treatment Weight/  Level Reps/Time Comments    Joint distractions and mobilizations  x x R wrist and all joints in digits to improve ROM for functional use    IASTM x x4' total Small beveled instrument to RLF, RRF for soft tissue mob for increased ROM    PROM x x R wrist and digits for increased ROM    Retrograde massage x x RLF, RRF for decrased swelling for improved ROM   K-tape   1\" Y strip cut placed on dorsal and volar  RLF, RRF surrounding PIP jt to assist c edema management and extension of PIP jts.    Flexbar  Yellow  x20 Twist to improve strength    Power web   MP flexion and extension to improve strength     Squeegie  Hot Dog x30 Squeezes to improve strength and ROM    Pennies   Retrieval and placement as well as holding to improve digit ROM    Digi flex green x25  to improve strength                        Co-ban x x Co-ban 1\" wrapped RLF distal to prox c good snug fit for edema management. Patient instructed to monitor for circulation c good understanding.                                                                               Modality Flow Sheet:  START STOP Tx Modality     15 minutes Fluidotherapy: 15 minutes R hand for pain and stiffness c mod heat and agitation.  Patient tolerated well c skin intact pre and post treatment Ultrasound: __1.0_ W/cm2 x _8__ mins  Duty factor: _x_100%  __50%  __20% __10%  Head size:   MHz: __1mHz __2 mHz  _x_3mHz  Location: R dorsal and medial/lateral LF PIP joint for pain. Patient tolerated well c skin intact pre and post treatment      10 minutes Hot Pack: R hand for pain/stiffness post ex. Patient tolerated well c skin intact pre and post treatment. Combined c paraffin.      10 minutes Paraffin: R hand for pain/stiffness post ex. Patient tolerated well c skin intact pre and post treatment. Combined c HP.       Cold Pack:      GOALS/ TREATMENT SESSION:      Time Frame for Long term goals : 6 weeks       Long term goal 1: Patient to state <30% impairments throughout daily tasks, as measured by the DASH. Continue []? ? ?Met  [x]? ?? Partially met  []? ? ? Not met   Long term goal 2: Patient to improve JIMENEZ R digits >210 to improve ability to make full composite fist and improve functional use of hand throughout daily tasks. Continue       []? ? ?Met  [x]? ?? Partially met  []? ? ? Not met   Long term goal 3: Patient to improve R wrist flexion to 45 and UD to 30 to improve ROM for functional use. Continue    []? ? ?Met  [x]? ?? Partially met  []? ? ? Not met   Long term goal 4: Patient to improve R  to >20#, 2 pt to 4# and 3 pt to 4# to improve strength of R hand for improved functional use. Continue    []? ? ?Met  [x]? ?? Partially met  []? ? ? Not met   Long term goal 5: Patient to demonstrate decreased edema PIP of RLF <7.0 cm and PIP of RRF to <6.5 cm. Continue          []? ? ?Met  [x]? ?? Partially met  []? ? ? Not met   Time Frame for Short term goals: 4 weeks       Short term goal 1: Patient to state compliance and independence c HEP. Met - upgrade as patient progresses [x]? ? ? Met  []? ??Partially met  []? ? ? Not met   Short term goal 2: Patient to improve JIMENEZ R digits 3-5 to >140 to improve functional use. Me    [x]? ? ? Met  []? ??Partially met  []? ? ? Not met   ADDITIONAL COMMENTS             EDUCATION  New Education provided to patient/family/caregiver:    [x]Yes:     []No (Continued review of prior education)   If yes Education Provided:   [place] and monitoring skin circulation  Method of Education:     [x]Discussion     []Demonstration    [] Written     []Other  Evaluation of Patients Response to Education:         [x]Patient and or caregiver verbalized understanding  []Patient and or Caregiver Demonstrated without assistance   []Patient and or Caregiver Demonstrated with assistance  []Needs additional instruction to demonstrate understanding of education    ASSESSMENT  Patient tolerated todays treatment session:    [x] Good   []  Fair   []  Poor  Limitations/difficulties with treatment session due to:   []Pain     []Fatigue     []Other medical complications     []Other  Goal Assessment: [] No Change    [x]Improved    Minutes Tracking:  Time In: 1030  Time Out: 1119  Minutes: 49  Timed Code Treatment Minutes: 47 Minutes        PLAN  [x]Continue with current plan of care  [][facility]  [][facility] per patient request  [] Change Treatment plan:  [] Insurance hold  __ Other        Electronically signed by MIKE Pimentel,  7/21/2021 12:05 PM

## 2021-07-26 ENCOUNTER — HOSPITAL ENCOUNTER (OUTPATIENT)
Dept: OCCUPATIONAL THERAPY | Age: 81
Setting detail: THERAPIES SERIES
Discharge: HOME | End: 2021-07-26
Payer: MEDICARE

## 2021-07-26 PROCEDURE — 97140 MANUAL THERAPY 1/> REGIONS: CPT

## 2021-07-26 PROCEDURE — 97035 APP MDLTY 1+ULTRASOUND EA 15: CPT

## 2021-07-26 PROCEDURE — 97022 WHIRLPOOL THERAPY: CPT

## 2021-07-26 NOTE — PROGRESS NOTES
minutes Ultrasound: __1.0_ W/cm2 x _8__ mins  Duty factor: _x_100%  __50%  __20% __10%  Head size:   MHz: __1mHz __2 mHz  _x_3mHz  Location: R dorsal and medial/lateral LF PIP joint for pain. Patient tolerated well c skin intact pre and post treatment      Hot Pack: R hand for pain/stiffness post ex. Patient tolerated well c skin intact pre and post treatment. Combined c paraffin.      Paraffin: R hand for pain/stiffness post ex. Patient tolerated well c skin intact pre and post treatment. Combined c HP.       Cold Pack:      GOALS/ TREATMENT SESSION:      Time Frame for Long term goals : 6 weeks       Long term goal 1: Patient to state <30% impairments throughout daily tasks, as measured by the DASH. Continue []????Met  [x]????Partially met  []????Not met   Long term goal 2: Patient to improve JIMENEZ R digits >210 to improve ability to make full composite fist and improve functional use of hand throughout daily tasks. Continue       []????Met  [x]????Partially met  []????Not met   Long term goal 3: Patient to improve R wrist flexion to 45 and UD to 30 to improve ROM for functional use. Continue    []????Met  [x]????Partially met  []????Not met   Long term goal 4: Patient to improve R  to >20#, 2 pt to 4# and 3 pt to 4# to improve strength of R hand for improved functional use. Continue    []????Met  [x]????Partially met  []????Not met   Long term goal 5: Patient to demonstrate decreased edema PIP of RLF <7.0 cm and PIP of RRF to <6.5 cm. Continue      7.3 PIP RLF     []????Met  [x]????Partially met  []????Not met   Time Frame for Short term goals: 4 weeks       Short term goal 1: Patient to state compliance and independence c HEP. Met - upgrade as patient progresses [x]????Met  []????Partially met  []????Not met   Short term goal 2: Patient to improve JIMENEZ R digits 3-5 to >140 to improve functional use. Met    [x]? ???Met  []????Partially met  []????Not met   ADDITIONAL COMMENTS              EDUCATION  New Education provided to patient/family/caregiver:    []Yes:     [x]No (Continued review of prior education)   If yes Education Provided:     Method of Education:     [x]Discussion     []Demonstration    [] Written     []Other  Evaluation of Patients Response to Education:         [x]Patient and or caregiver verbalized understanding  []Patient and or Caregiver Demonstrated without assistance   []Patient and or Caregiver Demonstrated with assistance  []Needs additional instruction to demonstrate understanding of education    ASSESSMENT  Patient tolerated todays treatment session:    [x] Good   []  Fair   []  Poor  Limitations/difficulties with treatment session due to:   []Pain     []Fatigue     []Other medical complications     []Other  Goal Assessment: [] No Change    [x]Improved        Minutes Tracking:  Time In: 0900  Time Out: 0945  Minutes: 45  Timed Code Treatment Minutes: 43 Minutes      PLAN  [x]Continue with current plan of care  [][facility]  []IHold per patient request  [] Change Treatment plan:  [] Insurance hold  __ Other        Electronically signed by MIKE Adan,  7/26/2021 1:06 PM

## 2021-07-28 ENCOUNTER — HOSPITAL ENCOUNTER (OUTPATIENT)
Dept: OCCUPATIONAL THERAPY | Age: 81
Setting detail: THERAPIES SERIES
Discharge: HOME | End: 2021-07-28
Payer: MEDICARE

## 2021-07-28 PROCEDURE — 97140 MANUAL THERAPY 1/> REGIONS: CPT

## 2021-07-28 PROCEDURE — 97035 APP MDLTY 1+ULTRASOUND EA 15: CPT

## 2021-07-28 PROCEDURE — 97018 PARAFFIN BATH THERAPY: CPT

## 2021-07-28 NOTE — PROGRESS NOTES
Phone: Gigi    Fax: [phone]                       Outpatient Occupational Therapy                 DAILY TREATMENT NOTE    Date: 7/28/2021  Patients Name:  Francine Steele  YOB: 1940 (78 y.o.)  Gender:  female  MRN:  [id number]  [facility] #: [id number]  Medical Diagnosis: M25.641 Stiffness of R hand    Referring Practitioner: Ruba Goodwin MD     INSURANCE  OT Insurance Information: [facility] [employer] / [employer]       Total # of Visits to Date: 7       PAIN  []No     [x]Yes      Location:  R hand  Pain Rating (0-10 pain scale): did not rate  Pain Description:     SUBJECTIVE   Patient states that she has been using her hand a lot. Patient states that the pain shooting up her forearm has subsided since starting therapy. Flow Sheet  Exercise /   Manual treatment Weight/  Level Reps/Time Comments    Joint distractions and mobilizations  x x R wrist and all joints in digits to improve ROM for functional use    IASTM   Small beveled instrument to RLF, RRF for soft tissue mob for increased ROM    PROM x x R wrist and digits for increased ROM    Retrograde massage x x RLF, RRF for decrased swelling for improved ROM   K-tape     1\" Y strip cut placed on dorsal and volar  RLF, RRF surrounding PIP jt to assist c edema management and extension of PIP jts.    Flexbar     Twist to improve strength    Power web     MP flexion and extension to improve strength     Squeegie     Squeezes to improve strength and ROM    Pennies     Retrieval and placement as well as holding to improve digit ROM    Digi flex      to improve strength                        Co-ban   Co-ban 1\" wrapped RLF distal to prox c good snug fit for edema management.  Patient instructed to monitor for circulation c good understanding.                                                                               Modality Flow Sheet:  START STOP Tx Modality     15 minutes Fluidotherapy: 15 minutes R hand for pain and stiffness c mod heat and agitation. Patient tolerated well c skin intact pre and post treatment      8 minutes Ultrasound: __1.2_ W/cm2 x _8__ mins  Duty factor: _x_100%  __50%  __20% __10%  Head size:   MHz: __1mHz __2 mHz  _x_3mHz  Location: R dorsal and medial/lateral LF PIP joint for pain. Patient tolerated well c skin intact pre and post treatment       Hot Pack: R hand for pain/stiffness post ex. Patient tolerated well c skin intact pre and post treatment. Combined c paraffin.       Paraffin: R hand for pain/stiffness post ex. Patient tolerated well c skin intact pre and post treatment. Combined c HP.       Cold Pack:      GOALS/ TREATMENT SESSION:      Time Frame for Long term goals : 6 weeks       Long term goal 1: Patient to state <30% impairments throughout daily tasks, as measured by the DASH. Continue []??? ? ?Met  [x]??? ? ? Partially met  []??? ??Not met   Long term goal 2: Patient to improve JIMENEZ R digits >210 to improve ability to make full composite fist and improve functional use of hand throughout daily tasks. Continue       []??? ? ?Met  [x]??? ? ? Partially met  []??? ??Not met   Long term goal 3: Patient to improve R wrist flexion to 45 and UD to 30 to improve ROM for functional use. Continue    []??? ? ?Met  [x]??? ? ? Partially met  []??? ??Not met   Long term goal 4: Patient to improve R  to >20#, 2 pt to 4# and 3 pt to 4# to improve strength of R hand for improved functional use. Continue    []??? ? ?Met  [x]??? ? ? Partially met  []??? ??Not met   Long term goal 5: Patient to demonstrate decreased edema PIP of RLF <7.0 cm and PIP of RRF to <6.5 cm. Continue      7.4 PIP RLF     []??? ? ?Met  [x]??? ? ? Partially met  []??? ??Not met   Time Frame for Short term goals: 4 weeks       Short term goal 1: Patient to state compliance and independence c HEP. Met - upgrade as patient progresses [x]??? ? ?Met  []??? ?? Partially met  []??? ??Not met   Short term goal 2: Patient to improve JIMENEZ R digits 3-5 to >140 to improve

## 2021-08-02 ENCOUNTER — HOSPITAL ENCOUNTER (OUTPATIENT)
Dept: OCCUPATIONAL THERAPY | Age: 81
Setting detail: THERAPIES SERIES
Discharge: HOME | End: 2021-08-02
Payer: MEDICARE

## 2021-08-02 PROCEDURE — 97110 THERAPEUTIC EXERCISES: CPT

## 2021-08-02 PROCEDURE — 97140 MANUAL THERAPY 1/> REGIONS: CPT

## 2021-08-02 PROCEDURE — 97022 WHIRLPOOL THERAPY: CPT

## 2021-08-02 NOTE — PROGRESS NOTES
Occupational Therapy  Phone: Gigi    Fax: [phone]                       Outpatient Occupational Therapy                 DAILY TREATMENT NOTE    Date: 8/2/2021  Patients Name:  Bob Sims  YOB: 1940 (78 y.o.)  Gender:  female  MRN:  [id number]  [facility] #: [id number]  Medical Diagnosis: M25.641 Stiffness of R hand    Referring Practitioner: Vy Oneal MD     INSURANCE  OT Insurance Information: [employer] / [employer]       Total # of Visits to Date: 8       PAIN  []No     []Yes      Location:   Pain Rating (0-10 pain scale):   Pain Description:     SUBJECTIVE   Pt c/o fatigue, \"Marleny been eating and drinking right and sleeping enough, I dont know why Im so tired. \" RLF PIP 7.1cm around, \"Its so swollen today\". Flow Sheet  Exercise /   Manual treatment Weight/  Level Reps/Time Comments    Joint distractions and mobilizations  x x R wrist and all joints in digits to improve ROM for functional use    IASTM     Small beveled instrument to RLF, RRF for soft tissue mob for increased ROM    PROM x x R wrist and digits for increased ROM    Retrograde massage x x RLF, RRF for decrased swelling for improved ROM   K-tape     1\" Y strip cut placed on dorsal and volar  RLF, RRF surrounding PIP jt to assist c edema management and extension of PIP jts.    Flexbar     Twist to improve strength    Power web  red  x20  MP flexion and extension to improve strength     Squeegie     Squeezes to improve strength and ROM    Pennies     Retrieval and placement as well as holding to improve digit ROM    Digi flex green  X 20   to improve strength                        Co-ban     Co-ban 1\" wrapped RLF distal to prox c good snug fit for edema management.  Patient instructed to monitor for circulation c good understanding.                                                                               Modality Flow Sheet:  START STOP Tx Modality     15 minutes Fluidotherapy: 15 minutes R hand for pain and stiffness c mod heat and agitation. Patient tolerated well c skin intact pre and post treatment     Ultrasound: __1.2_ W/cm2 x _8__ mins  Duty factor: _x_100%  __50%  __20% __10%  Head size:   MHz: __1mHz __2 mHz  _x_3mHz  Location: R dorsal and medial/lateral LF PIP joint for pain. Patient tolerated well c skin intact pre and post treatment       Hot Pack: R hand for pain/stiffness post ex. Patient tolerated well c skin intact pre and post treatment. Combined c paraffin.       Paraffin: R hand for pain/stiffness post ex. Patient tolerated well c skin intact pre and post treatment. Combined c HP.       Cold Pack:   GOALS/ TREATMENT SESSION:      Time Frame for Long term goals : 6 weeks       Long term goal 1: Patient to state <30% impairments throughout daily tasks, as measured by the DASH. Continue []???? ??Met  [x]???? ?? Partially met  []???? ?? Not met   Long term goal 2: Patient to improve JIMENEZ R digits >210 to improve ability to make full composite fist and improve functional use of hand throughout daily tasks. Continue       []???? ??Met  [x]???? ?? Partially met  []???? ?? Not met   Long term goal 3: Patient to improve R wrist flexion to 45 and UD to 30 to improve ROM for functional use. MET  Flex 47  UD 33  [x]???? ??Met  []???? ?? Partially met  []???? ?? Not met   Long term goal 4: Patient to improve R  to >20#, 2 pt to 4# and 3 pt to 4# to improve strength of R hand for improved functional use. MET   39# MET  2pt 11# MET  3pt 7# MET    [x]???? ??Met  []???? ?? Partially met  []???? ?? Not met   Long term goal 5: Patient to demonstrate decreased edema PIP of RLF <7.0 cm and PIP of RRF to <6.5 cm. Continue to monitor, pt reports swelling frequently     7.0 PIP RLF   6.5 PIP RRF    []???? ??Met  [x]???? ?? Partially met  []???? ?? Not met   Time Frame for Short term goals: 4 weeks       Short term goal 1: Patient to state compliance and independence c HEP.  Met - upgrade as patient progresses [x]?? ? ? ??Met  []???? ?? Partially met  []???? ?? Not met   Short term goal 2: Patient to improve JIMENEZ R digits 3-5 to >140 to improve functional use. Met    [x]???? ??Met  []???? ?? Partially met  []???? ?? Not met   ADDITIONAL COMMENTS              EDUCATION  New Education provided to patient/family/caregiver:    [x]Yes:     []No (Continued review of prior education)   If yes Education Provided: edema management, HEP review    Method of Education:     [x]Discussion     []Demonstration    [] Written     []Other  Evaluation of Patients Response to Education:         [x]Patient and or caregiver verbalized understanding  []Patient and or Caregiver Demonstrated without assistance   []Patient and or Caregiver Demonstrated with assistance  []Needs additional instruction to demonstrate understanding of education    ASSESSMENT  Patient tolerated todays treatment session:    [x] Good   []  Fair   []  Poor  Limitations/difficulties with treatment session due to:   []Pain     []Fatigue     []Other medical complications     []Other  Goal Assessment: [] No Change    [x]Improved      Therapists observations or comments: pt limited by PIP swelling of RLF RRF      Minutes Tracking:   IN: 9:01   OUT: 9:45      total tx time: 43 min    PLAN  [x]Continue with current plan of care  [][facility]  [][facility] per patient request  [] Change Treatment plan:  [] Insurance hold  __ Other        Electronically signed by RENATO Park COTA/L 8/2/2021 9:05 AM

## 2021-08-04 ENCOUNTER — HOSPITAL ENCOUNTER (OUTPATIENT)
Dept: OCCUPATIONAL THERAPY | Age: 81
Setting detail: THERAPIES SERIES
Discharge: HOME | End: 2021-08-04
Payer: MEDICARE

## 2021-08-04 PROCEDURE — 97140 MANUAL THERAPY 1/> REGIONS: CPT

## 2021-08-04 PROCEDURE — 97022 WHIRLPOOL THERAPY: CPT

## 2021-08-04 NOTE — PROGRESS NOTES
Occupational Therapy  . Phone: Gigi    Fax: [phone]                       Outpatient Occupational Therapy                 DAILY TREATMENT NOTE    Date: 8/4/2021  Patients Name:  Gladis Macias  YOB: 1940 (78 y.o.)  Gender:  female  MRN:  [id number]  [facility] #: [id number]  Medical Diagnosis: M25.641 Stiffness of R hand    Referring Practitioner: Jacqueline Hoffman MD     INSURANCE  OT Insurance Information: [employer] / [employer]       Total # of Visits to Date: 5       PAIN  [x]No     []Yes      Location:   Pain Rating (0-10 pain scale):   Pain Description: Pt describes \"prickly\" sensation in R hand, mostly RLF, \"Feels like I got it stuck in a thirn bush, happens once or twice a day\". SUBJECTIVE   \"I find myself dropping things today. \"    FLOW SHEET    Exercise /   Manual treatment Weight/  Level Reps/Time Comments    Joint distractions and mobilizations  x x R wrist and all joints in digits to improve ROM for functional use    IASTM     Small beveled instrument to RLF, RRF for soft tissue mob for increased ROM    PROM x x R wrist and digits for increased ROM    Retrograde massage x x RLF, RRF for decrased swelling for improved ROM   K-tape     1\" Y strip cut placed on dorsal and volar  RLF, RRF surrounding PIP jt to assist c edema management and extension of PIP jts.    Flexbar     Twist to improve strength    Power web   MP flexion and extension to improve strength     Squeegie   Squeezes to improve strength and ROM    Pennies   Retrieval and placement as well as holding to improve digit ROM    Digi flex    to improve strength                        Co-ban     Co-ban 1\" wrapped RLF distal to prox c good snug fit for edema management.  Patient instructed to monitor for circulation c good understanding.                                                       START STOP Tx Modality     15 minutes Fluidotherapy: 15 minutes R hand for pain and stiffness c mod heat and agitation. Patient tolerated well c skin intact pre and post treatment       Ultrasound: __1.2_ W/cm2 x _8__ mins  Duty factor: _x_100%  __50%  __20% __10%  Head size:   MHz: __1mHz __2 mHz  _x_3mHz  Location: R dorsal and medial/lateral LF PIP joint for pain. Patient tolerated well c skin intact pre and post treatment       Hot Pack: R hand for pain/stiffness post ex. Patient tolerated well c skin intact pre and post treatment. Combined c paraffin.       Paraffin: R hand for pain/stiffness post ex. Patient tolerated well c skin intact pre and post treatment. Combined c HP.       Cold Pack:       GOALS/ TREATMENT SESSION:      Time Frame for Long term goals : 6 weeks       Long term goal 1: Patient to state <30% impairments throughout daily tasks, as measured by the DASH. MET 27.5% this date [x]???????Met  []??????? Partially met  []????? ?? Not met   Long term goal 2: Patient to improve JIMENEZ R digits >210 to improve ability to make full composite fist and improve functional use of hand throughout daily tasks. Continue- see below  -improvements noted since last measurement on 7/14       []???????Met  [x]??????? Partially met  []????? ?? Not met   Long term goal 3: Patient to improve R wrist flexion to 45 and UD to 30 to improve ROM for functional use. MET [x]???????Met  []??????? Partially met  []????? ?? Not met   Long term goal 4: Patient to improve R  to >20#, 2 pt to 4# and 3 pt to 4# to improve strength of R hand for improved functional use. MET [x]???????Met  []??????? Partially met  []????? ?? Not met   Long term goal 5: Patient to demonstrate decreased edema PIP of RLF <7.0 cm and PIP of RRF to <6.5 cm. Continue- consistently measured as below last 2 visits     7.0 PIP RLF   6.5 PIP RRF    []???????Met  [x]??????? Partially met  []????? ?? Not met   Time Frame for Short term goals: 4 weeks       Short term goal 1: Patient to state compliance and independence c HEP.  Met - upgrade as patient progresses [x]???????Met  []??????? Partially met  []????? ?? Not met   Short term goal 2: Patient to improve JIMENEZ R digits 3-5 to >140 to improve functional use. Met    [x]???????Met  []??????? Partially met  []????? ?? Not met   ADDITIONAL COMMENTS            RIGHT HAND Index Long Ring Small   MP 68 67 67 86      38-86   DIP 36 41 32 37   JIMENEZ 205 195 185 177         EDUCATION  New Education provided to patient/family/caregiver:    []Yes:     [x]No (Continued review of prior education)   If yes Education Provided:     Method of Education:     [x]Discussion     [x]Demonstration    [] Written     []Other  Evaluation of Patients Response to Education:         [x]Patient and or caregiver verbalized understanding  []Patient and or Caregiver Demonstrated without assistance   []Patient and or Caregiver Demonstrated with assistance  []Needs additional instruction to demonstrate understanding of education    ASSESSMENT  Patient tolerated todays treatment session:    [x] Good   []  Fair   []  Poor  Limitations/difficulties with treatment session due to:   []Pain     []Fatigue     []Other medical complications     []Other  Goal Assessment: [] No Change    [x]Improved      Therapists observations or comments: noted improvements      Minutes Tracking:   IN: 8:53   OUT: 9:44      total tx time: 49 min    PLAN  [x]Continue with current plan of care  [][facility]  []IHold per patient request  [] Change Treatment plan:  [] Insurance hold  __ Other        Electronically signed by RENATO Armenta COTA/L 8/4/2021 8:53 AM

## 2021-08-09 ENCOUNTER — HOSPITAL ENCOUNTER (OUTPATIENT)
Dept: OCCUPATIONAL THERAPY | Age: 81
Setting detail: THERAPIES SERIES
Discharge: HOME | End: 2021-08-09
Payer: MEDICARE

## 2021-08-09 PROCEDURE — 97140 MANUAL THERAPY 1/> REGIONS: CPT

## 2021-08-09 PROCEDURE — 97022 WHIRLPOOL THERAPY: CPT

## 2021-08-09 PROCEDURE — 97035 APP MDLTY 1+ULTRASOUND EA 15: CPT

## 2021-08-09 NOTE — PROGRESS NOTES
Phone: Gigi    Fax: [phone]                       Outpatient Occupational Therapy                 DAILY TREATMENT NOTE    Date: 8/9/2021  Patients Name:  Abby Tabares  YOB: 1940 (78 y.o.)  Gender:  female  MRN:  [id number]  [facility] #: [id number]  Medical Diagnosis: M25.641 Stiffness of R hand    Referring Practitioner: Henrry Almeida MD     INSURANCE  OT Insurance Information: [employer] / [employer]       Total # of Visits to Date: 10       PAIN  [x]No     []Yes      Location:   Pain Rating (0-10 pain scale):   Pain Description:     SUBJECTIVE   Patient has f/u on thurs c ortho. PN sent. FLOW SHEET     Exercise /   Manual treatment Weight/  Level Reps/Time Comments    Joint distractions and mobilizations  x x R wrist and all joints in digits to improve ROM for functional use   Place and holds x x10 second hold x 5x To improve ROM    PROM x x R wrist and digits for increased ROM    Retrograde massage x x RLF, RRF for decrased swelling for improved ROM   K-tape     1\" Y strip cut placed on dorsal and volar  RLF, RRF surrounding PIP jt to assist c edema management and extension of PIP jts. Egg Red x20  and pinch to improve strength + ROM    Power web     MP flexion and extension to improve strength     Squeegie     Squeezes to improve strength and ROM    Pennies     Retrieval and placement as well as holding to improve digit ROM    Digi flex      to improve strength                        Co-ban     Co-ban 1\" wrapped RLF distal to prox c good snug fit for edema management. Patient instructed to monitor for circulation c good understanding.                                                                                     START STOP Tx Modality     15 minutes Fluidotherapy: 15 minutes R hand for pain and stiffness c mod heat and agitation.  Patient tolerated well c skin intact pre and post treatment      8 minutes Ultrasound: __1.2_ W/cm2 x _8__ mins  Duty factor: _x_100%  __50%  __20% __10%  Head size:   MHz: __1mHz __2 mHz  _x_3mHz  Location: R dorsal and medial/lateral LF PIP joint for pain. Patient tolerated well c skin intact pre and post treatment       Hot Pack: R hand for pain/stiffness post ex. Patient tolerated well c skin intact pre and post treatment. Combined c paraffin.       Paraffin: R hand for pain/stiffness post ex. Patient tolerated well c skin intact pre and post treatment. Combined c HP.       Cold Pack:         GOALS/ TREATMENT SESSION:      Time Frame for Long term goals : 6 weeks       Long term goal 1: Patient to state <30% impairments throughout daily tasks, as measured by the DASH. MET 27.5%  [x]????????Met  []???????? Partially met  []?????? ?? Not met   Long term goal 2: Patient to improve JIMENEZ R digits >210 to improve ability to make full composite fist and improve functional use of hand throughout daily tasks. Continue        []????????Met  [x]???????? Partially met  []?????? ?? Not met   Long term goal 3: Patient to improve R wrist flexion to 45 and UD to 30 to improve ROM for functional use. MET [x]????????Met  []???????? Partially met  []?????? ?? Not met   Long term goal 4: Patient to improve R  to >20#, 2 pt to 4# and 3 pt to 4# to improve strength of R hand for improved functional use. MET [x]????????Met  []???????? Partially met  []?????? ?? Not met   Long term goal 5: Patient to demonstrate decreased edema PIP of RLF <7.0 cm and PIP of RRF to <6.5 cm. Continue    []????????Met  [x]???????? Partially met  []?????? ?? Not met   Time Frame for Short term goals: 4 weeks       Short term goal 1: Patient to state compliance and independence c HEP. Met - upgrade as patient progresses [x]????????Met  []???????? Partially met  []?????? ?? Not met   Short term goal 2: Patient to improve JIMENEZ R digits 3-5 to >140 to improve functional use. Met    [x]????????Met  []???????? Partially met  []?????? ?? Not met   ADDITIONAL COMMENTS     EDUCATION  New Education provided to patient/family/caregiver:    []Yes:     [x]No (Continued review of prior education)   If yes Education Provided:     Method of Education:     [x]Discussion     []Demonstration    [] Written     []Other  Evaluation of Patients Response to Education:         [x]Patient and or caregiver verbalized understanding  []Patient and or Caregiver Demonstrated without assistance   []Patient and or Caregiver Demonstrated with assistance  []Needs additional instruction to demonstrate understanding of education    ASSESSMENT  Patient tolerated todays treatment session:    [x] Good   []  Fair   []  Poor  Limitations/difficulties with treatment session due to:   []Pain     []Fatigue     []Other medical complications     []Other  Goal Assessment: [] No Change    [x]Improved          Minutes Tracking:  Time In: 0901  Time Out: 7012  Minutes: 45  Timed Code Treatment Minutes: 44 Minutes        PLAN  [x]Continue with current plan of care  [][facility]  []IHold per patient request  [] Change Treatment plan:  [] Insurance hold  __ Other        Electronically signed by MIKE Alex,  8/9/2021 10:45 AM

## 2021-08-11 ENCOUNTER — APPOINTMENT (OUTPATIENT)
Dept: OCCUPATIONAL THERAPY | Age: 81
End: 2021-08-11
Payer: MEDICARE

## 2021-08-12 ENCOUNTER — HOSPITAL ENCOUNTER (OUTPATIENT)
Dept: OCCUPATIONAL THERAPY | Age: 81
Setting detail: THERAPIES SERIES
Discharge: HOME | End: 2021-08-12
Payer: MEDICARE

## 2021-08-12 PROCEDURE — 97140 MANUAL THERAPY 1/> REGIONS: CPT

## 2021-08-12 PROCEDURE — 97022 WHIRLPOOL THERAPY: CPT

## 2021-08-12 NOTE — PROGRESS NOTES
Phone: Gigi    Fax: [phone]                       Outpatient Occupational Therapy                 DAILY TREATMENT NOTE    Date: 8/12/2021  Patients Name:  Sy Valdes  YOB: 1940 (78 y.o.)  Gender:  female  MRN:  [id number]  [facility] #: [id number]  Medical Diagnosis: M25.641 Stiffness of R hand    Referring Practitioner: Esperanza Matos MD     INSURANCE  OT Insurance Information: [employer]       Total # of Visits to Date: 6       PAIN  [x]No     []Yes      Location:   Pain Rating (0-10 pain scale):   Pain Description:     SUBJECTIVE   Patient states that her f/u was actually for her knee this am, not her hand. States that Dr. Dami Hamilton will not see her until she has EMG. Plan to continue d/c this date c call back in 2 weeks d/t meeting most goals. FLOW SHEET     Exercise /   Manual treatment Weight/  Level Reps/Time Comments    Joint distractions and mobilizations  x x R wrist and all joints in digits to improve ROM for functional use   Place and holds x x10 second hold x 5x To improve ROM    PROM x x R wrist and digits for increased ROM    Retrograde massage x x RLF, RRF for decrased swelling for improved ROM   K-tape   1\" Y strip cut placed on dorsal and volar  RLF, RRF surrounding PIP jt to assist c edema management and extension of PIP jts. Egg    and pinch to improve strength + ROM    Power web     MP flexion and extension to improve strength     Squeegie     Squeezes to improve strength and ROM    Pennies     Retrieval and placement as well as holding to improve digit ROM    Digi flex      to improve strength                        Co-ban     Co-ban 1\" wrapped RLF distal to prox c good snug fit for edema management.  Patient instructed to monitor for circulation c good understanding.                                                                                     START STOP Tx Modality     15 minutes Fluidotherapy: 15 minutes R hand for pain and stiffness c mod heat and agitation. Patient tolerated well c skin intact pre and post treatment      Ultrasound: __1.2_ W/cm2 x _8__ mins  Duty factor: _x_100%  __50%  __20% __10%  Head size:   MHz: __1mHz __2 mHz  _x_3mHz  Location: R dorsal and medial/lateral LF PIP joint for pain. Patient tolerated well c skin intact pre and post treatment       Hot Pack: R hand for pain/stiffness post ex. Patient tolerated well c skin intact pre and post treatment. Combined c paraffin.       Paraffin: R hand for pain/stiffness post ex. Patient tolerated well c skin intact pre and post treatment. Combined c HP.       Cold Pack:          Goals:    Time Frame for Long term goals : 6 weeks       Long term goal 1: Patient to state <30% impairments throughout daily tasks, as measured by the DASH. MET 27.5%  [x]?????????Met  []????????? Partially met  []??????? ?? Not met   Long term goal 2: Patient to improve JIMENEZ R digits >210 to improve ability to make full composite fist and improve functional use of hand throughout daily tasks. Continue - Progressed since last measurement    See below        []?????????Met  [x]????????? Partially met  []??????? ?? Not met   Long term goal 3: Patient to improve R wrist flexion to 45 and UD to 30 to improve ROM for functional use. MET [x]?????????Met  []????????? Partially met  []??????? ?? Not met   Long term goal 4: Patient to improve R  to >20#, 2 pt to 4# and 3 pt to 4# to improve strength of R hand for improved functional use. MET [x]?????????Met  []????????? Partially met  []??????? ?? Not met   Long term goal 5: Patient to demonstrate decreased edema PIP of RLF <7.0 cm and PIP of RRF to <6.5 cm. Continue  RLF: 7.0  RRF: 6.4 - Met    []?????????Met  [x]????????? Partially met  []??????? ?? Not met   Time Frame for Short term goals: 4 weeks       Short term goal 1: Patient to state compliance and independence c HEP.  Met - upgrade as patient progresses [x]?????????Met  []????????? Partially met  []??????? ?? Not met   Short term goal 2: Patient to improve JIMENEZ R digits 3-5 to >140 to improve functional use. Met    [x]?????????Met  []????????? Partially met  []??????? ?? Not met   ADDITIONAL COMMENTS         RIGHT HAND Index Long Ring Small   MP 0-76 0-75 0-75 0-91   PIP 0-105   40-95   DIP 0-55 0-47 0-36 0-50   JIMENEZ 236 211 202 196          EDUCATION  New Education provided to patient/family/caregiver:    [x]Yes:     []No (Continued review of prior education)   If yes Education Provided:  Reviewed HEP and d/c recommendations    Method of Education:     [x]Discussion     []Demonstration    [] Written     []Other  Evaluation of Patients Response to Education:         [x]Patient and or caregiver verbalized understanding  []Patient and or Caregiver Demonstrated without assistance   []Patient and or Caregiver Demonstrated with assistance  []Needs additional instruction to demonstrate understanding of education    ASSESSMENT  Patient tolerated todays treatment session:    [x] Good   []  Fair   []  Poor  Limitations/difficulties with treatment session due to:   []Pain     []Fatigue     []Other medical complications     []Other  Goal Assessment: [] No Change    [x]Improved      Therapists observations or comments:  Improved JIMENEZ R digits      Minutes Tracking:  Time In: 8585  Time Out: 1033  Minutes: 43  Timed Code Treatment Minutes: 40 Minutes        PLAN  []Continue with current plan of care  [][facility]  [][facility] per patient request  [] Change Treatment plan:  [] Insurance hold  _x_ Other: call back in 2-3 weeks followed by d/c if no regression or issues        Electronically signed by MIKE Scanlon,  8/12/2021 10:50 AM

## 2021-11-12 ENCOUNTER — HOSPITAL ENCOUNTER (EMERGENCY)
Age: 81
Discharge: HOME | DRG: 177 | End: 2021-11-12
Payer: MEDICARE

## 2021-11-12 ENCOUNTER — APPOINTMENT (OUTPATIENT)
Dept: GENERAL RADIOLOGY | Age: 81
DRG: 177 | End: 2021-11-12
Payer: MEDICARE

## 2021-11-12 VITALS
HEART RATE: 94 BPM | HEIGHT: 64 IN | RESPIRATION RATE: 17 BRPM | OXYGEN SATURATION: 95 % | BODY MASS INDEX: 25.44 KG/M2 | SYSTOLIC BLOOD PRESSURE: 146 MMHG | DIASTOLIC BLOOD PRESSURE: 61 MMHG | WEIGHT: 149 LBS | TEMPERATURE: 100.9 F

## 2021-11-12 DIAGNOSIS — U07.1 COVID-19: Primary | ICD-10-CM

## 2021-11-12 LAB
ANION GAP SERPL CALCULATED.3IONS-SCNC: 16 MMOL/L (ref 9–17)
BNP INTERPRETATION: NORMAL
BUN BLDV-MCNC: 15 MG/DL (ref 8–23)
BUN/CREAT BLD: 28 (ref 9–20)
CALCIUM SERPL-MCNC: 8.3 MG/DL (ref 8.6–10.4)
CHLORIDE BLD-SCNC: 91 MMOL/L (ref 98–107)
CO2: 21 MMOL/L (ref 20–31)
CREAT SERPL-MCNC: 0.54 MG/DL (ref 0.5–0.9)
GFR AFRICAN AMERICAN: >60 ML/MIN
GFR NON-AFRICAN AMERICAN: >60 ML/MIN
GFR SERPL CREATININE-BSD FRML MDRD: ABNORMAL ML/MIN/{1.73_M2}
GFR SERPL CREATININE-BSD FRML MDRD: ABNORMAL ML/MIN/{1.73_M2}
GLUCOSE BLD-MCNC: 170 MG/DL (ref 70–99)
HCT VFR BLD CALC: 40.2 % (ref 36.3–47.1)
HEMOGLOBIN: 13.5 G/DL (ref 11.9–15.1)
MCH RBC QN AUTO: 30.9 PG (ref 25.2–33.5)
MCHC RBC AUTO-ENTMCNC: 33.6 G/DL (ref 28.4–34.8)
MCV RBC AUTO: 92 FL (ref 82.6–102.9)
NRBC AUTOMATED: 0 PER 100 WBC
PDW BLD-RTO: 12.2 % (ref 11.8–14.4)
PLATELET # BLD: 149 K/UL (ref 138–453)
PMV BLD AUTO: 10.9 FL (ref 8.1–13.5)
POTASSIUM SERPL-SCNC: 4.2 MMOL/L (ref 3.7–5.3)
PRO-BNP: 178 PG/ML
RBC # BLD: 4.37 M/UL (ref 3.95–5.11)
SARS-COV-2, RAPID: DETECTED
SODIUM BLD-SCNC: 128 MMOL/L (ref 135–144)
SPECIMEN DESCRIPTION: ABNORMAL
WBC # BLD: 7.2 K/UL (ref 3.5–11.3)

## 2021-11-12 PROCEDURE — 71045 X-RAY EXAM CHEST 1 VIEW: CPT

## 2021-11-12 PROCEDURE — 36415 COLL VENOUS BLD VENIPUNCTURE: CPT

## 2021-11-12 PROCEDURE — 93005 ELECTROCARDIOGRAM TRACING: CPT | Performed by: PHYSICIAN ASSISTANT

## 2021-11-12 PROCEDURE — 99283 EMERGENCY DEPT VISIT LOW MDM: CPT

## 2021-11-12 PROCEDURE — 85027 COMPLETE CBC AUTOMATED: CPT

## 2021-11-12 PROCEDURE — 87635 SARS-COV-2 COVID-19 AMP PRB: CPT

## 2021-11-12 PROCEDURE — 80048 BASIC METABOLIC PNL TOTAL CA: CPT

## 2021-11-12 PROCEDURE — 83880 ASSAY OF NATRIURETIC PEPTIDE: CPT

## 2021-11-12 ASSESSMENT — ENCOUNTER SYMPTOMS
COUGH: 0
NAUSEA: 0
SORE THROAT: 0
BLOOD IN STOOL: 0
EYE DISCHARGE: 0
ABDOMINAL PAIN: 0
DIARRHEA: 0
WHEEZING: 0
VOMITING: 0
SHORTNESS OF BREATH: 0
CONSTIPATION: 0
CHEST TIGHTNESS: 0
BACK PAIN: 0
EYE REDNESS: 0
RHINORRHEA: 0
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
## 2021-11-13 LAB
EKG ATRIAL RATE: 90 BPM
EKG P AXIS: 54 DEGREES
EKG P-R INTERVAL: 132 MS
EKG Q-T INTERVAL: 380 MS
EKG QRS DURATION: 90 MS
EKG QTC CALCULATION (BAZETT): 464 MS
EKG R AXIS: 45 DEGREES
EKG T AXIS: 52 DEGREES
EKG VENTRICULAR RATE: 90 BPM

## 2021-11-13 PROCEDURE — 93010 ELECTROCARDIOGRAM REPORT: CPT | Performed by: FAMILY MEDICINE
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
## 2021-11-13 NOTE — ED PROVIDER NOTES
[facility]  EMERGENCY DEPARTMENT ENCOUNTER      Pt Name: Marion Mcmillan  MRN: [id number]  [place] 1940  Date of evaluation: 11/12/2021  Provider: Vale Ashford [place] Diony     Chief Complaint   Patient presents with    Fatigue     only eaten 3 tangerines in 3 days    Fever     100.9 on arrival    Concern For COVID-19     family members in same house tested positive         HISTORY OF PRESENT ILLNESS   (Location/Symptom, Timing/Onset, Context/Setting,Quality, Duration, Modifying Factors, Severity)  Note limiting factors. Marion Mcmillan is a78 y.o. female who presents to the emergency department via EMS secondary to generalized fatigue over the past 2 days. Associated complaints include fever and body aches. Patient reports that she is exposed to Covid at home. Reports that she got mad at her family because they did not give her her food today. She states that she lives in their house and just did not feel like getting up to get food. She is not concerned for abuse. Patient reports she has been at the house she just does not felt well enough to get it. She states she is hungry and would like a sandwich here. She is not concern for abuse at home. She does not want anything reported. She denies chest pain shortness of breath palpitations. Denies any numbness or tingling sensation. She has no other complaints this time. HPI    Nursing Notes werereviewed. REVIEW OF SYSTEMS    (2-9 systems for level 4, 10 or more for level 5)     Review of Systems   Constitutional: Negative for chills, diaphoresis and fever. HENT: Negative for congestion, ear pain, rhinorrhea and sore throat. Eyes: Negative for discharge, redness and visual disturbance. Respiratory: Negative for cough, chest tightness, shortness of breath and wheezing. Cardiovascular: Negative for chest pain and palpitations.    Gastrointestinal: Negative for abdominal pain, blood in stool, constipation, diarrhea, nausea and vomiting. Endocrine: Negative for polydipsia, polyphagia and polyuria. Genitourinary: Negative for decreased urine volume, difficulty urinating, dysuria, frequency and hematuria. Musculoskeletal: Negative for arthralgias, back pain and myalgias. Skin: Negative for pallor and rash. Allergic/Immunologic: Negative for food allergies and immunocompromised state. Neurological: Positive for weakness. Negative for dizziness, syncope and light-headedness. Hematological: Negative for adenopathy. Does not bruise/bleed easily. Psychiatric/Behavioral: Negative for behavioral problems and suicidal ideas. The patient is not nervous/anxious. Except as noted above the remainder of the review of systems was reviewed and negative.        PAST MEDICAL HISTORY     Past Medical History:   Diagnosis Date    Achilles tendon rupture 2011    LEFT    Cancer [facility])     Chronic SI joint pain     left    Claustrophobia     Diabetes mellitus ([facility] [address] 2010    ON ORAL MED    Esophageal stricture 2011    PT HAD DILATATION    [place] (hard of hearing)     WEARS [place] HEARING AIDS    Hyperlipidemia 2012    Hypertension     PT HAD TILT TABLE TEST BP MED DISCONTINUED    Retinal detachment 10/2012    LEFT    Trauma to eye, left          SURGICALHISTORY       Past Surgical History:   Procedure Laterality Date    BLADDER SUSPENSION  1994    WITH HYSTERECTOMY    BLEPHAROPLASTY Left 5/3/2013    CATARACT REMOVAL Bilateral     with iol    CERVICAL DISC SURGERY  1984    cervical spine    COLECTOMY      ROBOTIC LOWER COLON RESECTION    ESOPHAGEAL DILATATION      EYE SURGERY Left 06/15/2013    REPAIR OF WOUND, WITH LENS REMOVAL     EYE SURGERY Bilateral 2008    BILAT CATARACT REMOVAL WITH IOL    EYE SURGERY Left ,7/13,12/12,6/13    vitrectomy    FINGER TRIGGER RELEASE Left 4/15/2019    FINGER TRIGGER RELEASE-MIDDLE FINGER performed by Obey Rico MD at [address] [place] [address] SURGERY Left 03/09/2018    Excision of Os Peroneum, Primary Repair of Peroneus Longus Tendon - Dr. Holly Sample Right 06/21/2016    MARY Dr. Dre Burks Right 08/10/2020    Dr. Garland Husbands    lower back    NC FOOT/TOES SURGERY PROC UNLISTED Left 3/9/2018    ACHILLES TENDON LENGTHENING REPAIR/ EXCISION OF OS PERONEUM,PRIMARY REPAIR OF PERONEUS LINGUS TENDON WITH POSSIBLE PERONEAL TENODESIS performed by Lauro Vance DPM at [address] 10/30/2019    ANAL PROCTO SIGMOIDOSCOPY FLEXIBLE with biopsies performed by Lorena Julien MD at [address]  10/30/2019    -sigmoid colon mass(invasive ulcerated adenocarcinoma)    TOTAL HIP ARTHROPLASTY Left 8/7/2017    HIP TOTAL ARTHROPLASTY performed by Slick Mustafa MD at [address] Right 8/10/2020    KNEE TOTAL ARTHROPLASTY performed by Slick Mustafa MD at [facility] dilation    VITRECTOMY Left 10/3/13         CURRENT MEDICATIONS       Previous Medications    ACETAMINOPHEN (TYLENOL) 325 MG TABLET    Take 2 tablets by mouth every 4 hours as needed for Pain    DOCUSATE SODIUM (COLACE) 100 MG CAPSULE    Take 100 mg by mouth 2 times daily    FIBER PO    Take by mouth daily    LATANOPROST (XALATAN) 0.005 % OPHTHALMIC SOLUTION    Place 1 drop into both eyes nightly    METFORMIN (GLUCOPHAGE) 500 MG TABLET    Take 500 mg by mouth 2 times daily (with meals)    POLYVINYL ALCOHOL-POVIDONE (TEARS PLUS OP)    Apply 1 drop to eye 3 times daily    RIVAROXABAN (XARELTO) 10 MG TABS TABLET    Take 1 tablet by mouth daily         ALLERGIES   Aleve [naproxen sodium], Aspirin, Codeine, Nsaids, Align [lactase-lactobacillus], Amitriptyline, Gabapentin, Lidocaine, Lyrica [pregabalin], Omnipaque [iohexol], Other, Tetracaine, and Iodine    FAMILY HISTORY       Family History   Problem Relation Age of Onset    Heart Disease Mother         CARDIAC MASS    Heart Disease Father     Cancer Paternal Grandfather     Cancer Other         both families, several family members          SOCIAL HISTORY       Social History     Socioeconomic History    Marital status:      Spouse name: None    Number of children: None    Years of education: None    Highest education level: None   Occupational History    None   Tobacco Use    Smoking status: Never Smoker    Smokeless tobacco: Never Used   Vaping Use    Vaping Use: Never used   Substance and Sexual Activity    Alcohol use: No    Drug use: No    Sexual activity: None   Other Topics Concern    None   Social History Narrative    None     Social Determinants of Health     Financial Resource Strain:     Difficulty of Paying Living Expenses: Not on file   Food Insecurity:     Worried About Running Out of Food in the Last Year: Not on file    Joy of Food in the Last Year: Not on file   Transportation Needs:     Lack of Transportation (Medical): Not on file    Lack of Transportation (Non-Medical):  Not on file   Physical Activity:     Days of Exercise per Week: Not on file    Minutes of Exercise per Session: Not on file   Stress:     Feeling of Stress : Not on file   Social Connections:     Frequency of Communication with Friends and Family: Not on file    Frequency of Social Gatherings with Friends and Family: Not on file    Attends Cheondoism Services: Not on file    Active Member of Clubs or Organizations: Not on file    Attends Club or Organization Meetings: Not on file    Marital Status: Not on file   Intimate Partner Violence:     Fear of Current or Ex-Partner: Not on file    Emotionally Abused: Not on file    Physically Abused: Not on file    Sexually Abused: Not on file   Housing Stability:     Unable to Pay for Housing in the Last Year: Not on file    Number of [place] in the Last Year: Not on file    Unstable Housing in the Last Year: Not on file       SCREENINGS             PHYSICAL EXAM    (up to 7 for level 4, 8 or more for level 5)     ED Triage Vitals [11/12/21 1745]   BP Temp Temp src Pulse Resp SpO2 Height Weight   (!) 139/102 100.9 °F (38.3 °C) -- 92 20 93 % 5' 4\" (1.626 m) 149 lb (67.6 kg)       Physical Exam  Vitals and nursing note reviewed. Constitutional:       General: She is not in acute distress. Appearance: Normal appearance. She is well-developed. She is not ill-appearing, toxic-appearing or diaphoretic. HENT:      Head: Normocephalic and atraumatic. Right Ear: External ear normal.      Left Ear: External ear normal.      Nose: Congestion present. Mouth/Throat:      Mouth: Mucous membranes are moist.      Pharynx: No oropharyngeal exudate. Eyes:      General: No scleral icterus. Right eye: No discharge. Left eye: No discharge. Conjunctiva/sclera: Conjunctivae normal.      Pupils: Pupils are equal, round, and reactive to light. Neck:      Thyroid: No thyromegaly. Trachea: No tracheal deviation. Cardiovascular:      Rate and Rhythm: Normal rate and regular rhythm. Heart sounds: No friction rub. No gallop. Pulmonary:      Effort: Pulmonary effort is normal. No respiratory distress. Breath sounds: Normal breath sounds. No stridor. No wheezing, rhonchi or rales. Abdominal:      General: Bowel sounds are normal. There is no distension. Palpations: Abdomen is soft. Tenderness: There is no abdominal tenderness. There is no right CVA tenderness, left CVA tenderness, guarding or rebound. Musculoskeletal:         General: No tenderness or deformity. Normal range of motion. Cervical back: Normal range of motion and neck supple. Lymphadenopathy:      Cervical: No cervical adenopathy. Skin:     General: Skin is warm and dry. Capillary Refill: Capillary refill takes less than 2 seconds.       Findings: No erythema or rash.   Neurological:      General: No focal deficit present. Mental Status: She is alert and oriented to person, place, and time. Cranial Nerves: No cranial nerve deficit. Motor: No abnormal muscle tone. Deep Tendon Reflexes: Reflexes are normal and symmetric. Reflexes normal.   Psychiatric:         Mood and Affect: Mood normal.         Behavior: Behavior normal.         DIAGNOSTIC RESULTS     EKG: All EKG's are interpreted by the Emergency Department Physician who either signs orCo-signs this chart in the absence of a cardiologist.      RADIOLOGY:   Non-plainfilm images such as CT, Ultrasound and MRI are read by the radiologist. Plain radiographic images are visualized and preliminarily interpreted by the emergency physician with the below findings:      Interpretationper the Radiologist below, if available at the time of this note:    XR CHEST PORTABLE   Final Result   Diffuse prominence of the pulmonary interstitium. This is nonspecific but   can be seen in atypical/viral pneumonia. Correlate clinically               ED BEDSIDE ULTRASOUND:   Performed by ED Physician - none    LABS:  Labs Reviewed   COVID-19, RAPID - Abnormal; Notable for the following components:       Result Value    SARS-CoV-2, Rapid DETECTED (*)     All other components within normal limits   BASIC METABOLIC PANEL - Abnormal; Notable for the following components:    Glucose 170 (*)     Bun/Cre Ratio 28 (*)     Calcium 8.3 (*)     Sodium 128 (*)     Chloride 91 (*)     All other components within normal limits   CBC   BRAIN NATRIURETIC PEPTIDE       All other labs were within normal range or not returned as of this dictation.     EMERGENCY DEPARTMENT COURSE and DIFFERENTIAL DIAGNOSIS/MDM:   Vitals:    Vitals:    11/12/21 1815 11/12/21 1830 11/12/21 1845 11/12/21 1900   BP: 116/70 (!) 140/67 (!) 126/48 (!) 146/73   Pulse:  87 87 89   Resp:  14 20 15   Temp:       SpO2: 92% 94% 94% 95%   Weight:       Height: MDM  75-year-old female who presents secondary to having a cough and feeling just weak. She reports people at home are positive for Covid. She is not hypoxic she is not tachypneic she denies any chest pain or or shortness of breath. Reports she is hungry and her family did not feed her today. At this point she is in no distress. Will screen for Covid. Will get work-up to include relative acute ACS versus PE versus COVID-19 versus pneumonia versus infection dehydration electrolyte imbalance. Mild hyponatremia at the walk-in to see patient she is eating a tuna sandwich and smiling. At this point, the evidence for any other entities in the differential is insufficient to justify any further testing. This was extended the patient. The patient was advised that persistent or worsening symptoms would require further evaluation. Patient is resting comfortably at this time and in no distress. I have updated patient on current results. We discussed at length the patient's diagnosis. Patient understands to follow up with primary care provider in the next 2 days. Patient verbalized understanding of this. We went over medications. Strict return warnings were given. Patient will return to the emergency room as needed with new or worsening complaints. She is able to ambulate. She is actively eating and drinking.         Procedures    FINAL IMPRESSION      1. COVID-19        DISPOSITION/PLAN   DISPOSITION Decision To Discharge 11/12/2021 07:15:42 PM      PATIENT REFERRED TO:  [facility] ED  [address]  [phone]    If symptoms worsen, As needed    Cruz Rivera [address] [place] [address] 53-29-14-27    Schedule an appointment as soon as possible for a visit in 1 day        DISCHARGE MEDICATIONS:  New Prescriptions    BUDESONIDE (PULMICORT) 180 MCG/ACT AEPB INHALER    Inhale 2 puffs into the lungs 2 times daily Summation      Patient Course:      ED Medications administered this visit:  Medications - No data to display    New Prescriptions from this visit:    New Prescriptions    BUDESONIDE (PULMICORT) 180 MCG/ACT AEPB INHALER    Inhale 2 puffs into the lungs 2 times daily       Follow-up:  [facility] ED  [address]  [address] [phone]    If symptoms worsen, As needed    Cruz Segura [address] [place] [address] 53-29-14-27    Schedule an appointment as soon as possible for a visit in 1 day          Final Impression:   1.  COVID-19               (Please note that portions of this note were completed with a voice recognition program.  Efforts were made to edit the dictations but occasionally words are mis-transcribed.)           Torsten Bales PA-C  11/12/21 1923

## 2021-11-15 ENCOUNTER — APPOINTMENT (OUTPATIENT)
Dept: GENERAL RADIOLOGY | Age: 81
DRG: 177 | End: 2021-11-15
Payer: MEDICARE

## 2021-11-15 ENCOUNTER — HOSPITAL ENCOUNTER (INPATIENT)
Age: 81
LOS: 10 days | Discharge: HOME | DRG: 177 | End: 2021-11-25
Attending: INTERNAL MEDICINE | Admitting: INTERNAL MEDICINE
Payer: MEDICARE

## 2021-11-15 ENCOUNTER — APPOINTMENT (OUTPATIENT)
Dept: CT IMAGING | Age: 81
DRG: 177 | End: 2021-11-15
Payer: MEDICARE

## 2021-11-15 ENCOUNTER — CARE COORDINATION (OUTPATIENT)
Dept: CARE COORDINATION | Age: 81
End: 2021-11-15

## 2021-11-15 DIAGNOSIS — U07.1 COVID-19 VIRUS INFECTION: ICD-10-CM

## 2021-11-15 DIAGNOSIS — J12.82 PNEUMONIA DUE TO COVID-19 VIRUS: ICD-10-CM

## 2021-11-15 DIAGNOSIS — R09.02 HYPOXIA: Primary | ICD-10-CM

## 2021-11-15 DIAGNOSIS — J96.01 ACUTE RESPIRATORY FAILURE WITH HYPOXIA (HCC): ICD-10-CM

## 2021-11-15 DIAGNOSIS — U07.1 PNEUMONIA DUE TO COVID-19 VIRUS: ICD-10-CM

## 2021-11-15 LAB
ABSOLUTE EOS #: 0.14 K/UL (ref 0–0.44)
ABSOLUTE IMMATURE GRANULOCYTE: 0 K/UL (ref 0–0.3)
ABSOLUTE LYMPH #: 0.72 K/UL (ref 1.1–3.7)
ABSOLUTE MONO #: 0.29 K/UL (ref 0.1–1.2)
ALBUMIN SERPL-MCNC: 3.6 G/DL (ref 3.5–5.2)
ALBUMIN/GLOBULIN RATIO: 0.9 (ref 1–2.5)
ALP BLD-CCNC: 62 U/L (ref 35–104)
ALT SERPL-CCNC: 16 U/L (ref 5–33)
ANION GAP SERPL CALCULATED.3IONS-SCNC: 14 MMOL/L (ref 9–17)
AST SERPL-CCNC: 28 U/L
BASOPHILS # BLD: 0 % (ref 0–2)
BASOPHILS ABSOLUTE: 0 K/UL (ref 0–0.2)
BILIRUB SERPL-MCNC: 0.64 MG/DL (ref 0.3–1.2)
BNP INTERPRETATION: NORMAL
BUN BLDV-MCNC: 11 MG/DL (ref 8–23)
BUN/CREAT BLD: 20 (ref 9–20)
CALCIUM SERPL-MCNC: 8.4 MG/DL (ref 8.6–10.4)
CHLORIDE BLD-SCNC: 90 MMOL/L (ref 98–107)
CO2: 24 MMOL/L (ref 20–31)
CREAT SERPL-MCNC: 0.56 MG/DL (ref 0.5–0.9)
DIFFERENTIAL TYPE: ABNORMAL
EKG ATRIAL RATE: 91 BPM
EKG P AXIS: 54 DEGREES
EKG P-R INTERVAL: 128 MS
EKG Q-T INTERVAL: 330 MS
EKG QRS DURATION: 86 MS
EKG QTC CALCULATION (BAZETT): 405 MS
EKG R AXIS: 36 DEGREES
EKG T AXIS: 41 DEGREES
EKG VENTRICULAR RATE: 91 BPM
EOSINOPHILS RELATIVE PERCENT: 2 % (ref 1–4)
GFR AFRICAN AMERICAN: >60 ML/MIN
GFR NON-AFRICAN AMERICAN: >60 ML/MIN
GFR SERPL CREATININE-BSD FRML MDRD: ABNORMAL ML/MIN/{1.73_M2}
GFR SERPL CREATININE-BSD FRML MDRD: ABNORMAL ML/MIN/{1.73_M2}
GLUCOSE BLD-MCNC: 156 MG/DL (ref 70–99)
GLUCOSE BLD-MCNC: 225 MG/DL (ref 74–100)
HCT VFR BLD CALC: 39.2 % (ref 36.3–47.1)
HEMOGLOBIN: 13.2 G/DL (ref 11.9–15.1)
IMMATURE GRANULOCYTES: 0 %
LACTIC ACID: 1.7 MMOL/L (ref 0.5–2.2)
LYMPHOCYTES # BLD: 10 % (ref 24–43)
MCH RBC QN AUTO: 30.6 PG (ref 25.2–33.5)
MCHC RBC AUTO-ENTMCNC: 33.7 G/DL (ref 28.4–34.8)
MCV RBC AUTO: 91 FL (ref 82.6–102.9)
MONOCYTES # BLD: 4 % (ref 3–12)
MORPHOLOGY: NORMAL
NRBC AUTOMATED: 0 PER 100 WBC
PDW BLD-RTO: 11.9 % (ref 11.8–14.4)
PLATELET # BLD: 173 K/UL (ref 138–453)
PLATELET ESTIMATE: ABNORMAL
PMV BLD AUTO: 11.3 FL (ref 8.1–13.5)
POTASSIUM SERPL-SCNC: 3.9 MMOL/L (ref 3.7–5.3)
PRO-BNP: 284 PG/ML
RBC # BLD: 4.31 M/UL (ref 3.95–5.11)
RBC # BLD: ABNORMAL 10*6/UL
SEG NEUTROPHILS: 84 % (ref 36–65)
SEGMENTED NEUTROPHILS ABSOLUTE COUNT: 6.05 K/UL (ref 1.5–8.1)
SODIUM BLD-SCNC: 128 MMOL/L (ref 135–144)
TOTAL PROTEIN: 7.7 G/DL (ref 6.4–8.3)
TROPONIN INTERP: NORMAL
TROPONIN T: NORMAL NG/ML
TROPONIN, HIGH SENSITIVITY: 7 NG/L (ref 0–14)
WBC # BLD: 7.2 K/UL (ref 3.5–11.3)
WBC # BLD: ABNORMAL 10*3/UL

## 2021-11-15 PROCEDURE — 2580000003 HC RX 258

## 2021-11-15 PROCEDURE — 83605 ASSAY OF LACTIC ACID: CPT

## 2021-11-15 PROCEDURE — 6370000000 HC RX 637 (ALT 250 FOR IP): Performed by: NURSE PRACTITIONER

## 2021-11-15 PROCEDURE — 96361 HYDRATE IV INFUSION ADD-ON: CPT

## 2021-11-15 PROCEDURE — 99285 EMERGENCY DEPT VISIT HI MDM: CPT

## 2021-11-15 PROCEDURE — 96374 THER/PROPH/DIAG INJ IV PUSH: CPT

## 2021-11-15 PROCEDURE — 2500000003 HC RX 250 WO HCPCS

## 2021-11-15 PROCEDURE — 87205 SMEAR GRAM STAIN: CPT

## 2021-11-15 PROCEDURE — 87150 DNA/RNA AMPLIFIED PROBE: CPT

## 2021-11-15 PROCEDURE — 6360000002 HC RX W HCPCS: Performed by: NURSE PRACTITIONER

## 2021-11-15 PROCEDURE — 6360000002 HC RX W HCPCS: Performed by: PHYSICIAN ASSISTANT

## 2021-11-15 PROCEDURE — 83880 ASSAY OF NATRIURETIC PEPTIDE: CPT

## 2021-11-15 PROCEDURE — 36415 COLL VENOUS BLD VENIPUNCTURE: CPT

## 2021-11-15 PROCEDURE — 93010 ELECTROCARDIOGRAM REPORT: CPT | Performed by: INTERNAL MEDICINE

## 2021-11-15 PROCEDURE — 1200000000 HC SEMI PRIVATE

## 2021-11-15 PROCEDURE — 87077 CULTURE AEROBIC IDENTIFY: CPT

## 2021-11-15 PROCEDURE — 82947 ASSAY GLUCOSE BLOOD QUANT: CPT

## 2021-11-15 PROCEDURE — 94664 DEMO&/EVAL PT USE INHALER: CPT

## 2021-11-15 PROCEDURE — 93005 ELECTROCARDIOGRAM TRACING: CPT | Performed by: PHYSICIAN ASSISTANT

## 2021-11-15 PROCEDURE — 84484 ASSAY OF TROPONIN QUANT: CPT

## 2021-11-15 PROCEDURE — 70450 CT HEAD/BRAIN W/O DYE: CPT

## 2021-11-15 PROCEDURE — 85025 COMPLETE CBC W/AUTO DIFF WBC: CPT

## 2021-11-15 PROCEDURE — 2500000003 HC RX 250 WO HCPCS: Performed by: INTERNAL MEDICINE

## 2021-11-15 PROCEDURE — XW033E5 INTRODUCTION OF REMDESIVIR ANTI-INFECTIVE INTO PERIPHERAL VEIN, PERCUTANEOUS APPROACH, NEW TECHNOLOGY GROUP 5: ICD-10-PCS | Performed by: FAMILY MEDICINE

## 2021-11-15 PROCEDURE — 94761 N-INVAS EAR/PLS OXIMETRY MLT: CPT

## 2021-11-15 PROCEDURE — 94669 MECHANICAL CHEST WALL OSCILL: CPT

## 2021-11-15 PROCEDURE — 2580000003 HC RX 258: Performed by: PHYSICIAN ASSISTANT

## 2021-11-15 PROCEDURE — 87040 BLOOD CULTURE FOR BACTERIA: CPT

## 2021-11-15 PROCEDURE — 80053 COMPREHEN METABOLIC PANEL: CPT

## 2021-11-15 PROCEDURE — 71250 CT THORAX DX C-: CPT

## 2021-11-15 PROCEDURE — 2580000003 HC RX 258: Performed by: INTERNAL MEDICINE

## 2021-11-15 PROCEDURE — 2700000000 HC OXYGEN THERAPY PER DAY

## 2021-11-15 RX ORDER — ACETAMINOPHEN 325 MG/1
650 TABLET ORAL EVERY 4 HOURS PRN
Status: DISCONTINUED | OUTPATIENT
Start: 2021-11-15 | End: 2021-11-25 | Stop reason: HOSPADM

## 2021-11-15 RX ORDER — NICOTINE POLACRILEX 4 MG
15 LOZENGE BUCCAL PRN
Status: DISCONTINUED | OUTPATIENT
Start: 2021-11-15 | End: 2021-11-25 | Stop reason: HOSPADM

## 2021-11-15 RX ORDER — DEXTROSE MONOHYDRATE 25 G/50ML
12.5 INJECTION, SOLUTION INTRAVENOUS PRN
Status: DISCONTINUED | OUTPATIENT
Start: 2021-11-15 | End: 2021-11-25 | Stop reason: HOSPADM

## 2021-11-15 RX ORDER — DEXTROSE MONOHYDRATE 50 MG/ML
100 INJECTION, SOLUTION INTRAVENOUS PRN
Status: DISCONTINUED | OUTPATIENT
Start: 2021-11-15 | End: 2021-11-25 | Stop reason: HOSPADM

## 2021-11-15 RX ORDER — LATANOPROST 50 UG/ML
1 SOLUTION/ DROPS OPHTHALMIC NIGHTLY
Status: DISCONTINUED | OUTPATIENT
Start: 2021-11-15 | End: 2021-11-25 | Stop reason: HOSPADM

## 2021-11-15 RX ORDER — BUDESONIDE 0.5 MG/2ML
0.5 INHALANT ORAL 2 TIMES DAILY
Status: DISCONTINUED | OUTPATIENT
Start: 2021-11-15 | End: 2021-11-25 | Stop reason: HOSPADM

## 2021-11-15 RX ORDER — REMDESIVIR 100 MG/1
INJECTION, POWDER, LYOPHILIZED, FOR SOLUTION INTRAVENOUS
Status: COMPLETED
Start: 2021-11-15 | End: 2021-11-15

## 2021-11-15 RX ORDER — ASCORBIC ACID 500 MG
1000 TABLET ORAL 4 TIMES DAILY
Status: DISCONTINUED | OUTPATIENT
Start: 2021-11-15 | End: 2021-11-25 | Stop reason: HOSPADM

## 2021-11-15 RX ORDER — DEXAMETHASONE SODIUM PHOSPHATE 10 MG/ML
10 INJECTION INTRAMUSCULAR; INTRAVENOUS ONCE
Status: COMPLETED | OUTPATIENT
Start: 2021-11-15 | End: 2021-11-15

## 2021-11-15 RX ORDER — ZINC SULFATE 50(220)MG
100 CAPSULE ORAL DAILY
Status: DISCONTINUED | OUTPATIENT
Start: 2021-11-15 | End: 2021-11-25 | Stop reason: HOSPADM

## 2021-11-15 RX ORDER — 0.9 % SODIUM CHLORIDE 0.9 %
30 INTRAVENOUS SOLUTION INTRAVENOUS PRN
Status: DISCONTINUED | OUTPATIENT
Start: 2021-11-15 | End: 2021-11-25 | Stop reason: HOSPADM

## 2021-11-15 RX ORDER — DOCUSATE SODIUM 100 MG/1
100 CAPSULE, LIQUID FILLED ORAL 2 TIMES DAILY
Status: DISCONTINUED | OUTPATIENT
Start: 2021-11-15 | End: 2021-11-25 | Stop reason: HOSPADM

## 2021-11-15 RX ORDER — ALBUTEROL SULFATE 2.5 MG/3ML
2.5 SOLUTION RESPIRATORY (INHALATION) EVERY 4 HOURS PRN
Status: DISCONTINUED | OUTPATIENT
Start: 2021-11-15 | End: 2021-11-25 | Stop reason: HOSPADM

## 2021-11-15 RX ORDER — FAMOTIDINE 20 MG/1
20 TABLET, FILM COATED ORAL 2 TIMES DAILY
Status: DISCONTINUED | OUTPATIENT
Start: 2021-11-15 | End: 2021-11-25 | Stop reason: HOSPADM

## 2021-11-15 RX ORDER — GUAIFENESIN/DEXTROMETHORPHAN 100-10MG/5
5 SYRUP ORAL EVERY 4 HOURS PRN
Status: DISCONTINUED | OUTPATIENT
Start: 2021-11-15 | End: 2021-11-25 | Stop reason: HOSPADM

## 2021-11-15 RX ORDER — 0.9 % SODIUM CHLORIDE 0.9 %
500 INTRAVENOUS SOLUTION INTRAVENOUS ONCE
Status: COMPLETED | OUTPATIENT
Start: 2021-11-15 | End: 2021-11-15

## 2021-11-15 RX ORDER — ERGOCALCIFEROL 1.25 MG/1
50000 CAPSULE ORAL WEEKLY
Status: DISCONTINUED | OUTPATIENT
Start: 2021-11-15 | End: 2021-11-25 | Stop reason: HOSPADM

## 2021-11-15 RX ORDER — DEXAMETHASONE 4 MG/1
6 TABLET ORAL DAILY
Status: COMPLETED | OUTPATIENT
Start: 2021-11-15 | End: 2021-11-24

## 2021-11-15 RX ADMIN — DEXAMETHASONE 6 MG: 4 TABLET ORAL at 20:45

## 2021-11-15 RX ADMIN — OXYCODONE HYDROCHLORIDE AND ACETAMINOPHEN 1000 MG: 500 TABLET ORAL at 20:45

## 2021-11-15 RX ADMIN — DEXAMETHASONE SODIUM PHOSPHATE 10 MG: 10 INJECTION INTRAMUSCULAR; INTRAVENOUS at 14:22

## 2021-11-15 RX ADMIN — SODIUM CHLORIDE 500 ML: 9 INJECTION, SOLUTION INTRAVENOUS at 14:23

## 2021-11-15 RX ADMIN — RIVAROXABAN 10 MG: 10 TABLET, FILM COATED ORAL at 20:45

## 2021-11-15 RX ADMIN — BUDESONIDE 500 MCG: 0.5 SUSPENSION RESPIRATORY (INHALATION) at 21:28

## 2021-11-15 RX ADMIN — METFORMIN HYDROCHLORIDE 500 MG: 500 TABLET ORAL at 20:45

## 2021-11-15 RX ADMIN — LATANOPROST 1 DROP: 50 SOLUTION OPHTHALMIC at 20:46

## 2021-11-15 RX ADMIN — ZINC SULFATE 220 MG (50 MG) CAPSULE 100 MG: CAPSULE at 20:45

## 2021-11-15 RX ADMIN — WATER 20 ML: 1 INJECTION INTRAMUSCULAR; INTRAVENOUS; SUBCUTANEOUS at 21:22

## 2021-11-15 RX ADMIN — REMDESIVIR 200 MG: 100 INJECTION, POWDER, LYOPHILIZED, FOR SOLUTION INTRAVENOUS at 21:22

## 2021-11-15 RX ADMIN — REMDESIVIR 100 MG: 100 INJECTION, POWDER, LYOPHILIZED, FOR SOLUTION INTRAVENOUS at 21:22

## 2021-11-15 RX ADMIN — FAMOTIDINE 20 MG: 20 TABLET ORAL at 20:45

## 2021-11-15 RX ADMIN — DOCUSATE SODIUM 100 MG: 100 CAPSULE ORAL at 20:45

## 2021-11-15 RX ADMIN — ERGOCALCIFEROL 50000 UNITS: 1.25 CAPSULE ORAL at 21:22

## 2021-11-15 ASSESSMENT — ENCOUNTER SYMPTOMS
EYES NEGATIVE: 1
GASTROINTESTINAL NEGATIVE: 1
SHORTNESS OF BREATH: 1

## 2021-11-15 NOTE — ED PROVIDER NOTES
[facility]  EMERGENCY DEPARTMENT ENCOUNTER      Pt Name: Brenden Lopez  MRN: [id number]  [place] 1940  Date of evaluation: 11/15/2021  Provider: CEFERINO Horowitz PA-C    CHIEF COMPLAINT     Chief Complaint   Patient presents with    Altered Mental Status     increased over weekend    Positive For Covid-19     on Friday 11/12    Shortness of Breath     increasing since Friday         HISTORY OF PRESENT ILLNESS   (Location/Symptom, Timing/Onset, Context/Setting,Quality, Duration, Modifying Factors, Severity)  Note limiting factors. Brenden Lopez is a78 y.o. female who presents to the emergency department      15-year-old female was brought here by family members for worsening symptoms of Covid. Patient was diagnosed with COVID-19 on November 12. She woke last night with confusion and increased shortness of breath. Upon arrival here to the emergency room she was hypoxic at 89% on room air. Patient is alert and oriented to place and self. She has had minor confusion per family history. Was sent here by her primary care physician for evaluation and admission. Nursing Notes werereviewed. REVIEW OF SYSTEMS    (2-9 systems for level 4, 10 or more for level 5)     Review of Systems   Constitutional: Positive for fatigue. HENT: Negative. Eyes: Negative. Respiratory: Positive for shortness of breath. Cardiovascular: Negative. Gastrointestinal: Negative. Endocrine: Negative. Genitourinary: Negative. Musculoskeletal: Negative. Skin: Negative. Neurological: Negative. Psychiatric/Behavioral: Positive for confusion. All other systems reviewed and are negative. Except as noted above the remainder of the review of systems was reviewed and negative.        PAST MEDICAL HISTORY     Past Medical History:   Diagnosis Date    Achilles tendon rupture 2011    LEFT    Cancer [facility])     Chronic SI joint pain     left    Claustrophobia     Diabetes mellitus ([address] 2010    ON ORAL MED    Esophageal stricture 2011    PT HAD DILATATION    [place] (hard of hearing)     WEARS [place] HEARING AIDS    Hyperlipidemia 2012    Hypertension     PT HAD TILT TABLE TEST BP MED DISCONTINUED    Retinal detachment 10/2012    LEFT    Trauma to eye, left          SURGICALHISTORY       Past Surgical History:   Procedure Laterality Date    BLADDER SUSPENSION  1994    WITH HYSTERECTOMY    BLEPHAROPLASTY Left 5/3/2013    CATARACT REMOVAL Bilateral     with iol    CERVICAL DISC SURGERY  1984    cervical spine    COLECTOMY      ROBOTIC LOWER COLON RESECTION    ESOPHAGEAL DILATATION      EYE SURGERY Left 06/15/2013    REPAIR OF WOUND, WITH LENS REMOVAL     EYE SURGERY Bilateral 2008    BILAT CATARACT REMOVAL WITH IOL    EYE SURGERY Left ,7/13,12/12,6/13    vitrectomy    FINGER TRIGGER RELEASE Left 4/15/2019    FINGER TRIGGER RELEASE-MIDDLE FINGER performed by Murali Martinez MD at [address] [facility] Left 03/09/2018    Excision of Os Peroneum, Primary Repair of Peroneus Longus Tendon - Dr. Vanessa López Right 06/21/2016    MARY Dr. Amrita Jackson Right 08/10/2020    Dr. Sharon Acosta    lower back    RI FOOT/TOES SURGERY PROC UNLISTED Left 3/9/2018    ACHILLES TENDON LENGTHENING REPAIR/ EXCISION OF OS PERONEUM,PRIMARY REPAIR OF PERONEUS LINGUS TENDON WITH POSSIBLE PERONEAL TENODESIS performed by Aaron Noyola DPM at [address] 10/30/2019    ANAL PROCTO SIGMOIDOSCOPY FLEXIBLE with biopsies performed by Nurys Eid MD at [address]  10/30/2019    -sigmoid colon mass(invasive ulcerated adenocarcinoma)    TOTAL HIP ARTHROPLASTY Left 8/7/2017    HIP TOTAL ARTHROPLASTY performed by Murali Martinez MD at [address] Right 8/10/2020    KNEE TOTAL ARTHROPLASTY performed by Murali Martinez MD at [address] [facility] dilation    VITRECTOMY Left 10/3/13         CURRENT MEDICATIONS       Previous Medications    ACETAMINOPHEN (TYLENOL) 325 MG TABLET    Take 2 tablets by mouth every 4 hours as needed for Pain    BUDESONIDE (PULMICORT) 180 MCG/ACT AEPB INHALER    Inhale 2 puffs into the lungs 2 times daily    DOCUSATE SODIUM (COLACE) 100 MG CAPSULE    Take 100 mg by mouth 2 times daily    FIBER PO    Take by mouth daily    LATANOPROST (XALATAN) 0.005 % OPHTHALMIC SOLUTION    Place 1 drop into both eyes nightly    METFORMIN (GLUCOPHAGE) 500 MG TABLET    Take 500 mg by mouth 2 times daily (with meals)    POLYVINYL ALCOHOL-POVIDONE (TEARS PLUS OP)    Apply 1 drop to eye 3 times daily    RIVAROXABAN (XARELTO) 10 MG TABS TABLET    Take 1 tablet by mouth daily         ALLERGIES   Aleve [naproxen sodium], Aspirin, Codeine, Nsaids, Align [lactase-lactobacillus], Amitriptyline, Gabapentin, Lidocaine, Lyrica [pregabalin], Omnipaque [iohexol], Other, Tetracaine, and Iodine    FAMILY HISTORY       Family History   Problem Relation Age of Onset    Heart Disease Mother         CARDIAC MASS    Heart Disease Father     Cancer Paternal Grandfather     Cancer Other         both families, several family members          SOCIAL HISTORY       Social History     Socioeconomic History    Marital status:      Spouse name: None    Number of children: None    Years of education: None    Highest education level: None   Occupational History    None   Tobacco Use    Smoking status: Never Smoker    Smokeless tobacco: Never Used   Vaping Use    Vaping Use: Never used   Substance and Sexual Activity    Alcohol use: No    Drug use: No    Sexual activity: None   Other Topics Concern    None   Social History Narrative    None     Social Determinants of Health     Financial Resource Strain:     Difficulty of Paying Living Expenses: Not on file   Food Insecurity:     Worried About Running Out of Food in the Last Year: Not on file    Ran Out of Food in the Last Year: Not on file   Transportation Needs:     Lack of Transportation (Medical): Not on file    Lack of Transportation (Non-Medical): Not on file   Physical Activity:     Days of Exercise per Week: Not on file    Minutes of Exercise per Session: Not on file   Stress:     Feeling of Stress : Not on file   Social Connections:     Frequency of Communication with Friends and Family: Not on file    Frequency of Social Gatherings with Friends and Family: Not on file    Attends Sabianist Services: Not on file    Active Member of [employer] or Organizations: Not on file    Attends Club or Organization Meetings: Not on file    Marital Status: Not on file   Intimate Partner Violence:     Fear of Current or Ex-Partner: Not on file    Emotionally Abused: Not on file    Physically Abused: Not on file    Sexually Abused: Not on file   Housing Stability:     Unable to Pay for Housing in the Last Year: Not on file    Number of [place] in the Last Year: Not on file    Unstable Housing in the Last Year: Not on file       SCREENINGS    [place] Coma Scale  Eye Opening: Spontaneous  Best Verbal Response: Confused  Best Motor Response: Obeys commands  [place] Coma Scale Score: 14        PHYSICAL EXAM    (up to 7 for level 4, 8 or more for level 5)     ED Triage Vitals [11/15/21 1356]   BP Temp Temp Source Pulse Resp SpO2 Height Weight   (!) 157/65 99.1 °F (37.3 °C) Oral 92 24 (!) 88 % 5' 4\" (1.626 m) 149 lb (67.6 kg)       Physical Exam  Vitals and nursing note reviewed. Constitutional:       General: She is not in acute distress. Appearance: Normal appearance. She is normal weight. She is not ill-appearing or toxic-appearing. HENT:      Head: Normocephalic and atraumatic. Mouth/Throat:      Mouth: Mucous membranes are moist.   Eyes:      Pupils: Pupils are equal, round, and reactive to light.    Cardiovascular:      Rate and Rhythm: Tachycardia present. Pulses: Normal pulses. Pulmonary:      Effort: Pulmonary effort is normal. No respiratory distress. Breath sounds: No stridor. Wheezing present. No rhonchi or rales. Abdominal:      General: Abdomen is flat. There is no distension. Palpations: There is no mass. Tenderness: There is no abdominal tenderness. There is no right CVA tenderness, left CVA tenderness, guarding or rebound. Hernia: No hernia is present. Musculoskeletal:         General: No tenderness. Normal range of motion. Skin:     Capillary Refill: Capillary refill takes less than 2 seconds. Neurological:      General: No focal deficit present. Mental Status: She is alert and oriented to person, place, and time. Cranial Nerves: Cranial nerve deficit present. Psychiatric:         Mood and Affect: Mood normal.         DIAGNOSTIC RESULTS     EKG: All EKG's are interpreted by the Emergency Department Physician who either signs orCo-signs this chart in the absence of a cardiologist.      RADIOLOGY:   Non-plainfilm images such as CT, Ultrasound and MRI are read by the radiologist. Plain radiographic images are visualized and preliminarily interpreted by the emergency physician with the below findings:      Interpretationper the Radiologist below, if available at the time of this note:    CT CHEST WO CONTRAST   Final Result   Multifocal bilateral ground-glass opacities throughout both lungs compatible   with COVID-19 pneumonia. CT Head WO Contrast   Final Result   No acute intracranial abnormality.       Diffuse atrophic changes with findings suggesting chronic microvascular   ischemia               ED BEDSIDE ULTRASOUND:   Performed by ED Physician - none    LABS:  Labs Reviewed   CBC WITH AUTO DIFFERENTIAL - Abnormal; Notable for the following components:       Result Value    Seg Neutrophils 84 (*)     Lymphocytes 10 (*)     Absolute Lymph # 0.72 (*)     All other components within normal limits   COMPREHENSIVE METABOLIC PANEL W/ REFLEX TO MG FOR LOW K - Abnormal; Notable for the following components:    Glucose 156 (*)     Calcium 8.4 (*)     Sodium 128 (*)     Chloride 90 (*)     Albumin/Globulin Ratio 0.9 (*)     All other components within normal limits   CULTURE, BLOOD 1   CULTURE, BLOOD 1   TROPONIN   BRAIN NATRIURETIC PEPTIDE   LACTIC ACID       All other labs were within normal range or not returned as of this dictation. EMERGENCY DEPARTMENT COURSE and DIFFERENTIAL DIAGNOSIS/MDM:   Vitals:    Vitals:    11/15/21 1356 11/15/21 1400 11/15/21 1415 11/15/21 1511   BP: (!) 157/65 (!) 147/92     Pulse: 92 87 90 92   Resp: 24 16 26    Temp: 99.1 °F (37.3 °C)      TempSrc: Oral      SpO2: (!) 88% (!) 89% 92%    Weight: 149 lb (67.6 kg)      Height: 5' 4\" (1.626 m)            MDM  Number of Diagnoses or Management Options  Diagnosis management comments: Patient presented here with worsening symptoms of Covid. CT scan shows bilateral pneumonia consistent with Covid no PE. Patient was hypoxic upon arrival.  Given IV Decadron and fluids. Patient's head CT is read negative. CBC BMP and troponin were all unremarkable. Procedures    FINAL IMPRESSION      1. Hypoxia Stable   2. Pneumonia due to COVID-19 virus        DISPOSITION/PLAN   DISPOSITION        PATIENT REFERRED TO:  No follow-up provider specified. DISCHARGE MEDICATIONS:  New Prescriptions    No medications on file              Summation      Patient Course:      ED Medications administered this visit:    Medications   0.9 % sodium chloride bolus (500 mLs IntraVENous New Bag 11/15/21 1423)   dexamethasone (DECADRON) injection 10 mg (10 mg IntraVENous Given 11/15/21 1422)       New Prescriptions from this visit:    New Prescriptions    No medications on file       Follow-up:  No follow-up provider specified. Final Impression:   1. Hypoxia Stable   2.  Pneumonia due to COVID-19 virus               (Please note that portions of this note were completed with a voice recognition program.  Efforts were made to edit the dictations but occasionally words are mis-transcribed.)         Yumiko Espino PA-C  11/15/21 1411

## 2021-11-15 NOTE — ED NOTES
Per nursing supervisor room 327 cannot be cleaned until [phone] and then patient can be transferred.       Suni Pastrana RN  11/15/21 0014

## 2021-11-16 PROBLEM — J96.01 ACUTE RESPIRATORY FAILURE WITH HYPOXIA (HCC): Status: ACTIVE | Noted: 2021-11-16

## 2021-11-16 LAB
ABSOLUTE EOS #: 0 K/UL (ref 0–0.44)
ABSOLUTE IMMATURE GRANULOCYTE: 0.06 K/UL (ref 0–0.3)
ABSOLUTE LYMPH #: 0.77 K/UL (ref 1.1–3.7)
ABSOLUTE MONO #: 0.47 K/UL (ref 0.1–1.2)
ALBUMIN SERPL-MCNC: 3.1 G/DL (ref 3.5–5.2)
ALBUMIN/GLOBULIN RATIO: 0.8 (ref 1–2.5)
ALP BLD-CCNC: 60 U/L (ref 35–104)
ALT SERPL-CCNC: 19 U/L (ref 5–33)
ANION GAP SERPL CALCULATED.3IONS-SCNC: 15 MMOL/L (ref 9–17)
AST SERPL-CCNC: 28 U/L
BASOPHILS # BLD: 0 % (ref 0–2)
BASOPHILS ABSOLUTE: 0 K/UL (ref 0–0.2)
BILIRUB SERPL-MCNC: 0.43 MG/DL (ref 0.3–1.2)
BUN BLDV-MCNC: 19 MG/DL (ref 8–23)
BUN/CREAT BLD: 39 (ref 9–20)
C-REACTIVE PROTEIN: 126.7 MG/L (ref 0–5)
CALCIUM SERPL-MCNC: 8.3 MG/DL (ref 8.6–10.4)
CHLORIDE BLD-SCNC: 94 MMOL/L (ref 98–107)
CO2: 20 MMOL/L (ref 20–31)
CREAT SERPL-MCNC: 0.49 MG/DL (ref 0.5–0.9)
D-DIMER QUANTITATIVE: 1.47 MG/L FEU (ref 0–0.59)
DIFFERENTIAL TYPE: ABNORMAL
EOSINOPHILS RELATIVE PERCENT: 0 % (ref 1–4)
FERRITIN: 408 UG/L (ref 13–150)
FIBRINOGEN: 445 MG/DL (ref 179–518)
GFR AFRICAN AMERICAN: >60 ML/MIN
GFR NON-AFRICAN AMERICAN: >60 ML/MIN
GFR SERPL CREATININE-BSD FRML MDRD: ABNORMAL ML/MIN/{1.73_M2}
GFR SERPL CREATININE-BSD FRML MDRD: ABNORMAL ML/MIN/{1.73_M2}
GLUCOSE BLD-MCNC: 212 MG/DL (ref 74–100)
GLUCOSE BLD-MCNC: 227 MG/DL (ref 74–100)
GLUCOSE BLD-MCNC: 267 MG/DL (ref 74–100)
GLUCOSE BLD-MCNC: 270 MG/DL (ref 74–100)
GLUCOSE BLD-MCNC: 281 MG/DL (ref 70–99)
HCT VFR BLD CALC: 38.3 % (ref 36.3–47.1)
HEMOGLOBIN: 13.2 G/DL (ref 11.9–15.1)
IMMATURE GRANULOCYTES: 1 %
INR BLD: 1.8
LACTATE DEHYDROGENASE: 320 U/L (ref 135–214)
LYMPHOCYTES # BLD: 13 % (ref 24–43)
MCH RBC QN AUTO: 30.8 PG (ref 25.2–33.5)
MCHC RBC AUTO-ENTMCNC: 34.5 G/DL (ref 28.4–34.8)
MCV RBC AUTO: 89.5 FL (ref 82.6–102.9)
MONOCYTES # BLD: 8 % (ref 3–12)
MORPHOLOGY: NORMAL
NRBC AUTOMATED: 0 PER 100 WBC
PARTIAL THROMBOPLASTIN TIME: 29.4 SEC (ref 23.9–33.8)
PDW BLD-RTO: 11.9 % (ref 11.8–14.4)
PLATELET # BLD: 163 K/UL (ref 138–453)
PLATELET ESTIMATE: ABNORMAL
PMV BLD AUTO: 11.1 FL (ref 8.1–13.5)
POTASSIUM SERPL-SCNC: 3.9 MMOL/L (ref 3.7–5.3)
PROTHROMBIN TIME: 20.3 SEC (ref 11.5–14.2)
RBC # BLD: 4.28 M/UL (ref 3.95–5.11)
RBC # BLD: ABNORMAL 10*6/UL
SEG NEUTROPHILS: 78 % (ref 36–65)
SEGMENTED NEUTROPHILS ABSOLUTE COUNT: 4.6 K/UL (ref 1.5–8.1)
SODIUM BLD-SCNC: 129 MMOL/L (ref 135–144)
TOTAL PROTEIN: 6.9 G/DL (ref 6.4–8.3)
WBC # BLD: 5.9 K/UL (ref 3.5–11.3)
WBC # BLD: ABNORMAL 10*3/UL

## 2021-11-16 PROCEDURE — 85384 FIBRINOGEN ACTIVITY: CPT

## 2021-11-16 PROCEDURE — 82728 ASSAY OF FERRITIN: CPT

## 2021-11-16 PROCEDURE — 6370000000 HC RX 637 (ALT 250 FOR IP): Performed by: NURSE PRACTITIONER

## 2021-11-16 PROCEDURE — 97166 OT EVAL MOD COMPLEX 45 MIN: CPT

## 2021-11-16 PROCEDURE — 80053 COMPREHEN METABOLIC PANEL: CPT

## 2021-11-16 PROCEDURE — 82947 ASSAY GLUCOSE BLOOD QUANT: CPT

## 2021-11-16 PROCEDURE — 83615 LACTATE (LD) (LDH) ENZYME: CPT

## 2021-11-16 PROCEDURE — APPSS30 APP SPLIT SHARED TIME 16-30 MINUTES: Performed by: NURSE PRACTITIONER

## 2021-11-16 PROCEDURE — 1200000000 HC SEMI PRIVATE

## 2021-11-16 PROCEDURE — 81001 URINALYSIS AUTO W/SCOPE: CPT

## 2021-11-16 PROCEDURE — 36415 COLL VENOUS BLD VENIPUNCTURE: CPT

## 2021-11-16 PROCEDURE — 86140 C-REACTIVE PROTEIN: CPT

## 2021-11-16 PROCEDURE — 97162 PT EVAL MOD COMPLEX 30 MIN: CPT

## 2021-11-16 PROCEDURE — 85610 PROTHROMBIN TIME: CPT

## 2021-11-16 PROCEDURE — 97110 THERAPEUTIC EXERCISES: CPT

## 2021-11-16 PROCEDURE — 85730 THROMBOPLASTIN TIME PARTIAL: CPT

## 2021-11-16 PROCEDURE — 97116 GAIT TRAINING THERAPY: CPT

## 2021-11-16 PROCEDURE — 85379 FIBRIN DEGRADATION QUANT: CPT

## 2021-11-16 PROCEDURE — 94669 MECHANICAL CHEST WALL OSCILL: CPT

## 2021-11-16 PROCEDURE — 6360000002 HC RX W HCPCS: Performed by: NURSE PRACTITIONER

## 2021-11-16 PROCEDURE — 85025 COMPLETE CBC W/AUTO DIFF WBC: CPT

## 2021-11-16 PROCEDURE — 94640 AIRWAY INHALATION TREATMENT: CPT

## 2021-11-16 PROCEDURE — 2700000000 HC OXYGEN THERAPY PER DAY

## 2021-11-16 PROCEDURE — 99222 1ST HOSP IP/OBS MODERATE 55: CPT | Performed by: INTERNAL MEDICINE

## 2021-11-16 RX ADMIN — OXYCODONE HYDROCHLORIDE AND ACETAMINOPHEN 1000 MG: 500 TABLET ORAL at 09:24

## 2021-11-16 RX ADMIN — OXYCODONE HYDROCHLORIDE AND ACETAMINOPHEN 1000 MG: 500 TABLET ORAL at 13:39

## 2021-11-16 RX ADMIN — METFORMIN HYDROCHLORIDE 500 MG: 500 TABLET ORAL at 16:32

## 2021-11-16 RX ADMIN — ZINC SULFATE 220 MG (50 MG) CAPSULE 100 MG: CAPSULE at 09:24

## 2021-11-16 RX ADMIN — LATANOPROST 1 DROP: 50 SOLUTION OPHTHALMIC at 20:47

## 2021-11-16 RX ADMIN — INSULIN LISPRO 6 UNITS: 100 INJECTION, SOLUTION INTRAVENOUS; SUBCUTANEOUS at 09:21

## 2021-11-16 RX ADMIN — OXYCODONE HYDROCHLORIDE AND ACETAMINOPHEN 1000 MG: 500 TABLET ORAL at 20:47

## 2021-11-16 RX ADMIN — BUDESONIDE 500 MCG: 0.5 SUSPENSION RESPIRATORY (INHALATION) at 21:14

## 2021-11-16 RX ADMIN — BUDESONIDE 500 MCG: 0.5 SUSPENSION RESPIRATORY (INHALATION) at 10:55

## 2021-11-16 RX ADMIN — DEXAMETHASONE 6 MG: 4 TABLET ORAL at 09:24

## 2021-11-16 RX ADMIN — RIVAROXABAN 10 MG: 10 TABLET, FILM COATED ORAL at 09:24

## 2021-11-16 RX ADMIN — FAMOTIDINE 20 MG: 20 TABLET ORAL at 09:24

## 2021-11-16 RX ADMIN — FAMOTIDINE 20 MG: 20 TABLET ORAL at 20:47

## 2021-11-16 RX ADMIN — OXYCODONE HYDROCHLORIDE AND ACETAMINOPHEN 1000 MG: 500 TABLET ORAL at 16:32

## 2021-11-16 RX ADMIN — INSULIN LISPRO 4 UNITS: 100 INJECTION, SOLUTION INTRAVENOUS; SUBCUTANEOUS at 11:45

## 2021-11-16 RX ADMIN — INSULIN LISPRO 6 UNITS: 100 INJECTION, SOLUTION INTRAVENOUS; SUBCUTANEOUS at 16:32

## 2021-11-16 RX ADMIN — METFORMIN HYDROCHLORIDE 500 MG: 500 TABLET ORAL at 09:24

## 2021-11-16 RX ADMIN — INSULIN LISPRO 2 UNITS: 100 INJECTION, SOLUTION INTRAVENOUS; SUBCUTANEOUS at 20:47

## 2021-11-16 NOTE — PROGRESS NOTES
Discussed discharge plans with the daughter-in-law. Patient is a 78year old female here with Pneumonia due to COVID-19 virus . She is alert and oriented with some confusion. Family stated the confusion is new as of last week. Patient is  and lives with her son & daughter-in-law. She just started using a cone with in the past month. Patient does her own cooking and cleaning . The family stated she has not been eating even if they gave her meals this past week. She is independent with her ADL's. Patient manges her own medications. The family is not letting her drive any longer. She has no services in the home. Her PCP is Tracie Garcia MD. She has medical insurance that helps with her medication costs. The discharge plan at this time is home with no services. Patient only has financial POA and not health care advance directives. LSW to monitor and assist with any needs or concerns as they arise.     CONOR Mccormick

## 2021-11-16 NOTE — PROGRESS NOTES
Comprehensive Nutrition Assessment    Type and Reason for Visit:  Initial (missing nutrition screenings)    Nutrition Recommendations/Plan:  Encourage oral intakes    Nutrition Assessment:  Mild malnutrition r/t altered respiratory status, AEB weight declines >2% in last week, with suspected reduced PO intakes. Unable to confirm eating patterns pta, as patient phone rings \"busy\" upon multiple attempts. Hyperglycemia with known diabetes and steroid. Will add Glucerna to supplement in response to weight losses pta. Malnutrition Assessment:  Malnutrition Status:  Mild malnutrition    Context:  Acute Illness     Findings of the 6 clinical characteristics of malnutrition:  Energy Intake:  Unable to assess  Weight Loss:  7 - Greater than 2% over 1 week     Body Fat Loss:  Unable to assess     Muscle Mass Loss:  Unable to assess    Fluid Accumulation:  No significant fluid accumulation     Strength:  Not Performed    Estimated Daily Nutrient Needs:  Energy (kcal):  8232-9118 (20-25); Weight Used for Energy Requirements:  Current     Protein (g):  65-76 (1.2-1.4); Weight Used for Protein Requirements:  Ideal        Fluid (ml/day):  1700; Method Used for Fluid Requirements:  1 ml/kcal      Nutrition Related Findings:  no edema      Wounds:  None       Current Nutrition Therapies:    ADULT DIET; Regular; 4 carb choices (60 gm/meal)  ADULT ORAL NUTRITION SUPPLEMENT; Breakfast, Lunch, Dinner; Diabetic Oral Supplement    Anthropometric Measures:  · Height: 5' 4\" (162.6 cm)  · Current Body Weight: 145 lb 11.2 oz (66.1 kg)   · Admission Body Weight: 149 lb (67.6 kg)    · Usual Body Weight: 149 lb (67.6 kg)     · Ideal Body Weight: 120 lbs; % Ideal Body Weight 121.4 %   · BMI: 25  · Adjusted Body Weight:  ; No Adjustment   · BMI Categories: Overweight (BMI 25.0-29. 9)       Nutrition Diagnosis:   · Mild malnutrition related to impaired respiratory function as evidenced by weight loss greater than or equal to 2% in 1 week    Lab Results   Component Value Date     (L) 11/16/2021    K 3.9 11/16/2021    CL 94 (L) 11/16/2021    CO2 20 11/16/2021    BUN 19 11/16/2021    CREATININE 0.49 (L) 11/16/2021    GLUCOSE 281 (H) 11/16/2021    CALCIUM 8.3 (L) 11/16/2021    PROT 6.9 11/16/2021    LABALBU 3.1 (L) 11/16/2021    BILITOT 0.43 11/16/2021    ALKPHOS 60 11/16/2021    AST 28 11/16/2021    ALT 19 11/16/2021    LABGLOM >60 11/16/2021    GFRAA >60 11/16/2021     Lab Results   Component Value Date    LABA1C 7.1 (H) 08/11/2020     Lab Results   Component Value Date     08/11/2020     Lab Results   Component Value Date    VITD25 11.6 (L) 06/24/2016       Nutrition Interventions:   Food and/or Nutrient Delivery:  Continue Current Diet, Start Oral Nutrition Supplement  Nutrition Education/Counseling:  No recommendation at this time   Coordination of Nutrition Care:  Continue to monitor while inpatient    Goals:  PO >75% meals and supplements       Nutrition Monitoring and Evaluation:   Behavioral-Environmental Outcomes:  None Identified   Food/Nutrient Intake Outcomes:  Food and Nutrient Intake, Supplement Intake  Physical Signs/Symptoms Outcomes:  Biochemical Data, Weight     Discharge Planning:     Too soon to determine     Electronically signed by Keith Pitts RD, LD on 11/16/21 at 8:59 AM EST    Contact: [phone]

## 2021-11-16 NOTE — PROGRESS NOTES
Physical Therapy    Facility/Department: [facility]  Initial Assessment    NAME: Connor Narayanan  : 1940  MRN: [id number]    Date of Service: 2021    Discharge Recommendations:  Continue to assess pending progress, Home with Home health PT, IP Rehab        Assessment   Assessment: Patient is 78year old female with dx of hypoxia who presents with decreased B LE strength, decreased functional mobility and endurance and decreased safety and balance during transfers and ambulation and would benefit from physical therapy to address all concerns and safely return to OF. Treatment Diagnosis: Difficulty walking  Prognosis: Good  Decision Making: Medium Complexity  REQUIRES PT FOLLOW UP: Yes  Activity Tolerance  Activity Tolerance: Patient Tolerated treatment well; Patient limited by endurance       Patient Diagnosis(es): The primary encounter diagnosis was Hypoxia. A diagnosis of Pneumonia due to COVID-19 virus was also pertinent to this visit. has a past medical history of Achilles tendon rupture, Cancer ([address] Chronic SI joint pain, Claustrophobia, Diabetes mellitus ([address] Esophageal stricture, [place] (hard of hearing), Hyperlipidemia, Hypertension, Retinal detachment, and Trauma to eye, left.   has a past surgical history that includes Cervical disc surgery (); Lumbar disc surgery (); Hysterectomy (); bladder suspension (); Blepharoplasty (Left, 5/3/2013); Eye surgery (Left, 06/15/2013); Eye surgery (Bilateral, ); Upper gastrointestinal endoscopy; Esophagus dilation; Cataract removal (Bilateral); eye surgery (Left, ,,,); vitrectomy (Left, 10/3/13); Total hip arthroplasty (Left, 2017); joint replacement (Right, 2016); Foot Amputation; Foot Tendon Surgery (Left, 2018); pr foot/toes surgery proc unlisted (Left, 3/9/2018);  Finger trigger release (Left, 4/15/2019); proctosigmoidoscopy (N/A, 10/30/2019); proctosigmoidoscopy (10/30/2019); colectomy; Knee Arthroplasty (Right, 08/10/2020); and Total knee arthroplasty (Right, 8/10/2020). Restrictions  Restrictions/Precautions  Restrictions/Precautions: General Precautions, Fall Risk, Isolation  Vision/Hearing  Vision: Impaired  Vision Exceptions:  (L retinal detachment)  Hearing: Exceptions to [facility]  Hearing Exceptions: Hard of hearing/hearing concerns     Subjective  General  Chart Reviewed: Yes  Patient assessed for rehabilitation services?: Yes  Response To Previous Treatment: Not applicable  Family / Caregiver Present: No  Referring Practitioner: GRETA Devine  Referral Date : 11/15/21  Diagnosis: Hypoxia, R09.02  Follows Commands: Within Functional Limits  Subjective  Subjective: Patient denies pain and agrees to PT eval. Mild confusion noted. Pain Screening  Patient Currently in Pain: Denies          Orientation  Orientation  Overall Orientation Status: Within Functional Limits  Social/Functional History  Social/Functional History  Lives With: Son  Type of Home: House  Home Layout: One level  Home Access: Stairs to enter with rails  Entrance Stairs - Number of Steps: 4  Entrance Stairs - Rails: Right  Home Equipment: Rolling walker  ADL Assistance: Independent  Homemaking Assistance: Independent  Homemaking Responsibilities: Yes  Ambulation Assistance: Independent  Transfer Assistance: Independent  Active : No  Additional Comments: Pt reports living with son and daughter in law and being Ind with IADL's and ADL's, uses walker prn.   Cognition        Objective          AROM RLE (degrees)  RLE AROM: WFL  AROM LLE (degrees)  LLE AROM : WFL  Strength RLE  Comment: 4-/5 grossly  Strength LLE  Comment: 4-/5 grossly        Bed mobility  Comment: Pt up in chair upon arrival  Transfers  Sit to Stand: Contact guard assistance; Minimal Assistance  Stand to sit: Minimal Assistance; Contact guard assistance  Comment: Leon to control descent into chair  Ambulation  Ambulation?: Yes  Ambulation 1  Surface: level tile  Device: Rolling Walker  Other Apparatus: O2  Assistance: Contact guard assistance  Distance: Pt amb 5ftx2 with FWW and CGA with vc's for technique with O2 tubing. SpO2 88-90%. Balance  Sitting - Static: Good  Sitting - Dynamic: Fair; +  Standing - Static: Fair  Standing - Dynamic: Fair; -        Plan   Plan  Times per week: 7  Times per day: Twice a day (daily on weekends)  Current Treatment Recommendations: Strengthening, Neuromuscular Re-education, Home Exercise Program, ROM, Safety Education & Training, Balance Training, Endurance Training, Patient/Caregiver Education & Training, Functional Mobility Training, Transfer Training, Gait Training, Stair training  Safety Devices  Type of devices: All fall risk precautions in place, Left in chair, Call light within reach, Patient at risk for falls, Nurse notified, Gait belt    Goals  Short term goals  Time Frame for Short term goals: 20 days  Short term goal 1: Patient to complete sit/stand and stand pivot transfers with SUP and no LOB to decrease fall risk. Short term goal 2: Patient to ambulate 150ft with FWW or LRAD and SUP with SpO2 >/=90% for improved endurance. Short term goal 3: Patient to tolerate 20-30 min of ther ex/act to improve functional strnegth and endurance. Short term goal 4: Patient to ascend/descend 4 steps with HRx1 and SUP with no LOB to safely enter her home.        Therapy Time   Individual Concurrent Group Co-treatment   Time In 0856         Time Out 0906         Minutes 10         Timed Code Treatment Minutes: 49 Janie Price, PT, DPT

## 2021-11-16 NOTE — PROGRESS NOTES
Patient arrived on the floor and was able to walk to bed. Pt alert and oriented, vitals obtained, assessment completed and disaster navigator completed. Pt denies further needs at this time, call light within reach will continue to monitor.

## 2021-11-16 NOTE — PROGRESS NOTES
Physical Therapy  Facility/Department: [facility] AT THE [facility] MED SURG  Daily Treatment Note  NAME: Esteban Kincaid  : 1940  MRN: [id number]    Date of Service: 2021    Discharge Recommendations:  Continue to assess pending progress, Home with Home health PT, IP Rehab        Assessment   Treatment Diagnosis: Difficulty walking  Prognosis: Good  Decision Making: Medium Complexity  REQUIRES PT FOLLOW UP: Yes  Activity Tolerance  Activity Tolerance: Patient Tolerated treatment well; Patient limited by endurance     Patient Diagnosis(es): The primary encounter diagnosis was Hypoxia. A diagnosis of Pneumonia due to COVID-19 virus was also pertinent to this visit. has a past medical history of Achilles tendon rupture, Cancer ([facility] [address] Chronic SI joint pain, Claustrophobia, Diabetes mellitus ([address] Esophageal stricture, [place] (hard of hearing), Hyperlipidemia, Hypertension, Retinal detachment, and Trauma to eye, left.   has a past surgical history that includes Cervical disc surgery (); Lumbar disc surgery (); Hysterectomy (); bladder suspension (); Blepharoplasty (Left, 5/3/2013); Eye surgery (Left, 06/15/2013); Eye surgery (Bilateral, ); Upper gastrointestinal endoscopy; Esophagus dilation; Cataract removal (Bilateral); eye surgery (Left, ,,,); vitrectomy (Left, 10/3/13); Total hip arthroplasty (Left, 2017); joint replacement (Right, 2016); Foot Amputation; Foot Tendon Surgery (Left, 2018); pr foot/toes surgery proc unlisted (Left, 3/9/2018); Finger trigger release (Left, 4/15/2019); proctosigmoidoscopy (N/A, 10/30/2019); proctosigmoidoscopy (10/30/2019); colectomy; Knee Arthroplasty (Right, 08/10/2020); and Total knee arthroplasty (Right, 8/10/2020).     Restrictions  Restrictions/Precautions  Restrictions/Precautions: General Precautions, Fall Risk, Isolation  Subjective   General  Chart Reviewed: Yes  Response To Previous Treatment: Patient with no complaints from previous session. Family / Caregiver Present: No  Referring Practitioner: GRETA Morales  Subjective  Subjective: Pt. denies pain. Pt. in chair upon arrival and agreeable to therapy. Orientation  Orientation  Overall Orientation Status: Within Functional Limits  Cognition      Objective   Bed mobility  Bridging: Stand by assistance  Rolling to Left: Stand by assistance  Rolling to Right: Stand by assistance  Sit to Supine: Stand by assistance; Contact guard assistance  Scooting: Stand by assistance  Transfers  Sit to Stand: Contact guard assistance  Stand to sit: Contact guard assistance  Ambulation  Ambulation?: Yes  Ambulation 1  Surface: level tile  Device: Rolling Walker  Other Apparatus: O2  Assistance: Contact guard assistance  Distance: 10ftx1        Exercises  Comments: Reclined exercises B LE x20. Seated exercises B LE x15     G-Code     OutComes Score    AM-PAC Score    Goals  Short term goals  Time Frame for Short term goals: 20 days  Short term goal 1: Patient to complete sit/stand and stand pivot transfers with SUP and no LOB to decrease fall risk. Short term goal 2: Patient to ambulate 150ft with FWW or LRAD and SUP with SpO2 >/=90% for improved endurance. Short term goal 3: Patient to tolerate 20-30 min of ther ex/act to improve functional strnegth and endurance. Short term goal 4: Patient to ascend/descend 4 steps with HRx1 and SUP with no LOB to safely enter her home. Plan    Plan  Times per week: 7  Times per day: Twice a day  Current Treatment Recommendations: Strengthening, Neuromuscular Re-education, Home Exercise Program, ROM, Safety Education & Training, Balance Training, Endurance Training, Patient/Caregiver Education & Training, Functional Mobility Training, Transfer Training, Gait Training, Stair training  Safety Devices  Type of devices:  All fall risk precautions in place, Call light within reach, Patient at risk for falls, Nurse notified, Gait belt, Left in bed Therapy Time   Individual Concurrent Group Co-treatment   Time In 1521         Time Out 1545         Minutes 24         Timed Code Treatment Minutes: [address], PTA

## 2021-11-16 NOTE — FLOWSHEET NOTE
Awake, resting in bed. Vitals checked and assessment completed. Up to bathroom with 1 assist using a walker. Steady on feet. To chair at bedside. Call light within reach.  Continue to monitor

## 2021-11-16 NOTE — CONSULTS
Remdesivir Initiation & Daily Monitoring    Physician requesting remdesivir (use limited to ID): Kira Lackey    Demonstrated benefit is to shorten the duration of illness, mortality benefit has not been shown. Seems to be most beneficial early in the disease course. Criteria for use (confirm all are met): yes  Suspected/Confirmed COVID-19 positive and requiring hospitalization  Patient requires supplemental oxygen (this is the population for whom remdesivir demonstrated a shortened duration of illness; benefit less clear for those on high flow oxygen or mechanical ventilation, but may be reasonable to consider if change in respiratory status was recent)  Not recommended to be used in patients with baseline ALT 5x ULN or more    There are no PK data available for severe renal impairment (eGFR < 30 mL/min) or on RRT. Consider risk/benefit for individual patient. Baseline CrCl: Estimated Creatinine Clearance: 71 mL/min (based on SCr of 0.56 mg/dL). Ensure LFTs are ordered at baseline & consider if monitoring during therapy is appropriate (this information is included in CMP or can be ordered separately). Package insert states: Perform hepatic laboratory testing in all patients before starting VEKLURY and while receiving VEKLURY as clinically appropriate. Baseline ALT: Results for Adelfo Griffiths (MRN [id number]) as of 11/16/2021 06:55   Ref. Range 11/15/2021 14:17   ALT Latest Ref Range: 5 - 33 U/L 16       Duration of therapy should be limited to 5 days. Anticipated stop date: 11/19/2021    Also assess corticosteroid therapy: If patient has had symptoms for 7 days or more and is on supplemental oxygen, then a course of dexamethasone can also be considered. Dexamethasone indicated? yes  If yes, is dexamethasone ordered?  yes  Consider baseline CRP (steroids may be beneficial if > 20 mg/L and may be harmful if < 10 mg/L): not available  Nola Magaña., 11/16/2021, 6:57 AM

## 2021-11-16 NOTE — FLOWSHEET NOTE
Awake, up in chair at bedside. Vitals checked and patient reassessed. No c/o at present time.  Call light within reach, continue to monitor

## 2021-11-16 NOTE — CONSULTS
Spoke with Marcus Cervantes at the bedside. States that she has a living will and [address] at home with her son and daughter in law listed as decision makers. ACP activator questions answered at this time. States that she is unsure of what she would want. After further explanation states that she would want intubation and CPR but requests that writer contact her family to discuss further, Writer spoke with patients daughter in law Handy Mooney via phone. Handy Mooney states that Marcus Cervantes has advanced directives at home. Copy requested at her earliest convenience.  Discussed Emma's responses to ACP questions and Handy Mooney states that these are consistent with the wishes Marcus Cervantes has expressed to them in the past.     [address] Nurse Coordinator  11/16/2021 10:54 AM

## 2021-11-16 NOTE — H&P
History and Physical    Patient:  Gayle Area  MRN: [id number]    Chief Complaint: Altered mental status and shortness of breath    History Obtained From:  patient, electronic medical record    PCP: Tito Velásquez MD    History of Present Illness: The patient is a 78 y.o. female who resented to the emergency room with family due to worsening symptoms of Covid. Patient was diagnosed with COVID-19 on November 12. Patient stated that her symptoms had been ongoing for at least 2 weeks. Patient stated she woke up and had some confusion and increased shortness of breath. Upon arrival to the emergency room patient was found to be hypoxic at 89%. Patient was oriented to place and self at that time but did show some minor confusion per family history. CT of her head was negative. CT of her chest showed multifocal bilateral groundglass opacities throughout both lungs compatible with COVID-19 pneumonia. Patient is not Covid vaccinated.     Past Medical History:        Diagnosis Date    Achilles tendon rupture 2011    LEFT    Cancer ([address]     Chronic SI joint pain     left    Claustrophobia     Diabetes mellitus ([address] 2010    ON ORAL MED    Esophageal stricture 2011    PT HAD DILATATION    [place] (hard of hearing)     WEARS [place] HEARING AIDS    Hyperlipidemia 2012    Hypertension     PT HAD TILT TABLE TEST BP MED DISCONTINUED    Retinal detachment 10/2012    LEFT    Trauma to eye, left        Past Surgical History:        Procedure Laterality Date    BLADDER SUSPENSION  1994    WITH HYSTERECTOMY    BLEPHAROPLASTY Left 5/3/2013    CATARACT REMOVAL Bilateral     with iol    CERVICAL DISC SURGERY  1984    cervical spine    COLECTOMY      ROBOTIC LOWER COLON RESECTION    ESOPHAGEAL DILATATION      EYE SURGERY Left 06/15/2013    REPAIR OF WOUND, WITH LENS REMOVAL     EYE SURGERY Bilateral 2008    BILAT CATARACT REMOVAL WITH IOL    EYE SURGERY Left ,7/13,12/12,6/13    vitrectomy    FINGER TRIGGER RELEASE Left 4/15/2019    FINGER TRIGGER RELEASE-MIDDLE FINGER performed by Linh Tucker MD at [address] Left 03/09/2018    Excision of Os Peroneum, Primary Repair of Peroneus Longus Tendon - Dr. Loreto Rogers Right 06/21/2016    MARY Dr. Dc Vasquez Right 08/10/2020    Dr. Horacio Atkins    lower back    [place] FOOT/TOES SURGERY PROC UNLISTED Left 3/9/2018    ACHILLES TENDON LENGTHENING REPAIR/ EXCISION OF OS PERONEUM,PRIMARY REPAIR OF PERONEUS LINGUS TENDON WITH POSSIBLE PERONEAL TENODESIS performed by Maury Kramer DPM at [address] N/A 10/30/2019    ANAL PROCTO SIGMOIDOSCOPY FLEXIBLE with biopsies performed by Lidia Bocanegra MD at [address]  10/30/2019    -sigmoid colon mass(invasive ulcerated adenocarcinoma)    TOTAL HIP ARTHROPLASTY Left 8/7/2017    HIP TOTAL ARTHROPLASTY performed by Linh Tucker MD at [place] [address] Right 8/10/2020    KNEE TOTAL ARTHROPLASTY performed by Linh Tucker MD at [facility] dilation    VITRECTOMY Left 10/3/13       Medications Prior to Admission:    Prior to Admission medications    Medication Sig Start Date End Date Taking?  Authorizing Provider   budesonide (PULMICORT) 180 MCG/ACT AEPB inhaler Inhale 2 puffs into the lungs 2 times daily 11/12/21   [facility], [place]   rivaroxaban (XARELTO) 10 MG TABS tablet Take 1 tablet by mouth daily 8/12/20   Linh Tucker MD   FIBER PO Take by mouth daily    Historical Provider, MD   docusate sodium (COLACE) 100 MG capsule Take 100 mg by mouth 2 times daily    Historical Provider, MD   latanoprost (XALATAN) 0.005 % ophthalmic solution Place 1 drop into both eyes nightly    Historical Provider, MD   Polyvinyl Alcohol-Povidone (TEARS PLUS OP) Apply 1 drop to eye 3 times daily    Historical Provider, MD   metFORMIN (GLUCOPHAGE) 500 MG tablet Take 500 mg by mouth 2 times daily (with meals)    Historical Provider, MD       Allergies:  Aleve [naproxen sodium], Aspirin, Codeine, Nsaids, Align [lactase-lactobacillus], Amitriptyline, Gabapentin, Lidocaine, Lyrica [pregabalin], Omnipaque [iohexol], Other, Tetracaine, and Iodine    Social History:   TOBACCO:   reports that she has never smoked. She has never used smokeless tobacco.  ETOH:   reports no history of alcohol use. Family History:       Problem Relation Age of Onset    Heart Disease Mother         CARDIAC MASS    Heart Disease Father     Cancer Paternal Grandfather     Cancer Other         both families, several family members       Allergies:  Aleve [naproxen sodium], Aspirin, Codeine, Nsaids, Align [lactase-lactobacillus], Amitriptyline, Gabapentin, Lidocaine, Lyrica [pregabalin], Omnipaque [iohexol], Other, Tetracaine, and Iodine    Medications Prior to Admission:    Prior to Admission medications    Medication Sig Start Date End Date Taking? Authorizing Provider   budesonide (PULMICORT) 180 MCG/ACT AEPB inhaler Inhale 2 puffs into the lungs 2 times daily 11/12/21   [facility], PA-C   rivaroxaban (XARELTO) 10 MG TABS tablet Take 1 tablet by mouth daily 8/12/20   Catia Jimenes MD   FIBER PO Take by mouth daily    Historical Provider, MD   docusate sodium (COLACE) 100 MG capsule Take 100 mg by mouth 2 times daily    Historical Provider, MD   latanoprost (XALATAN) 0.005 % ophthalmic solution Place 1 drop into both eyes nightly    Historical Provider, MD   Polyvinyl Alcohol-Povidone (TEARS PLUS OP) Apply 1 drop to eye 3 times daily    Historical Provider, MD   metFORMIN (GLUCOPHAGE) 500 MG tablet Take 500 mg by mouth 2 times daily (with meals)    Historical Provider, MD       Review of Systems:  Constitutional:negative  for fevers, and negative for chills.   Eyes: negative for visual disturbance   ENT: negative for sore throat, negative nasal congestion, and negative for earache  Respiratory: positive for shortness of breath, positive for cough, and negative for wheezing  Cardiovascular: negative for chest pain, negative for palpitations, and negative for syncope  Gastrointestinal: negative for abdominal pain, negative for nausea,negative for vomiting, negative for diarrhea, negative for constipation, and negative for hematochezia or melena  Genitourinary: negative for dysuria, negative for urinary urgency, negative for urinary frequency, and negative for hematuria  Skin: negative for skin rash, and negative for skin lesions  Neurological: negative for unilateral weakness, numbness or tingling. Physical Exam:    Vitals:   Temp: 97.3 °F (36.3 °C)  BP: 98/68  Resp: 18  Pulse: 80  SpO2: 92 %  24HR INTAKE/OUTPUT:      Intake/Output Summary (Last 24 hours) at 11/16/2021 1437  Last data filed at 11/16/2021 1350  Gross per 24 hour   Intake 950 ml   Output --   Net 950 ml       Weight    Body mass index is 25.01 kg/m². Exam:  GEN:    Awake, alert and oriented x3. EYES:  EOMI, pupils equal   NECK: Supple. No lymphadenopathy. No carotid bruit  CVS:    regular rate and rhythm, no audible murmur  PULM:  Diminished with bibasilar rales, no acute respiratory distress  ABD:    Bowels sounds normal.  Abdomen is soft. No distention. no tenderness to palpation. EXT:   no edema bilaterally . No calf tenderness. NEURO: Moves all extremities. Motor and sensory are grossly intact  SKIN:  No rashes.   No skin lesions.    -----------------------------------------------------------------  Diagnostic Data:     DATA:    CBC:   Lab Results   Component Value Date    WBC 5.9 11/16/2021    RBC 4.28 11/16/2021    HGB 13.2 11/16/2021    HCT 38.3 11/16/2021    MCV 89.5 11/16/2021     11/16/2021        CMP:   Lab Results   Component Value Date    GLUCOSE 281 (H) 11/16/2021    BUN 19 11/16/2021    CREATININE 0.49 (L) 11/16/2021     (L) 11/16/2021    K 3.9 11/16/2021    CALCIUM 8.3 (L) 11/16/2021    CL 94 (L) 11/16/2021    CO2 20 11/16/2021    PROT 6.9 11/16/2021    LABALBU 3.1 (L) 11/16/2021    BILITOT 0.43 11/16/2021    ALKPHOS 60 11/16/2021    ALT 19 11/16/2021    AST 28 11/16/2021       UA:   Lab Results   Component Value Date    COLORU YELLOW 06/20/2016    SPECGRAV 1.020 06/20/2016    WBCUA 5 TO 10 06/20/2016    RBCUA 0 TO 2 06/20/2016    EPITHUA 0 TO 2 06/20/2016    LEUKOCYTESUR TRACE (A) 06/20/2016    GLUCOSEU NEGATIVE 06/20/2016    KETUA NEGATIVE 06/20/2016    PROTEINU NEGATIVE 06/20/2016    HGBUR TRACE (A) 06/20/2016    CASTUA NOT REPORTED 06/20/2016    CRYSTUA NOT REPORTED 06/20/2016    BACTERIA TRACE (A) 06/20/2016    YEAST NOT REPORTED 06/20/2016       Lactic Acid:   Lab Results   Component Value Date    LACTA 1.7 11/15/2021       D-Dimer:  Lab Results   Component Value Date    DDIMER 1.47 (H) 11/16/2021       PT/INR:  Lab Results   Component Value Date    PROTIME 20.3 11/16/2021    INR 1.8 11/16/2021       High Sensitivity Troponin:  Recent Labs     11/15/21  1417   TROPHS 7       ABGs:   No results found for: PHART, PH, GJS0PEE, PCO2, PO2ART, PO2, DWS8QBO, HCO3, BEART, BE, THGBART, THB, IQC9IMZ, U1VUBDHW, O2SAT, FIO2        CT CHEST WO CONTRAST   Final Result   Multifocal bilateral ground-glass opacities throughout both lungs compatible   with COVID-19 pneumonia. CT Head WO Contrast   Final Result   No acute intracranial abnormality. Diffuse atrophic changes with findings suggesting chronic microvascular   ischemia               EKG reviewed: my interpretation is: normal sinus rhythm    Assessment:    Active Problems:    Type 2 diabetes mellitus without complication (HCC)    Mild malnutrition (HCC)    Pneumonia due to COVID-19 virus    Acute respiratory failure with hypoxia (HCC)  Resolved Problems:    * No resolved hospital problems.  *      Patient Active Problem List    Diagnosis Date Noted    Type 2 diabetes mellitus without complication ([facility] 06/20/2016    Closed right hip fracture ([facility] [address] 06/20/2016    Essential hypertension 06/20/2016    [facility] failure with hypoxia [facility] 11/16/2021    Pneumonia due to COVID-19 virus 11/15/2021    S/P total knee arthroplasty, right 08/10/2020    Malignant neoplasm of sigmoid colon ([facility] [address]     Status post left hip replacement 08/08/2017    Status post total replacement of right hip 55/48/3046    Diastolic dysfunction with chronic heart failure (HCC) 06/21/2016    Mild malnutrition ([facility] [address] 06/21/2016    Partial recent retinal detachment with multiple defects 10/03/2013    Aphakia of eye, left 07/23/2013    Corneal edema, secondary, left 07/23/2013       Plan:     · This patient requires inpatient admission because of pneumonia due to COVID-19  · Factors affecting the medical complexity of this patient include acute respiratory failure with hypoxia, type 2 diabetes  · Estimated length of stay is 5 days  · Pneumonia due to COVID-19  · Remdesivir-not indicated at this time-received 1 dose in ER  · Dexamethasone 6 mg daily  · Vitamin C, D and zinc  · Continue Xarelto  · Trend CRPs-monitor for Actemra  · Appreciate infectious disease  · Acute respiratory failure with hypoxia  · Supplemental oxygen to maintain SPO2 greater than 92%-currently on 4 L  · Acapella  · Prone  · PT OT  · Nebs  · Type 2 diabetes   · POCT before meals and at bedtime  · Medium dose sliding scale  · Hypoglycemia protocol  · Continue Glucophage  · DVT prophylaxis: already on chronic anticoagulation  · Peptic ulcer prophylaxis: Pepcid  · High risk medications: none  · Social Service and Case Management consults for DC planning  · Dietician consult initiated    CORE MEASURES  DVT prophylaxis: already on chronic anticoagulation  Decubitus ulcer present on admission: No  CODE STATUS: FULL CODE  Nutrition Status: good   Physical therapy: Yes   [place] reviewed: Yes  EKG Reviewed:  Yes  Advance Directive Addressed: Yes    Kayden Arriaga Maria Luisa Chahal, APRN - CNP, LARRY, NP-C  11/16/2021, 2:37 PM

## 2021-11-16 NOTE — PROGRESS NOTES
Occupational Therapy   Occupational Therapy Initial Assessment  Date: 2021   Patient Name: Shruthi Vazquez  MRN: [id number]     : 1940    Date of Service: 2021    Discharge Recommendations:  Continue to assess pending progress[facility] [address] [facility]       Assessment   Performance deficits / Impairments: Decreased functional mobility ; Decreased ADL status; Decreased strength; Decreased endurance; Decreased high-level IADLs; Decreased balance  Assessment: 77 y/o F admitted to [facility] for COVID19+. Patient currently on O2, dyspnea c functional exxertion. Patient c generalized weakness and deconditioning, limiting indep and tolerance for self care. OT to address and educate on AE/DME, EC/WS techniques, and d/c folder to ensure safe return home. Treatment Diagnosis: weakness  Prognosis: Fair  Decision Making: Medium Complexity  OT Education: Plan of Care; OT Role; Transfer Training  REQUIRES OT FOLLOW UP: Yes  Safety Devices  Safety Devices in place: Yes  Type of devices: Left in chair; Call light within reach           Patient Diagnosis(es): The primary encounter diagnosis was Hypoxia. A diagnosis of Pneumonia due to COVID-19 virus was also pertinent to this visit. has a past medical history of Achilles tendon rupture, Cancer ([address] Chronic SI joint pain, Claustrophobia, Diabetes mellitus ([address] Esophageal stricture, [place] (hard of hearing), Hyperlipidemia, Hypertension, Retinal detachment, and Trauma to eye, left.   has a past surgical history that includes Cervical disc surgery (); Lumbar disc surgery (); Hysterectomy (); bladder suspension (); Blepharoplasty (Left, 5/3/2013); Eye surgery (Left, 06/15/2013); Eye surgery (Bilateral, ); Upper gastrointestinal endoscopy; Esophagus dilation; Cataract removal (Bilateral); eye surgery (Left, ,,,); vitrectomy (Left, 10/3/13);  Total hip arthroplasty (Left, 2017); joint replacement (Right, 06/21/2016); Foot Amputation; Foot Tendon Surgery (Left, 03/09/2018); pr foot/toes surgery proc unlisted (Left, 3/9/2018); Finger trigger release (Left, 4/15/2019); proctosigmoidoscopy (N/A, 10/30/2019); proctosigmoidoscopy (10/30/2019); colectomy; Knee Arthroplasty (Right, 08/10/2020); and Total knee arthroplasty (Right, 8/10/2020). Treatment Diagnosis: weakness      Restrictions  Restrictions/Precautions  Restrictions/Precautions: General Precautions, Fall Risk, Isolation    Subjective   General  Patient assessed for rehabilitation services?: Yes  Subjective  Subjective: Patient states no pain. General Comment  Comments: Patient sitting in chair upon arrival, agreeable to OT evaluation. Social/Functional History  Social/Functional History  Lives With: Son  Type of Home: House  Home Layout: One level  Home Access: Stairs to enter with rails  Entrance Stairs - Number of Steps: 4  Entrance Stairs - Rails: Right  Bathroom Shower/Tub: Tub/Shower unit  Bathroom Equipment: Grab bars in shower, Shower chair  Home Equipment: Rolling walker  ADL Assistance: [address]: Independent  Homemaking Responsibilities: Yes  Ambulation Assistance: Independent  Transfer Assistance: Independent  Active : No  Additional Comments: Pt reports living with son and daughter in law and being Ind with IADL's and ADL's, uses walker prn.        Objective        Orientation  Overall Orientation Status: Within Functional Limits  Observation/Palpation  Observation: Patient on 4-5L O2  Balance  Sitting Balance: Independent  Standing Balance: Contact guard assistance  Functional Mobility  Functional - Mobility Device: Rolling Walker  Activity: To/from bathroom  Assist Level: Contact guard assistance  Toilet Transfers  Toilet - Technique: Ambulating  Equipment Used: Standard toilet  Toilet Transfer: Contact guard assistance  ADL  Feeding: Independent  Grooming: Contact guard assistance  UE Bathing: Stand by assistance  LE Bathing: Minimal assistance  UE Dressing: Stand by assistance  LE Dressing: Minimal assistance  Toileting: Minimal assistance  Additional Comments: Patient very SOB c ADL and functional movement. Transfers  Stand Step Transfers: Contact guard assistance  Sit to stand: Contact guard assistance  Stand to sit: Contact guard assistance                       LUE AROM (degrees)  LUE AROM : WFL  Left Hand AROM (degrees)  Left Hand AROM: WFL  RUE AROM (degrees)  RUE AROM : WFL  Right Hand AROM (degrees)  Right Hand AROM: WFL                      Plan   Plan  Times per week: 7  Times per day: Daily  Current Treatment Recommendations: Strengthening, Balance Training, Functional Mobility Training, Endurance Training, Patient/Caregiver Education & Training, Safety Education & Training, Self-Care / ADL    G-Code     OutComes Score                                                  AM-PAC Score             Goals  Short term goals  Time Frame for Short term goals: 21 visits  Short term goal 1: Patient to complete self care routine c Mod I c use of AE/DME and EC/WS techniques as needed to ensure safe return home. Short term goal 2: Patient to engage in 15 minutes of ther ex/ther act s significant shortness of breath to improve strength as well as activity tolerance for self care tasks. Short term goal 3: Patient to be educated on d/c folder, AE/DME, and general safety to ensure safe return home. Short term goal 4: Patient to be educated on d/c folder, EC/WS techniques, AE/DME, and general safety to ensure safe return home.        Therapy Time   Individual Concurrent Group Co-treatment   Time In 0945         Time Out [address] [place] [address], OTR/L

## 2021-11-16 NOTE — CONSULTS
[facility] - Initial Consult Note COVID 19 Patient-Tele-Visit  Today's Date and Time: 11/16/2021, 10:27 AM    Impression :     · COVID 19 Confirmed Infection  · Covid tests:  · 11/12/21: Positive  · Acute hypoxic respiratory distress  · Acute confusion  · Hyponatremia  · Hx colorectal cancer s/p resection  · Hx bilateral hip replacement  · DM  · Hx urinary incontinence  · Hx cataracts s/p eye surgery X 5    Patient evaluated by Telemedicine. Requesting Institution: [facility]  Provider Institution: [address] [place] [address],[address], [address] [facility] Ms Ernesto Arechiga, APRN-     Recommendations:   · Antibiotic treatment:  · Monitor off antibiotics  · Urine culture  · Covid Rx:  · Remdesivir-Initiated 11/15/21  · Decadron-Initiated 11/15/21  · Actemra-Recommended if CRP is elevated      Medical Decision Making/Summary/Discussion:11/16/2021     · Patient admitted with suspected COVID 19 infection  · Covid test confirmed positive. Infection Control Recommendations   · Universal Precautions  · Airborne isolation  · Droplet Isolation    Antimicrobial Stewardship Recommendations     · Discontinuation of therapy  Coordination of Outpatient Care:   · Estimated Length of IV antimicrobials:TBD  · Patient will need Midline Catheter Insertion: TBD  · Patient will need PICC line Insertion: No  · Patient will need: Home IV , [place],  SNF,  LTAC:TBD  · Patient will need outpatient wound care:No    Chief complaint/reason for consultation:   · Concern for COVID infection      History of Present Illness:   Kenna Dance is a 78y.o.-year-old female who was initially admitted on 11/15/2021. Patient seen at the request of Dr. Yumi Dalton:    Patient initially presented through [address] [place] [address] on 11/12/21 with complaints of fatigue, fever and myalgias over th past two days. She was exposed to Covid from family members she lives with.  She denied shortness of breath. She was found to be stable on room air and was discharged on symbicort. On 11/15/21, the patient was brought back to the ED by family members for worsening shortness of breath and confusion. She was found to have an SpO2 of 89% on room air. Imaging revealed bilateral ground-glass opacities. CT head was negative for any acute abnormalities. Abnormal labs include:   Na: 128  D-dimer: 1.47    The patient was started on Decadron and Remdesivir. Patient admitted because of concerns with COVID 19.    CURRENT EVALUATION : 11/16/2021    Afebrile  VS stable    Patient exhibiting respiratory distress. yes  Respiratory secretions:     Patient receiving supplemental oxygen.: 4 L NC  RR18  02 sat: 92%      % FIO2:   PEEP:      QTc:       NEWS Score: 0-4 Low risk group; 5-6: Medium risk group; 7 or above: High risk group  Parameters 3 2 1 0 1 2 3   Age    < 65   ? 65   RR ? 8  9-11 12-20  21-24 ? 25   O2 Sats ? 91 92-93 94-95 ? 96      Suppl O2  Yes  No      SBP ? 90  101-110 111-219   ? 220   HR ? 40  41-50 51-90  111-130 ? 131   Consciousness    Alert   Drowsiness, lethargy, or confusion   Temperature ? 35.0 C (95.0 F)  35.1-36.0 C 95.1-96.9 F 36.1-38.0 C 97.0-100.4 F 38.1-39.0 C 100.5-102.3 F ? 39.1 C ? 102.4 F      NEWS Score:  ·  11/16/21: 7 High risk    Overall Daily Picture:    Unchanged    Presence of secondary bacterial Infection:    No   Additional antibiotics: No    Labs, X rays reviewed: 11/16/2021    BUN:19  Cr:0.49    WBC:5.9  Hb:13.2  Plat: 163    Absolute Neutrophils:4.60  Absolute Lymphocytes:0.77  Neutrophil/Lymphocyte Ratio: 6 high risk    CRP:Pending  Ferritin:  LDH:     Pro Calcitonin:      Cultures:  Urine:  ·   Blood:  ·   Sputum :  ·   Wound:       CXR:   11/12/21        CAT:  11/15/21        Discussed with patient, RN, CC, IM.     I have personally reviewed the past medical history, past surgical history, medications, social history, and family history, and I have updated the database accordingly.   Past Medical History:     Past Medical History:   Diagnosis Date    Achilles tendon rupture 2011    LEFT    Cancer ([address]     Chronic SI joint pain     left    Claustrophobia     Diabetes mellitus ([address] 2010    ON ORAL MED    Esophageal stricture 2011    PT HAD DILATATION    [place] (hard of hearing)     WEARS [place] HEARING AIDS    Hyperlipidemia 2012    Hypertension     PT HAD TILT TABLE TEST BP MED DISCONTINUED    Retinal detachment 10/2012    LEFT    Trauma to eye, left        Past Surgical  History:     Past Surgical History:   Procedure Laterality Date    BLADDER SUSPENSION  1994    WITH HYSTERECTOMY    BLEPHAROPLASTY Left 5/3/2013    CATARACT REMOVAL Bilateral     with iol    CERVICAL DISC SURGERY  1984    cervical spine    COLECTOMY      ROBOTIC LOWER COLON RESECTION    ESOPHAGEAL DILATATION      EYE SURGERY Left 06/15/2013    REPAIR OF WOUND, WITH LENS REMOVAL     EYE SURGERY Bilateral 2008    BILAT CATARACT REMOVAL WITH IOL    EYE SURGERY Left ,7/13,12/12,6/13    vitrectomy    FINGER TRIGGER RELEASE Left 4/15/2019    FINGER TRIGGER RELEASE-MIDDLE FINGER performed by Linh Tucker MD at [facility] Left 03/09/2018    Excision of Os Peroneum, Primary Repair of Peroneus Longus Tendon - Dr. Loreto Rogers Right 06/21/2016    MARY Dr. Dc Vasquez Right 08/10/2020    Dr. Horacio Atkins    lower back    LA FOOT/TOES SURGERY PROC UNLISTED Left 3/9/2018    ACHILLES TENDON LENGTHENING REPAIR/ EXCISION OF OS PERONEUM,PRIMARY REPAIR OF PERONEUS LINGUS TENDON WITH POSSIBLE PERONEAL TENODESIS performed by Maury Kramer DPM at [address] 10/30/2019    ANAL PROCTO SIGMOIDOSCOPY FLEXIBLE with biopsies performed by Lidia Bocanegra MD at [address]  10/30/2019    -sigmoid colon mass(invasive ulcerated adenocarcinoma)    TOTAL HIP ARTHROPLASTY Left 8/7/2017    HIP TOTAL ARTHROPLASTY performed by Woodrow Medina MD at [address] [facility] Right 8/10/2020    KNEE TOTAL ARTHROPLASTY performed by Woodrow Medina MD at [facility] dilation    VITRECTOMY Left 10/3/13       Medications:      budesonide  0.5 mg Nebulization BID    docusate sodium  100 mg Oral BID    latanoprost  1 drop Both Eyes Nightly    metFORMIN  500 mg Oral BID WC    rivaroxaban  10 mg Oral Daily    dexamethasone  6 mg Oral Daily    insulin lispro  0-12 Units SubCUTAneous TID WC    insulin lispro  0-6 Units SubCUTAneous Nightly    ascorbic acid  1,000 mg Oral 4x Daily    famotidine  20 mg Oral BID    zinc sulfate  100 mg Oral Daily    vitamin D  50,000 Units Oral Weekly       Social History:     Social History     Socioeconomic History    Marital status:      Spouse name: Not on file    Number of children: Not on file    Years of education: Not on file    Highest education level: Not on file   Occupational History    Not on file   Tobacco Use    Smoking status: Never Smoker    Smokeless tobacco: Never Used   Vaping Use    Vaping Use: Never used   Substance and Sexual Activity    Alcohol use: No    Drug use: No    Sexual activity: Not on file   Other Topics Concern    Not on file   Social History Narrative    Not on file     Social Determinants of Health     Financial Resource Strain:     Difficulty of Paying Living Expenses: Not on file   Food Insecurity:     Worried About Running Out of Food in the Last Year: Not on file    Joy of Food in the Last Year: Not on file   Transportation Needs:     Lack of Transportation (Medical): Not on file    Lack of Transportation (Non-Medical):  Not on file   Physical Activity:     Days of Exercise per Week: Not on file    Minutes of Exercise per Session: Not on file   Stress:     Feeling of Stress : Not on file   Social Connections:     Frequency of Communication with Friends and Family: Not on file    Frequency of Social Gatherings with Friends and Family: Not on file    Attends Scientologist Services: Not on file    Active Member of Clubs or Organizations: Not on file    Attends Club or Organization Meetings: Not on file    Marital Status: Not on file   Intimate Partner Violence:     Fear of Current or Ex-Partner: Not on file    Emotionally Abused: Not on file    Physically Abused: Not on file    Sexually Abused: Not on file   Housing Stability:     Unable to Pay for Housing in the Last Year: Not on file    Number of [place] in the Last Year: Not on file    Unstable Housing in the Last Year: Not on file       Family History:     Family History   Problem Relation Age of Onset    Heart Disease Mother         CARDIAC MASS    Heart Disease Father     Cancer Paternal Grandfather     Cancer Other         both families, several family members        Allergies:   Aleve [naproxen sodium], Aspirin, Codeine, Nsaids, Align [lactase-lactobacillus], Amitriptyline, Gabapentin, Lidocaine, Lyrica [pregabalin], Omnipaque [iohexol], Other, Tetracaine, and Iodine     Review of Systems:       Constitutional: No fevers or chills. No systemic complaints  Head: No headaches  Eyes: No double vision or blurry vision. No conjunctival inflammation. ENT: No sore throat or runny nose. . No hearing loss, tinnitus or vertigo. Cardiovascular: No chest pain or palpitations. Shortness of breath. MACK  Lung:  Shortness of breath or cough. No sputum production  Abdomen: No nausea, vomiting, diarrhea, or abdominal pain. Ayde Dany No cramps. Genitourinary: No increased urinary frequency, or dysuria. No hematuria. No suprapubic or CVA pain  Musculoskeletal: No muscle aches or pains. No joint effusions, swelling or deformities  Hematologic: No bleeding or bruising.   Neurologic: No headache, weakness, numbness, or tingling. Integument: No rash, no ulcers. Psychiatric: No depression. Endocrine: No polyuria, no polydipsia, no polyphagia. Physical Examination :     Patient Vitals for the past 8 hrs:   BP Temp Temp src Pulse Resp SpO2 Height   11/16/21 0726 110/70 97.7 °F (36.5 °C) Temporal 80 18 92 % --   11/16/21 0651 -- -- -- -- -- -- 5' 4\" (1.626 m)   11/16/21 0247 123/67 97.3 °F (36.3 °C) Temporal 77 20 92 % --     General Appearance: Awake, alert, and in no apparent distress  Head:  Normocephalic, no trauma  Eyes: Pupils equal, round, reactive to light; sclera anicteric; conjunctivae pink. No embolic phenomena. ENT: Oropharynx clear, without erythema, exudate, or thrush. No tenderness of sinuses. Mouth/throat: mucosa pink and moist. No lesions. Dentition in good repair. Neck:Supple, without lymphadenopathy. Thyroid normal, No bruits. Pulmonary/Chest: Clear to auscultation, without wheezes, rales, or rhonchi. No dullness to percussion. Cardiovascular: Regular rate and rhythm without murmurs, rubs, or gallops. Abdomen: Soft, non tender. Bowel sounds normal. No organomegaly  All four Extremities: No cyanosis, clubbing, edema, or effusions. Neurologic: No gross sensory or motor deficits. Skin: Warm and dry with good turgor. No signs of peripheral arterial or venous insufficiency. No ulcerations. No open wounds.     Medical Decision Making -Laboratory:   I have independently reviewed/ordered the following labs:    CBC with Differential:   Recent Labs     11/15/21  1417 11/16/21  0650   WBC 7.2 5.9   HGB 13.2 13.2   HCT 39.2 38.3    163   LYMPHOPCT 10* 13*   MONOPCT 4 8     BMP:   Recent Labs     11/15/21  1417 11/16/21  0650   * 129*   K 3.9 3.9   CL 90* 94*   CO2 24 20   BUN 11 19   CREATININE 0.56 0.49*     Hepatic Function Panel:   Recent Labs     11/15/21  1417 11/16/21  0650   PROT 7.7 6.9   LABALBU 3.6 3.1*   BILITOT 0.64 0.43   ALKPHOS 62 60   ALT 16 19   AST 28 28     No results for input(s): RPR in the last 72 hours. No results for input(s): HIV in the last 72 hours. No results for input(s): BC in the last 72 hours. Lab Results   Component Value Date    MUCUS TRACE 06/20/2016    RBC 4.28 11/16/2021    RBC 3.98 11/16/2011    TRICHOMONAS NOT REPORTED 06/20/2016    WBC 5.9 11/16/2021    YEAST NOT REPORTED 06/20/2016    TURBIDITY CLEAR 06/20/2016     Lab Results   Component Value Date    CREATININE 0.49 11/16/2021    GLUCOSE 281 11/16/2021    GLUCOSE 116 11/16/2011       Medical Decision Making-Imaging:     Narrative   EXAMINATION:   ONE XRAY VIEW OF THE CHEST       11/12/2021 6:08 pm       COMPARISON:   None.       HISTORY:   ORDERING SYSTEM PROVIDED HISTORY: weakness   TECHNOLOGIST PROVIDED HISTORY:   weakness       FINDINGS:   There is diffuse prominence of the pulmonary interstitium.  No airspace   consolidation.  No pneumothorax or pleural effusion.  No cardiomegaly. Degenerative changes in the bilateral shoulders and spine.           Impression   Diffuse prominence of the pulmonary interstitium.  This is nonspecific but   can be seen in atypical/viral pneumonia.  Correlate clinically             Narrative   EXAMINATION:   CT OF THE CHEST WITHOUT CONTRAST 11/15/2021 2:30 pm       TECHNIQUE:   CT of the chest was performed without the administration of intravenous   contrast. Multiplanar reformatted images are provided for review.  Dose   modulation, iterative reconstruction, and/or weight based adjustment of the   mA/kV was utilized to reduce the radiation dose to as low as reasonably   achievable.       COMPARISON:   None.       HISTORY:   ORDERING SYSTEM PROVIDED HISTORY: covid   TECHNOLOGIST PROVIDED HISTORY:   covid   Decision Support Exception - unselect if not a suspected or confirmed   emergency medical condition->Emergency Medical Condition (MA)       FINDINGS:   Mediastinum: Mild cardiomegaly.  No pericardial effusion.  Thoracic aorta is   normal in caliber.  Scattered atherosclerotic changes.  Scattered mildly   prominent mediastinal lymph nodes.       Lungs/pleura: Scattered ground-glass infiltrate throughout both lungs.  No   effusion or pneumothorax.       Upper Abdomen: No acute abnormality.  Small sliding hiatal hernia.       Soft Tissues/Bones: No aggressive osseous lesions.  Confluent anterolateral   vertebral body syndesmophyte formation compatible with DISH. Jitendra Britt acute soft   tissue abnormality.           Impression   Multifocal bilateral ground-glass opacities throughout both lungs compatible   with COVID-19 pneumonia.             Narrative   EXAMINATION:   CT OF THE HEAD WITHOUT CONTRAST  11/15/2021 2:29 pm       TECHNIQUE:   CT of the head was performed without the administration of intravenous   contrast. Dose modulation, iterative reconstruction, and/or weight based   adjustment of the mA/kV was utilized to reduce the radiation dose to as low   as reasonably achievable.       COMPARISON:   03/01/2013       HISTORY:   ORDERING SYSTEM PROVIDED HISTORY: confusion   TECHNOLOGIST PROVIDED HISTORY:   confusion       Decision Support Exception - unselect if not a suspected or confirmed   emergency medical condition->Emergency Medical Condition (MA)       FINDINGS:   BRAIN/VENTRICLES: The ventricles and sulci are diffusely enlarged.  Low   attenuation is seen in the periventricular and subcortical white matter.  No   acute intracranial hemorrhage or acute infarct is identified.       ORBITS: The visualized portion of the orbits demonstrate no acute abnormality.       SINUSES: Small amount of fluid in the right maxillary sinus.  Otherwise the   paranasal sinuses appear clear.       SOFT TISSUES/SKULL:  No acute abnormality of the visualized skull or soft   tissues.           Impression   No acute intracranial abnormality.       Diffuse atrophic changes with findings suggesting chronic microvascular   ischemia                 Medical Decision [id number]:       Medical Decision Making-Other:     Note:  · Labs, medications, radiologic studies were reviewed with personal review of films  ·  Large amounts of data were reviewed  · Discussed with nursing Staff, Discharge planner  · Infection Control and Prevention measures reviewed  · All prior entries were reviewed  · Administer medications as ordered  · Prognosis: Guarded  · Discharge planning reviewed      Thank you for allowing us to participate in the care of this patient. Please call with questions. LARRY Oden - ERIKA  Pager: [phone] - Office: [phone]    ATTESTATION:    I have discussed the case, including pertinent history and exam findings with the APRN. I have evaluated the  History, physical findings and pictures of the patient and the key elements of the encounter have been performed by me. I have reviewed the laboratory data, other diagnostic studies and discussed them with the APRN. I have updated the medical record where necessary. I agree with the assessment, plan and orders as documented by the APRN.     Steven Bowens MD.

## 2021-11-16 NOTE — ACP (ADVANCE CARE PLANNING)
Advance Care Planning     Advance Care Planning Activator (Inpatient)  Conversation Note      Date of ACP Conversation: 11/16/2021     Conversation Conducted with: Patient with Decision Making Capacity    ACP Activator: Nuria Finley 43 Decision Maker:     Current Designated Health Care Decision Maker:     Mau Glaser- Son- [phone]    Hannah Brower- Daughter in [place]- [phone]  Today we documented decision maker based off of documents patient and family states are at home    Care Preferences    Ventilation: \"If you were in your present state of health and suddenly became very ill and were unable to breathe on your own, what would your preference be about the use of a ventilator (breathing machine) if it were available to you? \"      Would the patient desire the use of ventilator (breathing machine)?: yes    \"If your health worsens and it becomes clear that your chance of recovery is unlikely, what would your preference be about the use of a ventilator (breathing machine) if it were available to you? \"     Would the patient desire the use of ventilator (breathing machine)?: No      Resuscitation  \"CPR works best to restart the heart when there is a sudden event, like a heart attack, in someone who is otherwise healthy. Unfortunately, CPR does not typically restart the heart for people who have serious health conditions or who are very sick. \"    \"In the event your heart stopped as a result of an underlying serious health condition, would you want attempts to be made to restart your heart (answer \"yes\" for attempt to resuscitate) or would you prefer a natural death (answer \"no\" for do not attempt to resuscitate)? \" yes       [] Yes   [x] No   Educated Patient / Meño Lau regarding differences between Advance Directives and portable DNR orders.     Length of ACP Conversation in minutes:      Conversation Outcomes:  [x] ACP discussion completed  [] Existing advance directive reviewed with patient; no changes to patient's previously recorded wishes  [] New Advance Directive completed  [] Portable Do Not Rescitate prepared for Provider review and signature  [] POLST/POST/MOLST/MOST prepared for Provider review and signature      Follow-up plan:    [] Schedule follow-up conversation to continue planning  [] Referred individual to Provider for additional questions/concerns   [] Advised patient/agent/surrogate to review completed ACP document and update if needed with changes in condition, patient preferences or care setting    [x] This note routed to one or more involved healthcare providers

## 2021-11-17 LAB
-: ABNORMAL
ABSOLUTE EOS #: <0.03 K/UL (ref 0–0.44)
ABSOLUTE IMMATURE GRANULOCYTE: 0.1 K/UL (ref 0–0.3)
ABSOLUTE LYMPH #: 0.93 K/UL (ref 1.1–3.7)
ABSOLUTE MONO #: 0.63 K/UL (ref 0.1–1.2)
ALBUMIN SERPL-MCNC: 3.4 G/DL (ref 3.5–5.2)
ALBUMIN/GLOBULIN RATIO: 1 (ref 1–2.5)
ALP BLD-CCNC: 72 U/L (ref 35–104)
ALT SERPL-CCNC: 23 U/L (ref 5–33)
AMORPHOUS: ABNORMAL
ANION GAP SERPL CALCULATED.3IONS-SCNC: 14 MMOL/L (ref 9–17)
AST SERPL-CCNC: 29 U/L
BACTERIA: ABNORMAL
BASOPHILS # BLD: 0 % (ref 0–2)
BASOPHILS ABSOLUTE: <0.03 K/UL (ref 0–0.2)
BILIRUB SERPL-MCNC: 0.42 MG/DL (ref 0.3–1.2)
BILIRUBIN URINE: NEGATIVE
BUN BLDV-MCNC: 27 MG/DL (ref 8–23)
BUN/CREAT BLD: 47 (ref 9–20)
C-REACTIVE PROTEIN: 54.9 MG/L (ref 0–5)
CALCIUM SERPL-MCNC: 8.4 MG/DL (ref 8.6–10.4)
CASTS UA: ABNORMAL /LPF
CHLORIDE BLD-SCNC: 96 MMOL/L (ref 98–107)
CO2: 20 MMOL/L (ref 20–31)
COLOR: YELLOW
COMMENT UA: ABNORMAL
CREAT SERPL-MCNC: 0.58 MG/DL (ref 0.5–0.9)
CRYSTALS, UA: ABNORMAL /HPF
D-DIMER QUANTITATIVE: 0.68 MG/L FEU (ref 0–0.59)
DIFFERENTIAL TYPE: ABNORMAL
EOSINOPHILS RELATIVE PERCENT: 0 % (ref 1–4)
EPITHELIAL CELLS UA: ABNORMAL /HPF (ref 0–25)
FERRITIN: 461 UG/L (ref 13–150)
FIBRINOGEN: 479 MG/DL (ref 179–518)
GFR AFRICAN AMERICAN: >60 ML/MIN
GFR NON-AFRICAN AMERICAN: >60 ML/MIN
GFR SERPL CREATININE-BSD FRML MDRD: ABNORMAL ML/MIN/{1.73_M2}
GFR SERPL CREATININE-BSD FRML MDRD: ABNORMAL ML/MIN/{1.73_M2}
GLUCOSE BLD-MCNC: 216 MG/DL (ref 74–100)
GLUCOSE BLD-MCNC: 234 MG/DL (ref 74–100)
GLUCOSE BLD-MCNC: 237 MG/DL (ref 70–99)
GLUCOSE BLD-MCNC: 244 MG/DL (ref 74–100)
GLUCOSE BLD-MCNC: 300 MG/DL (ref 74–100)
GLUCOSE URINE: NEGATIVE
HCT VFR BLD CALC: 38 % (ref 36.3–47.1)
HEMOGLOBIN: 13.3 G/DL (ref 11.9–15.1)
IMMATURE GRANULOCYTES: 1 %
INR BLD: 1.3
KETONES, URINE: NEGATIVE
LEUKOCYTE ESTERASE, URINE: ABNORMAL
LYMPHOCYTES # BLD: 6 % (ref 24–43)
MCH RBC QN AUTO: 31.4 PG (ref 25.2–33.5)
MCHC RBC AUTO-ENTMCNC: 35 G/DL (ref 28.4–34.8)
MCV RBC AUTO: 89.6 FL (ref 82.6–102.9)
MONOCYTES # BLD: 4 % (ref 3–12)
MUCUS: ABNORMAL
NITRITE, URINE: NEGATIVE
NRBC AUTOMATED: 0 PER 100 WBC
OTHER OBSERVATIONS UA: ABNORMAL
PARTIAL THROMBOPLASTIN TIME: 28.4 SEC (ref 23.9–33.8)
PDW BLD-RTO: 11.9 % (ref 11.8–14.4)
PH UA: 6.5 (ref 5–9)
PLATELET # BLD: 247 K/UL (ref 138–453)
PLATELET ESTIMATE: ABNORMAL
PMV BLD AUTO: 11.4 FL (ref 8.1–13.5)
POTASSIUM SERPL-SCNC: 4.1 MMOL/L (ref 3.7–5.3)
PROTEIN UA: NEGATIVE
PROTHROMBIN TIME: 16.4 SEC (ref 11.5–14.2)
RBC # BLD: 4.24 M/UL (ref 3.95–5.11)
RBC # BLD: ABNORMAL 10*6/UL
RBC UA: ABNORMAL /HPF (ref 0–2)
RENAL EPITHELIAL, UA: ABNORMAL /HPF
SEG NEUTROPHILS: 89 % (ref 36–65)
SEGMENTED NEUTROPHILS ABSOLUTE COUNT: 13.3 K/UL (ref 1.5–8.1)
SODIUM BLD-SCNC: 130 MMOL/L (ref 135–144)
SPECIFIC GRAVITY UA: 1.01 (ref 1.01–1.02)
TOTAL PROTEIN: 6.8 G/DL (ref 6.4–8.3)
TRICHOMONAS: ABNORMAL
TURBIDITY: CLEAR
URINE HGB: NEGATIVE
UROBILINOGEN, URINE: NORMAL
WBC # BLD: 15 K/UL (ref 3.5–11.3)
WBC # BLD: ABNORMAL 10*3/UL
WBC UA: ABNORMAL /HPF (ref 0–5)
YEAST: ABNORMAL

## 2021-11-17 PROCEDURE — 36415 COLL VENOUS BLD VENIPUNCTURE: CPT

## 2021-11-17 PROCEDURE — 97110 THERAPEUTIC EXERCISES: CPT

## 2021-11-17 PROCEDURE — 85730 THROMBOPLASTIN TIME PARTIAL: CPT

## 2021-11-17 PROCEDURE — 97535 SELF CARE MNGMENT TRAINING: CPT

## 2021-11-17 PROCEDURE — 6360000002 HC RX W HCPCS: Performed by: NURSE PRACTITIONER

## 2021-11-17 PROCEDURE — 2700000000 HC OXYGEN THERAPY PER DAY

## 2021-11-17 PROCEDURE — 94761 N-INVAS EAR/PLS OXIMETRY MLT: CPT

## 2021-11-17 PROCEDURE — 97116 GAIT TRAINING THERAPY: CPT

## 2021-11-17 PROCEDURE — 85379 FIBRIN DEGRADATION QUANT: CPT

## 2021-11-17 PROCEDURE — APPSS30 APP SPLIT SHARED TIME 16-30 MINUTES: Performed by: NURSE PRACTITIONER

## 2021-11-17 PROCEDURE — 80053 COMPREHEN METABOLIC PANEL: CPT

## 2021-11-17 PROCEDURE — 82947 ASSAY GLUCOSE BLOOD QUANT: CPT

## 2021-11-17 PROCEDURE — 82728 ASSAY OF FERRITIN: CPT

## 2021-11-17 PROCEDURE — 86140 C-REACTIVE PROTEIN: CPT

## 2021-11-17 PROCEDURE — 1200000000 HC SEMI PRIVATE

## 2021-11-17 PROCEDURE — 99233 SBSQ HOSP IP/OBS HIGH 50: CPT | Performed by: INTERNAL MEDICINE

## 2021-11-17 PROCEDURE — 94640 AIRWAY INHALATION TREATMENT: CPT

## 2021-11-17 PROCEDURE — 6370000000 HC RX 637 (ALT 250 FOR IP): Performed by: NURSE PRACTITIONER

## 2021-11-17 PROCEDURE — 85025 COMPLETE CBC W/AUTO DIFF WBC: CPT

## 2021-11-17 PROCEDURE — 94669 MECHANICAL CHEST WALL OSCILL: CPT

## 2021-11-17 PROCEDURE — XW033H5 INTRODUCTION OF TOCILIZUMAB INTO PERIPHERAL VEIN, PERCUTANEOUS APPROACH, NEW TECHNOLOGY GROUP 5: ICD-10-PCS | Performed by: INTERNAL MEDICINE

## 2021-11-17 PROCEDURE — 85384 FIBRINOGEN ACTIVITY: CPT

## 2021-11-17 PROCEDURE — 2580000003 HC RX 258: Performed by: NURSE PRACTITIONER

## 2021-11-17 PROCEDURE — 85610 PROTHROMBIN TIME: CPT

## 2021-11-17 RX ADMIN — ZINC SULFATE 220 MG (50 MG) CAPSULE 100 MG: CAPSULE at 09:59

## 2021-11-17 RX ADMIN — FAMOTIDINE 20 MG: 20 TABLET ORAL at 09:59

## 2021-11-17 RX ADMIN — METFORMIN HYDROCHLORIDE 500 MG: 500 TABLET ORAL at 10:03

## 2021-11-17 RX ADMIN — RIVAROXABAN 10 MG: 10 TABLET, FILM COATED ORAL at 09:59

## 2021-11-17 RX ADMIN — INSULIN LISPRO 4 UNITS: 100 INJECTION, SOLUTION INTRAVENOUS; SUBCUTANEOUS at 17:16

## 2021-11-17 RX ADMIN — OXYCODONE HYDROCHLORIDE AND ACETAMINOPHEN 1000 MG: 500 TABLET ORAL at 13:38

## 2021-11-17 RX ADMIN — DOCUSATE SODIUM 100 MG: 100 CAPSULE ORAL at 22:25

## 2021-11-17 RX ADMIN — INSULIN LISPRO 4 UNITS: 100 INJECTION, SOLUTION INTRAVENOUS; SUBCUTANEOUS at 22:29

## 2021-11-17 RX ADMIN — DEXAMETHASONE 6 MG: 4 TABLET ORAL at 09:59

## 2021-11-17 RX ADMIN — BUDESONIDE 500 MCG: 0.5 SUSPENSION RESPIRATORY (INHALATION) at 21:33

## 2021-11-17 RX ADMIN — TOCILIZUMAB 400 MG: 20 INJECTION, SOLUTION, CONCENTRATE INTRAVENOUS at 10:08

## 2021-11-17 RX ADMIN — OXYCODONE HYDROCHLORIDE AND ACETAMINOPHEN 1000 MG: 500 TABLET ORAL at 09:59

## 2021-11-17 RX ADMIN — BUDESONIDE 500 MCG: 0.5 SUSPENSION RESPIRATORY (INHALATION) at 09:02

## 2021-11-17 RX ADMIN — INSULIN LISPRO 4 UNITS: 100 INJECTION, SOLUTION INTRAVENOUS; SUBCUTANEOUS at 09:56

## 2021-11-17 RX ADMIN — FAMOTIDINE 20 MG: 20 TABLET ORAL at 22:25

## 2021-11-17 RX ADMIN — METFORMIN HYDROCHLORIDE 500 MG: 500 TABLET ORAL at 17:15

## 2021-11-17 RX ADMIN — INSULIN LISPRO 4 UNITS: 100 INJECTION, SOLUTION INTRAVENOUS; SUBCUTANEOUS at 11:24

## 2021-11-17 RX ADMIN — OXYCODONE HYDROCHLORIDE AND ACETAMINOPHEN 1000 MG: 500 TABLET ORAL at 17:15

## 2021-11-17 RX ADMIN — LATANOPROST 1 DROP: 50 SOLUTION OPHTHALMIC at 22:24

## 2021-11-17 RX ADMIN — OXYCODONE HYDROCHLORIDE AND ACETAMINOPHEN 1000 MG: 500 TABLET ORAL at 22:28

## 2021-11-17 NOTE — CONSULTS
[facility] Tocilizumab Criteria for Use    Criteria **All criteria need to be met to receive Tocilizumab for COVID-19 patients**   Age  >22 years old    Clinical Status  Within 7 days of symptom onset OR within 2 days of hospital admission   AND  Within 24 hours of vital (respiratory/cardiovascular) organ support initiation*   Concomitant Therapy Receiving systemic corticosteroids    Oxygen Saturation  <92% OR requiring oxygen    CRP   (if available) >7.5 mg/dL   (>75mg/L)   Contraindications Invasive active mycobacterial or fungal infection  Platelets < 165,048 or active bleeding   Not receiving systemic steroids    Ordering Provider Type  ID, intensivists, pulmonology (where available, see above)     *Respiratory support includes but is not limited to: Non-invasive/invasive ventilation   *Cardiovascular support includes but is not limited to: Vasopressor support   Results for Octavio Glaze (MRN [id number]) as of 11/17/2021 08:05   Ref. Range 11/12/2021 18:08 11/15/2021 14:17 11/16/2021 06:50 11/17/2021 06:30   Platelet Count Latest Ref Range: 138 - 453 k/uL 149 173 163 247     Results for Octavio Glaze (MRN [id number]) as of 11/17/2021 08:05   Ref. Range 11/16/2021 06:50   CRP Latest Ref Range: 0.0 - 5.0 mg/L 126.7 (H)     I  Weight-Based Tocilizumab Dose (x 1 dose only)  WHEN COMPOUNDED FROM IV VIAL  Patient Weight (ABW, in kg) Tocilizumab (Actemra) Dose   <= 40 kg 8 mg/kg  x 1 dose   >40 kg - <= 65 kg 400 mg x 1 dose   > 65 - <=90 kg 600 mg  x 1 dose   > 90 kg  800mg  x 1 dose     Actemra 400 mg IVPB once over 1 hour with in line filter.   Nola Sherman., 11/17/2021, 8:06 AM

## 2021-11-17 NOTE — FLOWSHEET NOTE
Awake,resting in bed. Vitals checked and patient reassessed. No change from morning assessment. Call light within reach.  , continue to monitor

## 2021-11-17 NOTE — PROGRESS NOTES
VS and assessment completed. Patient denies pain. Patient encouraged to deep breathe and use the acepella. Bed alarm on. Denies any needs. Will continue to monitor.

## 2021-11-17 NOTE — PROGRESS NOTES
Patient up to bathroom. Unable to obtain a urine sample. Patients bowel movement went into the hat. Zinc cream placed on patients coccyx. Patient encouraged to deep breathe and lay on her side in bed.

## 2021-11-17 NOTE — FLOWSHEET NOTE
Update given to patients daughter in law [facility]. Patients son Jabier Vallecillo at work.

## 2021-11-17 NOTE — PROGRESS NOTES
Present: No  Referring Practitioner: GRETA Austin  Subjective  Subjective: Pt. in bed upon arrival and agreeable to therapy. No pain reported at this time. Pain Screening  Patient Currently in Pain: Denies  Vital Signs  Patient Currently in Pain: Denies       Orientation  Orientation  Overall Orientation Status: Within Functional Limits  Cognition      Objective      Transfers  Sit to Stand: Stand by assistance; Contact guard assistance  Stand to sit: Stand by assistance; Contact guard assistance  Ambulation  Ambulation?: Yes  Ambulation 1  Surface: level tile  Device: Rolling Walker  Other Apparatus: O2  Assistance: Contact guard assistance; Stand by assistance  Distance: 40ftx1   Exercises  Comments: Supine and seated exercises B LE x20   Comment: STS x10    G-Code     OutComes Score    AM-PAC Score    Goals  Short term goals  Time Frame for Short term goals: 20 days  Short term goal 1: Patient to complete sit/stand and stand pivot transfers with SUP and no LOB to decrease fall risk. Short term goal 2: Patient to ambulate 150ft with FWW or LRAD and SUP with SpO2 >/=90% for improved endurance. Short term goal 3: Patient to tolerate 20-30 min of ther ex/act to improve functional strnegth and endurance. Short term goal 4: Patient to ascend/descend 4 steps with HRx1 and SUP with no LOB to safely enter her home. Plan    Plan  Times per week: 7  Times per day: Twice a day  Current Treatment Recommendations: Strengthening, Neuromuscular Re-education, Home Exercise Program, ROM, Safety Education & Training, Balance Training, Endurance Training, Patient/Caregiver Education & Training, Functional Mobility Training, Transfer Training, Gait Training, Stair training  Safety Devices  Type of devices:  All fall risk precautions in place, Call light within reach, Patient at risk for falls, Nurse notified, Gait belt, Left in bed     Therapy Time   Individual Concurrent Group Co-treatment   Time In [phone]

## 2021-11-17 NOTE — PROGRESS NOTES
Progress Note    SUBJECTIVE:    Patient seen for f/u of Pneumonia due to COVID-19 virus. She been up in the chair alert oriented no acute distress. Patient currently on 6 L per nasal cannula. She has no complaints. ROS:   Constitutional: negative  for fevers, and negative for chills. Respiratory: positive for shortness of breath, positive for cough, and negative for wheezing  Cardiovascular: negative for chest pain, and negative for palpitations  Gastrointestinal: negative for abdominal pain, negative for nausea,negative for vomiting, negative for diarrhea, and negative for constipation     All other systems were reviewed with the patient and are negative unless otherwise stated in HPI      OBJECTIVE:      Vitals:   Vitals:    11/17/21 0902   BP:    Pulse:    Resp: 22   Temp:    SpO2: 93%     Weight: 145 lb 11.2 oz (66.1 kg)   Height: 5' 4\" (162.6 cm)     Weight  Wt Readings from Last 3 Encounters:   11/15/21 145 lb 11.2 oz (66.1 kg)   11/12/21 149 lb (67.6 kg)   08/12/20 161 lb 9.6 oz (73.3 kg)     Body mass index is 25.01 kg/m². 24HR INTAKE/OUTPUT:      Intake/Output Summary (Last 24 hours) at 11/17/2021 1233  Last data filed at 11/17/2021 0546  Gross per 24 hour   Intake 1290 ml   Output 650 ml   Net 640 ml     -----------------------------------------------------------------  Exam:    GEN:    Awake, alert and oriented x3. EYES:   EOMI, pupils equal   NECK: Supple. No lymphadenopathy. No carotid bruit  CVS:     regular rate and rhythm, no audible murmur  PULM:  Diminished with bibasilar rales, no acute respiratory distress  ABD:     Bowels sounds normal.  Abdomen is soft. No distention. no tenderness to palpation. EXT:     no edema bilaterally . No calf tenderness. NEURO: Moves all extremities. Motor and sensory are grossly intact  SKIN:    No rashes.   No skin lesions.  -----------------------------------------------------------------    Diagnostic Data:      Complete Blood Count:   Recent Labs     11/15/21  1417 11/16/21  0650 11/17/21  0630   WBC 7.2 5.9 15.0*   RBC 4.31 4.28 4.24   HGB 13.2 13.2 13.3   HCT 39.2 38.3 38.0   MCV 91.0 89.5 89.6   MCH 30.6 30.8 31.4   MCHC 33.7 34.5 35.0*   RDW 11.9 11.9 11.9    163 247   MPV 11.3 11.1 11.4        Last 3 Blood Glucose:   Recent Labs     11/15/21  1417 11/16/21  0650 11/17/21  0630   GLUCOSE 156* 281* 237*        Comprehensive Metabolic Profile:   Recent Labs     11/15/21  1417 11/16/21  0650 11/17/21  0630   * 129* 130*   K 3.9 3.9 4.1   CL 90* 94* 96*   CO2 24 20 20   BUN 11 19 27*   CREATININE 0.56 0.49* 0.58   GLUCOSE 156* 281* 237*   CALCIUM 8.4* 8.3* 8.4*   PROT 7.7 6.9 6.8   LABALBU 3.6 3.1* 3.4*   BILITOT 0.64 0.43 0.42   ALKPHOS 62 60 72   AST 28 28 29   ALT 16 19 23        Urinalysis:   Lab Results   Component Value Date    NITRU NEGATIVE 06/20/2016    COLORU YELLOW 06/20/2016    PHUR 6.0 06/20/2016    WBCUA 5 TO 10 06/20/2016    RBCUA 0 TO 2 06/20/2016    MUCUS TRACE 06/20/2016    TRICHOMONAS NOT REPORTED 06/20/2016    YEAST NOT REPORTED 06/20/2016    BACTERIA TRACE 06/20/2016    SPECGRAV 1.020 06/20/2016    LEUKOCYTESUR TRACE 06/20/2016    UROBILINOGEN Normal 06/20/2016    BILIRUBINUR NEGATIVE 06/20/2016    GLUCOSEU NEGATIVE 06/20/2016    KETUA NEGATIVE 06/20/2016    AMORPHOUS NOT REPORTED 06/20/2016       HgBA1c:    Lab Results   Component Value Date    LABA1C 7.1 08/11/2020       Lactic Acid:   Lab Results   Component Value Date    LACTA 1.7 11/15/2021        Troponin: No results for input(s): TROPONINI in the last 72 hours. Radiology/Imaging:  CT CHEST WO CONTRAST   Final Result   Multifocal bilateral ground-glass opacities throughout both lungs compatible   with COVID-19 pneumonia. CT Head WO Contrast   Final Result   No acute intracranial abnormality.       Diffuse atrophic changes with findings suggesting chronic microvascular   ischemia               ASSESSMENT / PLAN:  Pneumonia due to COVID-19 virus  · Continue current therapy   · Remdesivir-not indicated  · Continue dexamethasone  · Continue vitamin C, D and zinc  · Continue Xarelto  · Trend CRPs-elevated 126  · Request Actemra to be given today  · Appreciate infectious disease  · Acute respiratory failure with hypoxia  · Supplemental oxygen to maintain SPO2 greater than 92%-currently on 6 L  · Acapella  · Prone  · PT/OT  · Nebs  · Type 2 diabetes  · POCT before meals and at bedtime  · Medium dose sliding scale  · Hypoglycemia protocol  · Continue Glucophage  · Nutrition status:   · at risk for malnutrition  · Dietician consult initiated  · Hospital Prophylaxis:   · DVT: Xarelto   · Stress Ulcer: H2 Blocker   · High risk medications: none   · Disposition:    · Discharge plan is pending      LARRY Sanchez - CNP , LARRY, NP-C  Hospitalist Medicine        11/17/2021, 12:33 PM

## 2021-11-17 NOTE — PROGRESS NOTES
Present: No  Referring Practitioner: GRETA Gil  Subjective  Subjective: Pt. up in chair upon arrival. Pt. denies pain and agreeable to therapy at this time. Orientation  Orientation  Overall Orientation Status: Within Functional Limits  Cognition      Objective      Transfers  Sit to Stand: Contact guard assistance  Stand to sit: Contact guard assistance  Ambulation  Ambulation?: Yes  Ambulation 1  Surface: level tile  Device: Rolling Walker  Other Apparatus: O2  Assistance: Contact guard assistance  Distance: 40ftx1        Exercises  Comments: Reclined and seated exercises B LE x20     G-Code     OutComes Score    AM-PAC Score    Goals  Short term goals  Time Frame for Short term goals: 20 days  Short term goal 1: Patient to complete sit/stand and stand pivot transfers with SUP and no LOB to decrease fall risk. Short term goal 2: Patient to ambulate 150ft with FWW or LRAD and SUP with SpO2 >/=90% for improved endurance. Short term goal 3: Patient to tolerate 20-30 min of ther ex/act to improve functional strnegth and endurance. Short term goal 4: Patient to ascend/descend 4 steps with HRx1 and SUP with no LOB to safely enter her home. Plan    Plan  Times per week: 7  Times per day: Twice a day  Current Treatment Recommendations: Strengthening, Neuromuscular Re-education, Home Exercise Program, ROM, Safety Education & Training, Balance Training, Endurance Training, Patient/Caregiver Education & Training, Functional Mobility Training, Transfer Training, Gait Training, Stair training  Safety Devices  Type of devices:  All fall risk precautions in place, Call light within reach, Patient at risk for falls, Nurse notified, Gait belt, Left in chair     Therapy Time   Individual Concurrent Group Co-treatment   Time In 1047         Time Out 1111         Minutes 262 \[facility]\"" [place], [place]

## 2021-11-17 NOTE — PROGRESS NOTES
[facility] -Progress Note COVID 19 Patient-Tele-Visit  Today's Date and Time: 11/17/2021, 2:13 PM    Impression :     · COVID 19 Confirmed Infection  · Covid tests:  · 11/12/21: Positive  · Acute hypoxic respiratory distress  · Acute confusion  · Hyponatremia  · Hx colorectal cancer s/p resection  · Hx bilateral hip replacement  · DM  · Hx urinary incontinence  · Hx cataracts s/p eye surgery X 5    Patient evaluated by Telemedicine. Requesting Institution: [facility]  Provider Institution: [address] [place] [address],[facility] [place], [address] Ms Juan Carlos Zapien, APRN-     Recommendations:   · Antibiotic treatment:  · Monitor off antibiotics  · Urine culture  · Covid Rx:  · Remdesivir-Initiated 11/15/21  · Decadron-Initiated 11/15/21  · Actemra-Ordered 11/17/21      Medical Decision Making/Summary/Discussion:11/17/2021     · Patient admitted with suspected COVID 19 infection  · Covid test confirmed positive. Infection Control Recommendations   · Universal Precautions  · Airborne isolation  · Droplet Isolation    Antimicrobial Stewardship Recommendations     · Discontinuation of therapy  Coordination of Outpatient Care:   · Estimated Length of IV antimicrobials:TBD  · Patient will need Midline Catheter Insertion: TBD  · Patient will need PICC line Insertion: No  · Patient will need: Home IV , [place],  SNF,  LTAC:TBD  · Patient will need outpatient wound care:No    Chief complaint/reason for consultation:   · Concern for COVID infection      History of Present Illness:   [place] is a 78y.o.-year-old female who was initially admitted on 11/15/2021. Patient seen at the request of Dr. Law Boston:    Patient initially presented through [address] [place] [address] on 11/12/21 with complaints of fatigue, fever and myalgias over the past two days. She was exposed to Covid from family members she lives with. She denied shortness of breath. She was found to be stable on room air and was discharged on symbicort. On 11/15/21, the patient was brought back to the ED by family members for worsening shortness of breath and confusion. She was found to have an SpO2 of 89% on room air. Imaging revealed bilateral ground-glass opacities. CT head was negative for any acute abnormalities. Abnormal labs include:   Na: 128  D-dimer: 1.47    The patient was started on Decadron and Remdesivir. Patient admitted because of concerns with COVID 19.    CURRENT EVALUATION : 11/17/2021    Afebrile  VS stable    Increased oxygen requirements today  On Decadron and Actemra ordered 11/17/21    Urine culture shows no significant growth    Patient exhibiting respiratory distress. yes  Respiratory secretions:     Patient receiving supplemental oxygen.: 4-->6 L NC  RR:18-->20  02 sat: 92-->94%      % FIO2:   PEEP:      QTc:       NEWS Score: 0-4 Low risk group; 5-6: Medium risk group; 7 or above: High risk group  Parameters 3 2 1 0 1 2 3   Age    < 65   ? 65   RR ? 8  9-11 12-20  21-24 ? 25   O2 Sats ?  91 92-93 94-95 ? 96      Suppl O2  Yes  No      SBP ? 90  101-110 111-219   ? 220   HR ? 40  41-50 51-90  111-130 ? 131   Consciousness    Alert   Drowsiness, lethargy, or confusion   Temperature ? 35.0 C (95.0 F)  35.1-36.0 C 95.1-96.9 F 36.1-38.0 C 97.0-100.4 F 38.1-39.0 C 100.5-102.3 F ? 39.1 C ? 102.4 F      NEWS Score:  ·  11/16/21: 7 High risk    Overall Daily Picture:    Unchanged    Presence of secondary bacterial Infection:    No   Additional antibiotics: No    Labs, X rays reviewed: 11/17/2021    BUN:19-->27  Cr:0.49-->0.58    WBC:5.9-->15.0  Hb:13.2-->13.3  Plat: 163-->247    Absolute Neutrophils:4.60  Absolute Lymphocytes:0.77  Neutrophil/Lymphocyte Ratio: 6 high risk    CRP:126.7  Ferritin:408  LDH: 320    Pro Calcitonin:      Cultures:  Urine:  ·   Blood:  ·   Sputum :  ·   Wound:       CXR:   11/12/21        CAT:  11/15/21        Discussed with patient, RN, CC, IM. I have personally reviewed the past medical history, past surgical history, medications, social history, and family history, and I have updated the database accordingly.   Past Medical History:     Past Medical History:   Diagnosis Date    Achilles tendon rupture 2011    LEFT    [facility]     Chronic SI joint pain     left    Claustrophobia     Diabetes mellitus ([facility] [address] 2010    ON ORAL MED    Esophageal stricture 2011    PT HAD DILATATION    [place] (hard of hearing)     WEARS [place] HEARING AIDS    Hyperlipidemia 2012    Hypertension     PT HAD TILT TABLE TEST BP MED DISCONTINUED    Retinal detachment 10/2012    LEFT    Trauma to eye, left        Past Surgical  History:     Past Surgical History:   Procedure Laterality Date    BLADDER SUSPENSION  1994    WITH HYSTERECTOMY    BLEPHAROPLASTY Left 5/3/2013    CATARACT REMOVAL Bilateral     with iol    CERVICAL DISC SURGERY  1984    cervical spine    COLECTOMY      ROBOTIC LOWER COLON RESECTION    ESOPHAGEAL DILATATION      EYE SURGERY Left 06/15/2013    REPAIR OF WOUND, WITH LENS REMOVAL     EYE SURGERY Bilateral 2008    BILAT CATARACT REMOVAL WITH IOL    EYE SURGERY Left ,7/13,12/12,6/13    vitrectomy    FINGER TRIGGER RELEASE Left 4/15/2019    FINGER TRIGGER RELEASE-MIDDLE FINGER performed by Catia Jimenes MD at [facility] Left 03/09/2018    Excision of Os Peroneum, Primary Repair of Peroneus Longus Tendon - Dr. Rich Enriquez Right 06/21/2016    MARY Dr. Ninfa Patel Right 08/10/2020    Dr. Madeleine Quinteros    lower back    PA FOOT/TOES SURGERY PROC UNLISTED Left 3/9/2018    ACHILLES TENDON LENGTHENING REPAIR/ EXCISION OF OS PERONEUM,PRIMARY REPAIR OF PERONEUS LINGUS TENDON WITH POSSIBLE PERONEAL TENODESIS performed by Dequan Nj DPM at [facility] 10/30/2019    ANAL PROCTO SIGMOIDOSCOPY FLEXIBLE with biopsies performed by Lidia Bocanegra MD at [address]  10/30/2019    -sigmoid colon mass(invasive ulcerated adenocarcinoma)    TOTAL HIP ARTHROPLASTY Left 8/7/2017    HIP TOTAL ARTHROPLASTY performed by Linh Tucker MD at [place] [address] Right 8/10/2020    KNEE TOTAL ARTHROPLASTY performed by Linh Tucker MD at [facility] dilation    VITRECTOMY Left 10/3/13       Medications:      budesonide  0.5 mg Nebulization BID    docusate sodium  100 mg Oral BID    latanoprost  1 drop Both Eyes Nightly    metFORMIN  500 mg Oral BID WC    rivaroxaban  10 mg Oral Daily    dexamethasone  6 mg Oral Daily    insulin lispro  0-12 Units SubCUTAneous TID WC    insulin lispro  0-6 Units SubCUTAneous Nightly    ascorbic acid  1,000 mg Oral 4x Daily    famotidine  20 mg Oral BID    zinc sulfate  100 mg Oral Daily    vitamin D  50,000 Units Oral Weekly       Social History:     Social History     Socioeconomic History    Marital status:      Spouse name: Not on file    Number of children: Not on file    Years of education: Not on file    Highest education level: Not on file   Occupational History    Not on file   Tobacco Use    Smoking status: Never Smoker    Smokeless tobacco: Never Used   Vaping Use    Vaping Use: Never used   Substance and Sexual Activity    Alcohol use: No    Drug use: No    Sexual activity: Not on file   Other Topics Concern    Not on file   Social History Narrative    Not on file     Social Determinants of Health     Financial Resource Strain:     Difficulty of Paying Living Expenses: Not on file   Food Insecurity:     Worried About Running Out of Food in the Last Year: Not on file    Joy of Food in the Last Year: Not on file   Transportation Needs:     Lack of Transportation (Medical): Not on file    Lack of Transportation (Non-Medical):  Not on file   Physical Activity:     Days of Exercise per Week: Not on file    Minutes of Exercise per Session: Not on file   Stress:     Feeling of Stress : Not on file   Social Connections:     Frequency of Communication with Friends and Family: Not on file    Frequency of Social Gatherings with Friends and Family: Not on file    Attends Scientologist Services: Not on file    Active Member of [address] [place] [address] or Organizations: Not on file    Attends Club or Organization Meetings: Not on file    Marital Status: Not on file   Intimate Partner Violence:     Fear of Current or Ex-Partner: Not on file    Emotionally Abused: Not on file    Physically Abused: Not on file    Sexually Abused: Not on file   Housing Stability:     Unable to Pay for Housing in the Last Year: Not on file    Number of [place] in the Last Year: Not on file    Unstable Housing in the Last Year: Not on file       Family History:     Family History   Problem Relation Age of Onset    Heart Disease Mother         CARDIAC MASS    Heart Disease Father     Cancer Paternal Grandfather     Cancer Other         both families, several family members        Allergies:   Aleve [naproxen sodium], Aspirin, Codeine, Nsaids, Align [lactase-lactobacillus], Amitriptyline, Gabapentin, Lidocaine, Lyrica [pregabalin], Omnipaque [iohexol], Other, Tetracaine, and Iodine     Review of Systems:       Constitutional: No fevers or chills. No systemic complaints  Head: No headaches  Eyes: No double vision or blurry vision. No conjunctival inflammation. ENT: No sore throat or runny nose. . No hearing loss, tinnitus or vertigo. Cardiovascular: No chest pain or palpitations. Shortness of breath. MACK  Lung:  Shortness of breath or cough. No sputum production  Abdomen: No nausea, vomiting, diarrhea, or abdominal pain. [place] No cramps. Genitourinary: No increased urinary frequency, or dysuria. No hematuria. No suprapubic or CVA pain  Musculoskeletal: No muscle aches or pains. No joint effusions, swelling or deformities  Hematologic: No bleeding or bruising. Neurologic: No headache, weakness, numbness, or tingling. Integument: No rash, no ulcers. Psychiatric: No depression. Endocrine: No polyuria, no polydipsia, no polyphagia. Physical Examination :     Patient Vitals for the past 8 hrs:   BP Temp Temp src Pulse Resp SpO2   11/17/21 1333 109/70 97.3 °F (36.3 °C) Temporal 80 20 94 %   11/17/21 0902 -- -- -- -- 22 93 %   11/17/21 0743 109/68 97.9 °F (36.6 °C) Temporal 89 20 91 %     General Appearance: Awake, alert, and in no apparent distress  Head:  Normocephalic, no trauma  Eyes: Pupils equal, round, reactive to light; sclera anicteric; conjunctivae pink. No embolic phenomena. ENT: Oropharynx clear, without erythema, exudate, or thrush. No tenderness of sinuses. Mouth/throat: mucosa pink and moist. No lesions. Dentition in good repair. Neck:Supple, without lymphadenopathy. Thyroid normal, No bruits. Pulmonary/Chest: Clear to auscultation, without wheezes, rales, or rhonchi. No dullness to percussion. Cardiovascular: Regular rate and rhythm without murmurs, rubs, or gallops. Abdomen: Soft, non tender. Bowel sounds normal. No organomegaly  All four Extremities: No cyanosis, clubbing, edema, or effusions. Neurologic: No gross sensory or motor deficits. Skin: Warm and dry with good turgor. No signs of peripheral arterial or venous insufficiency. No ulcerations. No open wounds.     Medical Decision Making -Laboratory:   I have independently reviewed/ordered the following labs:    CBC with Differential:   Recent Labs     11/16/21 0650 11/17/21  0630   WBC 5.9 15.0*   HGB 13.2 13.3   HCT 38.3 38.0    247   LYMPHOPCT 13* 6*   MONOPCT 8 4     BMP:   Recent Labs     11/16/21 0650 11/17/21  0630   * 130*   K 3.9 4.1   CL 94* 96*   CO2 20 20   BUN 19 27*   CREATININE 0.49* 0.58     Hepatic Function Panel:   Recent Labs     11/16/21  0650 11/17/21  0630   PROT 6.9 6.8 LABALBU 3.1* 3.4*   BILITOT 0.43 0.42   ALKPHOS 60 72   ALT 19 23   AST 28 29     No results for input(s): RPR in the last 72 hours. No results for input(s): HIV in the last 72 hours. No results for input(s): BC in the last 72 hours. Lab Results   Component Value Date    MUCUS NOT REPORTED 11/16/2021    RBC 4.24 11/17/2021    RBC 3.98 11/16/2011    TRICHOMONAS NOT REPORTED 11/16/2021    WBC 15.0 11/17/2021    YEAST NOT REPORTED 11/16/2021    TURBIDITY Clear 11/16/2021     Lab Results   Component Value Date    CREATININE 0.58 11/17/2021    GLUCOSE 237 11/17/2021    GLUCOSE 116 11/16/2011       Medical Decision Making-Imaging:     Narrative   EXAMINATION:   ONE XRAY VIEW OF THE CHEST       11/12/2021 6:08 pm       COMPARISON:   None.       HISTORY:   ORDERING SYSTEM PROVIDED HISTORY: weakness   TECHNOLOGIST PROVIDED HISTORY:   weakness       FINDINGS:   There is diffuse prominence of the pulmonary interstitium.  No airspace   consolidation.  No pneumothorax or pleural effusion.  No cardiomegaly. Degenerative changes in the bilateral shoulders and spine.           Impression   Diffuse prominence of the pulmonary interstitium.  This is nonspecific but   can be seen in atypical/viral pneumonia.  Correlate clinically             Narrative   EXAMINATION:   CT OF THE CHEST WITHOUT CONTRAST 11/15/2021 2:30 pm       TECHNIQUE:   CT of the chest was performed without the administration of intravenous   contrast. Multiplanar reformatted images are provided for review.  Dose   modulation, iterative reconstruction, and/or weight based adjustment of the   mA/kV was utilized to reduce the radiation dose to as low as reasonably   achievable.       COMPARISON:   None.       HISTORY:   ORDERING SYSTEM PROVIDED HISTORY: covid   TECHNOLOGIST PROVIDED HISTORY:   covid   Decision Support Exception - unselect if not a suspected or confirmed   emergency medical condition->Emergency Medical Condition (MA)       FINDINGS:   Mediastinum: Mild cardiomegaly.  No pericardial effusion.  Thoracic aorta is   normal in caliber.  Scattered atherosclerotic changes.  Scattered mildly   prominent mediastinal lymph nodes.       Lungs/pleura: Scattered ground-glass infiltrate throughout both lungs.  No   effusion or pneumothorax.       Upper Abdomen: No acute abnormality.  Small sliding hiatal hernia.       Soft Tissues/Bones: No aggressive osseous lesions.  Confluent anterolateral   vertebral body syndesmophyte formation compatible with DISH. Alia Safe acute soft   tissue abnormality.           Impression   Multifocal bilateral ground-glass opacities throughout both lungs compatible   with COVID-19 pneumonia.             Narrative   EXAMINATION:   CT OF THE HEAD WITHOUT CONTRAST  11/15/2021 2:29 pm       TECHNIQUE:   CT of the head was performed without the administration of intravenous   contrast. Dose modulation, iterative reconstruction, and/or weight based   adjustment of the mA/kV was utilized to reduce the radiation dose to as low   as reasonably achievable.       COMPARISON:   03/01/2013       HISTORY:   ORDERING SYSTEM PROVIDED HISTORY: confusion   TECHNOLOGIST PROVIDED HISTORY:   confusion       Decision Support Exception - unselect if not a suspected or confirmed   emergency medical condition->Emergency Medical Condition (MA)       FINDINGS:   BRAIN/VENTRICLES: The ventricles and sulci are diffusely enlarged.  Low   attenuation is seen in the periventricular and subcortical white matter.  No   acute intracranial hemorrhage or acute infarct is identified.       ORBITS: The visualized portion of the orbits demonstrate no acute abnormality.       SINUSES: Small amount of fluid in the right maxillary sinus.  Otherwise the   paranasal sinuses appear clear.       SOFT TISSUES/SKULL:  No acute abnormality of the visualized skull or soft   tissues.           Impression   No acute intracranial abnormality.       Diffuse atrophic changes with findings suggesting chronic microvascular   ischemia                 Medical Decision [id number]-[id number]-Ipgjg:       Medical Decision Making-Other:     Note:  · Labs, medications, radiologic studies were reviewed with personal review of films  ·  Large amounts of data were reviewed  · Discussed with nursing Staff, Discharge planner  · Infection Control and Prevention measures reviewed  · All prior entries were reviewed  · Administer medications as ordered  · Prognosis: Guarded  · Discharge planning reviewed      Thank you for allowing us to participate in the care of this patient. Please call with questions. LARRY Christina - CNP  Pager: [phone] - Office: [phone]      ATTESTATION:    I have discussed the case, including pertinent history and exam findings with the APRN. I have evaluated the  History, physical findings and pictures of the patient and the key elements of the encounter have been performed by me. I have reviewed the laboratory data, other diagnostic studies and discussed them with the APRN. I have updated the medical record where necessary. I agree with the assessment, plan and orders as documented by the APRN.     Sav Silvestre MD.

## 2021-11-17 NOTE — PROGRESS NOTES
Occupational Therapy  Facility/Department: [address] [place] [address] MED SURG  Daily Treatment Note  NAME: Maria Del Carmen Lindsay  : 1940  MRN: [id number]    Date of Service: 2021    Discharge Recommendations:  Continue to assess pending progress, Subacute/Skilled Nursing Facility, Home with Home health OT       Assessment      OT Education: OT Role; Plan of Care; Home Exercise Program; Transfer Training  Patient Education: pt declined edu on d/c folder \"I have been here 6 times since , I have those at home. I have all the safety stuff too. \" Pt described tub transfer bench, RW, reacher, etc.  Barriers to Learning: none  Activity Tolerance  Activity Tolerance: Patient Tolerated treatment well  Safety Devices  Type of devices: Left in chair; Call light within reach         Patient Diagnosis(es): The primary encounter diagnosis was Hypoxia. A diagnosis of Pneumonia due to COVID-19 virus was also pertinent to this visit. has a past medical history of Achilles tendon rupture, Cancer ([address] Chronic SI joint pain, Claustrophobia, Diabetes mellitus ([address] Esophageal stricture, [place] (hard of hearing), Hyperlipidemia, Hypertension, Retinal detachment, and Trauma to eye, left.   has a past surgical history that includes Cervical disc surgery (); Lumbar disc surgery (); Hysterectomy (); bladder suspension (); Blepharoplasty (Left, 5/3/2013); Eye surgery (Left, 06/15/2013); Eye surgery (Bilateral, ); Upper gastrointestinal endoscopy; Esophagus dilation; Cataract removal (Bilateral); eye surgery (Left, ,,,); vitrectomy (Left, 10/3/13); Total hip arthroplasty (Left, 2017); joint replacement (Right, 2016); Foot Amputation; Foot Tendon Surgery (Left, 2018); pr foot/toes surgery proc unlisted (Left, 3/9/2018);  Finger trigger release (Left, 4/15/2019); proctosigmoidoscopy (N/A, 10/30/2019); proctosigmoidoscopy (10/30/2019); colectomy; Knee Arthroplasty (Right, 08/10/2020); and Total knee arthroplasty (Right, 8/10/2020). Restrictions  Restrictions/Precautions  Restrictions/Precautions: General Precautions, Fall Risk, Isolation  Subjective   General  Chart Reviewed: Yes  Patient assessed for rehabilitation services?: Yes  Response to previous treatment: Patient with no complaints from previous session  Family / Caregiver Present: No  Diagnosis: covid  Subjective  Subjective: Pt in bed agreeable to therex and transfer training. Pt disgusted with lack of transportation in area and perseverated on that. \"I wrote a letter to the [address] [place] [address] going to write to the [facility]. \"  General Comment  Comments: Patient sitting in chair upon arrival, agreeable to OT evaluation. Vital Signs  Patient Currently in Pain: Denies   Orientation     Objective             Balance  Sitting Balance: Independent  Standing Balance: Stand by assistance  Standing Balance  Time: ~ 3 min x 2  Activity: fxl mob/transfer training in room  Comment: 0 LOB, 0 diff managing 02 tubing  Functional Mobility  Functional - Mobility Device: Rolling Walker  Activity: Other (normal household distances in room)  Assist Level: Stand by assistance  Bed mobility  Rolling to Right: Stand by assistance  Supine to Sit: Stand by assistance  Scooting: Stand by assistance  Comment: use of bed rail  Transfers  Sit to stand: Stand by assistance  Stand to sit: Stand by assistance  Transfer Comments: VCs for tech                                            Type of ROM/Therapeutic Exercise  Type of ROM/Therapeutic Exercise: Free weights  Comment: BUE therex to increase strength/endurance for ease of fxl tasks. 1# free weight x 20 reps x all planes while supine HOB raised. Occ short RBs.                     Plan   Plan  Times per week: 7  Times per day: Daily  Current Treatment Recommendations: Strengthening, Balance Training, Functional Mobility Training, Endurance Training, Patient/Caregiver Education & Training, Safety Education & Training, Self-Care / ADL  G-Code     OutComes Score                                                  AM-PAC Score             Goals  Short term goals  Time Frame for Short term goals: 21 visits  Short term goal 1: Patient to complete self care routine c Mod I c use of AE/DME and EC/WS techniques as needed to ensure safe return home. Short term goal 2: Patient to engage in 15 minutes of ther ex/ther act s significant shortness of breath to improve strength as well as activity tolerance for self care tasks. Short term goal 3: Patient to be educated on d/c folder, AE/DME, and general safety to ensure safe return home. Short term goal 4: Patient to be educated on d/c folder, EC/WS techniques, AE/DME, and general safety to ensure safe return home.        Therapy Time   Individual Concurrent Group Co-treatment   Time In 1525         Time Out 1552         Minutes [address]

## 2021-11-17 NOTE — PROGRESS NOTES
Encouraged patient to lay on her stomach or side to side. Oxygen hanging in the low 90s to high 80s. Patient turned to Right side.   Pulse ox at 91%

## 2021-11-17 NOTE — FLOWSHEET NOTE
Awake,up to bathroom and up in chair at bedside. Vitals checked and assessment completed. C/O SOB with activity. 91 % on 6 liters of O2. . Call light within reach.  Continue to monitor

## 2021-11-17 NOTE — PROGRESS NOTES
Patient awake and in bed. Denies any needs. Stated she is going to call her son today to see how he is doing. Able to sit on the side of the bed as well.

## 2021-11-18 PROBLEM — U07.1 COVID-19 VIRUS INFECTION: Status: ACTIVE | Noted: 2021-11-18

## 2021-11-18 LAB
ABSOLUTE EOS #: 0 K/UL (ref 0–0.44)
ABSOLUTE IMMATURE GRANULOCYTE: 0.07 K/UL (ref 0–0.3)
ABSOLUTE LYMPH #: 0.74 K/UL (ref 1.1–3.7)
ABSOLUTE MONO #: 0.37 K/UL (ref 0.1–1.2)
ALBUMIN SERPL-MCNC: 3.1 G/DL (ref 3.5–5.2)
ALBUMIN/GLOBULIN RATIO: 0.9 (ref 1–2.5)
ALP BLD-CCNC: 62 U/L (ref 35–104)
ALT SERPL-CCNC: 19 U/L (ref 5–33)
ANION GAP SERPL CALCULATED.3IONS-SCNC: 12 MMOL/L (ref 9–17)
AST SERPL-CCNC: 22 U/L
BASOPHILS # BLD: 0 % (ref 0–2)
BASOPHILS ABSOLUTE: 0 K/UL (ref 0–0.2)
BILIRUB SERPL-MCNC: 0.42 MG/DL (ref 0.3–1.2)
BUN BLDV-MCNC: 23 MG/DL (ref 8–23)
BUN/CREAT BLD: 40 (ref 9–20)
CALCIUM SERPL-MCNC: 8.4 MG/DL (ref 8.6–10.4)
CHLORIDE BLD-SCNC: 100 MMOL/L (ref 98–107)
CO2: 25 MMOL/L (ref 20–31)
CREAT SERPL-MCNC: 0.57 MG/DL (ref 0.5–0.9)
D-DIMER QUANTITATIVE: 0.66 MG/L FEU (ref 0–0.59)
DIFFERENTIAL TYPE: ABNORMAL
EOSINOPHILS RELATIVE PERCENT: 0 % (ref 1–4)
FERRITIN: 352 UG/L (ref 13–150)
FIBRINOGEN: 345 MG/DL (ref 179–518)
GFR AFRICAN AMERICAN: >60 ML/MIN
GFR NON-AFRICAN AMERICAN: >60 ML/MIN
GFR SERPL CREATININE-BSD FRML MDRD: ABNORMAL ML/MIN/{1.73_M2}
GFR SERPL CREATININE-BSD FRML MDRD: ABNORMAL ML/MIN/{1.73_M2}
GLUCOSE BLD-MCNC: 161 MG/DL (ref 74–100)
GLUCOSE BLD-MCNC: 183 MG/DL (ref 70–99)
GLUCOSE BLD-MCNC: 230 MG/DL (ref 74–100)
GLUCOSE BLD-MCNC: 234 MG/DL (ref 74–100)
HCT VFR BLD CALC: 37.5 % (ref 36.3–47.1)
HEMOGLOBIN: 12.4 G/DL (ref 11.9–15.1)
IMMATURE GRANULOCYTES: 1 %
INR BLD: 1.3
LYMPHOCYTES # BLD: 10 % (ref 24–43)
MCH RBC QN AUTO: 30.3 PG (ref 25.2–33.5)
MCHC RBC AUTO-ENTMCNC: 33.1 G/DL (ref 28.4–34.8)
MCV RBC AUTO: 91.7 FL (ref 82.6–102.9)
MONOCYTES # BLD: 5 % (ref 3–12)
MORPHOLOGY: NORMAL
NRBC AUTOMATED: 0 PER 100 WBC
PARTIAL THROMBOPLASTIN TIME: 26.8 SEC (ref 23.9–33.8)
PDW BLD-RTO: 12 % (ref 11.8–14.4)
PLATELET # BLD: 244 K/UL (ref 138–453)
PLATELET ESTIMATE: ABNORMAL
PMV BLD AUTO: 11.2 FL (ref 8.1–13.5)
POTASSIUM SERPL-SCNC: 3.9 MMOL/L (ref 3.7–5.3)
PROTHROMBIN TIME: 16.3 SEC (ref 11.5–14.2)
RBC # BLD: 4.09 M/UL (ref 3.95–5.11)
RBC # BLD: ABNORMAL 10*6/UL
SEG NEUTROPHILS: 84 % (ref 36–65)
SEGMENTED NEUTROPHILS ABSOLUTE COUNT: 6.22 K/UL (ref 1.5–8.1)
SODIUM BLD-SCNC: 137 MMOL/L (ref 135–144)
TOTAL PROTEIN: 6.6 G/DL (ref 6.4–8.3)
WBC # BLD: 7.4 K/UL (ref 3.5–11.3)
WBC # BLD: ABNORMAL 10*3/UL

## 2021-11-18 PROCEDURE — 82728 ASSAY OF FERRITIN: CPT

## 2021-11-18 PROCEDURE — 94640 AIRWAY INHALATION TREATMENT: CPT

## 2021-11-18 PROCEDURE — 36415 COLL VENOUS BLD VENIPUNCTURE: CPT

## 2021-11-18 PROCEDURE — 6360000002 HC RX W HCPCS: Performed by: NURSE PRACTITIONER

## 2021-11-18 PROCEDURE — 85379 FIBRIN DEGRADATION QUANT: CPT

## 2021-11-18 PROCEDURE — 85025 COMPLETE CBC W/AUTO DIFF WBC: CPT

## 2021-11-18 PROCEDURE — 97116 GAIT TRAINING THERAPY: CPT

## 2021-11-18 PROCEDURE — 97110 THERAPEUTIC EXERCISES: CPT

## 2021-11-18 PROCEDURE — 99232 SBSQ HOSP IP/OBS MODERATE 35: CPT | Performed by: INTERNAL MEDICINE

## 2021-11-18 PROCEDURE — 94669 MECHANICAL CHEST WALL OSCILL: CPT

## 2021-11-18 PROCEDURE — 85384 FIBRINOGEN ACTIVITY: CPT

## 2021-11-18 PROCEDURE — APPSS30 APP SPLIT SHARED TIME 16-30 MINUTES: Performed by: NURSE PRACTITIONER

## 2021-11-18 PROCEDURE — 82947 ASSAY GLUCOSE BLOOD QUANT: CPT

## 2021-11-18 PROCEDURE — 85730 THROMBOPLASTIN TIME PARTIAL: CPT

## 2021-11-18 PROCEDURE — 85610 PROTHROMBIN TIME: CPT

## 2021-11-18 PROCEDURE — 2700000000 HC OXYGEN THERAPY PER DAY

## 2021-11-18 PROCEDURE — 80053 COMPREHEN METABOLIC PANEL: CPT

## 2021-11-18 PROCEDURE — 6370000000 HC RX 637 (ALT 250 FOR IP): Performed by: NURSE PRACTITIONER

## 2021-11-18 PROCEDURE — 1200000000 HC SEMI PRIVATE

## 2021-11-18 PROCEDURE — 94761 N-INVAS EAR/PLS OXIMETRY MLT: CPT

## 2021-11-18 RX ADMIN — INSULIN LISPRO 1 UNITS: 100 INJECTION, SOLUTION INTRAVENOUS; SUBCUTANEOUS at 22:23

## 2021-11-18 RX ADMIN — METFORMIN HYDROCHLORIDE 500 MG: 500 TABLET ORAL at 16:58

## 2021-11-18 RX ADMIN — METFORMIN HYDROCHLORIDE 500 MG: 500 TABLET ORAL at 08:59

## 2021-11-18 RX ADMIN — OXYCODONE HYDROCHLORIDE AND ACETAMINOPHEN 1000 MG: 500 TABLET ORAL at 16:58

## 2021-11-18 RX ADMIN — INSULIN LISPRO 4 UNITS: 100 INJECTION, SOLUTION INTRAVENOUS; SUBCUTANEOUS at 11:44

## 2021-11-18 RX ADMIN — RIVAROXABAN 10 MG: 10 TABLET, FILM COATED ORAL at 08:59

## 2021-11-18 RX ADMIN — FAMOTIDINE 20 MG: 20 TABLET ORAL at 08:59

## 2021-11-18 RX ADMIN — FAMOTIDINE 20 MG: 20 TABLET ORAL at 22:21

## 2021-11-18 RX ADMIN — DEXAMETHASONE 6 MG: 4 TABLET ORAL at 08:59

## 2021-11-18 RX ADMIN — OXYCODONE HYDROCHLORIDE AND ACETAMINOPHEN 1000 MG: 500 TABLET ORAL at 13:34

## 2021-11-18 RX ADMIN — LATANOPROST 1 DROP: 50 SOLUTION OPHTHALMIC at 22:21

## 2021-11-18 RX ADMIN — BUDESONIDE 500 MCG: 0.5 SUSPENSION RESPIRATORY (INHALATION) at 20:17

## 2021-11-18 RX ADMIN — OXYCODONE HYDROCHLORIDE AND ACETAMINOPHEN 1000 MG: 500 TABLET ORAL at 08:59

## 2021-11-18 RX ADMIN — ZINC SULFATE 220 MG (50 MG) CAPSULE 100 MG: CAPSULE at 08:59

## 2021-11-18 RX ADMIN — BUDESONIDE 500 MCG: 0.5 SUSPENSION RESPIRATORY (INHALATION) at 10:25

## 2021-11-18 RX ADMIN — INSULIN LISPRO 2 UNITS: 100 INJECTION, SOLUTION INTRAVENOUS; SUBCUTANEOUS at 09:00

## 2021-11-18 RX ADMIN — OXYCODONE HYDROCHLORIDE AND ACETAMINOPHEN 1000 MG: 500 TABLET ORAL at 22:21

## 2021-11-18 RX ADMIN — INSULIN LISPRO 4 UNITS: 100 INJECTION, SOLUTION INTRAVENOUS; SUBCUTANEOUS at 16:58

## 2021-11-18 ASSESSMENT — PAIN SCALES - GENERAL: PAINLEVEL_OUTOF10: 0

## 2021-11-18 NOTE — FLOWSHEET NOTE
Awake, resting in bed. Vitals checked and assessment completed. Continues at 6 liters of oxygen with SATs at 95 %. Denies pain this morning. Coughing up small amounts of sputum. No needs at present time.  Continue to monitor

## 2021-11-18 NOTE — PROGRESS NOTES
Recent Labs     11/16/21  0650 11/17/21  0630 11/18/21  0610   WBC 5.9 15.0* 7.4   RBC 4.28 4.24 4.09   HGB 13.2 13.3 12.4   HCT 38.3 38.0 37.5   MCV 89.5 89.6 91.7   MCH 30.8 31.4 30.3   MCHC 34.5 35.0* 33.1   RDW 11.9 11.9 12.0    247 244   MPV 11.1 11.4 11.2        Last 3 Blood Glucose:   Recent Labs     11/15/21  1417 11/16/21  0650 11/17/21  0630 11/18/21  0610   GLUCOSE 156* 281* 237* 183*        Comprehensive Metabolic Profile:   Recent Labs     11/16/21  0650 11/17/21  0630 11/18/21  0610   * 130* 137   K 3.9 4.1 3.9   CL 94* 96* 100   CO2 20 20 25   BUN 19 27* 23   CREATININE 0.49* 0.58 0.57   GLUCOSE 281* 237* 183*   CALCIUM 8.3* 8.4* 8.4*   PROT 6.9 6.8 6.6   LABALBU 3.1* 3.4* 3.1*   BILITOT 0.43 0.42 0.42   ALKPHOS 60 72 62   AST 28 29 22   ALT 19 23 19        Urinalysis:   Lab Results   Component Value Date    NITRU NEGATIVE 11/16/2021    COLORU Yellow 11/16/2021    PHUR 6.5 11/16/2021    WBCUA 2 TO 5 11/16/2021    RBCUA 0 TO 2 11/16/2021    MUCUS NOT REPORTED 11/16/2021    TRICHOMONAS NOT REPORTED 11/16/2021    YEAST NOT REPORTED 11/16/2021    BACTERIA 1+ 11/16/2021    SPECGRAV 1.010 11/16/2021    LEUKOCYTESUR SMALL 11/16/2021    UROBILINOGEN Normal 11/16/2021    BILIRUBINUR NEGATIVE 11/16/2021    GLUCOSEU NEGATIVE 11/16/2021    KETUA NEGATIVE 11/16/2021    AMORPHOUS NOT REPORTED 11/16/2021       HgBA1c:    Lab Results   Component Value Date    LABA1C 7.1 08/11/2020       Lactic Acid:   Lab Results   Component Value Date    LACTA 1.7 11/15/2021        Troponin: No results for input(s): TROPONINI in the last 72 hours. Radiology/Imaging:  CT CHEST WO CONTRAST   Final Result   Multifocal bilateral ground-glass opacities throughout both lungs compatible   with COVID-19 pneumonia. CT Head WO Contrast   Final Result   No acute intracranial abnormality.       Diffuse atrophic changes with findings suggesting chronic microvascular   ischemia               ASSESSMENT / PLAN:  COVID-19 virus infection  · Continue current therapy   · Remdesivir-not indicated  · Continue dexamethasone  · Continue vitamin C, D and zinc  · Continue Xarelto  · Trend CRPs-improvign  · Received Actemra 11/17/2021  · Appreciate infectious disease  · Blood culture x1+ gram-positive cocci-will await sensitivity as I am concerned this is a contaminant. Patient afebrile no elevation in WBC count. We will hold off on starting antibiotics.   · Acute respiratory failure with hypoxia  · Supplemental oxygen to maintain SPO2 greater than 92%-currently on 6 L  · Acapella  · Prone  · PT/OT  · Nebs  · Type 2 diabetes  · POCT before meals and at bedtime  · Medium dose sliding scale  · Hypoglycemia protocol  · Continue Glucophage  · Nutrition status:   · at risk for malnutrition  · Dietician consult initiated  · Hospital Prophylaxis:   · DVT: Xarelto   · Stress Ulcer: H2 Blocker   · High risk medications: none   · Disposition:    · Discharge plan is pending      LARRY Adan - CNP , LARRY, NP-C  Hospitalist Medicine        11/18/2021, 9:12 AM

## 2021-11-18 NOTE — PROGRESS NOTES
Occupational Therapy  Facility/Department: [facility] AT THE [facility] MED SURG  Daily Treatment Note  NAME: Steph Swanson  : 1940  MRN: [id number]    Date of Service: 2021    Discharge Recommendations:  Continue to assess pending progress, Subacute/Skilled Nursing Facility, Home with Home health OT       Assessment      OT Education: OT Role; Plan of Care; Home Exercise Program  Barriers to Learning: none  Activity Tolerance  Activity Tolerance: Patient Tolerated treatment well  Safety Devices  Safety Devices in place: Yes  Type of devices: Left in chair; Call light within reach; All fall risk precautions in place         Patient Diagnosis(es): The primary encounter diagnosis was Hypoxia. A diagnosis of Pneumonia due to COVID-19 virus was also pertinent to this visit. has a past medical history of Achilles tendon rupture, Cancer ([address] Chronic SI joint pain, Claustrophobia, Diabetes mellitus ([address] Esophageal stricture, [place] (hard of hearing), Hyperlipidemia, Hypertension, Retinal detachment, and Trauma to eye, left.   has a past surgical history that includes Cervical disc surgery (); Lumbar disc surgery (); Hysterectomy (); bladder suspension (); Blepharoplasty (Left, 5/3/2013); Eye surgery (Left, 06/15/2013); Eye surgery (Bilateral, ); Upper gastrointestinal endoscopy; Esophagus dilation; Cataract removal (Bilateral); eye surgery (Left, ,,,); vitrectomy (Left, 10/3/13); Total hip arthroplasty (Left, 2017); joint replacement (Right, 2016); Foot Amputation; Foot Tendon Surgery (Left, 2018); pr foot/toes surgery proc unlisted (Left, 3/9/2018); Finger trigger release (Left, 4/15/2019); proctosigmoidoscopy (N/A, 10/30/2019); proctosigmoidoscopy (10/30/2019); colectomy; Knee Arthroplasty (Right, 08/10/2020); and Total knee arthroplasty (Right, 8/10/2020).     Restrictions  Restrictions/Precautions  Restrictions/Precautions: General Precautions, Fall Risk, 1330         Time Out 1355         Minutes 25                 Yumiko Khan, RENATO

## 2021-11-18 NOTE — PROGRESS NOTES
Patient sitting up in chair at this time. Writer assessed patient and took vital signs. Patient is alert and oriented. Patient making jokes and in a good mood. Patient denies having needs. Vitals stable and WDL. Patient working with therapy when writer left room. Call light within reach, will continue to monitor.

## 2021-11-18 NOTE — PROGRESS NOTES
Comprehensive Nutrition Assessment    Type and Reason for Visit:  Reassess    Nutrition Recommendations/Plan:  Encourage oral intakes/supplements    Nutrition Assessment:  Continued malnutrition r/t altered respiratory status, AEB continued weight declines and suboptimal oral intakes. Contacted via phone, and stating dislike of Glucerna. Reluctantly agreeable to try proteinex, which I forewarn her about its taste (not very good). Is concerned about her blood sugars, so other supplments not as \"kind\" to her glucose currently r/t carbohyrate content. Malnutrition Assessment:  Malnutrition Status:  Mild malnutrition    Context:  Acute Illness     Findings of the 6 clinical characteristics of malnutrition:  Energy Intake:  Mild decrease in energy intake (Comment)  Weight Loss:  7 - Greater than 5% over 1 month     Body Fat Loss:  Unable to assess     Muscle Mass Loss:  Unable to assess    Fluid Accumulation:  No significant fluid accumulation     Strength:  Not Performed    Estimated Daily Nutrient Needs:  Energy (kcal):  4813-2807 (20-25); Weight Used for Energy Requirements:  Current     Protein (g):  65-76 (1.2-1.4); Weight Used for Protein Requirements:  Ideal        Fluid (ml/day):  1700; Method Used for Fluid Requirements:  1 ml/kcal      Nutrition Related Findings:  no edema      Wounds:  None       Current Nutrition Therapies:    ADULT DIET; Regular; 4 carb choices (60 gm/meal)  ADULT ORAL NUTRITION SUPPLEMENT; Breakfast, Dinner; Protein Modular    Anthropometric Measures:  · Height: 5' 4\" (162.6 cm)  · Current Body Weight: 139 lb 8 oz (63.3 kg)   · Admission Body Weight: 149 lb (67.6 kg)    · Usual Body Weight: 149 lb (67.6 kg)     · Ideal Body Weight: 120 lbs; % Ideal Body Weight 121.4 %   · BMI: 23.9  · Adjusted Body Weight:  ; No Adjustment   · BMI Categories: Normal Weight (BMI 18.5-24. 9)       Nutrition Diagnosis:   · Mild malnutrition related to impaired respiratory function as evidenced by weight loss greater than or equal to 2% in 1 week    Lab Results   Component Value Date     11/18/2021    K 3.9 11/18/2021     11/18/2021    CO2 25 11/18/2021    BUN 23 11/18/2021    CREATININE 0.57 11/18/2021    GLUCOSE 183 (H) 11/18/2021    CALCIUM 8.4 (L) 11/18/2021    PROT 6.6 11/18/2021    LABALBU 3.1 (L) 11/18/2021    BILITOT 0.42 11/18/2021    ALKPHOS 62 11/18/2021    AST 22 11/18/2021    ALT 19 11/18/2021    LABGLOM >60 11/18/2021    GFRAA >60 11/18/2021     Lab Results   Component Value Date    LABA1C 7.1 (H) 08/11/2020     Lab Results   Component Value Date     08/11/2020     Nutrition Interventions:   Food and/or Nutrient Delivery:  Modify Oral Nutrition Supplement, Continue Current Diet  Nutrition Education/Counseling:  Education initiated   Coordination of Nutrition Care:  Continue to monitor while inpatient    Goals:  PO >75% meals and supplements       Nutrition Monitoring and Evaluation:   Behavioral-Environmental Outcomes:  Beliefs and Attitutes   Food/Nutrient Intake Outcomes:  Food and Nutrient Intake, Supplement Intake  Physical Signs/Symptoms Outcomes:  Biochemical Data, Weight     Discharge Planning:     Too soon to determine     Electronically signed by Ryan Lemus RD, LD on 11/18/21 at 8:34 AM EST    Contact: [phone]

## 2021-11-18 NOTE — PROGRESS NOTES
Physical Therapy  Facility/Department: [facility] AT THE [facility] MED SURG  Daily Treatment Note  NAME: Jyoti Carranza  : 1940  MRN: [id number]    Date of Service: 2021    Discharge Recommendations:  Continue to assess pending progress, Home with Home health PT, IP Rehab        Assessment   Treatment Diagnosis: Difficulty walking  Prognosis: Good  Decision Making: Medium Complexity  REQUIRES PT FOLLOW UP: Yes  Activity Tolerance  Activity Tolerance: Patient Tolerated treatment well     Patient Diagnosis(es): The primary encounter diagnosis was Hypoxia. A diagnosis of Pneumonia due to COVID-19 virus was also pertinent to this visit. has a past medical history of Achilles tendon rupture, Cancer ([facility] [address] Chronic SI joint pain, Claustrophobia, Diabetes mellitus ([facility] [address] Esophageal stricture, [place] (hard of hearing), Hyperlipidemia, Hypertension, Retinal detachment, and Trauma to eye, left.   has a past surgical history that includes Cervical disc surgery (); Lumbar disc surgery (); Hysterectomy (); bladder suspension (); Blepharoplasty (Left, 5/3/2013); Eye surgery (Left, 06/15/2013); Eye surgery (Bilateral, ); Upper gastrointestinal endoscopy; Esophagus dilation; Cataract removal (Bilateral); eye surgery (Left, ,,,); vitrectomy (Left, 10/3/13); Total hip arthroplasty (Left, 2017); joint replacement (Right, 2016); Foot Amputation; Foot Tendon Surgery (Left, 2018); pr foot/toes surgery proc unlisted (Left, 3/9/2018); Finger trigger release (Left, 4/15/2019); proctosigmoidoscopy (N/A, 10/30/2019); proctosigmoidoscopy (10/30/2019); colectomy; Knee Arthroplasty (Right, 08/10/2020); and Total knee arthroplasty (Right, 8/10/2020). Restrictions  Restrictions/Precautions  Restrictions/Precautions: General Precautions, Fall Risk, Isolation  Subjective   General  Chart Reviewed: Yes  Response To Previous Treatment: Patient with no complaints from previous session.   Family / Caregiver Present: No  Referring Practitioner: GRETA Stringer  Subjective  Subjective: Pt. in bed upon arrival and agreeable to get up to chair for breakfast, Pt. reports having pain when coughing, RN aware. Orientation  Orientation  Overall Orientation Status: Within Functional Limits  Cognition      Objective   Bed mobility  Bridging: Stand by assistance  Rolling to Right: Stand by assistance  Supine to Sit: Stand by assistance  Transfers  Sit to Stand: Stand by assistance  Stand to sit: Stand by assistance  Ambulation  Ambulation?: Yes  Ambulation 1  Surface: level tile  Device: Rolling Walker  Assistance: Contact guard assistance; Stand by assistance  Distance: 40ftx1  Comments: Reports of light headed and dizziness when first standing but decreases with standing time     G-Code     OutComes Score    AM-PAC Score    Goals  Short term goals  Time Frame for Short term goals: 20 days  Short term goal 1: Patient to complete sit/stand and stand pivot transfers with SUP and no LOB to decrease fall risk. Short term goal 2: Patient to ambulate 150ft with FWW or LRAD and SUP with SpO2 >/=90% for improved endurance. Short term goal 3: Patient to tolerate 20-30 min of ther ex/act to improve functional strnegth and endurance. Short term goal 4: Patient to ascend/descend 4 steps with HRx1 and SUP with no LOB to safely enter her home. Plan    Plan  Times per week: 7  Times per day: Twice a day  Current Treatment Recommendations: Strengthening, Neuromuscular Re-education, Home Exercise Program, ROM, Safety Education & Training, Balance Training, Endurance Training, Patient/Caregiver Education & Training, Functional Mobility Training, Transfer Training, Gait Training, Stair training  Safety Devices  Type of devices:  All fall risk precautions in place, Call light within reach, Patient at risk for falls, Nurse notified, Gait belt, Left in chair     Therapy Time   Individual Concurrent Group Co-treatment   Time In

## 2021-11-18 NOTE — PROGRESS NOTES
[facility] -Progress Note COVID 19 Patient-Tele-Visit  Today's Date and Time: 11/18/2021, 12:07 PM    Impression :     · COVID 19 Confirmed Infection  · Covid tests:  · 11/12/21: Positive  · Acute hypoxic respiratory distress  · Acute confusion  · Hyponatremia  · Hx colorectal cancer s/p resection  · Hx bilateral hip replacement  · DM  · Hx urinary incontinence  · Hx cataracts s/p eye surgery X 5    Patient evaluated by Telemedicine. Requesting Institution: [facility]  Provider Institution: [address] [place] [address],[facility] [address] [place], [address] Ms Zelda Hill, LARRY-     Recommendations:   · Antibiotic treatment:  · Monitor off antibiotics  · Urine culture  · Covid Rx:  · Remdesivir-Initiated 11/15/21  · Decadron-Initiated 11/15/21  · Actemra-Ordered 11/17/21      Medical Decision Making/Summary/Discussion:11/18/2021     · Patient admitted with suspected COVID 19 infection  · Covid test confirmed positive. Infection Control Recommendations   · Universal Precautions  · Airborne isolation  · Droplet Isolation    Antimicrobial Stewardship Recommendations     · Discontinuation of therapy  Coordination of Outpatient Care:   · Estimated Length of IV antimicrobials:TBD  · Patient will need Midline Catheter Insertion: TBD  · Patient will need PICC line Insertion: No  · Patient will need: Home IV , [place],  SNF,  LTAC:TBD  · Patient will need outpatient wound care:No    Chief complaint/reason for consultation:   · Concern for COVID infection      History of Present Illness:   Chelsie Madrid is a 78y.o.-year-old female who was initially admitted on 11/15/2021. Patient seen at the request of Dr. Jorge Molina:    Patient initially presented through [address] [place] [address] on 11/12/21 with complaints of fatigue, fever and myalgias over the past two days. She was exposed to Covid from family members she lives with. She denied shortness of breath. She was found to be stable on room air and was discharged on symbicort. On 11/15/21, the patient was brought back to the ED by family members for worsening shortness of breath and confusion. She was found to have an SpO2 of 89% on room air. Imaging revealed bilateral ground-glass opacities. CT head was negative for any acute abnormalities. Abnormal labs include:   Na: 128  D-dimer: 1.47    The patient was started on Decadron and Remdesivir. Patient admitted because of concerns with COVID 19.    CURRENT EVALUATION : 11/18/2021    Afebrile  VS stable    The patient continues on 6 L NC  On Decadron and Actemra ordered 11/17/21    Urine culture shows no significant growth    Patient exhibiting respiratory distress. yes  Respiratory secretions:     Patient receiving supplemental oxygen.: 4-->6 L NC  RR:18-->20-->18  02 sat: 92-->94-->95%      % FIO2:   PEEP:      QTc:       NEWS Score: 0-4 Low risk group; 5-6: Medium risk group; 7 or above: High risk group  Parameters 3 2 1 0 1 2 3   Age    < 65   ? 65   RR ? 8  9-11 12-20  21-24 ? 25   O2 Sats ?  91 92-93 94-95 ? 96      Suppl O2  Yes  No      SBP ? 90  101-110 111-219   ? 220   HR ? 40  41-50 51-90  111-130 ? 131   Consciousness    Alert   Drowsiness, lethargy, or confusion   Temperature ? 35.0 C (95.0 F)  35.1-36.0 C 95.1-96.9 F 36.1-38.0 C 97.0-100.4 F 38.1-39.0 C 100.5-102.3 F ? 39.1 C ? 102.4 F      NEWS Score:  ·  11/16/21: 7 High risk    Overall Daily Picture:    Unchanged    Presence of secondary bacterial Infection:    No   Additional antibiotics: No    Labs, X rays reviewed: 11/18/2021    BUN:19-->27-->23  Cr:0.49-->0.58-->0.57    WBC:5.9-->15.0-->7.4  Hb:13.2-->13.3-->12.4  Plat: 163-->247-->244    Absolute Neutrophils:4.60  Absolute Lymphocytes:0.77  Neutrophil/Lymphocyte Ratio: 6 high risk    CRP:126.7-->54.9  Ferritin:408  LDH: 320    Pro Calcitonin:      Cultures:  Urine:  ·   Blood:  ·   Sputum :  ·   Wound:       CXR: 11/12/21        CAT:  11/15/21        Discussed with patient, RN, CC, IM. I have personally reviewed the past medical history, past surgical history, medications, social history, and family history, and I have updated the database accordingly.   Past Medical History:     Past Medical History:   Diagnosis Date    Achilles tendon rupture 2011    LEFT    [facility]     Chronic SI joint pain     left    Claustrophobia     Diabetes mellitus ([address] 2010    ON ORAL MED    Esophageal stricture 2011    PT HAD DILATATION    [place] (hard of hearing)     WEARS [place]SHKAN HEARING AIDS    Hyperlipidemia 2012    Hypertension     PT HAD TILT TABLE TEST BP MED DISCONTINUED    Retinal detachment 10/2012    LEFT    Trauma to eye, left        Past Surgical  History:     Past Surgical History:   Procedure Laterality Date    BLADDER SUSPENSION  1994    WITH HYSTERECTOMY    BLEPHAROPLASTY Left 5/3/2013    CATARACT REMOVAL Bilateral     with iol    CERVICAL DISC SURGERY  1984    cervical spine    COLECTOMY      ROBOTIC LOWER COLON RESECTION    ESOPHAGEAL DILATATION      EYE SURGERY Left 06/15/2013    REPAIR OF WOUND, WITH LENS REMOVAL     EYE SURGERY Bilateral 2008    BILAT CATARACT REMOVAL WITH IOL    EYE SURGERY Left ,7/13,12/12,6/13    vitrectomy    FINGER TRIGGER RELEASE Left 4/15/2019    FINGER TRIGGER RELEASE-MIDDLE FINGER performed by Jason Núñez MD at [address] [facility] Left 03/09/2018    Excision of Os Peroneum, Primary Repair of Peroneus Longus Tendon - Dr. Greg Luna Right 06/21/2016    [facility] Dr. Keturah Fan Right 08/10/2020    Dr. Katherine Fisher    lower back    UT FOOT/TOES SURGERY PROC UNLISTED Left 3/9/2018    ACHILLES TENDON LENGTHENING REPAIR/ EXCISION OF OS PERONEUM,PRIMARY REPAIR OF PERONEUS LINGUS TENDON WITH POSSIBLE PERONEAL TENODESIS performed by Alessandra Guzman DPM at [address] [facility]  PROCTOSIGMOIDOSCOPY N/A 10/30/2019    ANAL PROCTO SIGMOIDOSCOPY FLEXIBLE with biopsies performed by Nurys Eid MD at [address]  10/30/2019    -sigmoid colon mass(invasive ulcerated adenocarcinoma)    TOTAL HIP ARTHROPLASTY Left 8/7/2017    HIP TOTAL ARTHROPLASTY performed by Murali Martinez MD at [address] [facility] Right 8/10/2020    KNEE TOTAL ARTHROPLASTY performed by Murali Martinez MD at [facility] dilation    VITRECTOMY Left 10/3/13       Medications:      budesonide  0.5 mg Nebulization BID    docusate sodium  100 mg Oral BID    latanoprost  1 drop Both Eyes Nightly    metFORMIN  500 mg Oral BID WC    rivaroxaban  10 mg Oral Daily    dexamethasone  6 mg Oral Daily    insulin lispro  0-12 Units SubCUTAneous TID WC    insulin lispro  0-6 Units SubCUTAneous Nightly    ascorbic acid  1,000 mg Oral 4x Daily    famotidine  20 mg Oral BID    zinc sulfate  100 mg Oral Daily    vitamin D  50,000 Units Oral Weekly       Social History:     Social History     Socioeconomic History    Marital status:      Spouse name: Not on file    Number of children: Not on file    Years of education: Not on file    Highest education level: Not on file   Occupational History    Not on file   Tobacco Use    Smoking status: Never Smoker    Smokeless tobacco: Never Used   Vaping Use    Vaping Use: Never used   Substance and Sexual Activity    Alcohol use: No    Drug use: No    Sexual activity: Not on file   Other Topics Concern    Not on file   Social History Narrative    Not on file     Social Determinants of Health     Financial Resource Strain:     Difficulty of Paying Living Expenses: Not on file   Food Insecurity:     Worried About Running Out of Food in the Last Year: Not on file    Joy of Food in the Last Year: Not on file   Transportation Needs:     Lack of Transportation (Medical):  Not on file    Lack of Transportation (Non-Medical): Not on file   Physical Activity:     Days of Exercise per Week: Not on file    Minutes of Exercise per Session: Not on file   Stress:     Feeling of Stress : Not on file   Social Connections:     Frequency of Communication with Friends and Family: Not on file    Frequency of Social Gatherings with Friends and Family: Not on file    Attends [employer] Services: Not on file    Active Member of [address] [place] [address] [employer] or Organizations: Not on file    Attends Club or Organization Meetings: Not on file    Marital Status: Not on file   Intimate Partner Violence:     Fear of Current or Ex-Partner: Not on file    Emotionally Abused: Not on file    Physically Abused: Not on file    Sexually Abused: Not on file   Housing Stability:     Unable to Pay for Housing in the Last Year: Not on file    Number of [place] in the Last Year: Not on file    Unstable Housing in the Last Year: Not on file       Family History:     Family History   Problem Relation Age of Onset    Heart Disease Mother         CARDIAC MASS    Heart Disease Father     Cancer Paternal Grandfather     Cancer Other         both families, several family members        Allergies:   Aleve [naproxen sodium], Aspirin, Codeine, Nsaids, Align [lactase-lactobacillus], Amitriptyline, Gabapentin, Lidocaine, Lyrica [pregabalin], Omnipaque [iohexol], Other, Tetracaine, and Iodine     Review of Systems:       Constitutional: No fevers or chills. No systemic complaints  Head: No headaches  Eyes: No double vision or blurry vision. No conjunctival inflammation. ENT: No sore throat or runny nose. . No hearing loss, tinnitus or vertigo. Cardiovascular: No chest pain or palpitations. Shortness of breath. MACK  Lung:  Shortness of breath or cough. No sputum production  Abdomen: No nausea, vomiting, diarrhea, or abdominal pain. Vearl Pippins No cramps. Genitourinary: No increased urinary frequency, or dysuria. No hematuria.  No suprapubic or CVA pain  Musculoskeletal: No muscle aches or pains. No joint effusions, swelling or deformities  Hematologic: No bleeding or bruising. Neurologic: No headache, weakness, numbness, or tingling. Integument: No rash, no ulcers. Psychiatric: No depression. Endocrine: No polyuria, no polydipsia, no polyphagia. Physical Examination :     Patient Vitals for the past 8 hrs:   BP Temp Temp src Pulse Resp SpO2   11/18/21 0718 114/79 97.8 °F (36.6 °C) Temporal 77 18 95 %     General Appearance: Awake, alert, and in no apparent distress  Head:  Normocephalic, no trauma  Eyes: Pupils equal, round, reactive to light; sclera anicteric; conjunctivae pink. No embolic phenomena. ENT: Oropharynx clear, without erythema, exudate, or thrush. No tenderness of sinuses. Mouth/throat: mucosa pink and moist. No lesions. Dentition in good repair. Neck:Supple, without lymphadenopathy. Thyroid normal, No bruits. Pulmonary/Chest: Clear to auscultation, without wheezes, rales, or rhonchi. No dullness to percussion. Cardiovascular: Regular rate and rhythm without murmurs, rubs, or gallops. Abdomen: Soft, non tender. Bowel sounds normal. No organomegaly  All four Extremities: No cyanosis, clubbing, edema, or effusions. Neurologic: No gross sensory or motor deficits. Skin: Warm and dry with good turgor. No signs of peripheral arterial or venous insufficiency. No ulcerations. No open wounds.     Medical Decision Making -Laboratory:   I have independently reviewed/ordered the following labs:    CBC with Differential:   Recent Labs     11/17/21 0630 11/18/21 0610   WBC 15.0* 7.4   HGB 13.3 12.4   HCT 38.0 37.5    244   LYMPHOPCT 6* 10*   MONOPCT 4 5     BMP:   Recent Labs     11/17/21 0630 11/18/21 0610   * 137   K 4.1 3.9   CL 96* 100   CO2 20 25   BUN 27* 23   CREATININE 0.58 0.57     Hepatic Function Panel:   Recent Labs     11/17/21 0630 11/18/21 0610   PROT 6.8 6.6   LABALBU 3.4* 3.1*   BILITOT 0.42 0.42 ALKPHOS 72 62   ALT 23 19   AST 29 22     No results for input(s): RPR in the last 72 hours. No results for input(s): HIV in the last 72 hours. No results for input(s): BC in the last 72 hours. Lab Results   Component Value Date    MUCUS NOT REPORTED 11/16/2021    RBC 4.09 11/18/2021    RBC 3.98 11/16/2011    TRICHOMONAS NOT REPORTED 11/16/2021    WBC 7.4 11/18/2021    YEAST NOT REPORTED 11/16/2021    TURBIDITY Clear 11/16/2021     Lab Results   Component Value Date    CREATININE 0.57 11/18/2021    GLUCOSE 183 11/18/2021    GLUCOSE 116 11/16/2011       Medical Decision Making-Imaging:     Narrative   EXAMINATION:   ONE XRAY VIEW OF THE CHEST       11/12/2021 6:08 pm       COMPARISON:   None.       HISTORY:   ORDERING SYSTEM PROVIDED HISTORY: weakness   TECHNOLOGIST PROVIDED HISTORY:   weakness       FINDINGS:   There is diffuse prominence of the pulmonary interstitium.  No airspace   consolidation.  No pneumothorax or pleural effusion.  No cardiomegaly. Degenerative changes in the bilateral shoulders and spine.           Impression   Diffuse prominence of the pulmonary interstitium.  This is nonspecific but   can be seen in atypical/viral pneumonia.  Correlate clinically             Narrative   EXAMINATION:   CT OF THE CHEST WITHOUT CONTRAST 11/15/2021 2:30 pm       TECHNIQUE:   CT of the chest was performed without the administration of intravenous   contrast. Multiplanar reformatted images are provided for review.  Dose   modulation, iterative reconstruction, and/or weight based adjustment of the   mA/kV was utilized to reduce the radiation dose to as low as reasonably   achievable.       COMPARISON:   None.       HISTORY:   ORDERING SYSTEM PROVIDED HISTORY: covid   TECHNOLOGIST PROVIDED HISTORY:   covid   Decision Support Exception - unselect if not a suspected or confirmed   emergency medical condition->Emergency Medical Condition (MA)       FINDINGS:   Mediastinum: Mild cardiomegaly.  No pericardial effusion.  Thoracic aorta is   normal in caliber.  Scattered atherosclerotic changes.  Scattered mildly   prominent mediastinal lymph nodes.       Lungs/pleura: Scattered ground-glass infiltrate throughout both lungs.  No   effusion or pneumothorax.       Upper Abdomen: No acute abnormality.  Small sliding hiatal hernia.       Soft Tissues/Bones: No aggressive osseous lesions.  Confluent anterolateral   vertebral body syndesmophyte formation compatible with DISH. [place] acute soft   tissue abnormality.           Impression   Multifocal bilateral ground-glass opacities throughout both lungs compatible   with COVID-19 pneumonia.             Narrative   EXAMINATION:   CT OF THE HEAD WITHOUT CONTRAST  11/15/2021 2:29 pm       TECHNIQUE:   CT of the head was performed without the administration of intravenous   contrast. Dose modulation, iterative reconstruction, and/or weight based   adjustment of the mA/kV was utilized to reduce the radiation dose to as low   as reasonably achievable.       COMPARISON:   03/01/2013       HISTORY:   ORDERING SYSTEM PROVIDED HISTORY: confusion   TECHNOLOGIST PROVIDED HISTORY:   confusion       Decision Support Exception - unselect if not a suspected or confirmed   emergency medical condition->Emergency Medical Condition (MA)       FINDINGS:   BRAIN/VENTRICLES: The ventricles and sulci are diffusely enlarged.  Low   attenuation is seen in the periventricular and subcortical white matter.  No   acute intracranial hemorrhage or acute infarct is identified.       ORBITS: The visualized portion of the orbits demonstrate no acute abnormality.       SINUSES: Small amount of fluid in the right maxillary sinus.  Otherwise the   paranasal sinuses appear clear.       SOFT TISSUES/SKULL:  No acute abnormality of the visualized skull or soft   tissues.           Impression   No acute intracranial abnormality.       Diffuse atrophic changes with findings suggesting chronic microvascular   ischemia     Medical Decision Cvcuin-Ogspveqx-Mlnwu:       Medical Decision Making-Other:     Note:  · Labs, medications, radiologic studies were reviewed with personal review of films  ·  Large amounts of data were reviewed  · Discussed with nursing Staff, Discharge planner  · Infection Control and Prevention measures reviewed  · All prior entries were reviewed  · Administer medications as ordered  · Prognosis: Guarded  · Discharge planning reviewed      Thank you for allowing us to participate in the care of this patient. Please call with questions. LARRY Oden - CNP  Pager: [phone] - Office: [phone]    ATTESTATION:    I have discussed the case, including pertinent history and exam findings with the APRN. I have evaluated the  History, physical findings and pictures of the patient and the key elements of the encounter have been performed by me. I have reviewed the laboratory data, other diagnostic studies and discussed them with the APRN. I have updated the medical record where necessary. I agree with the assessment, plan and orders as documented by the APRN.     MD Quyen.

## 2021-11-18 NOTE — FLOWSHEET NOTE
Awake, resting in bed, vitals checked and assessment completed. Continues at 6 liters of O2 with pulse ox at 95 %. No c/o this morning.  Dhruv;l light within reach, continue to monitor

## 2021-11-18 NOTE — PROGRESS NOTES
Physical Therapy  Facility/Department: [facility] MED SURG  Daily Treatment Note  NAME: Yuri Shah  : 1940  MRN: [id number]    Date of Service: 2021    Discharge Recommendations:  Continue to assess pending progress, Home with Home health PT, IP Rehab        Assessment   Treatment Diagnosis: Difficulty walking  Prognosis: Good  Decision Making: Medium Complexity  REQUIRES PT FOLLOW UP: Yes  Activity Tolerance  Activity Tolerance: Patient Tolerated treatment well; Patient limited by endurance     Patient Diagnosis(es): The primary encounter diagnosis was Hypoxia. A diagnosis of Pneumonia due to COVID-19 virus was also pertinent to this visit. has a past medical history of Achilles tendon rupture, Cancer ([address] Chronic SI joint pain, Claustrophobia, Diabetes mellitus ([address] Esophageal stricture, [place] (hard of hearing), Hyperlipidemia, Hypertension, Retinal detachment, and Trauma to eye, left.   has a past surgical history that includes Cervical disc surgery (); Lumbar disc surgery (); Hysterectomy (); bladder suspension (); Blepharoplasty (Left, 5/3/2013); Eye surgery (Left, 06/15/2013); Eye surgery (Bilateral, ); Upper gastrointestinal endoscopy; Esophagus dilation; Cataract removal (Bilateral); eye surgery (Left, ,,,); vitrectomy (Left, 10/3/13); Total hip arthroplasty (Left, 2017); joint replacement (Right, 2016); Foot Amputation; Foot Tendon Surgery (Left, 2018); pr foot/toes surgery proc unlisted (Left, 3/9/2018); Finger trigger release (Left, 4/15/2019); proctosigmoidoscopy (N/A, 10/30/2019); proctosigmoidoscopy (10/30/2019); colectomy; Knee Arthroplasty (Right, 08/10/2020); and Total knee arthroplasty (Right, 8/10/2020).     Restrictions  Restrictions/Precautions  Restrictions/Precautions: General Precautions, Fall Risk, Isolation  Subjective   General  Chart Reviewed: Yes  Response To Previous Treatment: Patient with no complaints from previous session. Family / Caregiver Present: No  Referring Practitioner: GRETA Mccauley  Subjective  Subjective: Pt. up in chair, denies pain and is agreeable to therapy at this time. Pain Screening  Patient Currently in Pain: Denies  Vital Signs  Patient Currently in Pain: Denies       Orientation  Orientation  Overall Orientation Status: Within Functional Limits  Cognition      Objective   Bed mobility  Bridging: Stand by assistance  Rolling to Right: Stand by assistance  Supine to Sit: Stand by assistance  Transfers  Sit to Stand: Stand by assistance  Stand to sit: Stand by assistance  Ambulation  Ambulation?: Yes  Ambulation 1  Surface: level tile  Device: Rolling Walker  Assistance: Contact guard assistance; Stand by assistance  Distance: 40ftx1  Comments: Reports of light headed and dizziness when first standing but decreases with standing time        Exercises  Comments: Supine and seated exercises B LE x20     G-Code     OutComes Score    AM-PAC Score    Goals  Short term goals  Time Frame for Short term goals: 20 days  Short term goal 1: Patient to complete sit/stand and stand pivot transfers with SUP and no LOB to decrease fall risk. Short term goal 2: Patient to ambulate 150ft with FWW or LRAD and SUP with SpO2 >/=90% for improved endurance. Short term goal 3: Patient to tolerate 20-30 min of ther ex/act to improve functional strnegth and endurance. Short term goal 4: Patient to ascend/descend 4 steps with HRx1 and SUP with no LOB to safely enter her home. Plan    Plan  Times per week: 7  Times per day: Twice a day  Current Treatment Recommendations: Strengthening, Neuromuscular Re-education, Home Exercise Program, ROM, Safety Education & Training, Balance Training, Endurance Training, Patient/Caregiver Education & Training, Functional Mobility Training, Transfer Training, Gait Training, Stair training  Safety Devices  Type of devices:  All fall risk precautions in place, Call light within reach, Patient at risk for falls, Nurse notified, Gait belt, Left in chair     Therapy Time   Individual Concurrent Group Co-treatment   Time In 1437         Time Out 1453         Minutes [address], [place]

## 2021-11-19 LAB
ABSOLUTE EOS #: <0.03 K/UL (ref 0–0.44)
ABSOLUTE IMMATURE GRANULOCYTE: 0.04 K/UL (ref 0–0.3)
ABSOLUTE LYMPH #: 0.91 K/UL (ref 1.1–3.7)
ABSOLUTE MONO #: 0.29 K/UL (ref 0.1–1.2)
ALBUMIN SERPL-MCNC: 3 G/DL (ref 3.5–5.2)
ALBUMIN/GLOBULIN RATIO: 0.9 (ref 1–2.5)
ALP BLD-CCNC: 65 U/L (ref 35–104)
ALT SERPL-CCNC: 21 U/L (ref 5–33)
ANION GAP SERPL CALCULATED.3IONS-SCNC: 12 MMOL/L (ref 9–17)
AST SERPL-CCNC: 23 U/L
BASOPHILS # BLD: 0 % (ref 0–2)
BASOPHILS ABSOLUTE: <0.03 K/UL (ref 0–0.2)
BILIRUB SERPL-MCNC: 0.39 MG/DL (ref 0.3–1.2)
BUN BLDV-MCNC: 21 MG/DL (ref 8–23)
BUN/CREAT BLD: 40 (ref 9–20)
CALCIUM SERPL-MCNC: 8.1 MG/DL (ref 8.6–10.4)
CHLORIDE BLD-SCNC: 98 MMOL/L (ref 98–107)
CO2: 23 MMOL/L (ref 20–31)
CREAT SERPL-MCNC: 0.52 MG/DL (ref 0.5–0.9)
CULTURE: ABNORMAL
D-DIMER QUANTITATIVE: 1.12 MG/L FEU (ref 0–0.59)
DIFFERENTIAL TYPE: ABNORMAL
EOSINOPHILS RELATIVE PERCENT: 0 % (ref 1–4)
FERRITIN: 307 UG/L (ref 13–150)
FIBRINOGEN: 330 MG/DL (ref 179–518)
GFR AFRICAN AMERICAN: >60 ML/MIN
GFR NON-AFRICAN AMERICAN: >60 ML/MIN
GFR SERPL CREATININE-BSD FRML MDRD: ABNORMAL ML/MIN/{1.73_M2}
GFR SERPL CREATININE-BSD FRML MDRD: ABNORMAL ML/MIN/{1.73_M2}
GLUCOSE BLD-MCNC: 143 MG/DL (ref 74–100)
GLUCOSE BLD-MCNC: 153 MG/DL (ref 74–100)
GLUCOSE BLD-MCNC: 180 MG/DL (ref 70–99)
GLUCOSE BLD-MCNC: 181 MG/DL (ref 74–100)
GLUCOSE BLD-MCNC: 217 MG/DL (ref 74–100)
GLUCOSE BLD-MCNC: 255 MG/DL (ref 74–100)
HCT VFR BLD CALC: 36.4 % (ref 36.3–47.1)
HEMOGLOBIN: 12.2 G/DL (ref 11.9–15.1)
IMMATURE GRANULOCYTES: 1 %
INR BLD: 1.3
LYMPHOCYTES # BLD: 12 % (ref 24–43)
Lab: ABNORMAL
MCH RBC QN AUTO: 30.4 PG (ref 25.2–33.5)
MCHC RBC AUTO-ENTMCNC: 33.5 G/DL (ref 28.4–34.8)
MCV RBC AUTO: 90.8 FL (ref 82.6–102.9)
MONOCYTES # BLD: 4 % (ref 3–12)
NRBC AUTOMATED: 0 PER 100 WBC
PARTIAL THROMBOPLASTIN TIME: 27.3 SEC (ref 23.9–33.8)
PDW BLD-RTO: 11.9 % (ref 11.8–14.4)
PLATELET # BLD: 283 K/UL (ref 138–453)
PLATELET ESTIMATE: ABNORMAL
PMV BLD AUTO: 10.9 FL (ref 8.1–13.5)
POTASSIUM SERPL-SCNC: 4.1 MMOL/L (ref 3.7–5.3)
PROTHROMBIN TIME: 16 SEC (ref 11.5–14.2)
RBC # BLD: 4.01 M/UL (ref 3.95–5.11)
RBC # BLD: ABNORMAL 10*6/UL
SEG NEUTROPHILS: 83 % (ref 36–65)
SEGMENTED NEUTROPHILS ABSOLUTE COUNT: 6.43 K/UL (ref 1.5–8.1)
SODIUM BLD-SCNC: 133 MMOL/L (ref 135–144)
SPECIMEN DESCRIPTION: ABNORMAL
TOTAL PROTEIN: 6.2 G/DL (ref 6.4–8.3)
WBC # BLD: 7.7 K/UL (ref 3.5–11.3)
WBC # BLD: ABNORMAL 10*3/UL

## 2021-11-19 PROCEDURE — 85610 PROTHROMBIN TIME: CPT

## 2021-11-19 PROCEDURE — 6360000002 HC RX W HCPCS: Performed by: NURSE PRACTITIONER

## 2021-11-19 PROCEDURE — 97535 SELF CARE MNGMENT TRAINING: CPT

## 2021-11-19 PROCEDURE — 85379 FIBRIN DEGRADATION QUANT: CPT

## 2021-11-19 PROCEDURE — 94640 AIRWAY INHALATION TREATMENT: CPT

## 2021-11-19 PROCEDURE — 6370000000 HC RX 637 (ALT 250 FOR IP): Performed by: NURSE PRACTITIONER

## 2021-11-19 PROCEDURE — 82728 ASSAY OF FERRITIN: CPT

## 2021-11-19 PROCEDURE — 85025 COMPLETE CBC W/AUTO DIFF WBC: CPT

## 2021-11-19 PROCEDURE — 97116 GAIT TRAINING THERAPY: CPT

## 2021-11-19 PROCEDURE — 94669 MECHANICAL CHEST WALL OSCILL: CPT

## 2021-11-19 PROCEDURE — 85384 FIBRINOGEN ACTIVITY: CPT

## 2021-11-19 PROCEDURE — 36415 COLL VENOUS BLD VENIPUNCTURE: CPT

## 2021-11-19 PROCEDURE — 94761 N-INVAS EAR/PLS OXIMETRY MLT: CPT

## 2021-11-19 PROCEDURE — APPSS30 APP SPLIT SHARED TIME 16-30 MINUTES: Performed by: NURSE PRACTITIONER

## 2021-11-19 PROCEDURE — 2700000000 HC OXYGEN THERAPY PER DAY

## 2021-11-19 PROCEDURE — 97110 THERAPEUTIC EXERCISES: CPT

## 2021-11-19 PROCEDURE — 80053 COMPREHEN METABOLIC PANEL: CPT

## 2021-11-19 PROCEDURE — 99233 SBSQ HOSP IP/OBS HIGH 50: CPT | Performed by: INTERNAL MEDICINE

## 2021-11-19 PROCEDURE — 97530 THERAPEUTIC ACTIVITIES: CPT

## 2021-11-19 PROCEDURE — 85730 THROMBOPLASTIN TIME PARTIAL: CPT

## 2021-11-19 PROCEDURE — 82947 ASSAY GLUCOSE BLOOD QUANT: CPT

## 2021-11-19 PROCEDURE — 1200000000 HC SEMI PRIVATE

## 2021-11-19 RX ADMIN — INSULIN LISPRO 2 UNITS: 100 INJECTION, SOLUTION INTRAVENOUS; SUBCUTANEOUS at 21:11

## 2021-11-19 RX ADMIN — OXYCODONE HYDROCHLORIDE AND ACETAMINOPHEN 1000 MG: 500 TABLET ORAL at 08:12

## 2021-11-19 RX ADMIN — INSULIN LISPRO 6 UNITS: 100 INJECTION, SOLUTION INTRAVENOUS; SUBCUTANEOUS at 16:25

## 2021-11-19 RX ADMIN — FAMOTIDINE 20 MG: 20 TABLET ORAL at 21:10

## 2021-11-19 RX ADMIN — ZINC SULFATE 220 MG (50 MG) CAPSULE 100 MG: CAPSULE at 08:12

## 2021-11-19 RX ADMIN — DEXAMETHASONE 6 MG: 4 TABLET ORAL at 08:13

## 2021-11-19 RX ADMIN — METFORMIN HYDROCHLORIDE 500 MG: 500 TABLET ORAL at 08:13

## 2021-11-19 RX ADMIN — RIVAROXABAN 10 MG: 10 TABLET, FILM COATED ORAL at 08:13

## 2021-11-19 RX ADMIN — BUDESONIDE 500 MCG: 0.5 SUSPENSION RESPIRATORY (INHALATION) at 09:03

## 2021-11-19 RX ADMIN — OXYCODONE HYDROCHLORIDE AND ACETAMINOPHEN 1000 MG: 500 TABLET ORAL at 13:38

## 2021-11-19 RX ADMIN — INSULIN LISPRO 2 UNITS: 100 INJECTION, SOLUTION INTRAVENOUS; SUBCUTANEOUS at 12:37

## 2021-11-19 RX ADMIN — METFORMIN HYDROCHLORIDE 500 MG: 500 TABLET ORAL at 16:26

## 2021-11-19 RX ADMIN — OXYCODONE HYDROCHLORIDE AND ACETAMINOPHEN 1000 MG: 500 TABLET ORAL at 16:26

## 2021-11-19 RX ADMIN — LATANOPROST 1 DROP: 50 SOLUTION OPHTHALMIC at 21:10

## 2021-11-19 RX ADMIN — BUDESONIDE 500 MCG: 0.5 SUSPENSION RESPIRATORY (INHALATION) at 21:20

## 2021-11-19 RX ADMIN — FAMOTIDINE 20 MG: 20 TABLET ORAL at 08:13

## 2021-11-19 RX ADMIN — INSULIN LISPRO 2 UNITS: 100 INJECTION, SOLUTION INTRAVENOUS; SUBCUTANEOUS at 08:15

## 2021-11-19 RX ADMIN — OXYCODONE HYDROCHLORIDE AND ACETAMINOPHEN 1000 MG: 500 TABLET ORAL at 21:10

## 2021-11-19 ASSESSMENT — PAIN SCALES - GENERAL
PAINLEVEL_OUTOF10: 0

## 2021-11-19 NOTE — PROGRESS NOTES
Progress Note    SUBJECTIVE:    Patient seen for f/u of COVID-19 virus infection. She been up in the chair alert oriented no acute distress. Patient currently on 6 L per nasal cannula. She has no complaints. ROS:   Constitutional: negative  for fevers, and negative for chills. Respiratory: positive for shortness of breath, positive for cough, and negative for wheezing  Cardiovascular: negative for chest pain, and negative for palpitations  Gastrointestinal: negative for abdominal pain, negative for nausea,negative for vomiting, negative for diarrhea, and negative for constipation     All other systems were reviewed with the patient and are negative unless otherwise stated in HPI      OBJECTIVE:      Vitals:   Vitals:    11/19/21 1339   BP: 132/75   Pulse: 86   Resp: 20   Temp: 97.2 °F (36.2 °C)   SpO2:      Weight: 140 lb 3.2 oz (63.6 kg)   Height: 5' 4\" (162.6 cm)     Weight  Wt Readings from Last 3 Encounters:   11/19/21 140 lb 3.2 oz (63.6 kg)   11/12/21 149 lb (67.6 kg)   08/12/20 161 lb 9.6 oz (73.3 kg)     Body mass index is 24.07 kg/m². 24HR INTAKE/OUTPUT:      Intake/Output Summary (Last 24 hours) at 11/19/2021 1422  Last data filed at 11/19/2021 1009  Gross per 24 hour   Intake 560 ml   Output 250 ml   Net 310 ml     -----------------------------------------------------------------  Exam:    GEN:    Awake, alert and oriented x3. EYES:   EOMI, pupils equal   NECK: Supple. No lymphadenopathy. No carotid bruit  CVS:     regular rate and rhythm, no audible murmur  PULM:  Diminished with bibasilar rales, no acute respiratory distress  ABD:     Bowels sounds normal.  Abdomen is soft. No distention. no tenderness to palpation. EXT:     no edema bilaterally . No calf tenderness. NEURO: Moves all extremities. Motor and sensory are grossly intact  SKIN:    No rashes.   No skin lesions.  -----------------------------------------------------------------    Diagnostic Data:      Complete Blood Count: Recent Labs     11/17/21  0630 11/18/21  0610 11/19/21  0540   WBC 15.0* 7.4 7.7   RBC 4.24 4.09 4.01   HGB 13.3 12.4 12.2   HCT 38.0 37.5 36.4   MCV 89.6 91.7 90.8   MCH 31.4 30.3 30.4   MCHC 35.0* 33.1 33.5   RDW 11.9 12.0 11.9    244 283   MPV 11.4 11.2 10.9        Last 3 Blood Glucose:   Recent Labs     11/17/21  0630 11/18/21  0610 11/19/21  0540   GLUCOSE 237* 183* 180*        Comprehensive Metabolic Profile:   Recent Labs     11/17/21  0630 11/18/21  0610 11/19/21  0540   * 137 133*   K 4.1 3.9 4.1   CL 96* 100 98   CO2 20 25 23   BUN 27* 23 21   CREATININE 0.58 0.57 0.52   GLUCOSE 237* 183* 180*   CALCIUM 8.4* 8.4* 8.1*   PROT 6.8 6.6 6.2*   LABALBU 3.4* 3.1* 3.0*   BILITOT 0.42 0.42 0.39   ALKPHOS 72 62 65   AST 29 22 23   ALT 23 19 21        Urinalysis:   Lab Results   Component Value Date    NITRU NEGATIVE 11/16/2021    COLORU Yellow 11/16/2021    PHUR 6.5 11/16/2021    WBCUA 2 TO 5 11/16/2021    RBCUA 0 TO 2 11/16/2021    MUCUS NOT REPORTED 11/16/2021    TRICHOMONAS NOT REPORTED 11/16/2021    YEAST NOT REPORTED 11/16/2021    BACTERIA 1+ 11/16/2021    SPECGRAV 1.010 11/16/2021    LEUKOCYTESUR SMALL 11/16/2021    UROBILINOGEN Normal 11/16/2021    BILIRUBINUR NEGATIVE 11/16/2021    GLUCOSEU NEGATIVE 11/16/2021    KETUA NEGATIVE 11/16/2021    AMORPHOUS NOT REPORTED 11/16/2021       HgBA1c:    Lab Results   Component Value Date    LABA1C 7.1 08/11/2020       Lactic Acid:   Lab Results   Component Value Date    LACTA 1.7 11/15/2021        Troponin: No results for input(s): TROPONINI in the last 72 hours. Radiology/Imaging:  CT CHEST WO CONTRAST   Final Result   Multifocal bilateral ground-glass opacities throughout both lungs compatible   with COVID-19 pneumonia. CT Head WO Contrast   Final Result   No acute intracranial abnormality.       Diffuse atrophic changes with findings suggesting chronic microvascular   ischemia               ASSESSMENT / PLAN:  COVID-19 virus infection  · Continue current therapy   · Remdesivir-not indicated  · Continue dexamethasone  · Continue vitamin C, D and zinc  · Continue Xarelto  · Trend CRPs-improving  · Received Actemra 11/17/2021  · Appreciate infectious disease  · Blood culture x1+ gram-positive cocci-will await sensitivity as I am concerned this is a contaminant. Patient afebrile no elevation in WBC count. We will hold off on starting antibiotics.   · Acute respiratory failure with hypoxia  · Supplemental oxygen to maintain SPO2 greater than 92%-currently on 6 L  · Acapella  · Prone  · PT/OT  · Nebs  · Type 2 diabetes  · POCT before meals and at bedtime  · Medium dose sliding scale  · Hypoglycemia protocol  · Continue Glucophage  · Nutrition status:   · at risk for malnutrition  · Dietician consult initiated  · Hospital Prophylaxis:   · DVT: Xarelto   · Stress Ulcer: H2 Blocker   · High risk medications: none   · Disposition:    · Discharge plan is pending      LARRY Boo - CNP , LARRY, NP-C  Hospitalist Medicine        11/19/2021, 2:22 PM

## 2021-11-19 NOTE — PROGRESS NOTES
Nightly medications given. Patient got up to use the bathroom at this time. Pull up changed. Patient returned to bed and got comfortable. Patient denies any other needs at this time. Call light, bedside table and personal belongings within reach. Will continue to monitor.

## 2021-11-19 NOTE — PROGRESS NOTES
Patient sitting up in the chair. Patient requested to go to the bathroom. Patient got up with stand by to minimal assist with walker to bathroom. Patient sometimes has urgency incontinence. Patient had pull up change. Patient requested to get into bed at this time. Patient got into bed and got comfortable. Telemetry leads and finger pulse ox probe changed at this time. Vital signs and assessment completed. Patient was sating at 88% on 5L of nasal cannula. Writer turned patient's O2 up to 6L at this time and instructed patient to do some deep breathing. Patient's pulse ox came up to 93% on 6L. Patient denies any other needs at this time. Call light, bedside table and personal belongings within reach. Will continue to monitor.

## 2021-11-19 NOTE — FLOWSHEET NOTE
Awake,resting to bed. Vitals checked and assessment completed. Continues SOB with activity. On 6 liters of O2 with pulse ox of 91 % . No need at present time.  Continue to monitor

## 2021-11-19 NOTE — PROGRESS NOTES
[facility] -Progress Note COVID 19 Patient-Tele-Visit  Today's Date and Time: 11/19/2021, 10:34 AM    Impression :     · COVID 19 Confirmed Infection  · Covid tests:  · 11/12/21: Positive  · Acute hypoxic respiratory distress  · Acute confusion  · Hyponatremia  · Hx colorectal cancer s/p resection  · Hx bilateral hip replacement  · DM  · Hx urinary incontinence  · Hx cataracts s/p eye surgery X 5        Patient evaluated by Telemedicine. Requesting Institution: [facility]  Provider Institution: [address] [place] [address],[facility] [address] [place], [address] [facility] Person at [facility]: Ms Brit Yun, LARRY- ARVIN    Recommendations:   · Antibiotic treatment:  · Monitor off antibiotics  · Urine culture  · Covid Rx:  · Remdesivir-Initiated 11/15/21  · Decadron-Initiated 11/15/21  · Actemra-Ordered 11/17/21      Medical Decision Making/Summary/Discussion:11/19/2021     · Patient admitted with suspected COVID 19 infection  · Covid test confirmed positive. Infection Control Recommendations   · Universal Precautions  · Airborne isolation  · Droplet Isolation    Antimicrobial Stewardship Recommendations     · Discontinuation of therapy  Coordination of Outpatient Care:   · Estimated Length of IV antimicrobials:TBD  · Patient will need Midline Catheter Insertion: TBD  · Patient will need PICC line Insertion: No  · Patient will need: Home IV , [place],  SNF,  LTAC:TBD  · Patient will need outpatient wound care:No    Chief complaint/reason for consultation:   · Concern for COVID infection      History of Present Illness:   Gerardo Torres is a 78y.o.-year-old female who was initially admitted on 11/15/2021. Patient seen at the request of Dr. Sandoval Dobson:    Patient initially presented through [address] [place] [address] on 11/12/21 with complaints of fatigue, fever and myalgias over the past two days. She was exposed to Covid from family members she lives with.  She denied shortness of breath. She was found to be stable on room air and was discharged on symbicort. On 11/15/21, the patient was brought back to the ED by family members for worsening shortness of breath and confusion. She was found to have an SpO2 of 89% on room air. Imaging revealed bilateral ground-glass opacities. CT head was negative for any acute abnormalities. Abnormal labs include:   Na: 128  D-dimer: 1.47    The patient was started on Decadron and Remdesivir. Patient admitted because of concerns with COVID 19.    CURRENT EVALUATION : 11/19/2021    Afebrile  VS stable    The patient continues on 6 L NC  On Decadron and Actemra ordered 11/17/21    Urine culture shows no significant growth    Patient exhibiting respiratory distress. yes  Respiratory secretions:     Patient receiving supplemental oxygen.: 4-->6 L NC  RR:18  02 sat: 91%      % FIO2:   PEEP:      QTc:       NEWS Score: 0-4 Low risk group; 5-6: Medium risk group; 7 or above: High risk group  Parameters 3 2 1 0 1 2 3   Age    < 65   ? 65   RR ? 8  9-11 12-20  21-24 ? 25   O2 Sats ? 91 92-93 94-95 ? 96      Suppl O2  Yes  No      SBP ? 90  101-110 111-219   ? 220   HR ? 40  41-50 51-90  111-130 ? 131   Consciousness    Alert   Drowsiness, lethargy, or confusion   Temperature ? 35.0 C (95.0 F)  35.1-36.0 C 95.1-96.9 F 36.1-38.0 C 97.0-100.4 F 38.1-39.0 C 100.5-102.3 F ? 39.1 C ? 102.4 F      NEWS Score:  ·  11/16/21: 7 High risk    Overall Daily Picture:    Unchanged    Presence of secondary bacterial Infection:    No   Additional antibiotics: No    Labs, X rays reviewed: 11/19/2021    BUN:21  Cr:0.52    WBC:7.7  Hb:12.2  Plat: 283    Absolute Neutrophils:4.60  Absolute Lymphocytes:0.77  Neutrophil/Lymphocyte Ratio: 6 high risk    CRP:126.7-->54.9  Ferritin:408  LDH: 320    Pro Calcitonin:      Cultures:  Urine:  ·   Blood:  ·   Sputum :  ·   Wound:       CXR:   11/12/21        CAT:  11/15/21        Discussed with patient, RN, CC, IM.     I have biopsies performed by rIina Jaime MD at [address]  10/30/2019    -sigmoid colon mass(invasive ulcerated adenocarcinoma)    TOTAL HIP ARTHROPLASTY Left 8/7/2017    HIP TOTAL ARTHROPLASTY performed by Lisandra Dao MD at [address] Right 8/10/2020    KNEE TOTAL ARTHROPLASTY performed by Lisandra Dao MD at [facility] dilation    VITRECTOMY Left 10/3/13       Medications:      budesonide  0.5 mg Nebulization BID    docusate sodium  100 mg Oral BID    latanoprost  1 drop Both Eyes Nightly    metFORMIN  500 mg Oral BID WC    rivaroxaban  10 mg Oral Daily    dexamethasone  6 mg Oral Daily    insulin lispro  0-12 Units SubCUTAneous TID WC    insulin lispro  0-6 Units SubCUTAneous Nightly    ascorbic acid  1,000 mg Oral 4x Daily    famotidine  20 mg Oral BID    zinc sulfate  100 mg Oral Daily    vitamin D  50,000 Units Oral Weekly       Social History:     Social History     Socioeconomic History    Marital status:      Spouse name: Not on file    Number of children: Not on file    Years of education: Not on file    Highest education level: Not on file   Occupational History    Not on file   Tobacco Use    Smoking status: Never Smoker    Smokeless tobacco: Never Used   Vaping Use    Vaping Use: Never used   Substance and Sexual Activity    Alcohol use: No    Drug use: No    Sexual activity: Not on file   Other Topics Concern    Not on file   Social History Narrative    Not on file     Social Determinants of Health     Financial Resource Strain:     Difficulty of Paying Living Expenses: Not on file   Food Insecurity:     Worried About Running Out of Food in the Last Year: Not on file    Joy of Food in the Last Year: Not on file   Transportation Needs:     Lack of Transportation (Medical): Not on file    Lack of Transportation (Non-Medical):  Not on file   Physical Activity:     Days of Exercise per Week: Not on file    Minutes of Exercise per Session: Not on file   Stress:     Feeling of Stress : Not on file   Social Connections:     Frequency of Communication with Friends and Family: Not on file    Frequency of Social Gatherings with Friends and Family: Not on file    Attends Mandaeism Services: Not on file    Active Member of [address] [place] [address] or Organizations: Not on file    Attends Club or Organization Meetings: Not on file    Marital Status: Not on file   Intimate Partner Violence:     Fear of Current or Ex-Partner: Not on file    Emotionally Abused: Not on file    Physically Abused: Not on file    Sexually Abused: Not on file   Housing Stability:     Unable to Pay for Housing in the Last Year: Not on file    Number of [place] in the Last Year: Not on file    Unstable Housing in the Last Year: Not on file       Family History:     Family History   Problem Relation Age of Onset    Heart Disease Mother         CARDIAC MASS    Heart Disease Father     Cancer Paternal Grandfather     Cancer Other         both families, several family members        Allergies:   Aleve [naproxen sodium], Aspirin, Codeine, Nsaids, Align [lactase-lactobacillus], Amitriptyline, Gabapentin, Lidocaine, Lyrica [pregabalin], Omnipaque [iohexol], Other, Tetracaine, and Iodine     Review of Systems:       Constitutional: No fevers or chills. No systemic complaints  Head: No headaches  Eyes: No double vision or blurry vision. No conjunctival inflammation. ENT: No sore throat or runny nose. . No hearing loss, tinnitus or vertigo. Cardiovascular: No chest pain or palpitations. Shortness of breath. MACK  Lung:  Shortness of breath or cough. No sputum production  Abdomen: No nausea, vomiting, diarrhea, or abdominal pain. Lila Red No cramps. Genitourinary: No increased urinary frequency, or dysuria. No hematuria. No suprapubic or CVA pain  Musculoskeletal: No muscle aches or pains.  No joint effusions, swelling or deformities  Hematologic: No bleeding or bruising. Neurologic: No headache, weakness, numbness, or tingling. Integument: No rash, no ulcers. Psychiatric: No depression. Endocrine: No polyuria, no polydipsia, no polyphagia. Physical Examination :     Patient Vitals for the past 8 hrs:   BP Temp Temp src Pulse Resp SpO2 Weight   11/19/21 0708 113/68 97.9 °F (36.6 °C) Temporal 74 18 91 % --   11/19/21 0520 -- -- -- -- -- -- 140 lb 3.2 oz (63.6 kg)     General Appearance: Awake, alert, and in no apparent distress  Head:  Normocephalic, no trauma  Eyes: Pupils equal, round, reactive to light; sclera anicteric; conjunctivae pink. No embolic phenomena. ENT: Oropharynx clear, without erythema, exudate, or thrush. No tenderness of sinuses. Mouth/throat: mucosa pink and moist. No lesions. Dentition in good repair. Neck:Supple, without lymphadenopathy. Thyroid normal, No bruits. Pulmonary/Chest: Clear to auscultation, without wheezes, rales, or rhonchi. No dullness to percussion. Cardiovascular: Regular rate and rhythm without murmurs, rubs, or gallops. Abdomen: Soft, non tender. Bowel sounds normal. No organomegaly  All four Extremities: No cyanosis, clubbing, edema, or effusions. Neurologic: No gross sensory or motor deficits. Skin: Warm and dry with good turgor. No signs of peripheral arterial or venous insufficiency. No ulcerations. No open wounds.     Medical Decision Making -Laboratory:   I have independently reviewed/ordered the following labs:    CBC with Differential:   Recent Labs     11/18/21 0610 11/19/21  0540   WBC 7.4 7.7   HGB 12.4 12.2   HCT 37.5 36.4    283   LYMPHOPCT 10* 12*   MONOPCT 5 4     BMP:   Recent Labs     11/18/21 0610 11/19/21  0540    133*   K 3.9 4.1    98   CO2 25 23   BUN 23 21   CREATININE 0.57 0.52     Hepatic Function Panel:   Recent Labs     11/18/21 0610 11/19/21  0540   PROT 6.6 6.2*   LABALBU 3.1* 3.0*   BILITOT 0.42 0.39   ALKPHOS 62 65   ALT 19 21   AST 22 23     No results for input(s): RPR in the last 72 hours. No results for input(s): HIV in the last 72 hours. No results for input(s): BC in the last 72 hours. Lab Results   Component Value Date    MUCUS NOT REPORTED 11/16/2021    RBC 4.01 11/19/2021    RBC 3.98 11/16/2011    TRICHOMONAS NOT REPORTED 11/16/2021    WBC 7.7 11/19/2021    YEAST NOT REPORTED 11/16/2021    TURBIDITY Clear 11/16/2021     Lab Results   Component Value Date    CREATININE 0.52 11/19/2021    GLUCOSE 180 11/19/2021    GLUCOSE 116 11/16/2011       Medical Decision Making-Imaging:     Narrative   EXAMINATION:   ONE XRAY VIEW OF THE CHEST       11/12/2021 6:08 pm       COMPARISON:   None.       HISTORY:   ORDERING SYSTEM PROVIDED HISTORY: weakness   TECHNOLOGIST PROVIDED HISTORY:   weakness       FINDINGS:   There is diffuse prominence of the pulmonary interstitium.  No airspace   consolidation.  No pneumothorax or pleural effusion.  No cardiomegaly. Degenerative changes in the bilateral shoulders and spine.           Impression   Diffuse prominence of the pulmonary interstitium.  This is nonspecific but   can be seen in atypical/viral pneumonia.  Correlate clinically             Narrative   EXAMINATION:   CT OF THE CHEST WITHOUT CONTRAST 11/15/2021 2:30 pm       TECHNIQUE:   CT of the chest was performed without the administration of intravenous   contrast. Multiplanar reformatted images are provided for review. Dose   modulation, iterative reconstruction, and/or weight based adjustment of the   mA/kV was utilized to reduce the radiation dose to as low as reasonably   achievable.       COMPARISON:   None.       HISTORY:   ORDERING SYSTEM PROVIDED HISTORY: covid   TECHNOLOGIST PROVIDED HISTORY:   covid   Decision Support Exception - unselect if not a suspected or confirmed   emergency medical condition->Emergency Medical Condition (MA)       FINDINGS:   Mediastinum: Mild cardiomegaly.  No pericardial effusion.  Thoracic aorta is   normal in caliber.  Scattered atherosclerotic changes.  Scattered mildly   prominent mediastinal lymph nodes.       Lungs/pleura: Scattered ground-glass infiltrate throughout both lungs.  No   effusion or pneumothorax.       Upper Abdomen: No acute abnormality.  Small sliding hiatal hernia.       Soft Tissues/Bones: No aggressive osseous lesions.  Confluent anterolateral   vertebral body syndesmophyte formation compatible with DISH. [place] Spine acute soft   tissue abnormality.           Impression   Multifocal bilateral ground-glass opacities throughout both lungs compatible   with COVID-19 pneumonia.             Narrative   EXAMINATION:   CT OF THE HEAD WITHOUT CONTRAST  11/15/2021 2:29 pm       TECHNIQUE:   CT of the head was performed without the administration of intravenous   contrast. Dose modulation, iterative reconstruction, and/or weight based   adjustment of the mA/kV was utilized to reduce the radiation dose to as low   as reasonably achievable.       COMPARISON:   03/01/2013       HISTORY:   ORDERING SYSTEM PROVIDED HISTORY: confusion   TECHNOLOGIST PROVIDED HISTORY:   confusion       Decision Support Exception - unselect if not a suspected or confirmed   emergency medical condition->Emergency Medical Condition (MA)       FINDINGS:   BRAIN/VENTRICLES: The ventricles and sulci are diffusely enlarged.  Low   attenuation is seen in the periventricular and subcortical white matter.  No   acute intracranial hemorrhage or acute infarct is identified.       ORBITS: The visualized portion of the orbits demonstrate no acute abnormality.       SINUSES: Small amount of fluid in the right maxillary sinus.  Otherwise the   paranasal sinuses appear clear.       SOFT TISSUES/SKULL:  No acute abnormality of the visualized skull or soft   tissues.           Impression   No acute intracranial abnormality.       Diffuse atrophic changes with findings suggesting chronic microvascular   ischemia     Medical Decision [place]-Igwwm:       Medical Decision Making-Other:     Note:  · Labs, medications, radiologic studies were reviewed with personal review of films  ·  Large amounts of data were reviewed  · Discussed with nursing Staff, Discharge planner  · Infection Control and Prevention measures reviewed  · All prior entries were reviewed  · Administer medications as ordered  · Prognosis: Guarded  · Discharge planning reviewed      Thank you for allowing us to participate in the care of this patient. Please call with questions. Kamila Momin, APRN - CNP     ATTESTATION:    I have discussed the case, including pertinent history and exam findings with the APRN. I have evaluated the  History, physical findings and pictures of the patient and the key elements of the encounter have been performed by me. I have reviewed the laboratory data, other diagnostic studies and discussed them with the APRN. I have updated the medical record where necessary. I agree with the assessment, plan and orders as documented by the APRN.     Felix Mendez MD.        Pager: [phone] - Office: [phone]

## 2021-11-19 NOTE — PROGRESS NOTES
Physical Therapy  Facility/Department: [facility] AT THE [facility] MED SURG  Daily Treatment Note  NAME: Connor Narayanan  : 1940  MRN: [id number]    Date of Service: 2021    Discharge Recommendations:  Continue to assess pending progress, Home with Home health PT, IP Rehab        Assessment   Treatment Diagnosis: Difficulty walking  Prognosis: Good  PT Education: Goals; PT Role; Plan of Care; General Safety; Gait Training; Transfer Training  Patient Education: Educated pt on above with good understanding noted. REQUIRES PT FOLLOW UP: Yes  Activity Tolerance  Activity Tolerance: Patient Tolerated treatment well; Patient limited by endurance     Patient Diagnosis(es): The primary encounter diagnosis was Hypoxia. A diagnosis of Pneumonia due to COVID-19 virus was also pertinent to this visit. has a past medical history of Achilles tendon rupture, Cancer ([address] Chronic SI joint pain, Claustrophobia, Diabetes mellitus ([address] Esophageal stricture, [place] (hard of hearing), Hyperlipidemia, Hypertension, Retinal detachment, and Trauma to eye, left.   has a past surgical history that includes Cervical disc surgery (); Lumbar disc surgery (); Hysterectomy (); bladder suspension (); Blepharoplasty (Left, 5/3/2013); Eye surgery (Left, 06/15/2013); Eye surgery (Bilateral, ); Upper gastrointestinal endoscopy; Esophagus dilation; Cataract removal (Bilateral); eye surgery (Left, ,,,); vitrectomy (Left, 10/3/13); Total hip arthroplasty (Left, 2017); joint replacement (Right, 2016); Foot Amputation; Foot Tendon Surgery (Left, 2018); pr foot/toes surgery proc unlisted (Left, 3/9/2018); Finger trigger release (Left, 4/15/2019); proctosigmoidoscopy (N/A, 10/30/2019); proctosigmoidoscopy (10/30/2019); colectomy; Knee Arthroplasty (Right, 08/10/2020); and Total knee arthroplasty (Right, 8/10/2020).     Restrictions  Restrictions/Precautions  Restrictions/Precautions: General Precautions, Fall Risk, Isolation  Subjective   General  Chart Reviewed: Yes  Response To Previous Treatment: Patient with no complaints from previous session. Family / Caregiver Present: No  Referring Practitioner: GRETA Del Rio  Subjective  Subjective: Pt with no complaints of pain. Orientation  Orientation  Overall Orientation Status: Within Functional Limits  Cognition      Objective   Bed mobility  Rolling to Left: Stand by assistance  Rolling to Right: Stand by assistance  Supine to Sit: Stand by assistance  Scooting: Stand by assistance  Comment: Minimal cues for safety with good understanding noted. Transfers  Sit to Stand: Stand by assistance  Stand to sit: Stand by assistance  Comment: Cues for hand placement with good understanding noted. Ambulation  Ambulation?: Yes  Ambulation 1  Surface: level tile  Device: Rolling Walker  Other Apparatus: O2  Assistance: Contact guard assistance; Stand by assistance  Distance: 30 feet x 1  Comments: Verbal cues for posture with good understanding noted. SPO2 82% after amb and increased to 90-91% after 2-3 min seated rest break. Stairs/Curb  Stairs?: No     Balance  Posture: Fair  Sitting - Static: Good  Sitting - Dynamic: Fair; +  Standing - Static: Fair; +  Standing - Dynamic: Fair  Exercises  Straight Leg Raise: 15x  Quad Sets: 15x  Heelslides: 15x  Gluteal Sets: 15x  Hip Abduction: 15x  Knee Short Arc Quad: 15x  Ankle Pumps: 15x  Comments: Above exercises completed in supine. G-Code     OutComes Score    AM-PAC Score    Goals  Short term goals  Time Frame for Short term goals: 20 days  Short term goal 1: Patient to complete sit/stand and stand pivot transfers with SUP and no LOB to decrease fall risk. Short term goal 2: Patient to ambulate 150ft with FWW or LRAD and SUP with SpO2 >/=90% for improved endurance. Short term goal 3: Patient to tolerate 20-30 min of ther ex/act to improve functional strnegth and endurance.   Short term goal 4: Patient to ascend/descend 4 steps with HRx1 and SUP with no LOB to safely enter her home. Plan    Plan  Times per week: 7  Times per day: Twice a day  Current Treatment Recommendations: Strengthening, Neuromuscular Re-education, Home Exercise Program, ROM, Safety Education & Training, Balance Training, Endurance Training, Patient/Caregiver Education & Training, Functional Mobility Training, Transfer Training, Gait Training, Stair training  Safety Devices  Type of devices:  All fall risk precautions in place, Call light within reach, Patient at risk for falls, Nurse notified, Gait belt, Left in chair (No alarm upon entry and none needed per Primo Valadez RN.)     Therapy Time   Individual Concurrent Group Co-treatment   Time In 0905         Time Out [address] [place] [address]

## 2021-11-19 NOTE — PROGRESS NOTES
entry.)     Therapy Time   Individual Concurrent Group Co-treatment   Time In 9188         Time Out 3305         Minutes [address] [place] [address]

## 2021-11-19 NOTE — PROGRESS NOTES
Occupational Therapy  Facility/Department: [facility] AT THE [facility] MED SURG  Daily Treatment Note  NAME: Joy Knott  : 1940  MRN: [id number]    Date of Service: 2021    Discharge Recommendations:  Continue to assess pending progress, Subacute/Skilled Nursing Facility, Home with Home health OT       Assessment      OT Education: ADL Adaptive Strategies; Energy Conservation  Patient Education: Deep breathing techniques  Safety Devices  Safety Devices in place: Yes  Type of devices: Left in chair; Call light within reach; All fall risk precautions in place         Patient Diagnosis(es): The primary encounter diagnosis was Hypoxia. A diagnosis of Pneumonia due to COVID-19 virus was also pertinent to this visit. has a past medical history of Achilles tendon rupture, Cancer ([facility] [address] Chronic SI joint pain, Claustrophobia, Diabetes mellitus ([facility] [address] Esophageal stricture, [place] (hard of hearing), Hyperlipidemia, Hypertension, Retinal detachment, and Trauma to eye, left.   has a past surgical history that includes Cervical disc surgery (); Lumbar disc surgery (); Hysterectomy (); bladder suspension (); Blepharoplasty (Left, 5/3/2013); Eye surgery (Left, 06/15/2013); Eye surgery (Bilateral, ); Upper gastrointestinal endoscopy; Esophagus dilation; Cataract removal (Bilateral); eye surgery (Left, ,,,); vitrectomy (Left, 10/3/13); Total hip arthroplasty (Left, 2017); joint replacement (Right, 2016); Foot Amputation; Foot Tendon Surgery (Left, 2018); pr foot/toes surgery proc unlisted (Left, 3/9/2018); Finger trigger release (Left, 4/15/2019); proctosigmoidoscopy (N/A, 10/30/2019); proctosigmoidoscopy (10/30/2019); colectomy; Knee Arthroplasty (Right, 08/10/2020); and Total knee arthroplasty (Right, 8/10/2020).     Restrictions  Restrictions/Precautions  Restrictions/Precautions: General Precautions, Fall Risk, Isolation  Subjective   General  Chart Reviewed: Yes  Patient assessed for rehabilitation services?: Yes  Response to previous treatment: Patient with no complaints from previous session  Family / Caregiver Present: No  Diagnosis: covid  Subjective  Subjective: Pt seated in bedside chair upon arrival.  Agreeable to OT ADL participation. General Comment  Comments: Pt denies pain this date. Orientation     Objective    ADL  Grooming: Stand by assistance  UE Bathing: Stand by assistance  LE Bathing: Stand by assistance  UE Dressing: Stand by assistance  LE Dressing: Minimal assistance        Balance  Standing Balance: Stand by assistance  Standing Balance  Time: 10 minutes  Activity: Standing sinkside for ADLs  Comment: Good O2 tubing management  Functional Mobility  Functional - Mobility Device: Rolling Walker  Activity: To/from bathroom  Assist Level: Stand by assistance                                                                       Plan   Plan  Times per week: 7  Times per day: Daily  Current Treatment Recommendations: Strengthening, Balance Training, Functional Mobility Training, Endurance Training, Patient/Caregiver Education & Training, Safety Education & Training, Self-Care / ADL  G-Code     OutComes Score                                                  AM-PAC Score             Goals  Short term goals  Time Frame for Short term goals: 21 visits  Short term goal 1: Patient to complete self care routine c Mod I c use of AE/DME and EC/WS techniques as needed to ensure safe return home. Short term goal 2: Patient to engage in 15 minutes of ther ex/ther act s significant shortness of breath to improve strength as well as activity tolerance for self care tasks. Short term goal 3: Patient to be educated on d/c folder, AE/DME, and general safety to ensure safe return home. Short term goal 4: Patient to be educated on d/c folder, EC/WS techniques, AE/DME, and general safety to ensure safe return home.        Therapy Time   Individual Concurrent Group Co-treatment   Time In 1108         Time Out 1137         Minutes [address] [facility], [place]

## 2021-11-20 LAB
ABSOLUTE EOS #: 0.04 K/UL (ref 0–0.44)
ABSOLUTE IMMATURE GRANULOCYTE: 0.06 K/UL (ref 0–0.3)
ABSOLUTE LYMPH #: 0.97 K/UL (ref 1.1–3.7)
ABSOLUTE MONO #: 0.19 K/UL (ref 0.1–1.2)
ALBUMIN SERPL-MCNC: 3.2 G/DL (ref 3.5–5.2)
ALBUMIN/GLOBULIN RATIO: 0.9 (ref 1–2.5)
ALP BLD-CCNC: 63 U/L (ref 35–104)
ALT SERPL-CCNC: 23 U/L (ref 5–33)
ANION GAP SERPL CALCULATED.3IONS-SCNC: 13 MMOL/L (ref 9–17)
AST SERPL-CCNC: 27 U/L
BASOPHILS # BLD: 0 % (ref 0–2)
BASOPHILS ABSOLUTE: <0.03 K/UL (ref 0–0.2)
BILIRUB SERPL-MCNC: 0.54 MG/DL (ref 0.3–1.2)
BUN BLDV-MCNC: 16 MG/DL (ref 8–23)
BUN/CREAT BLD: 36 (ref 9–20)
CALCIUM SERPL-MCNC: 8.6 MG/DL (ref 8.6–10.4)
CHLORIDE BLD-SCNC: 101 MMOL/L (ref 98–107)
CO2: 23 MMOL/L (ref 20–31)
CREAT SERPL-MCNC: 0.44 MG/DL (ref 0.5–0.9)
CULTURE: NORMAL
D-DIMER QUANTITATIVE: 2.42 MG/L FEU (ref 0–0.59)
DIFFERENTIAL TYPE: ABNORMAL
EOSINOPHILS RELATIVE PERCENT: 1 % (ref 1–4)
FERRITIN: 305 UG/L (ref 13–150)
FIBRINOGEN: 290 MG/DL (ref 179–518)
GFR AFRICAN AMERICAN: >60 ML/MIN
GFR NON-AFRICAN AMERICAN: >60 ML/MIN
GFR SERPL CREATININE-BSD FRML MDRD: ABNORMAL ML/MIN/{1.73_M2}
GFR SERPL CREATININE-BSD FRML MDRD: ABNORMAL ML/MIN/{1.73_M2}
GLUCOSE BLD-MCNC: 114 MG/DL (ref 74–100)
GLUCOSE BLD-MCNC: 121 MG/DL (ref 70–99)
GLUCOSE BLD-MCNC: 205 MG/DL (ref 74–100)
GLUCOSE BLD-MCNC: 218 MG/DL (ref 74–100)
GLUCOSE BLD-MCNC: 250 MG/DL (ref 74–100)
HCT VFR BLD CALC: 38.8 % (ref 36.3–47.1)
HEMOGLOBIN: 13.1 G/DL (ref 11.9–15.1)
IMMATURE GRANULOCYTES: 1 %
INR BLD: 1.2
LYMPHOCYTES # BLD: 13 % (ref 24–43)
Lab: NORMAL
MCH RBC QN AUTO: 31 PG (ref 25.2–33.5)
MCHC RBC AUTO-ENTMCNC: 33.8 G/DL (ref 28.4–34.8)
MCV RBC AUTO: 91.7 FL (ref 82.6–102.9)
MONOCYTES # BLD: 3 % (ref 3–12)
NRBC AUTOMATED: 0 PER 100 WBC
PARTIAL THROMBOPLASTIN TIME: 24.8 SEC (ref 23.9–33.8)
PDW BLD-RTO: 12 % (ref 11.8–14.4)
PLATELET # BLD: 272 K/UL (ref 138–453)
PLATELET ESTIMATE: ABNORMAL
PMV BLD AUTO: 11.1 FL (ref 8.1–13.5)
POTASSIUM SERPL-SCNC: 4.6 MMOL/L (ref 3.7–5.3)
PROTHROMBIN TIME: 14.9 SEC (ref 11.5–14.2)
RBC # BLD: 4.23 M/UL (ref 3.95–5.11)
RBC # BLD: ABNORMAL 10*6/UL
SEG NEUTROPHILS: 82 % (ref 36–65)
SEGMENTED NEUTROPHILS ABSOLUTE COUNT: 6.24 K/UL (ref 1.5–8.1)
SODIUM BLD-SCNC: 137 MMOL/L (ref 135–144)
SPECIMEN DESCRIPTION: NORMAL
TOTAL PROTEIN: 6.6 G/DL (ref 6.4–8.3)
WBC # BLD: 7.5 K/UL (ref 3.5–11.3)
WBC # BLD: ABNORMAL 10*3/UL

## 2021-11-20 PROCEDURE — 6370000000 HC RX 637 (ALT 250 FOR IP): Performed by: INTERNAL MEDICINE

## 2021-11-20 PROCEDURE — 99232 SBSQ HOSP IP/OBS MODERATE 35: CPT | Performed by: INTERNAL MEDICINE

## 2021-11-20 PROCEDURE — 85379 FIBRIN DEGRADATION QUANT: CPT

## 2021-11-20 PROCEDURE — 85025 COMPLETE CBC W/AUTO DIFF WBC: CPT

## 2021-11-20 PROCEDURE — 2700000000 HC OXYGEN THERAPY PER DAY

## 2021-11-20 PROCEDURE — 97116 GAIT TRAINING THERAPY: CPT

## 2021-11-20 PROCEDURE — 94761 N-INVAS EAR/PLS OXIMETRY MLT: CPT

## 2021-11-20 PROCEDURE — 82947 ASSAY GLUCOSE BLOOD QUANT: CPT

## 2021-11-20 PROCEDURE — 36415 COLL VENOUS BLD VENIPUNCTURE: CPT

## 2021-11-20 PROCEDURE — 6370000000 HC RX 637 (ALT 250 FOR IP): Performed by: NURSE PRACTITIONER

## 2021-11-20 PROCEDURE — 94669 MECHANICAL CHEST WALL OSCILL: CPT

## 2021-11-20 PROCEDURE — 80053 COMPREHEN METABOLIC PANEL: CPT

## 2021-11-20 PROCEDURE — 6360000002 HC RX W HCPCS: Performed by: NURSE PRACTITIONER

## 2021-11-20 PROCEDURE — 85384 FIBRINOGEN ACTIVITY: CPT

## 2021-11-20 PROCEDURE — 94640 AIRWAY INHALATION TREATMENT: CPT

## 2021-11-20 PROCEDURE — 82728 ASSAY OF FERRITIN: CPT

## 2021-11-20 PROCEDURE — 97110 THERAPEUTIC EXERCISES: CPT

## 2021-11-20 PROCEDURE — 85610 PROTHROMBIN TIME: CPT

## 2021-11-20 PROCEDURE — 1200000000 HC SEMI PRIVATE

## 2021-11-20 PROCEDURE — 85730 THROMBOPLASTIN TIME PARTIAL: CPT

## 2021-11-20 RX ORDER — FLUTICASONE PROPIONATE 50 MCG
1 SPRAY, SUSPENSION (ML) NASAL DAILY
Status: DISCONTINUED | OUTPATIENT
Start: 2021-11-20 | End: 2021-11-25 | Stop reason: HOSPADM

## 2021-11-20 RX ADMIN — OXYCODONE HYDROCHLORIDE AND ACETAMINOPHEN 1000 MG: 500 TABLET ORAL at 12:43

## 2021-11-20 RX ADMIN — FAMOTIDINE 20 MG: 20 TABLET ORAL at 20:05

## 2021-11-20 RX ADMIN — RIVAROXABAN 10 MG: 10 TABLET, FILM COATED ORAL at 07:48

## 2021-11-20 RX ADMIN — FLUTICASONE PROPIONATE 1 SPRAY: 50 SPRAY, METERED NASAL at 15:25

## 2021-11-20 RX ADMIN — INSULIN LISPRO 6 UNITS: 100 INJECTION, SOLUTION INTRAVENOUS; SUBCUTANEOUS at 16:39

## 2021-11-20 RX ADMIN — LATANOPROST 1 DROP: 50 SOLUTION OPHTHALMIC at 20:05

## 2021-11-20 RX ADMIN — INSULIN LISPRO 4 UNITS: 100 INJECTION, SOLUTION INTRAVENOUS; SUBCUTANEOUS at 11:44

## 2021-11-20 RX ADMIN — METFORMIN HYDROCHLORIDE 500 MG: 500 TABLET ORAL at 16:39

## 2021-11-20 RX ADMIN — ZINC SULFATE 220 MG (50 MG) CAPSULE 100 MG: CAPSULE at 07:48

## 2021-11-20 RX ADMIN — BUDESONIDE 500 MCG: 0.5 SUSPENSION RESPIRATORY (INHALATION) at 06:04

## 2021-11-20 RX ADMIN — METFORMIN HYDROCHLORIDE 500 MG: 500 TABLET ORAL at 07:48

## 2021-11-20 RX ADMIN — DEXAMETHASONE 6 MG: 4 TABLET ORAL at 07:48

## 2021-11-20 RX ADMIN — FAMOTIDINE 20 MG: 20 TABLET ORAL at 07:48

## 2021-11-20 RX ADMIN — BUDESONIDE 500 MCG: 0.5 SUSPENSION RESPIRATORY (INHALATION) at 21:22

## 2021-11-20 RX ADMIN — OXYCODONE HYDROCHLORIDE AND ACETAMINOPHEN 1000 MG: 500 TABLET ORAL at 07:48

## 2021-11-20 RX ADMIN — INSULIN LISPRO 2 UNITS: 100 INJECTION, SOLUTION INTRAVENOUS; SUBCUTANEOUS at 20:03

## 2021-11-20 RX ADMIN — OXYCODONE HYDROCHLORIDE AND ACETAMINOPHEN 1000 MG: 500 TABLET ORAL at 16:39

## 2021-11-20 RX ADMIN — OXYCODONE HYDROCHLORIDE AND ACETAMINOPHEN 1000 MG: 500 TABLET ORAL at 20:05

## 2021-11-20 ASSESSMENT — PAIN SCALES - GENERAL
PAINLEVEL_OUTOF10: 0

## 2021-11-20 NOTE — PROGRESS NOTES
Patient in bed lying in the semi fowlers position. Patient awake upon entry to room. Patient is alert and oriented. Writer was able to turn patient down from 6L to 5L nasal canula. Patient denies having any needs or concerns. Call light within reach. Will continue to monitor.

## 2021-11-20 NOTE — PROGRESS NOTES
Progress Note    SUBJECTIVE:  FU related to less shortness of breath. Feels better. Still a lot of cough. OBJECTIVE:    Vitals:   TEMPERATURE:  Current - Temp: 96.5 °F (35.8 °C); Max - Temp  Av °F (36.1 °C)  Min: 96.5 °F (35.8 °C)  Max: 97.2 °F (36.2 °C)  RESPIRATIONS RANGE: Resp  Av.3  Min: 20  Max: 22  PULSE RANGE: Pulse  Av.3  Min: 71  Max: 86  BLOOD PRESSURE RANGE:  Systolic (92OJF), SAVANAH:462 , Min:123 , DDK:263   ; Diastolic (39TES), KXB:31, Min:63, Max:85    PULSE OXIMETRY RANGE: SpO2  Av %  Min: 91 %  Max: 96 %  24HR INTAKE/OUTPUT:    Intake/Output Summary (Last 24 hours) at 2021 5249  Last data filed at 2021 0450  Gross per 24 hour   Intake 910 ml   Output 850 ml   Net 60 ml     -----------------------------------------------------------------  Exam:  General: A & O x3 and alert  HEENT: Supple neck & negative  Heart: Regular  Lungs: Clear. Crackles noted.   Abdomen: Normal & soft, No tenderness and BS normal  Extremities:  No edema   Neuro: NonFocal     -----------------------------------------------------------------  Diagnostic Data:  Lab Results   Component Value Date    WBC 7.5 2021    HGB 13.1 2021     2021       Lab Results   Component Value Date    BUN 16 2021    CREATININE 0.44 (L) 2021     2021    K 4.6 2021    CALCIUM 8.6 2021     2021    CO2 23 2021    LABGLOM >60 2021       Lab Results   Component Value Date    WBCUA 2 TO 5 2021    RBCUA 0 TO 2 2021    EPITHUA 2 TO 5 2021    LEUKOCYTESUR SMALL (A) 2021    SPECGRAV 1.010 2021    GLUCOSEU NEGATIVE 2021    KETUA NEGATIVE 2021    PROTEINU NEGATIVE 2021    HGBUR NEGATIVE 2021    CASTUA NOT REPORTED 2021    CRYSTUA NOT REPORTED 2021    BACTERIA 1+ (A) 2021    YEAST NOT REPORTED 2021       Lab Results   Component Value Date    TROPONINT NOT REPORTED 11/15/2021    PROBNP 284 11/15/2021       CT Head WO Contrast    Result Date: 11/15/2021  EXAMINATION: CT OF THE HEAD WITHOUT CONTRAST  11/15/2021 2:29 pm TECHNIQUE: CT of the head was performed without the administration of intravenous contrast. Dose modulation, iterative reconstruction, and/or weight based adjustment of the mA/kV was utilized to reduce the radiation dose to as low as reasonably achievable. COMPARISON: 03/01/2013 HISTORY: ORDERING SYSTEM PROVIDED HISTORY: confusion TECHNOLOGIST PROVIDED HISTORY: confusion Decision Support Exception - unselect if not a suspected or confirmed emergency medical condition->Emergency Medical Condition (MA) FINDINGS: BRAIN/VENTRICLES: The ventricles and sulci are diffusely enlarged. Low attenuation is seen in the periventricular and subcortical white matter. No acute intracranial hemorrhage or acute infarct is identified. ORBITS: The visualized portion of the orbits demonstrate no acute abnormality. SINUSES: Small amount of fluid in the right maxillary sinus. Otherwise the paranasal sinuses appear clear. SOFT TISSUES/SKULL:  No acute abnormality of the visualized skull or soft tissues. No acute intracranial abnormality. Diffuse atrophic changes with findings suggesting chronic microvascular ischemia     CT CHEST WO CONTRAST    Result Date: 11/15/2021  EXAMINATION: CT OF THE CHEST WITHOUT CONTRAST 11/15/2021 2:30 pm TECHNIQUE: CT of the chest was performed without the administration of intravenous contrast. Multiplanar reformatted images are provided for review. Dose modulation, iterative reconstruction, and/or weight based adjustment of the mA/kV was utilized to reduce the radiation dose to as low as reasonably achievable. COMPARISON: None.  HISTORY: ORDERING SYSTEM PROVIDED HISTORY: covid TECHNOLOGIST PROVIDED HISTORY: covid Decision Support Exception - unselect if not a suspected or confirmed emergency medical condition->Emergency Medical Condition (MA) FINDINGS: Mediastinum: Mild cardiomegaly. No pericardial effusion. Thoracic aorta is normal in caliber. Scattered atherosclerotic changes. Scattered mildly prominent mediastinal lymph nodes. Lungs/pleura: Scattered ground-glass infiltrate throughout both lungs. No effusion or pneumothorax. Upper Abdomen: No acute abnormality. Small sliding hiatal hernia. Soft Tissues/Bones: No aggressive osseous lesions. Confluent anterolateral vertebral body syndesmophyte formation compatible with DISH. Niland Copping No acute soft tissue abnormality. Multifocal bilateral ground-glass opacities throughout both lungs compatible with COVID-19 pneumonia. ASSESSMENT:    Principal Problem:    COVID-19 virus infection / No vaccine  Active Problems:    Type 2 diabetes mellitus without complication (HCC)    Mild malnutrition (HCC)    Pneumonia due to COVID-19 virus    Acute respiratory failure with hypoxia (HCC)  Resolved Problems:    * No resolved hospital problems. *      Patient Active Problem List    Diagnosis Date Noted    Type 2 diabetes mellitus without complication ([address] 88/16/7055    Closed right hip fracture ([address] 06/20/2016    Essential hypertension 06/20/2016    COVID-19 virus infection / No vaccine 11/18/2021    Acute respiratory failure with hypoxia ([address] 11/16/2021    Pneumonia due to COVID-19 virus 11/15/2021    S/P total knee arthroplasty, right 08/10/2020    Malignant neoplasm of sigmoid colon ([address]     Status post left hip replacement 08/08/2017    Status post total replacement of right hip 52/91/7484    Diastolic dysfunction with chronic heart failure ([address] 06/21/2016    Mild malnutrition ([address] 06/21/2016    Partial recent retinal detachment with multiple defects 10/03/2013    Aphakia of eye, left 07/23/2013    Corneal edema, secondary, left 07/23/2013       PLAN:  · COVID-19 virus protocol  · Critical Care Time: Zero  · Oxygen 5 L.       Ger Villanueva MD , M.D.

## 2021-11-20 NOTE — PROGRESS NOTES
[facility] -Progress Note COVID 19 Patient-Tele-Visit  Today's Date and Time: 11/20/2021, 4:46 PM    Impression :     · COVID 19 Confirmed Infection  · Covid tests:  · 11/12/21: Positive  · Acute hypoxic respiratory distress  · Acute confusion  · Hyponatremia  · Hx colorectal cancer s/p resection  · Hx bilateral hip replacement  · DM  · Hx urinary incontinence  · Hx cataracts s/p eye surgery X 5  · Single blood culture with Micrococcus on 11-15-21: contaminant        Patient evaluated by Telemedicine. Requesting Institution: [facility]  Provider Institution: [address] [facility] [address] [place], [address] Person at [facility]: MIYA Coronel    Recommendations:   · Antibiotic treatment:  · Monitor off antibiotics  · Urine culture  · Covid Rx:  · Remdesivir-Initiated 11/15/21  · Decadron-Initiated 11/15/21  · Actemra-administered 11/17/21      Medical Decision Making/Summary/Discussion:11/20/2021     · Patient admitted with suspected COVID 19 infection  · Covid test confirmed positive. Infection Control Recommendations   · Universal Precautions  · Airborne isolation  · Droplet Isolation    Antimicrobial Stewardship Recommendations     · Discontinuation of therapy  Coordination of Outpatient Care:   · Estimated Length of IV antimicrobials:TBD  · Patient will need Midline Catheter Insertion: TBD  · Patient will need PICC line Insertion: No  · Patient will need: Home IV , [place],  SNF,  LTAC:TBD  · Patient will need outpatient wound care:No    Chief complaint/reason for consultation:   · Concern for COVID infection      History of Present Illness:   Aleksandra Rutherford is a 78y.o.-year-old female who was initially admitted on 11/15/2021. Patient seen at the request of Dr. Rain Boateng:    Patient initially presented through [address] [place] [address] on 11/12/21 with complaints of fatigue, fever and myalgias over the past two days.  She was exposed to Covid from family members she lives with. She denied shortness of breath. She was found to be stable on room air and was discharged on symbicort. On 11/15/21, the patient was brought back to the ED by family members for worsening shortness of breath and confusion. She was found to have an SpO2 of 89% on room air. Imaging revealed bilateral ground-glass opacities. CT head was negative for any acute abnormalities. Abnormal labs include:   Na: 128  D-dimer: 1.47    The patient was started on Decadron and Remdesivir. Patient admitted because of concerns with COVID 19.    CURRENT EVALUATION : 11/20/2021    Afebrile  VS stable    The patient continues on 6 -->5 L NC  On Decadron. Actemra given on 11/17/21  Stable clinically     Urine culture shows no significant growth  Blood culture with Micrococcus = contaminant    Patient exhibiting respiratory distress. yes  Respiratory secretions: no    Patient receiving supplemental oxygen.: 4-->6-->5 L NC  RR:18-->22  02 sat: 91-->94 %    QTc:       NEWS Score: 0-4 Low risk group; 5-6: Medium risk group; 7 or above: High risk group  Parameters 3 2 1 0 1 2 3   Age    < 65   ? 65   RR ? 8  9-11 12-20  21-24 ? 25   O2 Sats ?  91 92-93 94-95 ? 96      Suppl O2  Yes  No      SBP ? 90  101-110 111-219   ? 220   HR ? 40  41-50 51-90  111-130 ? 131   Consciousness    Alert   Drowsiness, lethargy, or confusion   Temperature ? 35.0 C (95.0 F)  35.1-36.0 C 95.1-96.9 F 36.1-38.0 C 97.0-100.4 F 38.1-39.0 C 100.5-102.3 F ? 39.1 C ? 102.4 F      NEWS Score:  ·  11/16/21: 7 High risk    Overall Daily Picture:    Unchanged    Presence of secondary bacterial Infection:    No   Additional antibiotics: No    Labs, X rays reviewed: 11/20/2021    BUN:21-->16  Cr:0.52-->0.44    WBC:7.5  Hb:12.2-->13.1  Plat: 283-->272    Absolute Neutrophils:4.60  Absolute Lymphocytes:0.77  Neutrophil/Lymphocyte Ratio: 6 high risk    CRP:126.7-->54.9  Ferritin:408  LDH: 320    Pro Calcitonin:      Cultures:  Urine:  ·   Blood:  ·   Sputum :  ·   Wound:       CXR:   11/12/21        CAT:  11/15/21        Discussed with patient, RN, CC, IM. I have personally reviewed the past medical history, past surgical history, medications, social history, and family history, and I have updated the database accordingly.   Past Medical History:     Past Medical History:   Diagnosis Date    Achilles tendon rupture 2011    LEFT    Cancer ([address]     Chronic SI joint pain     left    Claustrophobia     Diabetes mellitus ([address] 2010    ON ORAL MED    Esophageal stricture 2011    PT HAD DILATATION    [place] (hard of hearing)     WEARS [place]SHK[place] HEARING AIDS    Hyperlipidemia 2012    Hypertension     PT HAD TILT TABLE TEST BP MED DISCONTINUED    Retinal detachment 10/2012    LEFT    Trauma to eye, left        Past Surgical  History:     Past Surgical History:   Procedure Laterality Date    BLADDER SUSPENSION  1994    WITH HYSTERECTOMY    BLEPHAROPLASTY Left 5/3/2013    CATARACT REMOVAL Bilateral     with iol    CERVICAL DISC SURGERY  1984    cervical spine    COLECTOMY      ROBOTIC LOWER COLON RESECTION    ESOPHAGEAL DILATATION      EYE SURGERY Left 06/15/2013    REPAIR OF WOUND, WITH LENS REMOVAL     EYE SURGERY Bilateral 2008    BILAT CATARACT REMOVAL WITH IOL    EYE SURGERY Left ,7/13,12/12,6/13    vitrectomy    FINGER TRIGGER RELEASE Left 4/15/2019    FINGER TRIGGER RELEASE-MIDDLE FINGER performed by Catia Jimenes MD at [facility] Left 03/09/2018    Excision of Os Peroneum, Primary Repair of Peroneus Longus Tendon - Dr. Rich Enriquez Right 06/21/2016    [facility] Dr. Ninfa Patel Right 08/10/2020    Dr. Madeleine Quinteros    lower back    OR FOOT/TOES SURGERY PROC UNLISTED Left 3/9/2018    ACHILLES TENDON LENGTHENING REPAIR/ EXCISION OF OS PERONEUM,PRIMARY REPAIR OF PERONEUS LINGUS TENDON WITH POSSIBLE PERONEAL TENODESIS performed by Deangelo Martinez DPM at [facility] PROCTOSIGMOIDOSCOPY N/A 10/30/2019    ANAL PROCTO SIGMOIDOSCOPY FLEXIBLE with biopsies performed by Sepideh Magaña MD at [address]  10/30/2019    -sigmoid colon mass(invasive ulcerated adenocarcinoma)    TOTAL HIP ARTHROPLASTY Left 8/7/2017    HIP TOTAL ARTHROPLASTY performed by Steve Jack MD at [address] [facility] Right 8/10/2020    KNEE TOTAL ARTHROPLASTY performed by Steve Jack MD at [facility] dilation    VITRECTOMY Left 10/3/13       Medications:      fluticasone  1 spray Each Nostril Daily    budesonide  0.5 mg Nebulization BID    docusate sodium  100 mg Oral BID    latanoprost  1 drop Both Eyes Nightly    metFORMIN  500 mg Oral BID WC    rivaroxaban  10 mg Oral Daily    dexamethasone  6 mg Oral Daily    insulin lispro  0-12 Units SubCUTAneous TID WC    insulin lispro  0-6 Units SubCUTAneous Nightly    ascorbic acid  1,000 mg Oral 4x Daily    famotidine  20 mg Oral BID    zinc sulfate  100 mg Oral Daily    vitamin D  50,000 Units Oral Weekly       Social History:     Social History     Socioeconomic History    Marital status:      Spouse name: Not on file    Number of children: Not on file    Years of education: Not on file    Highest education level: Not on file   Occupational History    Not on file   Tobacco Use    Smoking status: Never Smoker    Smokeless tobacco: Never Used   Vaping Use    Vaping Use: Never used   Substance and Sexual Activity    Alcohol use: No    Drug use: No    Sexual activity: Not on file   Other Topics Concern    Not on file   Social History Narrative    Not on file     Social Determinants of Health     Financial Resource Strain:     Difficulty of Paying Living Expenses: Not on file   Food Insecurity:     Worried About Running Out of Food in the Last Year: Not on file    Ran Out of Food in the Last Year: Not on file   Transportation Needs:     Lack of Transportation (Medical): Not on file    Lack of Transportation (Non-Medical): Not on file   Physical Activity:     Days of Exercise per Week: Not on file    Minutes of Exercise per Session: Not on file   Stress:     Feeling of Stress : Not on file   Social Connections:     Frequency of Communication with Friends and Family: Not on file    Frequency of Social Gatherings with Friends and Family: Not on file    Attends Holiness Services: Not on file    Active Member of [address] [place] [address] [employer] or Organizations: Not on file    Attends Club or Organization Meetings: Not on file    Marital Status: Not on file   Intimate Partner Violence:     Fear of Current or Ex-Partner: Not on file    Emotionally Abused: Not on file    Physically Abused: Not on file    Sexually Abused: Not on file   Housing Stability:     Unable to Pay for Housing in the Last Year: Not on file    Number of [place] in the Last Year: Not on file    Unstable Housing in the Last Year: Not on file       Family History:     Family History   Problem Relation Age of Onset    Heart Disease Mother         CARDIAC MASS    Heart Disease Father     Cancer Paternal Grandfather     Cancer Other         both families, several family members        Allergies:   Aleve [naproxen sodium], Aspirin, Codeine, Nsaids, Align [lactase-lactobacillus], Amitriptyline, Gabapentin, Lidocaine, Lyrica [pregabalin], Omnipaque [iohexol], Other, Tetracaine, and Iodine     Review of Systems:       Constitutional: No fevers or chills. No systemic complaints  Head: No headaches  Eyes: No double vision or blurry vision. No conjunctival inflammation. ENT: No sore throat or runny nose. . No hearing loss, tinnitus or vertigo. Cardiovascular: No chest pain or palpitations. Shortness of breath. MACK  Lung:  Shortness of breath or cough.  No sputum production  Abdomen: No nausea, vomiting, diarrhea, or abdominal pain. Vearl Pippins No cramps. Genitourinary: No increased urinary frequency, or dysuria. No hematuria. No suprapubic or CVA pain  Musculoskeletal: No muscle aches or pains. No joint effusions, swelling or deformities  Hematologic: No bleeding or bruising. Neurologic: No headache, weakness, numbness, or tingling. Integument: No rash, no ulcers. Psychiatric: No depression. Endocrine: No polyuria, no polydipsia, no polyphagia. Physical Examination :     Patient Vitals for the past 8 hrs:   BP Temp Temp src Pulse Resp SpO2   11/20/21 1245 130/69 97.6 °F (36.4 °C) Temporal 92 22 94 %     General Appearance: Awake, alert, and in no apparent distress  Head:  Normocephalic, no trauma  Eyes: Pupils equal, round, reactive to light; sclera anicteric; conjunctivae pink. No embolic phenomena. ENT: Oropharynx clear, without erythema, exudate, or thrush. No tenderness of sinuses. Mouth/throat: mucosa pink and moist. No lesions. Dentition in good repair. Neck:Supple, without lymphadenopathy. Thyroid normal, No bruits. Pulmonary/Chest: Clear to auscultation, without wheezes, rales, or rhonchi. No dullness to percussion. Cardiovascular: Regular rate and rhythm without murmurs, rubs, or gallops. Abdomen: Soft, non tender. Bowel sounds normal. No organomegaly  All four Extremities: No cyanosis, clubbing, edema, or effusions. Neurologic: No gross sensory or motor deficits. Skin: Warm and dry with good turgor. No signs of peripheral arterial or venous insufficiency. No ulcerations. No open wounds.     Medical Decision Making -Laboratory:   I have independently reviewed/ordered the following labs:    CBC with Differential:   Recent Labs     11/19/21 0540 11/20/21 0620   WBC 7.7 7.5   HGB 12.2 13.1   HCT 36.4 38.8    272   LYMPHOPCT 12* 13*   MONOPCT 4 3     BMP:   Recent Labs     11/19/21  0540 11/20/21  0620   * 137   K 4.1 4.6   CL 98 101   CO2 23 23   BUN 21 16   CREATININE 0.52 0.44*     Hepatic Function Panel:   Recent Labs     11/19/21  0540 11/20/21  0620   PROT 6.2* 6.6   LABALBU 3.0* 3.2*   BILITOT 0.39 0.54   ALKPHOS 65 63   ALT 21 23   AST 23 27     No results for input(s): RPR in the last 72 hours. No results for input(s): HIV in the last 72 hours. No results for input(s): BC in the last 72 hours. Lab Results   Component Value Date    MUCUS NOT REPORTED 11/16/2021    RBC 4.23 11/20/2021    RBC 3.98 11/16/2011    TRICHOMONAS NOT REPORTED 11/16/2021    WBC 7.5 11/20/2021    YEAST NOT REPORTED 11/16/2021    TURBIDITY Clear 11/16/2021     Lab Results   Component Value Date    CREATININE 0.44 11/20/2021    GLUCOSE 121 11/20/2021    GLUCOSE 116 11/16/2011       Medical Decision Making-Imaging:     Narrative   EXAMINATION:   ONE XRAY VIEW OF THE CHEST       11/12/2021 6:08 pm       COMPARISON:   None.       HISTORY:   ORDERING SYSTEM PROVIDED HISTORY: weakness   TECHNOLOGIST PROVIDED HISTORY:   weakness       FINDINGS:   There is diffuse prominence of the pulmonary interstitium.  No airspace   consolidation.  No pneumothorax or pleural effusion.  No cardiomegaly. Degenerative changes in the bilateral shoulders and spine.           Impression   Diffuse prominence of the pulmonary interstitium.  This is nonspecific but   can be seen in atypical/viral pneumonia.  Correlate clinically             Narrative   EXAMINATION:   CT OF THE CHEST WITHOUT CONTRAST 11/15/2021 2:30 pm       TECHNIQUE:   CT of the chest was performed without the administration of intravenous   contrast. Multiplanar reformatted images are provided for review.  Dose   modulation, iterative reconstruction, and/or weight based adjustment of the   mA/kV was utilized to reduce the radiation dose to as low as reasonably   achievable.       COMPARISON:   None.       HISTORY:   ORDERING SYSTEM PROVIDED HISTORY: covid   TECHNOLOGIST PROVIDED HISTORY:   covid   Decision Support Exception - unselect if not a suspected or confirmed   emergency medical condition->Emergency Medical Condition (MA)       FINDINGS:   Mediastinum: Mild cardiomegaly.  No pericardial effusion.  Thoracic aorta is   normal in caliber.  Scattered atherosclerotic changes.  Scattered mildly   prominent mediastinal lymph nodes.       Lungs/pleura: Scattered ground-glass infiltrate throughout both lungs.  No   effusion or pneumothorax.       Upper Abdomen: No acute abnormality.  Small sliding hiatal hernia.       Soft Tissues/Bones: No aggressive osseous lesions.  Confluent anterolateral   vertebral body syndesmophyte formation compatible with DISH. Chloe Rubins acute soft   tissue abnormality.           Impression   Multifocal bilateral ground-glass opacities throughout both lungs compatible   with COVID-19 pneumonia.             Narrative   EXAMINATION:   CT OF THE HEAD WITHOUT CONTRAST  11/15/2021 2:29 pm       TECHNIQUE:   CT of the head was performed without the administration of intravenous   contrast. Dose modulation, iterative reconstruction, and/or weight based   adjustment of the mA/kV was utilized to reduce the radiation dose to as low   as reasonably achievable.       COMPARISON:   03/01/2013       HISTORY:   ORDERING SYSTEM PROVIDED HISTORY: confusion   TECHNOLOGIST PROVIDED HISTORY:   confusion       Decision Support Exception - unselect if not a suspected or confirmed   emergency medical condition->Emergency Medical Condition (MA)       FINDINGS:   BRAIN/VENTRICLES: The ventricles and sulci are diffusely enlarged.  Low   attenuation is seen in the periventricular and subcortical white matter.  No   acute intracranial hemorrhage or acute infarct is identified.       ORBITS: The visualized portion of the orbits demonstrate no acute abnormality.       SINUSES: Small amount of fluid in the right maxillary sinus.  Otherwise the   paranasal sinuses appear clear.       SOFT TISSUES/SKULL:  No acute abnormality of the visualized skull or soft   tissues.         Impression   No acute intracranial abnormality.       Diffuse atrophic changes with findings suggesting chronic microvascular   ischemia                 Medical Decision Ejfeca-[id number]:       Medical Decision Making-Other:     Note:  · Labs, medications, radiologic studies were reviewed with personal review of films  ·  Large amounts of data were reviewed  · Discussed with nursing Staff, Discharge planner  · Infection Control and Prevention measures reviewed  · All prior entries were reviewed  · Administer medications as ordered  · Prognosis: Guarded  · Discharge planning reviewed      Thank you for allowing us to participate in the care of this patient. Please call with questions.     Sabina Engel MD           Pager: [phone] - Office: [phone]

## 2021-11-20 NOTE — PROGRESS NOTES
Patient reassessed at this time and vitals taken. Vitals stable and WDL. Patient stated she would like to use bathroom. Writer assisted patient in using bathroom without complication. Patient returned to bed. Call light within reach and bed alarm on. Will continue to monitor.

## 2021-11-20 NOTE — PROGRESS NOTES
Occupational Therapy  Facility/Department: [facility] MED SURG  Daily Treatment Note  NAME: Saul Leyva  : 1940  MRN: [id number]    Date of Service: 2021    Discharge Recommendations:  Continue to assess pending progress, Subacute/Skilled Nursing Facility, Home with Home health OT       Assessment      Safety Devices  Safety Devices in place: Yes  Type of devices: Call light within reach         Patient Diagnosis(es): The primary encounter diagnosis was Hypoxia. A diagnosis of Pneumonia due to COVID-19 virus was also pertinent to this visit. has a past medical history of Achilles tendon rupture, Cancer ([facility] Chronic SI joint pain, Claustrophobia, Diabetes mellitus ([facility] Esophageal stricture, [place] (hard of hearing), Hyperlipidemia, Hypertension, Retinal detachment, and Trauma to eye, left.   has a past surgical history that includes Cervical disc surgery (); Lumbar disc surgery (); Hysterectomy (); bladder suspension (); Blepharoplasty (Left, 5/3/2013); Eye surgery (Left, 06/15/2013); Eye surgery (Bilateral, ); Upper gastrointestinal endoscopy; Esophagus dilation; Cataract removal (Bilateral); eye surgery (Left, ,,,); vitrectomy (Left, 10/3/13); Total hip arthroplasty (Left, 2017); joint replacement (Right, 2016); Foot Amputation; Foot Tendon Surgery (Left, 2018); pr foot/toes surgery proc unlisted (Left, 3/9/2018); Finger trigger release (Left, 4/15/2019); proctosigmoidoscopy (N/A, 10/30/2019); proctosigmoidoscopy (10/30/2019); colectomy; Knee Arthroplasty (Right, 08/10/2020); and Total knee arthroplasty (Right, 8/10/2020).     Restrictions  Restrictions/Precautions  Restrictions/Precautions: General Precautions, Fall Risk, Isolation  Subjective   General  Chart Reviewed: Yes  Patient assessed for rehabilitation services?: Yes  Response to previous treatment: Patient with no complaints from previous session  Family / Caregiver Present: No  Diagnosis: covid  Subjective  Subjective: Pt seated in bedside chair upon arrival.  Agreeable to OT ADL participation. General Comment  Comments: Pt denies pain this date. Pre Treatment Pain Screening  Comments / Details: No c/o pain. Vital Signs  Patient Currently in Pain: No   Orientation     Objective                                                                Type of ROM/Therapeutic Exercise  Type of ROM/Therapeutic Exercise: AROM  Comment: B UE ther ex with 0 wt X 10 reps for all sets with RBs in supine. Plan   Plan  Times per week: 7  Times per day: Daily  Current Treatment Recommendations: Strengthening, Balance Training, Functional Mobility Training, Endurance Training, Patient/Caregiver Education & Training, Safety Education & Training, Self-Care / ADL  G-Code     OutComes Score                                                  AM-PAC Score             Goals  Short term goals  Time Frame for Short term goals: 21 visits  Short term goal 1: Patient to complete self care routine c Mod I c use of AE/DME and EC/WS techniques as needed to ensure safe return home. Short term goal 2: Patient to engage in 15 minutes of ther ex/ther act s significant shortness of breath to improve strength as well as activity tolerance for self care tasks. Short term goal 3: Patient to be educated on d/c folder, AE/DME, and general safety to ensure safe return home. Short term goal 4: Patient to be educated on d/c folder, EC/WS techniques, AE/DME, and general safety to ensure safe return home.        Therapy Time   Individual Concurrent Group Co-treatment   Time In  825         Time Out  840         Minutes  15              [place] GROSS/[place] [id number]    Ambreen Cota

## 2021-11-20 NOTE — PROGRESS NOTES
Physical Therapy  Facility/Department: [facility] AT THE [facility] MED SURG  Daily Treatment Note  NAME: Saul Leyva  : 1940  MRN: [id number]    Date of Service: 2021    Discharge Recommendations:  Continue to assess pending progress, Home with Home health PT, IP Rehab        Assessment   Treatment Diagnosis: Difficulty walking  Prognosis: Good  Activity Tolerance  Activity Tolerance: Patient Tolerated treatment well; Patient limited by endurance     Patient Diagnosis(es): The primary encounter diagnosis was Hypoxia. A diagnosis of Pneumonia due to COVID-19 virus was also pertinent to this visit. has a past medical history of Achilles tendon rupture, Cancer ([facility] [address] Chronic SI joint pain, Claustrophobia, Diabetes mellitus ([address] Esophageal stricture, [place] (hard of hearing), Hyperlipidemia, Hypertension, Retinal detachment, and Trauma to eye, left.   has a past surgical history that includes Cervical disc surgery (); Lumbar disc surgery (); Hysterectomy (); bladder suspension (); Blepharoplasty (Left, 5/3/2013); Eye surgery (Left, 06/15/2013); Eye surgery (Bilateral, ); Upper gastrointestinal endoscopy; Esophagus dilation; Cataract removal (Bilateral); eye surgery (Left, ,,,); vitrectomy (Left, 10/3/13); Total hip arthroplasty (Left, 2017); joint replacement (Right, 2016); Foot Amputation; Foot Tendon Surgery (Left, 2018); pr foot/toes surgery proc unlisted (Left, 3/9/2018); Finger trigger release (Left, 4/15/2019); proctosigmoidoscopy (N/A, 10/30/2019); proctosigmoidoscopy (10/30/2019); colectomy; Knee Arthroplasty (Right, 08/10/2020); and Total knee arthroplasty (Right, 8/10/2020). Restrictions  Restrictions/Precautions  Restrictions/Precautions: General Precautions, Fall Risk, Isolation  Subjective   General  Chart Reviewed: Yes  Response To Previous Treatment: Patient with no complaints from previous session.   Family / Caregiver Present: No  Referring Practitioner: Hilda Rubio, APRN-CNP  Subjective  Subjective: Pt agreeable to therapy at this time          Orientation  Orientation  Overall Orientation Status: Within Functional Limits  Cognition      Objective   Bed mobility  Supine to Sit: Stand by assistance  Sit to Supine: Stand by assistance  Transfers  Sit to Stand: Stand by assistance  Stand to sit: Stand by assistance  Ambulation  Ambulation?: Yes  Ambulation 1  Surface: level tile  Device: Rolling Walker  Other Apparatus: O2  Assistance: Contact guard assistance; Stand by assistance  Distance: 15ft with FWW and CGA. SpO2 86-93%     Balance  Sitting - Static: Good  Sitting - Dynamic: Fair; +  Standing - Static: Fair; +  Standing - Dynamic: Fair  Exercises  Straight Leg Raise: 15x  Quad Sets: 15x  Heelslides: 15x  Gluteal Sets: 15x  Hip Abduction: 15x  Knee Short Arc Quad: 15x  Ankle Pumps: 15x  Comments: Above exercises completed in supine. Goals  Short term goals  Time Frame for Short term goals: 20 days  Short term goal 1: Patient to complete sit/stand and stand pivot transfers with SUP and no LOB to decrease fall risk. Short term goal 2: Patient to ambulate 150ft with FWW or LRAD and SUP with SpO2 >/=90% for improved endurance. Short term goal 3: Patient to tolerate 20-30 min of ther ex/act to improve functional strnegth and endurance. Short term goal 4: Patient to ascend/descend 4 steps with HRx1 and SUP with no LOB to safely enter her home. Plan    Plan  Times per week: 7  Times per day: Twice a day  Current Treatment Recommendations: Strengthening, Neuromuscular Re-education, Home Exercise Program, ROM, Safety Education & Training, Balance Training, Endurance Training, Patient/Caregiver Education & Training, Functional Mobility Training, Transfer Training, Gait Training, Stair training  Safety Devices  Type of devices:  All fall risk precautions in place, Call light within reach, Patient at risk for falls, Nurse notified, Gait belt, Left in bed     Therapy Time   Individual Concurrent Group Co-treatment   Time In 0925         Time Out 0948         Minutes 23         Timed Code Treatment Minutes: [id number] Karissa Rene, PT, DPT

## 2021-11-20 NOTE — PROGRESS NOTES
Pt sitting up in the chair when writer entered the room. Pt is A&Ox. 4 Vitals and assessment as charted. Pt denies pain. Pt remains on the 6L NC, will continue to monitor. Writer assisted the pt into the bed. Call light within reach. Bed alarm on.

## 2021-11-21 LAB
ABSOLUTE EOS #: 0.14 K/UL (ref 0–0.44)
ABSOLUTE IMMATURE GRANULOCYTE: 0.14 K/UL (ref 0–0.3)
ABSOLUTE LYMPH #: 1.12 K/UL (ref 1.1–3.7)
ABSOLUTE MONO #: 0.3 K/UL (ref 0.1–1.2)
ALBUMIN SERPL-MCNC: 3.4 G/DL (ref 3.5–5.2)
ALBUMIN/GLOBULIN RATIO: 1 (ref 1–2.5)
ALP BLD-CCNC: 70 U/L (ref 35–104)
ALT SERPL-CCNC: 24 U/L (ref 5–33)
ANION GAP SERPL CALCULATED.3IONS-SCNC: 12 MMOL/L (ref 9–17)
AST SERPL-CCNC: 23 U/L
BASOPHILS # BLD: 0 % (ref 0–2)
BASOPHILS ABSOLUTE: <0.03 K/UL (ref 0–0.2)
BILIRUB SERPL-MCNC: 0.47 MG/DL (ref 0.3–1.2)
BUN BLDV-MCNC: 18 MG/DL (ref 8–23)
BUN/CREAT BLD: 35 (ref 9–20)
CALCIUM SERPL-MCNC: 8.9 MG/DL (ref 8.6–10.4)
CHLORIDE BLD-SCNC: 96 MMOL/L (ref 98–107)
CO2: 26 MMOL/L (ref 20–31)
CREAT SERPL-MCNC: 0.51 MG/DL (ref 0.5–0.9)
D-DIMER QUANTITATIVE: 6 MG/L FEU (ref 0–0.59)
DIFFERENTIAL TYPE: ABNORMAL
EOSINOPHILS RELATIVE PERCENT: 1 % (ref 1–4)
FERRITIN: 296 UG/L (ref 13–150)
FIBRINOGEN: 259 MG/DL (ref 179–518)
GFR AFRICAN AMERICAN: >60 ML/MIN
GFR NON-AFRICAN AMERICAN: >60 ML/MIN
GFR SERPL CREATININE-BSD FRML MDRD: ABNORMAL ML/MIN/{1.73_M2}
GFR SERPL CREATININE-BSD FRML MDRD: ABNORMAL ML/MIN/{1.73_M2}
GLUCOSE BLD-MCNC: 132 MG/DL (ref 74–100)
GLUCOSE BLD-MCNC: 146 MG/DL (ref 70–99)
GLUCOSE BLD-MCNC: 182 MG/DL (ref 74–100)
GLUCOSE BLD-MCNC: 263 MG/DL (ref 74–100)
GLUCOSE BLD-MCNC: 267 MG/DL (ref 74–100)
HCT VFR BLD CALC: 40.5 % (ref 36.3–47.1)
HEMOGLOBIN: 13.5 G/DL (ref 11.9–15.1)
IMMATURE GRANULOCYTES: 1 %
INR BLD: 1.1
LYMPHOCYTES # BLD: 11 % (ref 24–43)
MCH RBC QN AUTO: 31.2 PG (ref 25.2–33.5)
MCHC RBC AUTO-ENTMCNC: 33.3 G/DL (ref 28.4–34.8)
MCV RBC AUTO: 93.5 FL (ref 82.6–102.9)
MONOCYTES # BLD: 3 % (ref 3–12)
NRBC AUTOMATED: 0 PER 100 WBC
PARTIAL THROMBOPLASTIN TIME: 26.5 SEC (ref 23.9–33.8)
PDW BLD-RTO: 12.1 % (ref 11.8–14.4)
PLATELET # BLD: 312 K/UL (ref 138–453)
PLATELET ESTIMATE: ABNORMAL
PMV BLD AUTO: 10.2 FL (ref 8.1–13.5)
POTASSIUM SERPL-SCNC: 4.9 MMOL/L (ref 3.7–5.3)
PROTHROMBIN TIME: 14.4 SEC (ref 11.5–14.2)
RBC # BLD: 4.33 M/UL (ref 3.95–5.11)
RBC # BLD: ABNORMAL 10*6/UL
SEG NEUTROPHILS: 84 % (ref 36–65)
SEGMENTED NEUTROPHILS ABSOLUTE COUNT: 8.34 K/UL (ref 1.5–8.1)
SODIUM BLD-SCNC: 134 MMOL/L (ref 135–144)
TOTAL PROTEIN: 6.8 G/DL (ref 6.4–8.3)
WBC # BLD: 10.1 K/UL (ref 3.5–11.3)
WBC # BLD: ABNORMAL 10*3/UL

## 2021-11-21 PROCEDURE — 1200000000 HC SEMI PRIVATE

## 2021-11-21 PROCEDURE — 6370000000 HC RX 637 (ALT 250 FOR IP): Performed by: NURSE PRACTITIONER

## 2021-11-21 PROCEDURE — 99232 SBSQ HOSP IP/OBS MODERATE 35: CPT | Performed by: INTERNAL MEDICINE

## 2021-11-21 PROCEDURE — 97116 GAIT TRAINING THERAPY: CPT

## 2021-11-21 PROCEDURE — 6370000000 HC RX 637 (ALT 250 FOR IP): Performed by: INTERNAL MEDICINE

## 2021-11-21 PROCEDURE — 2700000000 HC OXYGEN THERAPY PER DAY

## 2021-11-21 PROCEDURE — 85025 COMPLETE CBC W/AUTO DIFF WBC: CPT

## 2021-11-21 PROCEDURE — 82728 ASSAY OF FERRITIN: CPT

## 2021-11-21 PROCEDURE — 36415 COLL VENOUS BLD VENIPUNCTURE: CPT

## 2021-11-21 PROCEDURE — 80053 COMPREHEN METABOLIC PANEL: CPT

## 2021-11-21 PROCEDURE — 94761 N-INVAS EAR/PLS OXIMETRY MLT: CPT

## 2021-11-21 PROCEDURE — 97110 THERAPEUTIC EXERCISES: CPT

## 2021-11-21 PROCEDURE — 6360000002 HC RX W HCPCS: Performed by: NURSE PRACTITIONER

## 2021-11-21 PROCEDURE — 94669 MECHANICAL CHEST WALL OSCILL: CPT

## 2021-11-21 PROCEDURE — 85379 FIBRIN DEGRADATION QUANT: CPT

## 2021-11-21 PROCEDURE — 82947 ASSAY GLUCOSE BLOOD QUANT: CPT

## 2021-11-21 PROCEDURE — 94640 AIRWAY INHALATION TREATMENT: CPT

## 2021-11-21 PROCEDURE — 85730 THROMBOPLASTIN TIME PARTIAL: CPT

## 2021-11-21 PROCEDURE — 85384 FIBRINOGEN ACTIVITY: CPT

## 2021-11-21 PROCEDURE — 85610 PROTHROMBIN TIME: CPT

## 2021-11-21 RX ADMIN — INSULIN LISPRO 3 UNITS: 100 INJECTION, SOLUTION INTRAVENOUS; SUBCUTANEOUS at 20:21

## 2021-11-21 RX ADMIN — BUDESONIDE 500 MCG: 0.5 SUSPENSION RESPIRATORY (INHALATION) at 06:35

## 2021-11-21 RX ADMIN — RIVAROXABAN 10 MG: 10 TABLET, FILM COATED ORAL at 07:50

## 2021-11-21 RX ADMIN — OXYCODONE HYDROCHLORIDE AND ACETAMINOPHEN 1000 MG: 500 TABLET ORAL at 12:45

## 2021-11-21 RX ADMIN — INSULIN LISPRO 6 UNITS: 100 INJECTION, SOLUTION INTRAVENOUS; SUBCUTANEOUS at 16:58

## 2021-11-21 RX ADMIN — OXYCODONE HYDROCHLORIDE AND ACETAMINOPHEN 1000 MG: 500 TABLET ORAL at 07:50

## 2021-11-21 RX ADMIN — METFORMIN HYDROCHLORIDE 500 MG: 500 TABLET ORAL at 16:13

## 2021-11-21 RX ADMIN — FAMOTIDINE 20 MG: 20 TABLET ORAL at 20:21

## 2021-11-21 RX ADMIN — LATANOPROST 1 DROP: 50 SOLUTION OPHTHALMIC at 20:21

## 2021-11-21 RX ADMIN — OXYCODONE HYDROCHLORIDE AND ACETAMINOPHEN 1000 MG: 500 TABLET ORAL at 16:13

## 2021-11-21 RX ADMIN — METFORMIN HYDROCHLORIDE 500 MG: 500 TABLET ORAL at 07:50

## 2021-11-21 RX ADMIN — INSULIN LISPRO 2 UNITS: 100 INJECTION, SOLUTION INTRAVENOUS; SUBCUTANEOUS at 11:37

## 2021-11-21 RX ADMIN — FLUTICASONE PROPIONATE 1 SPRAY: 50 SPRAY, METERED NASAL at 07:50

## 2021-11-21 RX ADMIN — FAMOTIDINE 20 MG: 20 TABLET ORAL at 07:50

## 2021-11-21 RX ADMIN — DOCUSATE SODIUM 100 MG: 100 CAPSULE ORAL at 20:21

## 2021-11-21 RX ADMIN — OXYCODONE HYDROCHLORIDE AND ACETAMINOPHEN 1000 MG: 500 TABLET ORAL at 20:21

## 2021-11-21 RX ADMIN — DEXAMETHASONE 6 MG: 4 TABLET ORAL at 07:50

## 2021-11-21 RX ADMIN — ZINC SULFATE 220 MG (50 MG) CAPSULE 100 MG: CAPSULE at 07:50

## 2021-11-21 ASSESSMENT — PAIN SCALES - GENERAL
PAINLEVEL_OUTOF10: 0
PAINLEVEL_OUTOF10: 0

## 2021-11-21 NOTE — PROGRESS NOTES
Pt laying in bed with eyes closed. Pt awakens easily. Pt is A&O x4. Vitals and assessment as charted. Pt denies pain. Call light within reach. Bed alarm on.

## 2021-11-21 NOTE — PROGRESS NOTES
Patient sitting up in bed at this time awake. Writer assessed and took vitals. Patient is alert and oriented x4. Patient denies having needs. Call light within reach and bed alarm on. Will continue to monitor.

## 2021-11-21 NOTE — PROGRESS NOTES
Patient assisted up to chair at this time after using bathroom. Call light within reach. Will continue to monitor.

## 2021-11-21 NOTE — PROGRESS NOTES
[facility] -Progress Note COVID 19 Patient-Tele-Visit  Today's Date and Time: 11/21/2021, 6:16 PM    Impression :     · COVID 19 Confirmed Infection  · Covid tests:  · 11/12/21: Positive  · Acute hypoxic respiratory distress  · Acute confusion  · Hyponatremia  · Hx colorectal cancer s/p resection  · Hx bilateral hip replacement  · DM  · Hx urinary incontinence  · Hx cataracts s/p eye surgery X 5  · Single blood culture with Micrococcus on 11-15-21: contaminant        Patient evaluated by Telemedicine. Requesting Institution: [facility]  Provider Institution: [facility] [place], [address] Person at [facility]: Ms Audie Martinez, LARRY- ARVIN    Recommendations:   · Antibiotic treatment:  · Monitor off antibiotics  · Urine culture  · Covid Rx:  · Remdesivir-Initiated 11/15/21  · Decadron-Initiated 11/15/21  · Actemra-administered 11/17/21      Medical Decision Making/Summary/Discussion:11/21/2021     · Patient admitted with suspected COVID 19 infection  · Covid test confirmed positive. Infection Control Recommendations   · Universal Precautions  · Airborne isolation  · Droplet Isolation    Antimicrobial Stewardship Recommendations     · Discontinuation of therapy  Coordination of Outpatient Care:   · Estimated Length of IV antimicrobials:TBD  · Patient will need Midline Catheter Insertion: TBD  · Patient will need PICC line Insertion: No  · Patient will need: Home IV , [place],  SNF,  LTAC:TBD  · Patient will need outpatient wound care:No    Chief complaint/reason for consultation:   · Concern for COVID infection      History of Present Illness:   Shruthi Vazquez is a 78y.o.-year-old female who was initially admitted on 11/15/2021. Patient seen at the request of Dr. Daisy Conner:    Patient initially presented through [address] [place] [address] on 11/12/21 with complaints of fatigue, fever and myalgias over the past two days.  She was exposed to Covid from family members she lives with. She denied shortness of breath. She was found to be stable on room air and was discharged on symbicort. On 11/15/21, the patient was brought back to the ED by family members for worsening shortness of breath and confusion. She was found to have an SpO2 of 89% on room air. Imaging revealed bilateral ground-glass opacities. CT head was negative for any acute abnormalities. Abnormal labs include:   Na: 128  D-dimer: 1.47    The patient was started on Decadron and Remdesivir. Patient admitted because of concerns with COVID 19.    CURRENT EVALUATION : 11/21/2021    Afebrile  VS stable    The patient continues on 6 -->5 L NC  On Decadron. Actemra given on 11/17/21  Stable clinically     Urine culture shows no significant growth  Blood culture with Micrococcus = contaminant    Patient exhibiting respiratory distress. yes  Respiratory secretions: no    Patient receiving supplemental oxygen.: 4-->6-->5 L NC  RR:18-->22-->20  02 sat: 91-->93 %    QTc:       NEWS Score: 0-4 Low risk group; 5-6: Medium risk group; 7 or above: High risk group  Parameters 3 2 1 0 1 2 3   Age    < 65   ? 65   RR ? 8  9-11 12-20  21-24 ? 25   O2 Sats ?  91 92-93 94-95 ? 96      Suppl O2  Yes  No      SBP ? 90  101-110 111-219   ? 220   HR ? 40  41-50 51-90  111-130 ? 131   Consciousness    Alert   Drowsiness, lethargy, or confusion   Temperature ? 35.0 C (95.0 F)  35.1-36.0 C 95.1-96.9 F 36.1-38.0 C 97.0-100.4 F 38.1-39.0 C 100.5-102.3 F ? 39.1 C ? 102.4 F      NEWS Score:  ·  11/16/21: 7 High risk    Overall Daily Picture:    Unchanged    Presence of secondary bacterial Infection:    No   Additional antibiotics: No    Labs, X rays reviewed: 11/21/2021    BUN:21-->18  Cr:0.52-->0.44-->0.5    WBC:7.5-->10.1  Hb:12.2-->13.5  Plat: 283-->272-->312    Absolute Neutrophils:4.60  Absolute Lymphocytes:0.77  Neutrophil/Lymphocyte Ratio: 6 high risk    CRP:126.7-->54.9  Ferritin:408  LDH: 320    Pro Calcitonin:      Cultures:  Urine:  ·   Blood:  ·   Sputum :  ·   Wound:       CXR:   11/12/21        CAT:  11/15/21        Discussed with patient, RN, CC, IM. I have personally reviewed the past medical history, past surgical history, medications, social history, and family history, and I have updated the database accordingly.   Past Medical History:     Past Medical History:   Diagnosis Date    Achilles tendon rupture 2011    LEFT    Cancer ([facility] [address]     Chronic SI joint pain     left    Claustrophobia     Diabetes mellitus ([address] 2010    ON ORAL MED    Esophageal stricture 2011    PT HAD DILATATION    [place] (hard of hearing)     WEARS [place] HEARING AIDS    Hyperlipidemia 2012    Hypertension     PT HAD TILT TABLE TEST BP MED DISCONTINUED    Retinal detachment 10/2012    LEFT    Trauma to eye, left        Past Surgical  History:     Past Surgical History:   Procedure Laterality Date    BLADDER SUSPENSION  1994    WITH HYSTERECTOMY    BLEPHAROPLASTY Left 5/3/2013    CATARACT REMOVAL Bilateral     with iol    CERVICAL DISC SURGERY  1984    cervical spine    COLECTOMY      ROBOTIC LOWER COLON RESECTION    ESOPHAGEAL DILATATION      EYE SURGERY Left 06/15/2013    REPAIR OF WOUND, WITH LENS REMOVAL     EYE SURGERY Bilateral 2008    BILAT CATARACT REMOVAL WITH IOL    EYE SURGERY Left ,7/13,12/12,6/13    vitrectomy    FINGER TRIGGER RELEASE Left 4/15/2019    FINGER TRIGGER RELEASE-MIDDLE FINGER performed by Laura Patel MD at [facility] Left 03/09/2018    Excision of Os Peroneum, Primary Repair of Peroneus Longus Tendon - Dr. Evgeny Sotelo Right 06/21/2016    [facility] Dr. Sandrita Aguayo Right 08/10/2020    Dr. Camila Castellanos    lower back    OR FOOT/TOES SURGERY PROC UNLISTED Left 3/9/2018    ACHILLES TENDON LENGTHENING REPAIR/ EXCISION OF OS PERONEUM,PRIMARY REPAIR OF PERONEUS LINGUS TENDON WITH POSSIBLE PERONEAL TENODESIS performed by Alessandra Guzman DPM at [facility] PROCTOSIGMOIDOSCOPY N/A 10/30/2019    ANAL PROCTO SIGMOIDOSCOPY FLEXIBLE with biopsies performed by Hazel Villeda MD at [address]  10/30/2019    -sigmoid colon mass(invasive ulcerated adenocarcinoma)    TOTAL HIP ARTHROPLASTY Left 8/7/2017    HIP TOTAL ARTHROPLASTY performed by Jason Núñez MD at [address] [facility], [facility] Right 8/10/2020    KNEE TOTAL ARTHROPLASTY performed by Jason Núñez MD at [facility] dilation    VITRECTOMY Left 10/3/13       Medications:      fluticasone  1 spray Each Nostril Daily    budesonide  0.5 mg Nebulization BID    docusate sodium  100 mg Oral BID    latanoprost  1 drop Both Eyes Nightly    metFORMIN  500 mg Oral BID WC    rivaroxaban  10 mg Oral Daily    dexamethasone  6 mg Oral Daily    insulin lispro  0-12 Units SubCUTAneous TID WC    insulin lispro  0-6 Units SubCUTAneous Nightly    ascorbic acid  1,000 mg Oral 4x Daily    famotidine  20 mg Oral BID    zinc sulfate  100 mg Oral Daily    vitamin D  50,000 Units Oral Weekly       Social History:     Social History     Socioeconomic History    Marital status:      Spouse name: Not on file    Number of children: Not on file    Years of education: Not on file    Highest education level: Not on file   Occupational History    Not on file   Tobacco Use    Smoking status: Never Smoker    Smokeless tobacco: Never Used   Vaping Use    Vaping Use: Never used   Substance and Sexual Activity    Alcohol use: No    Drug use: No    Sexual activity: Not on file   Other Topics Concern    Not on file   Social History Narrative    Not on file     Social Determinants of Health     Financial Resource Strain:     Difficulty of Paying Living Expenses: Not on file   Food Insecurity:     Worried About Running Out of Food in the Last Year: Not on file    Ran Out of Food in the Last Year: Not on file   Transportation Needs:     Lack of Transportation (Medical): Not on file    Lack of Transportation (Non-Medical): Not on file   Physical Activity:     Days of Exercise per Week: Not on file    Minutes of Exercise per Session: Not on file   Stress:     Feeling of Stress : Not on file   Social Connections:     Frequency of Communication with Friends and Family: Not on file    Frequency of Social Gatherings with Friends and Family: Not on file    Attends Mandaen Services: Not on file    Active Member of [address] [place] [address] or Organizations: Not on file    Attends Club or Organization Meetings: Not on file    Marital Status: Not on file   Intimate Partner Violence:     Fear of Current or Ex-Partner: Not on file    Emotionally Abused: Not on file    Physically Abused: Not on file    Sexually Abused: Not on file   Housing Stability:     Unable to Pay for Housing in the Last Year: Not on file    Number of [place] in the Last Year: Not on file    Unstable Housing in the Last Year: Not on file       Family History:     Family History   Problem Relation Age of Onset    Heart Disease Mother         CARDIAC MASS    Heart Disease Father     Cancer Paternal Grandfather     Cancer Other         both families, several family members        Allergies:   Aleve [naproxen sodium], Aspirin, Codeine, Nsaids, Align [lactase-lactobacillus], Amitriptyline, Gabapentin, Lidocaine, Lyrica [pregabalin], Omnipaque [iohexol], Other, Tetracaine, and Iodine     Review of Systems:       Constitutional: No fevers or chills. No systemic complaints  Head: No headaches  Eyes: No double vision or blurry vision. No conjunctival inflammation. ENT: No sore throat or runny nose. . No hearing loss, tinnitus or vertigo. Cardiovascular: No chest pain or palpitations. Shortness of breath. MACK  Lung:  Shortness of breath or cough.  No sputum production  Abdomen: No nausea, vomiting, diarrhea, or abdominal pain. Renée Karst No cramps. Genitourinary: No increased urinary frequency, or dysuria. No hematuria. No suprapubic or CVA pain  Musculoskeletal: No muscle aches or pains. No joint effusions, swelling or deformities  Hematologic: No bleeding or bruising. Neurologic: No headache, weakness, numbness, or tingling. Integument: No rash, no ulcers. Psychiatric: No depression. Endocrine: No polyuria, no polydipsia, no polyphagia. Physical Examination :     Patient Vitals for the past 8 hrs:   BP Temp Temp src Pulse Resp SpO2   11/21/21 1241 (!) 107/58 97 °F (36.1 °C) Temporal 94 18 95 %     General Appearance: Awake, alert, and in no apparent distress  Head:  Normocephalic, no trauma  Eyes: Pupils equal, round, reactive to light; sclera anicteric; conjunctivae pink. No embolic phenomena. ENT: Oropharynx clear, without erythema, exudate, or thrush. No tenderness of sinuses. Mouth/throat: mucosa pink and moist. No lesions. Dentition in good repair. Neck:Supple, without lymphadenopathy. Thyroid normal, No bruits. Pulmonary/Chest: Clear to auscultation, without wheezes, rales, or rhonchi. No dullness to percussion. Cardiovascular: Regular rate and rhythm without murmurs, rubs, or gallops. Abdomen: Soft, non tender. Bowel sounds normal. No organomegaly  All four Extremities: No cyanosis, clubbing, edema, or effusions. Neurologic: No gross sensory or motor deficits. Skin: Warm and dry with good turgor. No signs of peripheral arterial or venous insufficiency. No ulcerations. No open wounds.     Medical Decision Making -Laboratory:   I have independently reviewed/ordered the following labs:    CBC with Differential:   Recent Labs     11/20/21 0620 11/21/21 0623   WBC 7.5 10.1   HGB 13.1 13.5   HCT 38.8 40.5    312   LYMPHOPCT 13* 11*   MONOPCT 3 3     BMP:   Recent Labs     11/20/21 0620 11/21/21 0623    134*   K 4.6 4.9    96*   CO2 23 26   BUN 16 18 CREATININE 0.44* 0.51     Hepatic Function Panel:   Recent Labs     11/20/21  0620 11/21/21  0623   PROT 6.6 6.8   LABALBU 3.2* 3.4*   BILITOT 0.54 0.47   ALKPHOS 63 70   ALT 23 24   AST 27 23     No results for input(s): RPR in the last 72 hours. No results for input(s): HIV in the last 72 hours. No results for input(s): BC in the last 72 hours. Lab Results   Component Value Date    MUCUS NOT REPORTED 11/16/2021    RBC 4.33 11/21/2021    RBC 3.98 11/16/2011    TRICHOMONAS NOT REPORTED 11/16/2021    WBC 10.1 11/21/2021    YEAST NOT REPORTED 11/16/2021    TURBIDITY Clear 11/16/2021     Lab Results   Component Value Date    CREATININE 0.51 11/21/2021    GLUCOSE 146 11/21/2021    GLUCOSE 116 11/16/2011       Medical Decision Making-Imaging:     Narrative   EXAMINATION:   ONE XRAY VIEW OF THE CHEST       11/12/2021 6:08 pm       COMPARISON:   None.       HISTORY:   ORDERING SYSTEM PROVIDED HISTORY: weakness   TECHNOLOGIST PROVIDED HISTORY:   weakness       FINDINGS:   There is diffuse prominence of the pulmonary interstitium.  No airspace   consolidation.  No pneumothorax or pleural effusion.  No cardiomegaly. Degenerative changes in the bilateral shoulders and spine.           Impression   Diffuse prominence of the pulmonary interstitium.  This is nonspecific but   can be seen in atypical/viral pneumonia.  Correlate clinically             Narrative   EXAMINATION:   CT OF THE CHEST WITHOUT CONTRAST 11/15/2021 2:30 pm       TECHNIQUE:   CT of the chest was performed without the administration of intravenous   contrast. Multiplanar reformatted images are provided for review.  Dose   modulation, iterative reconstruction, and/or weight based adjustment of the   mA/kV was utilized to reduce the radiation dose to as low as reasonably   achievable.       COMPARISON:   None.       HISTORY:   ORDERING SYSTEM PROVIDED HISTORY: covid   TECHNOLOGIST PROVIDED HISTORY:   covid   Decision Support Exception - unselect if not a suspected or confirmed   emergency medical condition->Emergency Medical Condition (MA)       FINDINGS:   Mediastinum: Mild cardiomegaly.  No pericardial effusion.  Thoracic aorta is   normal in caliber.  Scattered atherosclerotic changes.  Scattered mildly   prominent mediastinal lymph nodes.       Lungs/pleura: Scattered ground-glass infiltrate throughout both lungs.  No   effusion or pneumothorax.       Upper Abdomen: No acute abnormality.  Small sliding hiatal hernia.       Soft Tissues/Bones: No aggressive osseous lesions.  Confluent anterolateral   vertebral body syndesmophyte formation compatible with DISH. Anselmo Leyden acute soft   tissue abnormality.           Impression   Multifocal bilateral ground-glass opacities throughout both lungs compatible   with COVID-19 pneumonia.             Narrative   EXAMINATION:   CT OF THE HEAD WITHOUT CONTRAST  11/15/2021 2:29 pm       TECHNIQUE:   CT of the head was performed without the administration of intravenous   contrast. Dose modulation, iterative reconstruction, and/or weight based   adjustment of the mA/kV was utilized to reduce the radiation dose to as low   as reasonably achievable.       COMPARISON:   03/01/2013       HISTORY:   ORDERING SYSTEM PROVIDED HISTORY: confusion   TECHNOLOGIST PROVIDED HISTORY:   confusion       Decision Support Exception - unselect if not a suspected or confirmed   emergency medical condition->Emergency Medical Condition (MA)       FINDINGS:   BRAIN/VENTRICLES: The ventricles and sulci are diffusely enlarged.  Low   attenuation is seen in the periventricular and subcortical white matter.  No   acute intracranial hemorrhage or acute infarct is identified.       ORBITS: The visualized portion of the orbits demonstrate no acute abnormality.       SINUSES: Small amount of fluid in the right maxillary sinus.  Otherwise the   paranasal sinuses appear clear.       SOFT TISSUES/SKULL:  No acute abnormality of the visualized skull or soft   tissues.         Impression   No acute intracranial abnormality.       Diffuse atrophic changes with findings suggesting chronic microvascular   ischemia                 Medical Decision [id number]:       Medical Decision Making-Other:     Note:  · Labs, medications, radiologic studies were reviewed with personal review of films  ·  Large amounts of data were reviewed  · Discussed with nursing Staff, Discharge planner  · Infection Control and Prevention measures reviewed  · All prior entries were reviewed  · Administer medications as ordered  · Prognosis: Guarded  · Discharge planning reviewed      Thank you for allowing us to participate in the care of this patient. Please call with questions.     Dyana Arevalo MD           Pager: [phone] - Office: [phone]

## 2021-11-21 NOTE — PROGRESS NOTES
Progress Note    SUBJECTIVE:  FU related to less shortness of breath. Feels better. Still a lot of cough. More active. Much more talkative. OBJECTIVE:    Vitals:   TEMPERATURE:  Current - Temp: 96.7 °F (35.9 °C); Max - Temp  Av °F (36.1 °C)  Min: 96.7 °F (35.9 °C)  Max: 97.6 °F (36.4 °C)  RESPIRATIONS RANGE: Resp  Av.3  Min: 19  Max: 22  PULSE RANGE: Pulse  Av  Min: 78  Max: 93  BLOOD PRESSURE RANGE:  Systolic (90TXF), ZHS:065 , Min:107 , SRK:177   ; Diastolic (00LJD), HENRRY:08, Min:64, Max:80    PULSE OXIMETRY RANGE: SpO2  Av.7 %  Min: 77 %  Max: 98 %  24HR INTAKE/OUTPUT:      Intake/Output Summary (Last 24 hours) at 2021 0917  Last data filed at 2021 0755  Gross per 24 hour   Intake 1050 ml   Output 1500 ml   Net -450 ml     -----------------------------------------------------------------  Exam:  General: A & O x3 and alert  HEENT: Supple neck & negative  Heart: Regular  Lungs: Clear. Crackles noted. Better airflow.   Abdomen: Normal & soft, No tenderness and BS normal  Extremities:  No edema   Neuro: NonFocal     -----------------------------------------------------------------  Diagnostic Data:  Lab Results   Component Value Date    WBC 10.1 2021    HGB 13.5 2021     2021       Lab Results   Component Value Date    BUN 18 2021    CREATININE 0.51 2021     (L) 2021    K 4.9 2021    CALCIUM 8.9 2021    CL 96 (L) 2021    CO2 26 2021    LABGLOM >60 2021       Lab Results   Component Value Date    WBCUA 2 TO 5 2021    RBCUA 0 TO 2 2021    EPITHUA 2 TO 5 2021    LEUKOCYTESUR SMALL (A) 2021    SPECGRAV 1.010 2021    GLUCOSEU NEGATIVE 2021    KETUA NEGATIVE 2021    PROTEINU NEGATIVE 2021    HGBUR NEGATIVE 2021    CASTUA NOT REPORTED 2021    CRYSTUA NOT REPORTED 2021    BACTERIA 1+ (A) 2021    YEAST NOT REPORTED 2021       Lab Results   Component Value Date    TROPONINT NOT REPORTED 11/15/2021    PROBNP 284 11/15/2021       CT Head WO Contrast    Result Date: 11/15/2021  EXAMINATION: CT OF THE HEAD WITHOUT CONTRAST  11/15/2021 2:29 pm TECHNIQUE: CT of the head was performed without the administration of intravenous contrast. Dose modulation, iterative reconstruction, and/or weight based adjustment of the mA/kV was utilized to reduce the radiation dose to as low as reasonably achievable. COMPARISON: 03/01/2013 HISTORY: ORDERING SYSTEM PROVIDED HISTORY: confusion TECHNOLOGIST PROVIDED HISTORY: confusion Decision Support Exception - unselect if not a suspected or confirmed emergency medical condition->Emergency Medical Condition (MA) FINDINGS: BRAIN/VENTRICLES: The ventricles and sulci are diffusely enlarged. Low attenuation is seen in the periventricular and subcortical white matter. No acute intracranial hemorrhage or acute infarct is identified. ORBITS: The visualized portion of the orbits demonstrate no acute abnormality. SINUSES: Small amount of fluid in the right maxillary sinus. Otherwise the paranasal sinuses appear clear. SOFT TISSUES/SKULL:  No acute abnormality of the visualized skull or soft tissues. No acute intracranial abnormality. Diffuse atrophic changes with findings suggesting chronic microvascular ischemia     CT CHEST WO CONTRAST    Result Date: 11/15/2021  EXAMINATION: CT OF THE CHEST WITHOUT CONTRAST 11/15/2021 2:30 pm TECHNIQUE: CT of the chest was performed without the administration of intravenous contrast. Multiplanar reformatted images are provided for review. Dose modulation, iterative reconstruction, and/or weight based adjustment of the mA/kV was utilized to reduce the radiation dose to as low as reasonably achievable. COMPARISON: None.  HISTORY: ORDERING SYSTEM PROVIDED HISTORY: covid TECHNOLOGIST PROVIDED HISTORY: covid Decision Support Exception - unselect if not a suspected or confirmed emergency medical condition->Emergency Medical Condition (MA) FINDINGS: Mediastinum: Mild cardiomegaly. No pericardial effusion. Thoracic aorta is normal in caliber. Scattered atherosclerotic changes. Scattered mildly prominent mediastinal lymph nodes. Lungs/pleura: Scattered ground-glass infiltrate throughout both lungs. No effusion or pneumothorax. Upper Abdomen: No acute abnormality. Small sliding hiatal hernia. Soft Tissues/Bones: No aggressive osseous lesions. Confluent anterolateral vertebral body syndesmophyte formation compatible with DISH. Dani Retana No acute soft tissue abnormality. Multifocal bilateral ground-glass opacities throughout both lungs compatible with COVID-19 pneumonia. ASSESSMENT:    Principal Problem:    COVID-19 virus infection / No vaccine  Active Problems:    Type 2 diabetes mellitus without complication (HCC)    Mild malnutrition (HCC)    Pneumonia due to COVID-19 virus    Acute respiratory failure with hypoxia (HCC)  Resolved Problems:    * No resolved hospital problems. *      Patient Active Problem List    Diagnosis Date Noted    Type 2 diabetes mellitus without complication ([address] 82/87/3283    Closed right hip fracture ([address] 06/20/2016    Essential hypertension 06/20/2016    COVID-19 virus infection / No vaccine 11/18/2021    Acute respiratory failure with hypoxia ([address] 11/16/2021    Pneumonia due to COVID-19 virus 11/15/2021    S/P total knee arthroplasty, right 08/10/2020    Malignant neoplasm of sigmoid colon ([address]     Status post left hip replacement 08/08/2017    Status post total replacement of right hip 44/76/5693    Diastolic dysfunction with chronic heart failure ([address] 06/21/2016    Mild malnutrition ([address] 06/21/2016    Partial recent retinal detachment with multiple defects 10/03/2013    Aphakia of eye, left 07/23/2013    Corneal edema, secondary, left 07/23/2013       PLAN:  · COVID-19 virus protocol  · Critical Care Time: Zero  · Oxygen 5 L. · Wean oxygen. Patient seems to be slowly improving.       Abiola Kruger MD, M.D.

## 2021-11-21 NOTE — PROGRESS NOTES
Occupational Therapy  Facility/Department: [address] [place] [address] MED SURG  Daily Treatment Note  NAME: Meka Montoya  : 1940  MRN: [id number]    Date of Service: 2021    Discharge Recommendations:  Continue to assess pending progress, Subacute/Skilled Nursing Facility, Home with Home health OT       Assessment      Safety Devices  Safety Devices in place: Yes  Type of devices: Call light within reach; Left in chair         Patient Diagnosis(es): The primary encounter diagnosis was Hypoxia. A diagnosis of Pneumonia due to COVID-19 virus was also pertinent to this visit. has a past medical history of Achilles tendon rupture, Cancer ([facility] Chronic SI joint pain, Claustrophobia, Diabetes mellitus ([facility] Esophageal stricture, [place] (hard of hearing), Hyperlipidemia, Hypertension, Retinal detachment, and Trauma to eye, left.   has a past surgical history that includes Cervical disc surgery (); Lumbar disc surgery (); Hysterectomy (); bladder suspension (); Blepharoplasty (Left, 5/3/2013); Eye surgery (Left, 06/15/2013); Eye surgery (Bilateral, ); Upper gastrointestinal endoscopy; Esophagus dilation; Cataract removal (Bilateral); eye surgery (Left, ,,,); vitrectomy (Left, 10/3/13); Total hip arthroplasty (Left, 2017); joint replacement (Right, 2016); Foot Amputation; Foot Tendon Surgery (Left, 2018); pr foot/toes surgery proc unlisted (Left, 3/9/2018); Finger trigger release (Left, 4/15/2019); proctosigmoidoscopy (N/A, 10/30/2019); proctosigmoidoscopy (10/30/2019); colectomy; Knee Arthroplasty (Right, 08/10/2020); and Total knee arthroplasty (Right, 8/10/2020).     Restrictions  Restrictions/Precautions  Restrictions/Precautions: General Precautions, Fall Risk, Isolation  Subjective   General  Chart Reviewed: Yes  Patient assessed for rehabilitation services?: Yes  Response to previous treatment: Patient with no complaints from previous session  Family / Caregiver Present:

## 2021-11-21 NOTE — PROGRESS NOTES
Patient sitting up in bed at this time watching TV. Reassessment and vitals completed . Vitals stable and WDL. Patient denies having needs. Call light within reach bed alarm on. Will continue to monitor.

## 2021-11-21 NOTE — PROGRESS NOTES
Physical Therapy  Facility/Department: [facility] MED SURG  Daily Treatment Note  NAME: [place]  : 1940  MRN: [id number]    Date of Service: 2021    Discharge Recommendations:  Continue to assess pending progress, Home with Home health PT, IP Rehab        Assessment   Treatment Diagnosis: Difficulty walking  Prognosis: Good  PT Education: Goals; PT Role; Plan of Care; General Safety; Gait Training; Transfer Training  Patient Education: Educated pt on above and on proper breathing technique with good understanding noted. REQUIRES PT FOLLOW UP: Yes  Activity Tolerance  Activity Tolerance: Patient Tolerated treatment well; Patient limited by endurance     Patient Diagnosis(es): The primary encounter diagnosis was Hypoxia. A diagnosis of Pneumonia due to COVID-19 virus was also pertinent to this visit. has a past medical history of Achilles tendon rupture, Cancer ([address] Chronic SI joint pain, Claustrophobia, Diabetes mellitus ([address] Esophageal stricture, [place] (hard of hearing), Hyperlipidemia, Hypertension, Retinal detachment, and Trauma to eye, left.   has a past surgical history that includes Cervical disc surgery (); Lumbar disc surgery (); Hysterectomy (); bladder suspension (); Blepharoplasty (Left, 5/3/2013); Eye surgery (Left, 06/15/2013); Eye surgery (Bilateral, ); Upper gastrointestinal endoscopy; Esophagus dilation; Cataract removal (Bilateral); eye surgery (Left, ,,,); vitrectomy (Left, 10/3/13); Total hip arthroplasty (Left, 2017); joint replacement (Right, 2016); Foot Amputation; Foot Tendon Surgery (Left, 2018); pr foot/toes surgery proc unlisted (Left, 3/9/2018);  Finger trigger release (Left, 4/15/2019); proctosigmoidoscopy (N/A, 10/30/2019); proctosigmoidoscopy (10/30/2019); colectomy; Knee Arthroplasty (Right, 08/10/2020); and Total knee arthroplasty (Right, 8/10/2020). Restrictions  Restrictions/Precautions  Restrictions/Precautions: General Precautions, Fall Risk, Isolation  Subjective   General  Chart Reviewed: Yes  Response To Previous Treatment: Patient with no complaints from previous session. Family / Caregiver Present: No  Referring Practitioner: GRETA Pizarro  Subjective  Subjective: Pt with no pain and willing to participate in physical therapy session. Orientation  Orientation  Overall Orientation Status: Within Functional Limits  Cognition      Objective   Bed mobility  Bridging: Stand by assistance  Rolling to Left: Stand by assistance  Rolling to Right: Stand by assistance  Supine to Sit: Stand by assistance  Sit to Supine: Stand by assistance  Scooting: Stand by assistance  Comment: Verbal cues for technique with good understanding noted. Transfers  Sit to Stand: Stand by assistance  Stand to sit: Stand by assistance  Comment: Verbal cues for hand placement with good understanding noted. Ambulation  Ambulation?: Yes  Ambulation 1  Surface: level tile  Device: Rolling Walker  Other Apparatus: O2  Assistance: Contact guard assistance; Stand by assistance  Distance: Pt amb 20 feet x 1, 15 feet x 2  Comments: SPO2 78-92%. Pt needed extended rest breaks to allow for SPO2 to increase and for decreasing her SOB. Minimal cues for proper breathing technique with good understanding noted. Stairs/Curb  Stairs?: No     Balance  Posture: Fair  Sitting - Static: Good  Sitting - Dynamic: Fair; +  Standing - Static: Fair; +  Standing - Dynamic: Fair  Exercises  Straight Leg Raise: 15x  Quad Sets: 15x  Heelslides: 15x  Gluteal Sets: 15x  Hip Abduction: 15x  Knee Short Arc Quad: 15x  Ankle Pumps: 15x  Comments: Above exercises completed in supine. Frequent rest breaks needed d/t fatigue/SOB.       G-Code     OutComes Score    AM-PAC Score    Goals  Short term goals  Time Frame for Short term goals: 20 days  Short term goal 1: Patient to complete sit/stand and stand pivot transfers with SUP and no LOB to decrease fall risk. Short term goal 2: Patient to ambulate 150ft with FWW or LRAD and SUP with SpO2 >/=90% for improved endurance. Short term goal 3: Patient to tolerate 20-30 min of ther ex/act to improve functional strnegth and endurance. Short term goal 4: Patient to ascend/descend 4 steps with HRx1 and SUP with no LOB to safely enter her home. Plan    Plan  Times per week: 7  Times per day: Twice a day  Current Treatment Recommendations: Strengthening, Neuromuscular Re-education, Home Exercise Program, ROM, Safety Education & Training, Balance Training, Endurance Training, Patient/Caregiver Education & Training, Functional Mobility Training, Transfer Training, Gait Training, Stair training  Safety Devices  Type of devices:  All fall risk precautions in place, Call light within reach, Patient at risk for falls, Nurse notified, Gait belt, Left in bed (No alarm upon entry and none needed per Yan Smith RN.)     Therapy Time   Individual Concurrent Group Co-treatment   Time In 1106         Time Out [address] [place] [address]

## 2021-11-22 LAB
ABSOLUTE EOS #: 0.2 K/UL (ref 0–0.44)
ABSOLUTE IMMATURE GRANULOCYTE: 0.22 K/UL (ref 0–0.3)
ABSOLUTE LYMPH #: 1.11 K/UL (ref 1.1–3.7)
ABSOLUTE MONO #: 0.41 K/UL (ref 0.1–1.2)
ALBUMIN SERPL-MCNC: 3.4 G/DL (ref 3.5–5.2)
ALBUMIN/GLOBULIN RATIO: 1.2 (ref 1–2.5)
ALP BLD-CCNC: 69 U/L (ref 35–104)
ALT SERPL-CCNC: 23 U/L (ref 5–33)
ANION GAP SERPL CALCULATED.3IONS-SCNC: 11 MMOL/L (ref 9–17)
AST SERPL-CCNC: 18 U/L
BASOPHILS # BLD: 1 % (ref 0–2)
BASOPHILS ABSOLUTE: 0.05 K/UL (ref 0–0.2)
BILIRUB SERPL-MCNC: 0.41 MG/DL (ref 0.3–1.2)
BUN BLDV-MCNC: 19 MG/DL (ref 8–23)
BUN/CREAT BLD: 39 (ref 9–20)
CALCIUM SERPL-MCNC: 8.9 MG/DL (ref 8.6–10.4)
CHLORIDE BLD-SCNC: 98 MMOL/L (ref 98–107)
CO2: 26 MMOL/L (ref 20–31)
CREAT SERPL-MCNC: 0.49 MG/DL (ref 0.5–0.9)
D-DIMER QUANTITATIVE: 7.41 MG/L FEU (ref 0–0.59)
DIFFERENTIAL TYPE: ABNORMAL
EOSINOPHILS RELATIVE PERCENT: 2 % (ref 1–4)
FERRITIN: 240 UG/L (ref 13–150)
FIBRINOGEN: 182 MG/DL (ref 179–518)
GFR AFRICAN AMERICAN: >60 ML/MIN
GFR NON-AFRICAN AMERICAN: >60 ML/MIN
GFR SERPL CREATININE-BSD FRML MDRD: ABNORMAL ML/MIN/{1.73_M2}
GFR SERPL CREATININE-BSD FRML MDRD: ABNORMAL ML/MIN/{1.73_M2}
GLUCOSE BLD-MCNC: 121 MG/DL (ref 74–100)
GLUCOSE BLD-MCNC: 131 MG/DL (ref 70–99)
GLUCOSE BLD-MCNC: 162 MG/DL (ref 74–100)
GLUCOSE BLD-MCNC: 184 MG/DL (ref 74–100)
GLUCOSE BLD-MCNC: 234 MG/DL (ref 74–100)
HCT VFR BLD CALC: 40.4 % (ref 36.3–47.1)
HEMOGLOBIN: 13.3 G/DL (ref 11.9–15.1)
IMMATURE GRANULOCYTES: 2 %
INR BLD: 1.2
LYMPHOCYTES # BLD: 11 % (ref 24–43)
MCH RBC QN AUTO: 30.6 PG (ref 25.2–33.5)
MCHC RBC AUTO-ENTMCNC: 32.9 G/DL (ref 28.4–34.8)
MCV RBC AUTO: 93.1 FL (ref 82.6–102.9)
MONOCYTES # BLD: 4 % (ref 3–12)
NRBC AUTOMATED: 0 PER 100 WBC
PARTIAL THROMBOPLASTIN TIME: 23.7 SEC (ref 23.9–33.8)
PDW BLD-RTO: 12.1 % (ref 11.8–14.4)
PLATELET # BLD: 290 K/UL (ref 138–453)
PLATELET ESTIMATE: ABNORMAL
PMV BLD AUTO: 10.4 FL (ref 8.1–13.5)
POTASSIUM SERPL-SCNC: 4.9 MMOL/L (ref 3.7–5.3)
PROTHROMBIN TIME: 14.6 SEC (ref 11.5–14.2)
RBC # BLD: 4.34 M/UL (ref 3.95–5.11)
RBC # BLD: ABNORMAL 10*6/UL
SEG NEUTROPHILS: 80 % (ref 36–65)
SEGMENTED NEUTROPHILS ABSOLUTE COUNT: 8.49 K/UL (ref 1.5–8.1)
SODIUM BLD-SCNC: 135 MMOL/L (ref 135–144)
TOTAL PROTEIN: 6.3 G/DL (ref 6.4–8.3)
WBC # BLD: 10.5 K/UL (ref 3.5–11.3)
WBC # BLD: ABNORMAL 10*3/UL

## 2021-11-22 PROCEDURE — 6360000002 HC RX W HCPCS: Performed by: NURSE PRACTITIONER

## 2021-11-22 PROCEDURE — 82728 ASSAY OF FERRITIN: CPT

## 2021-11-22 PROCEDURE — 85730 THROMBOPLASTIN TIME PARTIAL: CPT

## 2021-11-22 PROCEDURE — 6370000000 HC RX 637 (ALT 250 FOR IP): Performed by: NURSE PRACTITIONER

## 2021-11-22 PROCEDURE — 80053 COMPREHEN METABOLIC PANEL: CPT

## 2021-11-22 PROCEDURE — 85379 FIBRIN DEGRADATION QUANT: CPT

## 2021-11-22 PROCEDURE — 94669 MECHANICAL CHEST WALL OSCILL: CPT

## 2021-11-22 PROCEDURE — 82947 ASSAY GLUCOSE BLOOD QUANT: CPT

## 2021-11-22 PROCEDURE — 36415 COLL VENOUS BLD VENIPUNCTURE: CPT

## 2021-11-22 PROCEDURE — 85025 COMPLETE CBC W/AUTO DIFF WBC: CPT

## 2021-11-22 PROCEDURE — 99232 SBSQ HOSP IP/OBS MODERATE 35: CPT | Performed by: INTERNAL MEDICINE

## 2021-11-22 PROCEDURE — 85610 PROTHROMBIN TIME: CPT

## 2021-11-22 PROCEDURE — 1200000000 HC SEMI PRIVATE

## 2021-11-22 PROCEDURE — APPSS30 APP SPLIT SHARED TIME 16-30 MINUTES: Performed by: NURSE PRACTITIONER

## 2021-11-22 PROCEDURE — 97110 THERAPEUTIC EXERCISES: CPT

## 2021-11-22 PROCEDURE — 6370000000 HC RX 637 (ALT 250 FOR IP): Performed by: INTERNAL MEDICINE

## 2021-11-22 PROCEDURE — 97530 THERAPEUTIC ACTIVITIES: CPT

## 2021-11-22 PROCEDURE — 97116 GAIT TRAINING THERAPY: CPT

## 2021-11-22 PROCEDURE — 85384 FIBRINOGEN ACTIVITY: CPT

## 2021-11-22 PROCEDURE — 94640 AIRWAY INHALATION TREATMENT: CPT

## 2021-11-22 RX ADMIN — LATANOPROST 1 DROP: 50 SOLUTION OPHTHALMIC at 20:25

## 2021-11-22 RX ADMIN — OXYCODONE HYDROCHLORIDE AND ACETAMINOPHEN 1000 MG: 500 TABLET ORAL at 20:24

## 2021-11-22 RX ADMIN — FLUTICASONE PROPIONATE 1 SPRAY: 50 SPRAY, METERED NASAL at 09:08

## 2021-11-22 RX ADMIN — INSULIN LISPRO 2 UNITS: 100 INJECTION, SOLUTION INTRAVENOUS; SUBCUTANEOUS at 11:40

## 2021-11-22 RX ADMIN — RIVAROXABAN 10 MG: 10 TABLET, FILM COATED ORAL at 09:08

## 2021-11-22 RX ADMIN — ZINC SULFATE 220 MG (50 MG) CAPSULE 100 MG: CAPSULE at 09:08

## 2021-11-22 RX ADMIN — METFORMIN HYDROCHLORIDE 500 MG: 500 TABLET ORAL at 16:36

## 2021-11-22 RX ADMIN — DEXAMETHASONE 6 MG: 4 TABLET ORAL at 09:08

## 2021-11-22 RX ADMIN — BUDESONIDE 500 MCG: 0.5 SUSPENSION RESPIRATORY (INHALATION) at 20:35

## 2021-11-22 RX ADMIN — INSULIN LISPRO 1 UNITS: 100 INJECTION, SOLUTION INTRAVENOUS; SUBCUTANEOUS at 20:25

## 2021-11-22 RX ADMIN — DOCUSATE SODIUM 100 MG: 100 CAPSULE ORAL at 20:24

## 2021-11-22 RX ADMIN — METFORMIN HYDROCHLORIDE 500 MG: 500 TABLET ORAL at 09:09

## 2021-11-22 RX ADMIN — ERGOCALCIFEROL 50000 UNITS: 1.25 CAPSULE ORAL at 09:08

## 2021-11-22 RX ADMIN — FAMOTIDINE 20 MG: 20 TABLET ORAL at 09:09

## 2021-11-22 RX ADMIN — OXYCODONE HYDROCHLORIDE AND ACETAMINOPHEN 1000 MG: 500 TABLET ORAL at 11:41

## 2021-11-22 RX ADMIN — INSULIN LISPRO 4 UNITS: 100 INJECTION, SOLUTION INTRAVENOUS; SUBCUTANEOUS at 16:35

## 2021-11-22 RX ADMIN — OXYCODONE HYDROCHLORIDE AND ACETAMINOPHEN 1000 MG: 500 TABLET ORAL at 16:36

## 2021-11-22 RX ADMIN — DOCUSATE SODIUM 100 MG: 100 CAPSULE ORAL at 09:08

## 2021-11-22 RX ADMIN — FAMOTIDINE 20 MG: 20 TABLET ORAL at 20:24

## 2021-11-22 ASSESSMENT — PAIN SCALES - GENERAL
PAINLEVEL_OUTOF10: 0
PAINLEVEL_OUTOF10: 0

## 2021-11-22 NOTE — PROGRESS NOTES
"Anesthesia Transfer of Care Note    Patient: Beatriz Mendosa    Procedure(s) Performed: Procedure(s) (LRB):  COLONOSCOPY (N/A)    Patient location: GI    Anesthesia Type: MAC    Transport from OR: Transported from OR on room air with adequate spontaneous ventilation    Post pain: adequate analgesia    Post assessment: no apparent anesthetic complications    Post vital signs: stable    Level of consciousness: awake, alert and oriented    Nausea/Vomiting: no nausea/vomiting    Complications: none    Transfer of care protocol was followed      Last vitals:   Visit Vitals  /64   Pulse 72   Temp 37.1 °C (98.7 °F)   Resp 16   Ht 5' 3" (1.6 m)   Wt 122.5 kg (270 lb)   LMP  (LMP Unknown)   SpO2 98%   Breastfeeding? No   BMI 47.83 kg/m²     " home.  Short term goal 4: Patient to be educated on d/c folder, EC/WS techniques, AE/DME, and general safety to ensure safe return home.        Therapy Time   Individual Concurrent Group Co-treatment   Time In 5578         Time Out 1038         Minutes [facility] [address] [place] [address]

## 2021-11-22 NOTE — PROGRESS NOTES
Writer turned O2 down to 4.5L via n/c at 7:30am. Patient is stating at 95% while sitting in chair. No S/SX of distress noted.

## 2021-11-22 NOTE — PROGRESS NOTES
Writer at bedside for shift assessment. Patient sitting up in bed, respirations are even and unlabored while on 5 L. Vitals obtained and assessment completed, see flowsheet for details. Pt taken to bathroom and back, tolerated fine. pt denies further needs at this time. Call light in reach, will continue to monitor.

## 2021-11-22 NOTE — PROGRESS NOTES
[facility] -Progress Note COVID 19 Patient-Tele-Visit  Today's Date and Time: 11/22/2021, 12:28 PM    Impression :     · COVID 19 Confirmed Infection  · Covid tests:  · 11/12/21: Positive  · Acute hypoxic respiratory distress  · Acute confusion  · Hyponatremia  · Hx colorectal cancer s/p resection  · Hx bilateral hip replacement  · DM  · Hx urinary incontinence  · Hx cataracts s/p eye surgery X 5  · Single blood culture with Micrococcus on 11-15-21: contaminant      Patient evaluated by Telemedicine. Requesting Institution: [facility]  Provider Institution: [facility] [place], [facility]: LARRY Perez- ARVIN    Recommendations:   · Antibiotic treatment:  · Monitor off antibiotics  · Urine culture  · Covid Rx:  · Remdesivir-Initiated 11/15/21  · Decadron-Initiated 11/15/21  · Actemra-administered 11/17/21      Medical Decision Making/Summary/Discussion:11/22/2021     · Patient admitted with suspected COVID 19 infection  · Covid test confirmed positive. Infection Control Recommendations   · Universal Precautions  · Airborne isolation  · Droplet Isolation    Antimicrobial Stewardship Recommendations     · Discontinuation of therapy  Coordination of Outpatient Care:   · Estimated Length of IV antimicrobials:TBD  · Patient will need Midline Catheter Insertion: TBD  · Patient will need PICC line Insertion: No  · Patient will need: Home IV , [place],  SNF,  LTAC:TBD  · Patient will need outpatient wound care:No    Chief complaint/reason for consultation:   · Concern for COVID infection      History of Present Illness:   Jus Perry is a 78y.o.-year-old female who was initially admitted on 11/15/2021. Patient seen at the request of Dr. Thacker Orn:    Patient initially presented through [address] [place] [address] on 11/12/21 with complaints of fatigue, fever and myalgias over the past two days.  She was exposed to Covid from family members she lives with. She denied shortness of breath. She was found to be stable on room air and was discharged on symbicort. On 11/15/21, the patient was brought back to the ED by family members for worsening shortness of breath and confusion. She was found to have an SpO2 of 89% on room air. Imaging revealed bilateral ground-glass opacities. CT head was negative for any acute abnormalities. Abnormal labs include:   Na: 128  D-dimer: 1.47    The patient was started on Decadron and Remdesivir. Patient admitted because of concerns with COVID 19.    CURRENT EVALUATION : 11/22/2021    Afebrile  VS stable    The patient continues on 6 -->5-->4.5 L NC  On Decadron. Actemra given on 11/17/21  Stable clinically     Urine culture shows no significant growth  Blood culture with Micrococcus = contaminant    Patient exhibiting respiratory distress. yes  Respiratory secretions: no    Patient receiving supplemental oxygen.: 4-->6-->5-->4.5 L NC  RR:18-->22-->20-->18  02 sat: 91-->93-->93 %    QTc:       NEWS Score: 0-4 Low risk group; 5-6: Medium risk group; 7 or above: High risk group  Parameters 3 2 1 0 1 2 3   Age    < 65   ? 65   RR ? 8  9-11 12-20  21-24 ? 25   O2 Sats ?  91 92-93 94-95 ? 96      Suppl O2  Yes  No      SBP ? 90  101-110 111-219   ? 220   HR ? 40  41-50 51-90  111-130 ? 131   Consciousness    Alert   Drowsiness, lethargy, or confusion   Temperature ? 35.0 C (95.0 F)  35.1-36.0 C 95.1-96.9 F 36.1-38.0 C 97.0-100.4 F 38.1-39.0 C 100.5-102.3 F ? 39.1 C ? 102.4 F      NEWS Score:  ·  11/16/21: 7 High risk    Overall Daily Picture:    Unchanged    Presence of secondary bacterial Infection:    No   Additional antibiotics: No    Labs, X rays reviewed: 11/22/2021    BUN:21-->18-->19  Cr:0.52-->0.44-->0.5-->0.49    WBC:7.5-->10.1-->10.5  Hb:12.2-->13.5-->13.3  Plat: 283-->272-->312-->290    Absolute Neutrophils:4.60  Absolute Lymphocytes:0.77  Neutrophil/Lymphocyte Ratio: 6 high risk    CRP:126.7-->54.9  Ferritin:408-->296  LDH: 320    Pro Calcitonin:      Cultures:  Urine:  ·   Blood:  ·   Sputum :  ·   Wound:       CXR:   11/12/21        CAT:  11/15/21        Discussed with patient, RN, CC, IM. I have personally reviewed the past medical history, past surgical history, medications, social history, and family history, and I have updated the database accordingly.   Past Medical History:     Past Medical History:   Diagnosis Date    Achilles tendon rupture 2011    LEFT    Cancer ([address]     Chronic SI joint pain     left    Claustrophobia     Diabetes mellitus ([address] 2010    ON ORAL MED    Esophageal stricture 2011    PT HAD DILATATION    [place] (hard of hearing)     WEARS [place] HEARING AIDS    Hyperlipidemia 2012    Hypertension     PT HAD TILT TABLE TEST BP MED DISCONTINUED    Retinal detachment 10/2012    LEFT    Trauma to eye, left        Past Surgical  History:     Past Surgical History:   Procedure Laterality Date    BLADDER SUSPENSION  1994    WITH HYSTERECTOMY    BLEPHAROPLASTY Left 5/3/2013    CATARACT REMOVAL Bilateral     with iol    CERVICAL DISC SURGERY  1984    cervical spine    COLECTOMY      ROBOTIC LOWER COLON RESECTION    ESOPHAGEAL DILATATION      EYE SURGERY Left 06/15/2013    REPAIR OF WOUND, WITH LENS REMOVAL     EYE SURGERY Bilateral 2008    BILAT CATARACT REMOVAL WITH IOL    EYE SURGERY Left ,7/13,12/12,6/13    vitrectomy    FINGER TRIGGER RELEASE Left 4/15/2019    FINGER TRIGGER RELEASE-MIDDLE FINGER performed by Lisandra Dao MD at [facility] Left 03/09/2018    Excision of Os Peroneum, Primary Repair of Peroneus Longus Tendon - Dr. Evgeny Mcclain Right 06/21/2016    MARY Dr. Marilee Bernard Right 08/10/2020    Dr. Bjorn Dunlap    lower back    [place] FOOT/TOES SURGERY PROC UNLISTED Left 3/9/2018    ACHILLES TENDON LENGTHENING REPAIR/ EXCISION OF OS PERONEUM,PRIMARY REPAIR OF PERONEUS LINGUS TENDON WITH POSSIBLE PERONEAL TENODESIS performed by Han Olvera DPM at [facility] PROCTOSIGMOIDOSCOPY N/A 10/30/2019    ANAL PROCTO SIGMOIDOSCOPY FLEXIBLE with biopsies performed by Martha Alba MD at [address]  10/30/2019    -sigmoid colon mass(invasive ulcerated adenocarcinoma)    TOTAL HIP ARTHROPLASTY Left 8/7/2017    HIP TOTAL ARTHROPLASTY performed by Ana House MD at [facility] Right 8/10/2020    KNEE TOTAL ARTHROPLASTY performed by Ana House MD at [facility] dilation    VITRECTOMY Left 10/3/13       Medications:      fluticasone  1 spray Each Nostril Daily    budesonide  0.5 mg Nebulization BID    docusate sodium  100 mg Oral BID    latanoprost  1 drop Both Eyes Nightly    metFORMIN  500 mg Oral BID WC    rivaroxaban  10 mg Oral Daily    dexamethasone  6 mg Oral Daily    insulin lispro  0-12 Units SubCUTAneous TID WC    insulin lispro  0-6 Units SubCUTAneous Nightly    ascorbic acid  1,000 mg Oral 4x Daily    famotidine  20 mg Oral BID    zinc sulfate  100 mg Oral Daily    vitamin D  50,000 Units Oral Weekly       Social History:     Social History     Socioeconomic History    Marital status:      Spouse name: Not on file    Number of children: Not on file    Years of education: Not on file    Highest education level: Not on file   Occupational History    Not on file   Tobacco Use    Smoking status: Never Smoker    Smokeless tobacco: Never Used   Vaping Use    Vaping Use: Never used   Substance and Sexual Activity    Alcohol use: No    Drug use: No    Sexual activity: Not on file   Other Topics Concern    Not on file   Social History Narrative    Not on file     Social Determinants of Health     Financial Resource Strain:     Difficulty of Paying Living Expenses: Not on file Food Insecurity:     Worried About Running Out of Food in the Last Year: Not on file    Joy of Food in the Last Year: Not on file   Transportation Needs:     Lack of Transportation (Medical): Not on file    Lack of Transportation (Non-Medical): Not on file   Physical Activity:     Days of Exercise per Week: Not on file    Minutes of Exercise per Session: Not on file   Stress:     Feeling of Stress : Not on file   Social Connections:     Frequency of Communication with Friends and Family: Not on file    Frequency of Social Gatherings with Friends and Family: Not on file    Attends Mandaeism Services: Not on file    Active Member of [address] [place] [address] [employer] or Organizations: Not on file    Attends Club or Organization Meetings: Not on file    Marital Status: Not on file   Intimate Partner Violence:     Fear of Current or Ex-Partner: Not on file    Emotionally Abused: Not on file    Physically Abused: Not on file    Sexually Abused: Not on file   Housing Stability:     Unable to Pay for Housing in the Last Year: Not on file    Number of [place] in the Last Year: Not on file    Unstable Housing in the Last Year: Not on file       Family History:     Family History   Problem Relation Age of Onset    Heart Disease Mother         CARDIAC MASS    Heart Disease Father     Cancer Paternal Grandfather     Cancer Other         both families, several family members        Allergies:   Aleve [naproxen sodium], Aspirin, Codeine, Nsaids, Align [lactase-lactobacillus], Amitriptyline, Gabapentin, Lidocaine, Lyrica [pregabalin], Omnipaque [iohexol], Other, Tetracaine, and Iodine     Review of Systems:       Constitutional: No fevers or chills. No systemic complaints  Head: No headaches  Eyes: No double vision or blurry vision. No conjunctival inflammation. ENT: No sore throat or runny nose. . No hearing loss, tinnitus or vertigo. Cardiovascular: No chest pain or palpitations. Shortness of breath.   MACK  Lung: Shortness of breath or cough. No sputum production  Abdomen: No nausea, vomiting, diarrhea, or abdominal pain. [place] No cramps. Genitourinary: No increased urinary frequency, or dysuria. No hematuria. No suprapubic or CVA pain  Musculoskeletal: No muscle aches or pains. No joint effusions, swelling or deformities  Hematologic: No bleeding or bruising. Neurologic: No headache, weakness, numbness, or tingling. Integument: No rash, no ulcers. Psychiatric: No depression. Endocrine: No polyuria, no polydipsia, no polyphagia. Physical Examination :     Patient Vitals for the past 8 hrs:   BP Temp Temp src Pulse Resp SpO2   11/22/21 0753 130/76 97.1 °F (36.2 °C) Temporal 76 18 93 %     General Appearance: Awake, alert, and in no apparent distress  Head:  Normocephalic, no trauma  Eyes: Pupils equal, round, reactive to light; sclera anicteric; conjunctivae pink. No embolic phenomena. ENT: Oropharynx clear, without erythema, exudate, or thrush. No tenderness of sinuses. Mouth/throat: mucosa pink and moist. No lesions. Dentition in good repair. Neck:Supple, without lymphadenopathy. Thyroid normal, No bruits. Pulmonary/Chest: Clear to auscultation, without wheezes, rales, or rhonchi. No dullness to percussion. Cardiovascular: Regular rate and rhythm without murmurs, rubs, or gallops. Abdomen: Soft, non tender. Bowel sounds normal. No organomegaly  All four Extremities: No cyanosis, clubbing, edema, or effusions. Neurologic: No gross sensory or motor deficits. Skin: Warm and dry with good turgor. No signs of peripheral arterial or venous insufficiency. No ulcerations. No open wounds.     Medical Decision Making -Laboratory:   I have independently reviewed/ordered the following labs:    CBC with Differential:   Recent Labs     11/21/21 0623 11/22/21 0620   WBC 10.1 10.5   HGB 13.5 13.3   HCT 40.5 40.4    290   LYMPHOPCT 11* 11*   MONOPCT 3 4     BMP:   Recent Labs     11/21/21 0623 11/22/21 0620   * 135   K 4.9 4.9   CL 96* 98   CO2 26 26   BUN 18 19   CREATININE 0.51 0.49*     Hepatic Function Panel:   Recent Labs     11/21/21  0623 11/22/21  0620   PROT 6.8 6.3*   LABALBU 3.4* 3.4*   BILITOT 0.47 0.41   ALKPHOS 70 69   ALT 24 23   AST 23 18     No results for input(s): RPR in the last 72 hours. No results for input(s): HIV in the last 72 hours. No results for input(s): BC in the last 72 hours. Lab Results   Component Value Date    MUCUS NOT REPORTED 11/16/2021    RBC 4.34 11/22/2021    RBC 3.98 11/16/2011    TRICHOMONAS NOT REPORTED 11/16/2021    WBC 10.5 11/22/2021    YEAST NOT REPORTED 11/16/2021    TURBIDITY Clear 11/16/2021     Lab Results   Component Value Date    CREATININE 0.49 11/22/2021    GLUCOSE 131 11/22/2021    GLUCOSE 116 11/16/2011       Medical Decision Making-Imaging:     Narrative   EXAMINATION:   ONE XRAY VIEW OF THE CHEST       11/12/2021 6:08 pm       COMPARISON:   None.       HISTORY:   ORDERING SYSTEM PROVIDED HISTORY: weakness   TECHNOLOGIST PROVIDED HISTORY:   weakness       FINDINGS:   There is diffuse prominence of the pulmonary interstitium.  No airspace   consolidation.  No pneumothorax or pleural effusion.  No cardiomegaly. Degenerative changes in the bilateral shoulders and spine.           Impression   Diffuse prominence of the pulmonary interstitium.  This is nonspecific but   can be seen in atypical/viral pneumonia.  Correlate clinically             Narrative   EXAMINATION:   CT OF THE CHEST WITHOUT CONTRAST 11/15/2021 2:30 pm       TECHNIQUE:   CT of the chest was performed without the administration of intravenous   contrast. Multiplanar reformatted images are provided for review.  Dose   modulation, iterative reconstruction, and/or weight based adjustment of the   mA/kV was utilized to reduce the radiation dose to as low as reasonably   achievable.       COMPARISON:   None.       HISTORY:   ORDERING SYSTEM PROVIDED HISTORY: covid   TECHNOLOGIST PROVIDED HISTORY: covid   Decision Support Exception - unselect if not a suspected or confirmed   emergency medical condition->Emergency Medical Condition ([place])       FINDINGS:   Mediastinum: Mild cardiomegaly.  No pericardial effusion.  Thoracic aorta is   normal in caliber.  Scattered atherosclerotic changes.  Scattered mildly   prominent mediastinal lymph nodes.       Lungs/pleura: Scattered ground-glass infiltrate throughout both lungs.  No   effusion or pneumothorax.       Upper Abdomen: No acute abnormality.  Small sliding hiatal hernia.       Soft Tissues/Bones: No aggressive osseous lesions.  Confluent anterolateral   vertebral body syndesmophyte formation compatible with DISH. Kathee Castles acute soft   tissue abnormality.           Impression   Multifocal bilateral ground-glass opacities throughout both lungs compatible   with COVID-19 pneumonia.             Narrative   EXAMINATION:   CT OF THE HEAD WITHOUT CONTRAST  11/15/2021 2:29 pm       TECHNIQUE:   CT of the head was performed without the administration of intravenous   contrast. Dose modulation, iterative reconstruction, and/or weight based   adjustment of the mA/kV was utilized to reduce the radiation dose to as low   as reasonably achievable.       COMPARISON:   03/01/2013       HISTORY:   ORDERING SYSTEM PROVIDED HISTORY: confusion   TECHNOLOGIST PROVIDED HISTORY:   confusion       Decision Support Exception - unselect if not a suspected or confirmed   emergency medical condition->Emergency Medical Condition ([place])       FINDINGS:   BRAIN/VENTRICLES: The ventricles and sulci are diffusely enlarged.  Low   attenuation is seen in the periventricular and subcortical white matter.  No   acute intracranial hemorrhage or acute infarct is identified.       ORBITS: The visualized portion of the orbits demonstrate no acute abnormality.       SINUSES: Small amount of fluid in the right maxillary sinus.  Otherwise the   paranasal sinuses appear clear.       SOFT TISSUES/SKULL:  No acute abnormality of the visualized skull or soft   tissues.           Impression   No acute intracranial abnormality.       Diffuse atrophic changes with findings suggesting chronic microvascular   ischemia                 Medical Decision [id number]:       Medical Decision Making-Other:     Note:  · Labs, medications, radiologic studies were reviewed with personal review of films  ·  Large amounts of data were reviewed  · Discussed with nursing Staff, Discharge planner  · Infection Control and Prevention measures reviewed  · All prior entries were reviewed  · Administer medications as ordered  · Prognosis: Guarded  · Discharge planning reviewed      Thank you for allowing us to participate in the care of this patient. Please call with questions. Rika Sharma, APRN - CNP         ATTESTATION:    I have discussed the case, including pertinent history and exam findings with the APRN. I have evaluated the  History, physical findings and pictures of the patient and the key elements of the encounter have been performed by me. I have reviewed the laboratory data, other diagnostic studies and discussed them with the APRN. I have updated the medical record where necessary. I agree with the assessment, plan and orders as documented by the APRN.     Alexandrea Garcia MD.      Pager: [phone] - Office: [phone]

## 2021-11-22 NOTE — PROGRESS NOTES
Physical Therapy  Facility/Department: [facility] AT THE [facility] MED SURG  Daily Treatment Note  NAME: Elmira Chatman  : 1940  MRN: [id number]    Date of Service: 2021    Discharge Recommendations:  Continue to assess pending progress, Home with Home health PT, IP Rehab        Assessment   Treatment Diagnosis: Difficulty walking  Decision Making: Medium Complexity  PT Education: Energy Conservation; PT Role; Goals; Plan of Care  Patient Education: Educated pt on above with good understanding. REQUIRES PT FOLLOW UP: Yes  Activity Tolerance  Activity Tolerance: Patient Tolerated treatment well; Patient limited by endurance  Activity Tolerance: SpO2 throughout tx ranged from 88- 91%     Patient Diagnosis(es): The primary encounter diagnosis was Hypoxia. A diagnosis of Pneumonia due to COVID-19 virus was also pertinent to this visit. has a past medical history of Achilles tendon rupture, [address] [facility] Chronic SI joint pain, Claustrophobia, Diabetes mellitus ([address] Esophageal stricture, [place] (hard of hearing), Hyperlipidemia, Hypertension, Retinal detachment, and Trauma to eye, left.   has a past surgical history that includes Cervical disc surgery (); Lumbar disc surgery (); Hysterectomy (); bladder suspension (); Blepharoplasty (Left, 5/3/2013); Eye surgery (Left, 06/15/2013); Eye surgery (Bilateral, ); Upper gastrointestinal endoscopy; Esophagus dilation; Cataract removal (Bilateral); eye surgery (Left, ,,,); vitrectomy (Left, 10/3/13); Total hip arthroplasty (Left, 2017); joint replacement (Right, 2016); Foot Amputation; Foot Tendon Surgery (Left, 2018); pr foot/toes surgery proc unlisted (Left, 3/9/2018);  Finger trigger release (Left, 4/15/2019); proctosigmoidoscopy (N/A, 10/30/2019); proctosigmoidoscopy (10/30/2019); colectomy; Knee Arthroplasty (Right, 08/10/2020); and Total knee arthroplasty (Right, 8/10/2020). Restrictions  Restrictions/Precautions  Restrictions/Precautions: General Precautions, Fall Risk, Isolation  Subjective   General  Chart Reviewed: Yes  Response To Previous Treatment: Patient with no complaints from previous session. Family / Caregiver Present: No  Referring Practitioner: GRETA Gil  Subjective  Subjective: Pt denies pain. Pt states that she doesn't feel like she can walk today and that she feels weak. Agreeable to ther ex. Pain Screening  Patient Currently in Pain: Denies  Vital Signs  Patient Currently in Pain: Denies       Orientation  Orientation  Overall Orientation Status: Within Functional Limits  Cognition      Objective   Exercises  Comments: Reclined B LE exercises x15 reps, including AP, SLR, heel slides, glute sets, quad sets, hip ABD. Rest breaks due to fatigue and SOB. Goals  Short term goals  Time Frame for Short term goals: 20 days  Short term goal 1: Patient to complete sit/stand and stand pivot transfers with SUP and no LOB to decrease fall risk. Short term goal 2: Patient to ambulate 150ft with FWW or LRAD and SUP with SpO2 >/=90% for improved endurance. Short term goal 3: Patient to tolerate 20-30 min of ther ex/act to improve functional strnegth and endurance. Short term goal 4: Patient to ascend/descend 4 steps with HRx1 and SUP with no LOB to safely enter her home. Plan    Plan  Times per week: 7  Times per day: Twice a day  Current Treatment Recommendations: Strengthening, Neuromuscular Re-education, Home Exercise Program, ROM, Safety Education & Training, Balance Training, Endurance Training, Patient/Caregiver Education & Training, Functional Mobility Training, Transfer Training, Gait Training, Stair training  Safety Devices  Type of devices:  All fall risk precautions in place, Call light within reach, Patient at risk for falls, Nurse notified, Left in chair     Therapy Time   Individual Concurrent Group Co-treatment   Time In [phone] Time Out 1132         Minutes 134 [address] [place] [address]

## 2021-11-22 NOTE — PROGRESS NOTES
Progress Note    SUBJECTIVE:    Patient seen for f/u of COVID-19 virus infection. She been up in the chair alert oriented no acute distress. Patient currently on 4.5 L per nasal cannula. She has no complaints. ROS:   Constitutional: negative  for fevers, and negative for chills. Respiratory: negative for shortness of breath, positive for cough, and negative for wheezing  Cardiovascular: negative for chest pain, and negative for palpitations  Gastrointestinal: negative for abdominal pain, negative for nausea,negative for vomiting, negative for diarrhea, and negative for constipation     All other systems were reviewed with the patient and are negative unless otherwise stated in HPI      OBJECTIVE:      Vitals:   Vitals:    11/22/21 1230   BP: 122/86   Pulse: 90   Resp: 18   Temp: 96.9 °F (36.1 °C)   SpO2: 97%     Weight: 143 lb 11.2 oz (65.2 kg)   Height: 5' 4\" (162.6 cm)     Weight  Wt Readings from Last 3 Encounters:   11/22/21 143 lb 11.2 oz (65.2 kg)   11/12/21 149 lb (67.6 kg)   08/12/20 161 lb 9.6 oz (73.3 kg)     Body mass index is 24.67 kg/m². 24HR INTAKE/OUTPUT:      Intake/Output Summary (Last 24 hours) at 11/22/2021 1349  Last data filed at 11/22/2021 0529  Gross per 24 hour   Intake 400 ml   Output 650 ml   Net -250 ml     -----------------------------------------------------------------  Exam:    GEN:    Awake, alert and oriented x3. EYES:   EOMI, pupils equal   NECK: Supple. No lymphadenopathy. No carotid bruit  CVS:     regular rate and rhythm, no audible murmur  PULM:  Diminished with bibasilar rales, no acute respiratory distress  ABD:     Bowels sounds normal.  Abdomen is soft. No distention. no tenderness to palpation. EXT:     no edema bilaterally . No calf tenderness. NEURO: Moves all extremities. Motor and sensory are grossly intact  SKIN:    No rashes.   No skin lesions.  -----------------------------------------------------------------    Diagnostic Data:      Complete infection  · Continue current therapy   · Remdesivir-not indicated  · Continue dexamethasone  · Continue vitamin C, D and zinc  · Continue Xarelto  · Trend CRPs-improving  · Received Actemra 11/17/2021  · Appreciate infectious disease  · Blood culture x1+ gram-positive cocci-will await sensitivity as I am concerned this is a contaminant. Patient afebrile no elevation in WBC count. We will hold off on starting antibiotics.   · Acute respiratory failure with hypoxia  · Supplemental oxygen to maintain SPO2 greater than 92%-currently on 4.5 L  · Acapella  · Prone  · PT/OT  · Nebs  · Type 2 diabetes  · POCT before meals and at bedtime  · Medium dose sliding scale  · Hypoglycemia protocol  · Continue Glucophage  · Nutrition status:   · at risk for malnutrition  · Dietician consult initiated  · Hospital Prophylaxis:   · DVT: Xarelto   · Stress Ulcer: H2 Blocker   · High risk medications: none   · Disposition:    · Discharge plan is pending      LARRY Russo - CNP , LARRY, NP-C  Hospitalist Medicine        11/22/2021, 1:49 PM

## 2021-11-22 NOTE — PROGRESS NOTES
Patient SpO2 dropped to 84% at 4.5L while walking back from bathroom. Patient able to recover within 3 minutes of being placed on edge of bed. Will continue to monitor SpO2 while trying to wean Oxygen.

## 2021-11-22 NOTE — PROGRESS NOTES
Physical Therapy  Facility/Department: [facility] AT THE [facility] MED SURG  Daily Treatment Note  NAME: Joy Knott  : 1940  MRN: [id number]    Date of Service: 2021    Discharge Recommendations:  Continue to assess pending progress, Home with Home health PT, IP Rehab        Assessment   Treatment Diagnosis: Difficulty walking  Prognosis: Good  Decision Making: Medium Complexity  PT Education: Energy Conservation; PT Role; Goals; Plan of Care  Patient Education: Educated pt on above with good understanding. REQUIRES PT FOLLOW UP: Yes  Activity Tolerance  Activity Tolerance: Patient Tolerated treatment well; Patient limited by endurance     Patient Diagnosis(es): The primary encounter diagnosis was Hypoxia. A diagnosis of Pneumonia due to COVID-19 virus was also pertinent to this visit. has a past medical history of Achilles tendon rupture, Cancer ([address] Chronic SI joint pain, Claustrophobia, Diabetes mellitus ([address] Esophageal stricture, [place] (hard of hearing), Hyperlipidemia, Hypertension, Retinal detachment, and Trauma to eye, left.   has a past surgical history that includes Cervical disc surgery (); Lumbar disc surgery (); Hysterectomy (); bladder suspension (); Blepharoplasty (Left, 5/3/2013); Eye surgery (Left, 06/15/2013); Eye surgery (Bilateral, ); Upper gastrointestinal endoscopy; Esophagus dilation; Cataract removal (Bilateral); eye surgery (Left, ,,,); vitrectomy (Left, 10/3/13); Total hip arthroplasty (Left, 2017); joint replacement (Right, 2016); Foot Amputation; Foot Tendon Surgery (Left, 2018); pr foot/toes surgery proc unlisted (Left, 3/9/2018); Finger trigger release (Left, 4/15/2019); proctosigmoidoscopy (N/A, 10/30/2019); proctosigmoidoscopy (10/30/2019); colectomy; Knee Arthroplasty (Right, 08/10/2020); and Total knee arthroplasty (Right, 8/10/2020).     Restrictions  Restrictions/Precautions  Restrictions/Precautions: General Precautions, Fall Risk, Isolation  Subjective   General  Chart Reviewed: Yes  Response To Previous Treatment: Patient with no complaints from previous session. Family / Caregiver Present: No  Referring Practitioner: Winda Kayser, APRN-CNP  Subjective  Subjective: Pt. denies pain and reports being cold upon arrival. Pt.in bed and agreeable to therapy and up to chair for lunch. Pain Screening  Patient Currently in Pain: Denies  Vital Signs  Patient Currently in Pain: Denies       Orientation  Orientation  Overall Orientation Status: Within Functional Limits  Cognition      Objective   Bed mobility  Bridging: Stand by assistance; Supervision  Rolling to Left: Stand by assistance; Supervision  Rolling to Right: Stand by assistance; Supervision  Supine to Sit: Stand by assistance; Supervision  Sit to Supine: Stand by assistance; Supervision  Scooting: Stand by assistance; Supervision  Transfers  Sit to Stand: Stand by assistance; Supervision  Stand to sit: Stand by assistance; Supervision  Ambulation  Ambulation?: Yes  Ambulation 1  Surface: level tile  Device: Rolling Walker  Other Apparatus: O2  Assistance: Stand by assistance  Distance: 60ftx1  Stairs/Curb  Stairs?: No        Exercises  Comments: Supine and seated exercises B LE x 20         Comment: commode use     G-Code     OutComes Score    AM-PAC Score    Goals  Short term goals  Time Frame for Short term goals: 20 days  Short term goal 1: Patient to complete sit/stand and stand pivot transfers with SUP and no LOB to decrease fall risk. Short term goal 2: Patient to ambulate 150ft with FWW or LRAD and SUP with SpO2 >/=90% for improved endurance. Short term goal 3: Patient to tolerate 20-30 min of ther ex/act to improve functional strnegth and endurance. Short term goal 4: Patient to ascend/descend 4 steps with HRx1 and SUP with no LOB to safely enter her home.     Plan    Plan  Times per week: 7  Times per day: Twice a day  Current Treatment Recommendations: Strengthening, Neuromuscular Re-education, Home Exercise Program, ROM, Safety Education & Training, Balance Training, Endurance Training, Patient/Caregiver Education & Training, Functional Mobility Training, Transfer Training, Gait Training, Stair training  Safety Devices  Type of devices:  All fall risk precautions in place, Call light within reach, Patient at risk for falls, Nurse notified, Left in chair     Therapy Time   Individual Concurrent Group Co-treatment   Time In 1150         Time Out 1214         Minutes [address], [place]

## 2021-11-23 LAB
ABSOLUTE EOS #: 0.16 K/UL (ref 0–0.44)
ABSOLUTE IMMATURE GRANULOCYTE: 0.21 K/UL (ref 0–0.3)
ABSOLUTE LYMPH #: 1.25 K/UL (ref 1.1–3.7)
ABSOLUTE MONO #: 0.49 K/UL (ref 0.1–1.2)
ALBUMIN SERPL-MCNC: 3.5 G/DL (ref 3.5–5.2)
ALBUMIN/GLOBULIN RATIO: 1.2 (ref 1–2.5)
ALP BLD-CCNC: 65 U/L (ref 35–104)
ALT SERPL-CCNC: 26 U/L (ref 5–33)
ANION GAP SERPL CALCULATED.3IONS-SCNC: 12 MMOL/L (ref 9–17)
AST SERPL-CCNC: 17 U/L
BASOPHILS # BLD: 0 % (ref 0–2)
BASOPHILS ABSOLUTE: <0.03 K/UL (ref 0–0.2)
BILIRUB SERPL-MCNC: 0.47 MG/DL (ref 0.3–1.2)
BUN BLDV-MCNC: 20 MG/DL (ref 8–23)
BUN/CREAT BLD: 40 (ref 9–20)
CALCIUM SERPL-MCNC: 9.1 MG/DL (ref 8.6–10.4)
CHLORIDE BLD-SCNC: 97 MMOL/L (ref 98–107)
CO2: 25 MMOL/L (ref 20–31)
CREAT SERPL-MCNC: 0.5 MG/DL (ref 0.5–0.9)
D-DIMER QUANTITATIVE: 6.93 MG/L FEU (ref 0–0.59)
DIFFERENTIAL TYPE: ABNORMAL
EOSINOPHILS RELATIVE PERCENT: 2 % (ref 1–4)
FERRITIN: 238 UG/L (ref 13–150)
FIBRINOGEN: 221 MG/DL (ref 179–518)
GFR AFRICAN AMERICAN: >60 ML/MIN
GFR NON-AFRICAN AMERICAN: >60 ML/MIN
GFR SERPL CREATININE-BSD FRML MDRD: ABNORMAL ML/MIN/{1.73_M2}
GFR SERPL CREATININE-BSD FRML MDRD: ABNORMAL ML/MIN/{1.73_M2}
GLUCOSE BLD-MCNC: 107 MG/DL (ref 74–100)
GLUCOSE BLD-MCNC: 132 MG/DL (ref 70–99)
GLUCOSE BLD-MCNC: 193 MG/DL (ref 74–100)
GLUCOSE BLD-MCNC: 215 MG/DL (ref 74–100)
GLUCOSE BLD-MCNC: 335 MG/DL (ref 74–100)
HCT VFR BLD CALC: 40.1 % (ref 36.3–47.1)
HEMOGLOBIN: 13.5 G/DL (ref 11.9–15.1)
IMMATURE GRANULOCYTES: 2 %
INR BLD: 1.1
LYMPHOCYTES # BLD: 12 % (ref 24–43)
MCH RBC QN AUTO: 31 PG (ref 25.2–33.5)
MCHC RBC AUTO-ENTMCNC: 33.7 G/DL (ref 28.4–34.8)
MCV RBC AUTO: 92.2 FL (ref 82.6–102.9)
MONOCYTES # BLD: 5 % (ref 3–12)
NRBC AUTOMATED: 0 PER 100 WBC
PARTIAL THROMBOPLASTIN TIME: 22.9 SEC (ref 23.9–33.8)
PDW BLD-RTO: 12.3 % (ref 11.8–14.4)
PLATELET # BLD: 265 K/UL (ref 138–453)
PLATELET ESTIMATE: ABNORMAL
PMV BLD AUTO: 10.1 FL (ref 8.1–13.5)
POTASSIUM SERPL-SCNC: 4.9 MMOL/L (ref 3.7–5.3)
PROTHROMBIN TIME: 14 SEC (ref 11.5–14.2)
RBC # BLD: 4.35 M/UL (ref 3.95–5.11)
RBC # BLD: ABNORMAL 10*6/UL
SEG NEUTROPHILS: 79 % (ref 36–65)
SEGMENTED NEUTROPHILS ABSOLUTE COUNT: 7.95 K/UL (ref 1.5–8.1)
SODIUM BLD-SCNC: 134 MMOL/L (ref 135–144)
TOTAL PROTEIN: 6.4 G/DL (ref 6.4–8.3)
WBC # BLD: 10.1 K/UL (ref 3.5–11.3)
WBC # BLD: ABNORMAL 10*3/UL

## 2021-11-23 PROCEDURE — 85384 FIBRINOGEN ACTIVITY: CPT

## 2021-11-23 PROCEDURE — 97116 GAIT TRAINING THERAPY: CPT

## 2021-11-23 PROCEDURE — 82947 ASSAY GLUCOSE BLOOD QUANT: CPT

## 2021-11-23 PROCEDURE — 82728 ASSAY OF FERRITIN: CPT

## 2021-11-23 PROCEDURE — 97110 THERAPEUTIC EXERCISES: CPT

## 2021-11-23 PROCEDURE — 85730 THROMBOPLASTIN TIME PARTIAL: CPT

## 2021-11-23 PROCEDURE — 85025 COMPLETE CBC W/AUTO DIFF WBC: CPT

## 2021-11-23 PROCEDURE — 99233 SBSQ HOSP IP/OBS HIGH 50: CPT | Performed by: INTERNAL MEDICINE

## 2021-11-23 PROCEDURE — 80053 COMPREHEN METABOLIC PANEL: CPT

## 2021-11-23 PROCEDURE — 85379 FIBRIN DEGRADATION QUANT: CPT

## 2021-11-23 PROCEDURE — 6360000002 HC RX W HCPCS: Performed by: NURSE PRACTITIONER

## 2021-11-23 PROCEDURE — 2700000000 HC OXYGEN THERAPY PER DAY

## 2021-11-23 PROCEDURE — 94761 N-INVAS EAR/PLS OXIMETRY MLT: CPT

## 2021-11-23 PROCEDURE — 94640 AIRWAY INHALATION TREATMENT: CPT

## 2021-11-23 PROCEDURE — APPSS30 APP SPLIT SHARED TIME 16-30 MINUTES: Performed by: NURSE PRACTITIONER

## 2021-11-23 PROCEDURE — 94669 MECHANICAL CHEST WALL OSCILL: CPT

## 2021-11-23 PROCEDURE — 6370000000 HC RX 637 (ALT 250 FOR IP): Performed by: NURSE PRACTITIONER

## 2021-11-23 PROCEDURE — 6370000000 HC RX 637 (ALT 250 FOR IP): Performed by: INTERNAL MEDICINE

## 2021-11-23 PROCEDURE — 36415 COLL VENOUS BLD VENIPUNCTURE: CPT

## 2021-11-23 PROCEDURE — 1200000000 HC SEMI PRIVATE

## 2021-11-23 PROCEDURE — 85610 PROTHROMBIN TIME: CPT

## 2021-11-23 RX ADMIN — INSULIN LISPRO 4 UNITS: 100 INJECTION, SOLUTION INTRAVENOUS; SUBCUTANEOUS at 20:50

## 2021-11-23 RX ADMIN — DEXAMETHASONE 6 MG: 4 TABLET ORAL at 08:47

## 2021-11-23 RX ADMIN — FAMOTIDINE 20 MG: 20 TABLET ORAL at 08:47

## 2021-11-23 RX ADMIN — INSULIN LISPRO 4 UNITS: 100 INJECTION, SOLUTION INTRAVENOUS; SUBCUTANEOUS at 11:54

## 2021-11-23 RX ADMIN — OXYCODONE HYDROCHLORIDE AND ACETAMINOPHEN 1000 MG: 500 TABLET ORAL at 16:39

## 2021-11-23 RX ADMIN — METFORMIN HYDROCHLORIDE 500 MG: 500 TABLET ORAL at 08:47

## 2021-11-23 RX ADMIN — FLUTICASONE PROPIONATE 1 SPRAY: 50 SPRAY, METERED NASAL at 08:47

## 2021-11-23 RX ADMIN — OXYCODONE HYDROCHLORIDE AND ACETAMINOPHEN 1000 MG: 500 TABLET ORAL at 11:55

## 2021-11-23 RX ADMIN — BUDESONIDE 500 MCG: 0.5 SUSPENSION RESPIRATORY (INHALATION) at 20:33

## 2021-11-23 RX ADMIN — OXYCODONE HYDROCHLORIDE AND ACETAMINOPHEN 1000 MG: 500 TABLET ORAL at 08:47

## 2021-11-23 RX ADMIN — DOCUSATE SODIUM 100 MG: 100 CAPSULE ORAL at 20:49

## 2021-11-23 RX ADMIN — FAMOTIDINE 20 MG: 20 TABLET ORAL at 20:49

## 2021-11-23 RX ADMIN — DOCUSATE SODIUM 100 MG: 100 CAPSULE ORAL at 08:47

## 2021-11-23 RX ADMIN — METFORMIN HYDROCHLORIDE 500 MG: 500 TABLET ORAL at 16:39

## 2021-11-23 RX ADMIN — INSULIN LISPRO 2 UNITS: 100 INJECTION, SOLUTION INTRAVENOUS; SUBCUTANEOUS at 16:39

## 2021-11-23 RX ADMIN — BUDESONIDE 500 MCG: 0.5 SUSPENSION RESPIRATORY (INHALATION) at 10:10

## 2021-11-23 RX ADMIN — ZINC SULFATE 220 MG (50 MG) CAPSULE 100 MG: CAPSULE at 08:47

## 2021-11-23 RX ADMIN — OXYCODONE HYDROCHLORIDE AND ACETAMINOPHEN 1000 MG: 500 TABLET ORAL at 20:49

## 2021-11-23 RX ADMIN — LATANOPROST 1 DROP: 50 SOLUTION OPHTHALMIC at 20:51

## 2021-11-23 RX ADMIN — RIVAROXABAN 10 MG: 10 TABLET, FILM COATED ORAL at 08:47

## 2021-11-23 ASSESSMENT — PAIN SCALES - GENERAL
PAINLEVEL_OUTOF10: 0

## 2021-11-23 NOTE — PROGRESS NOTES
Occupational Therapy  Facility/Department: [facility] AT THE [facility] MED SURG  Daily Treatment Note  NAME: Chelsie Madrid  : 1940  MRN: [id number]    Date of Service: 2021    Discharge Recommendations:  Continue to assess pending progress, Subacute/Skilled Nursing Facility, Home with Home health OT       Assessment      OT Education: Energy Conservation  Patient Education: Deep breathing techniques  Barriers to Learning: none  Safety Devices  Type of devices: All fall risk precautions in place; Call light within reach; Left in chair         Patient Diagnosis(es): The primary encounter diagnosis was Hypoxia. A diagnosis of Pneumonia due to COVID-19 virus was also pertinent to this visit. has a past medical history of Achilles tendon rupture, Cancer ([address] Chronic SI joint pain, Claustrophobia, Diabetes mellitus ([address] Esophageal stricture, [place] (hard of hearing), Hyperlipidemia, Hypertension, Retinal detachment, and Trauma to eye, left.   has a past surgical history that includes Cervical disc surgery (); Lumbar disc surgery (); Hysterectomy (); bladder suspension (); Blepharoplasty (Left, 5/3/2013); Eye surgery (Left, 06/15/2013); Eye surgery (Bilateral, ); Upper gastrointestinal endoscopy; Esophagus dilation; Cataract removal (Bilateral); eye surgery (Left, ,,,); vitrectomy (Left, 10/3/13); Total hip arthroplasty (Left, 2017); joint replacement (Right, 2016); Foot Amputation; Foot Tendon Surgery (Left, 2018); pr foot/toes surgery proc unlisted (Left, 3/9/2018); Finger trigger release (Left, 4/15/2019); proctosigmoidoscopy (N/A, 10/30/2019); proctosigmoidoscopy (10/30/2019); colectomy; Knee Arthroplasty (Right, 08/10/2020); and Total knee arthroplasty (Right, 8/10/2020).     Restrictions  Restrictions/Precautions  Restrictions/Precautions: General Precautions, Fall Risk, Isolation  Subjective   General  Chart Reviewed: Yes  Patient assessed for rehabilitation services?: Yes  Response to previous treatment: Patient with no complaints from previous session  Family / Caregiver Present: No  Referring Practitioner: Merlin Vila  Diagnosis: covid  Subjective  Subjective: Pt denies pain this date. Complains of SOB and fatigue. Declines ADLs but agreeable to B UE ther ex while reclined. General Comment  Comments: Pt reclined in bedside chair upon arrival.      Orientation     Objective                                                                Type of ROM/Therapeutic Exercise  Type of ROM/Therapeutic Exercise: PROM  Comment: Pt completed B UE ther ex with 2# dumbell, 15 reps x 1 set x 5 planes. Respiratory entered during ther ex, and patient declined second set due to SOB and needing breathing tx. Plan   Plan  Times per week: 7  Times per day: Daily  Current Treatment Recommendations: Strengthening, Balance Training, Functional Mobility Training, Endurance Training, Patient/Caregiver Education & Training, Safety Education & Training, Self-Care / ADL  G-Code     OutComes Score                                                  AM-PAC Score             Goals  Short term goals  Time Frame for Short term goals: 21 visits  Short term goal 1: Patient to complete self care routine c Mod I c use of AE/DME and EC/WS techniques as needed to ensure safe return home. Short term goal 2: Patient to engage in 15 minutes of ther ex/ther act s significant shortness of breath to improve strength as well as activity tolerance for self care tasks. Short term goal 3: Patient to be educated on d/c folder, AE/DME, and general safety to ensure safe return home. Short term goal 4: Patient to be educated on d/c folder, EC/WS techniques, AE/DME, and general safety to ensure safe return home.        Therapy Time   Individual Concurrent Group Co-treatment   Time In 6666         Time Out 1030         Minutes 15                 ARVIND Monge

## 2021-11-23 NOTE — PROGRESS NOTES
Physical Therapy  Facility/Department: [facility] AT THE [facility] MED SURG  Daily Treatment Note  NAME: Karen Mcclain  : 1940  MRN: [id number]    Date of Service: 2021    Discharge Recommendations:  Continue to assess pending progress, Home with Home health PT, IP Rehab        Assessment   Treatment Diagnosis: Difficulty walking  Prognosis: Good  Decision Making: Medium Complexity  PT Education: Energy Conservation; PT Role; Goals; Plan of Care  Patient Education: Educated pt on above with good understanding. REQUIRES PT FOLLOW UP: Yes  Activity Tolerance  Activity Tolerance: Patient Tolerated treatment well; Patient limited by endurance  Activity Tolerance: SpO2 throughout tx ranged from 84- 91%     Patient Diagnosis(es): The primary encounter diagnosis was Hypoxia. A diagnosis of Pneumonia due to COVID-19 virus was also pertinent to this visit. has a past medical history of Achilles tendon rupture, [address] [address] Chronic SI joint pain, Claustrophobia, Diabetes mellitus ([address] Esophageal stricture, [place] (hard of hearing), Hyperlipidemia, Hypertension, Retinal detachment, and Trauma to eye, left.   has a past surgical history that includes Cervical disc surgery (); Lumbar disc surgery (); Hysterectomy (); bladder suspension (); Blepharoplasty (Left, 5/3/2013); Eye surgery (Left, 06/15/2013); Eye surgery (Bilateral, ); Upper gastrointestinal endoscopy; Esophagus dilation; Cataract removal (Bilateral); eye surgery (Left, ,,,); vitrectomy (Left, 10/3/13); Total hip arthroplasty (Left, 2017); joint replacement (Right, 2016); Foot Amputation; Foot Tendon Surgery (Left, 2018); pr foot/toes surgery proc unlisted (Left, 3/9/2018);  Finger trigger release (Left, 4/15/2019); proctosigmoidoscopy (N/A, 10/30/2019); proctosigmoidoscopy (10/30/2019); colectomy; Knee Arthroplasty (Right, 08/10/2020); and Total knee arthroplasty (Right, 8/10/2020). Restrictions  Restrictions/Precautions  Restrictions/Precautions: General Precautions, Fall Risk, Isolation  Subjective   General  Chart Reviewed: Yes  Response To Previous Treatment: Patient with no complaints from previous session. Family / Caregiver Present: No  Referring Practitioner: GRETA Alvarez  Subjective  Subjective: Pt. on commode upon arrival. Pt. agreeable to therapy upon arrival.          Orientation  Orientation  Overall Orientation Status: Within Functional Limits  Cognition      Objective   Bed mobility  Bridging: Stand by assistance; Supervision  Rolling to Left: Stand by assistance; Supervision  Rolling to Right: Stand by assistance; Supervision  Sit to Supine: Stand by assistance; Supervision  Scooting: Stand by assistance; Supervision  Transfers  Sit to Stand: Stand by assistance; Supervision  Stand to sit: Stand by assistance; Supervision  Ambulation  Ambulation?: Yes  Ambulation 1  Surface: level tile  Device: Rolling Walker  Other Apparatus: O2  Assistance: Stand by assistance; Supervision  Distance: 60ftx1  Stairs/Curb  Stairs?: No        Exercises  Comments: Seated exercises B Le  x20. Stanidng sink exercises B LE x15         Comment: STS x6. commode use and transfer     G-Code     OutComes Score    AM-PAC Score    Goals  Short term goals  Time Frame for Short term goals: 20 days  Short term goal 1: Patient to complete sit/stand and stand pivot transfers with SUP and no LOB to decrease fall risk. Short term goal 2: Patient to ambulate 150ft with FWW or LRAD and SUP with SpO2 >/=90% for improved endurance. Short term goal 3: Patient to tolerate 20-30 min of ther ex/act to improve functional strnegth and endurance. Short term goal 4: Patient to ascend/descend 4 steps with HRx1 and SUP with no LOB to safely enter her home.     Plan    Plan  Times per week: 7  Times per day: Twice a day  Current Treatment Recommendations: Strengthening, Neuromuscular Re-education, Home Exercise Program, ROM, Safety Education & Training, Balance Training, Endurance Training, Patient/Caregiver Education & Training, Functional Mobility Training, Transfer Training, Gait Training, Stair training  Safety Devices  Type of devices:  All fall risk precautions in place, Call light within reach, Patient at risk for falls, Nurse notified, Left in bed     Therapy Time   Individual Concurrent Group Co-treatment   Time In 1358         Time Out 1423         Minutes [address] [place] [address]

## 2021-11-23 NOTE — PROGRESS NOTES
Writer at bedside for shift assessment. Patient sitting up in bed, respirations are even and unlabored while on 4.5 liters. Vitals obtained and assessment completed, see flowsheet for details. Patient taken to the bathroom at this time. pt denies further needs at this time. Call light in reach, will continue to monitor.

## 2021-11-23 NOTE — PROGRESS NOTES
Physical Therapy  Facility/Department: [facility] AT THE [facility] MED SURG  Daily Treatment Note  NAME: Sinai Roberson  : 1940  MRN: [id number]    Date of Service: 2021    Discharge Recommendations:  Continue to assess pending progress, Home with Home health PT, IP Rehab        Assessment   Treatment Diagnosis: Difficulty walking  Prognosis: Good  Decision Making: Medium Complexity  PT Education: Energy Conservation; PT Role; Goals; Plan of Care  Patient Education: Educated pt on above with good understanding. REQUIRES PT FOLLOW UP: Yes  Activity Tolerance  Activity Tolerance: Patient Tolerated treatment well; Patient limited by endurance  Activity Tolerance: SpO2 throughout tx ranged from 84- 91%     Patient Diagnosis(es): The primary encounter diagnosis was Hypoxia. A diagnosis of Pneumonia due to COVID-19 virus was also pertinent to this visit. has a past medical history of Achilles tendon rupture, [address] [address] Chronic SI joint pain, Claustrophobia, Diabetes mellitus ([address] Esophageal stricture, [place] (hard of hearing), Hyperlipidemia, Hypertension, Retinal detachment, and Trauma to eye, left.   has a past surgical history that includes Cervical disc surgery (); Lumbar disc surgery (); Hysterectomy (); bladder suspension (); Blepharoplasty (Left, 5/3/2013); Eye surgery (Left, 06/15/2013); Eye surgery (Bilateral, ); Upper gastrointestinal endoscopy; Esophagus dilation; Cataract removal (Bilateral); eye surgery (Left, ,,,); vitrectomy (Left, 10/3/13); Total hip arthroplasty (Left, 2017); joint replacement (Right, 2016); Foot Amputation; Foot Tendon Surgery (Left, 2018); pr foot/toes surgery proc unlisted (Left, 3/9/2018);  Finger trigger release (Left, 4/15/2019); proctosigmoidoscopy (N/A, 10/30/2019); proctosigmoidoscopy (10/30/2019); colectomy; Knee Arthroplasty (Right, 08/10/2020); and Total knee arthroplasty (Right, 8/10/2020). Restrictions  Restrictions/Precautions  Restrictions/Precautions: General Precautions, Fall Risk, Isolation  Subjective   General  Chart Reviewed: Yes  Response To Previous Treatment: Patient with no complaints from previous session. Family / Caregiver Present: No  Referring Practitioner: GRETA Buckley  Subjective  Subjective: Pt. in chair upon arrival and reports feet being cold but denies pain at this time. Pt. is agreeable to therapy at  this time. Orientation  Orientation  Overall Orientation Status: Within Functional Limits  Cognition      Objective      Transfers  Sit to Stand: Stand by assistance; Supervision  Stand to sit: Stand by assistance; Supervision  Ambulation  Ambulation?: Yes  Ambulation 1  Surface: level tile  Device: Rolling Walker  Other Apparatus: O2  Assistance: Stand by assistance  Distance: 60ftx1        Exercises  Comments: Supine and seated exercises B LE x 20         Comment: STS x5     G-Code     OutComes Score    AM-PAC Score    Goals  Short term goals  Time Frame for Short term goals: 20 days  Short term goal 1: Patient to complete sit/stand and stand pivot transfers with SUP and no LOB to decrease fall risk. Short term goal 2: Patient to ambulate 150ft with FWW or LRAD and SUP with SpO2 >/=90% for improved endurance. Short term goal 3: Patient to tolerate 20-30 min of ther ex/act to improve functional strnegth and endurance. Short term goal 4: Patient to ascend/descend 4 steps with HRx1 and SUP with no LOB to safely enter her home. Plan    Plan  Times per week: 7  Times per day: Twice a day  Current Treatment Recommendations: Strengthening, Neuromuscular Re-education, Home Exercise Program, ROM, Safety Education & Training, Balance Training, Endurance Training, Patient/Caregiver Education & Training, Functional Mobility Training, Transfer Training, Gait Training, Stair training  Safety Devices  Type of devices:  All fall risk precautions in place, Call light within reach, Patient at risk for falls, Nurse notified, Left in chair     Therapy Time   Individual Concurrent Group Co-treatment   Time In 0935         Time Out 1000         Minutes [address] [facility], [facility]

## 2021-11-23 NOTE — PROGRESS NOTES
Count:   Recent Labs     11/21/21 0623 11/22/21 0620 11/23/21  0555   WBC 10.1 10.5 10.1   RBC 4.33 4.34 4.35   HGB 13.5 13.3 13.5   HCT 40.5 40.4 40.1   MCV 93.5 93.1 92.2   MCH 31.2 30.6 31.0   MCHC 33.3 32.9 33.7   RDW 12.1 12.1 12.3    290 265   MPV 10.2 10.4 10.1        Last 3 Blood Glucose:   Recent Labs     11/21/21 0623 11/22/21 0620 11/23/21  0555   GLUCOSE 146* 131* 132*        Comprehensive Metabolic Profile:   Recent Labs     11/21/21 0623 11/22/21 0620 11/23/21  0555   * 135 134*   K 4.9 4.9 4.9   CL 96* 98 97*   CO2 26 26 25   BUN 18 19 20   CREATININE 0.51 0.49* 0.50   GLUCOSE 146* 131* 132*   CALCIUM 8.9 8.9 9.1   PROT 6.8 6.3* 6.4   LABALBU 3.4* 3.4* 3.5   BILITOT 0.47 0.41 0.47   ALKPHOS 70 69 65   AST 23 18 17   ALT 24 23 26        Urinalysis:   Lab Results   Component Value Date    NITRU NEGATIVE 11/16/2021    COLORU Yellow 11/16/2021    PHUR 6.5 11/16/2021    WBCUA 2 TO 5 11/16/2021    RBCUA 0 TO 2 11/16/2021    MUCUS NOT REPORTED 11/16/2021    TRICHOMONAS NOT REPORTED 11/16/2021    YEAST NOT REPORTED 11/16/2021    BACTERIA 1+ 11/16/2021    SPECGRAV 1.010 11/16/2021    LEUKOCYTESUR SMALL 11/16/2021    UROBILINOGEN Normal 11/16/2021    BILIRUBINUR NEGATIVE 11/16/2021    GLUCOSEU NEGATIVE 11/16/2021    KETUA NEGATIVE 11/16/2021    AMORPHOUS NOT REPORTED 11/16/2021       HgBA1c:    Lab Results   Component Value Date    LABA1C 7.1 08/11/2020       Lactic Acid:   Lab Results   Component Value Date    LACTA 1.7 11/15/2021        Troponin: No results for input(s): TROPONINI in the last 72 hours. Radiology/Imaging:  CT CHEST WO CONTRAST   Final Result   Multifocal bilateral ground-glass opacities throughout both lungs compatible   with COVID-19 pneumonia. CT Head WO Contrast   Final Result   No acute intracranial abnormality.       Diffuse atrophic changes with findings suggesting chronic microvascular   ischemia               ASSESSMENT / PLAN:  COVID-19 virus infection  · Continue current therapy   · Remdesivir-not indicated  · Continue dexamethasone  · Continue vitamin C, D and zinc  · Continue Xarelto  · Trend CRPs-improving  · Received Actemra 11/17/2021  · Appreciate infectious disease  · Blood culture x1+ gram-positive cocci-contaminant  · Acute respiratory failure with hypoxia  · Supplemental oxygen to maintain SPO2 greater than 92%-currently on 4.5 L  · Acapella  · Prone  · PT/OT  · Nebs  · Type 2 diabetes  · POCT before meals and at bedtime  · Medium dose sliding scale  · Hypoglycemia protocol  · Continue Glucophage  · Nutrition status:   · at risk for malnutrition  · Dietician consult initiated  · Hospital Prophylaxis:   · DVT: Xarelto   · Stress Ulcer: H2 Blocker   · High risk medications: none   · Disposition:    · Discharge plan is pending      LARRY Perkins - CNP , LARRY, NP-C  Hospitalist Medicine        11/23/2021, 4:17 PM

## 2021-11-23 NOTE — PROGRESS NOTES
[facility] -Progress Note COVID 19 Patient-Tele-Visit  Today's Date and Time: 11/23/2021, 11:36 AM    Impression :     · COVID 19 Confirmed Infection  · Covid tests:  · 11/12/21: Positive  · Acute hypoxic respiratory distress  · Acute confusion  · Hyponatremia  · Hx colorectal cancer s/p resection  · Hx bilateral hip replacement  · DM  · Hx urinary incontinence  · Hx cataracts s/p eye surgery X 5  · Single blood culture with Micrococcus on 11-15-21: contaminant      Patient evaluated by Telemedicine. Requesting Institution: [facility]  Provider Institution: [facility],[address], [address] [facility] Person at [address] [place]: LARRY Jackson- ARVIN    Recommendations:   · Antibiotic treatment:  · Monitor off antibiotics  · Urine culture  · Covid Rx:  · Remdesivir-Initiated 11/15/21  · Decadron-Initiated 11/15/21  · Actemra-administered 11/17/21      Medical Decision Making/Summary/Discussion:11/23/2021     · Patient admitted with suspected COVID 19 infection  · Covid test confirmed positive. Infection Control Recommendations   · Universal Precautions  · Airborne isolation  · Droplet Isolation    Antimicrobial Stewardship Recommendations     · Discontinuation of therapy  Coordination of Outpatient Care:   · Estimated Length of IV antimicrobials:TBD  · Patient will need Midline Catheter Insertion: TBD  · Patient will need PICC line Insertion: No  · Patient will need: Home IV , [place],  SNF,  LTAC:TBD  · Patient will need outpatient wound care:No    Chief complaint/reason for consultation:   · Concern for COVID infection      History of Present Illness:   Tito Merlos is a 78y.o.-year-old female who was initially admitted on 11/15/2021. Patient seen at the request of Dr. Tashia Franklin:    Patient initially presented through [address] [place] [address] on 11/12/21 with complaints of fatigue, fever and myalgias over the past two days.  She was exposed to Covid from family members she lives with. She denied shortness of breath. She was found to be stable on room air and was discharged on symbicort. On 11/15/21, the patient was brought back to the ED by family members for worsening shortness of breath and confusion. She was found to have an SpO2 of 89% on room air. Imaging revealed bilateral ground-glass opacities. CT head was negative for any acute abnormalities. Abnormal labs include:   Na: 128  D-dimer: 1.47    The patient was started on Decadron and Remdesivir. Patient admitted because of concerns with COVID 19.    CURRENT EVALUATION : 11/23/2021    Afebrile  VS stable    The patient continues on 6 -->5-->4.5 L NC  On Decadron. Actemra given on 11/17/21    She remains alert and oriented. No acute changes noted    Urine culture shows no significant growth  Blood culture with Micrococcus = contaminant    Patient exhibiting respiratory distress. yes  Respiratory secretions: no    Patient receiving supplemental oxygen.: 4-->6-->5-->4.5 L NC  RR:16  02 sat: 94 %    QTc:       NEWS Score: 0-4 Low risk group; 5-6: Medium risk group; 7 or above: High risk group  Parameters 3 2 1 0 1 2 3   Age    < 65   ? 65   RR ? 8  9-11 12-20  21-24 ? 25   O2 Sats ?  91 92-93 94-95 ? 96      Suppl O2  Yes  No      SBP ? 90  101-110 111-219   ? 220   HR ? 40  41-50 51-90  111-130 ? 131   Consciousness    Alert   Drowsiness, lethargy, or confusion   Temperature ? 35.0 C (95.0 F)  35.1-36.0 C 95.1-96.9 F 36.1-38.0 C 97.0-100.4 F 38.1-39.0 C 100.5-102.3 F ? 39.1 C ? 102.4 F      NEWS Score:  ·  11/16/21: 7 High risk    Overall Daily Picture:    Unchanged    Presence of secondary bacterial Infection:    No   Additional antibiotics: No    Labs, X rays reviewed: 11/23/2021    BUN:20  Cr:0.50    WBC:10.1  Hb:13.5  Plat: 265    Absolute Neutrophils:4.60  Absolute Lymphocytes:0.77  Neutrophil/Lymphocyte Ratio: 6 high risk    CRP:126.7-->54.9  Ferritin:408-->296  LDH: 320    Pro Calcitonin:      Cultures:  Urine:  ·   Blood:  ·   Sputum :  ·   Wound:       CXR:   11/12/21        CAT:  11/15/21        Discussed with patient, RN, CC, IM. I have personally reviewed the past medical history, past surgical history, medications, social history, and family history, and I have updated the database accordingly.   Past Medical History:     Past Medical History:   Diagnosis Date    Achilles tendon rupture 2011    LEFT    Cancer ([facility] [address]     Chronic SI joint pain     left    Claustrophobia     Diabetes mellitus ([address] 2010    ON ORAL MED    Esophageal stricture 2011    PT HAD DILATATION    [place] (hard of hearing)     WEARS [place] HEARING AIDS    Hyperlipidemia 2012    Hypertension     PT HAD TILT TABLE TEST BP MED DISCONTINUED    Retinal detachment 10/2012    LEFT    Trauma to eye, left        Past Surgical  History:     Past Surgical History:   Procedure Laterality Date    BLADDER SUSPENSION  1994    WITH HYSTERECTOMY    BLEPHAROPLASTY Left 5/3/2013    CATARACT REMOVAL Bilateral     with iol    CERVICAL DISC SURGERY  1984    cervical spine    COLECTOMY      ROBOTIC LOWER COLON RESECTION    ESOPHAGEAL DILATATION      EYE SURGERY Left 06/15/2013    REPAIR OF WOUND, WITH LENS REMOVAL     EYE SURGERY Bilateral 2008    BILAT CATARACT REMOVAL WITH IOL    EYE SURGERY Left ,7/13,12/12,6/13    vitrectomy    FINGER TRIGGER RELEASE Left 4/15/2019    FINGER TRIGGER RELEASE-MIDDLE FINGER performed by Chaitanya Edouard MD at [facility] Left 03/09/2018    Excision of Os Peroneum, Primary Repair of Peroneus Longus Tendon - Dr. Juan Lynne Right 06/21/2016    [facility] Dr. Channing Emery Right 08/10/2020    Dr. Sanjay Castañeda    lower back    AR FOOT/TOES SURGERY PROC UNLISTED Left 3/9/2018    ACHILLES TENDON LENGTHENING REPAIR/ EXCISION OF OS PERONEUM,PRIMARY REPAIR OF PERONEUS LINGUS TENDON WITH POSSIBLE PERONEAL TENODESIS performed by Pradip Wise DPM at [facility] PROCTOSIGMOIDOSCOPY N/A 10/30/2019    ANAL PROCTO SIGMOIDOSCOPY FLEXIBLE with biopsies performed by Ashely Mullen MD at [address]  10/30/2019    -sigmoid colon mass(invasive ulcerated adenocarcinoma)    TOTAL HIP ARTHROPLASTY Left 8/7/2017    HIP TOTAL ARTHROPLASTY performed by Laura Patel MD at [facility] Right 8/10/2020    KNEE TOTAL ARTHROPLASTY performed by Laura Patel MD at [facility] dilation    VITRECTOMY Left 10/3/13       Medications:      fluticasone  1 spray Each Nostril Daily    budesonide  0.5 mg Nebulization BID    docusate sodium  100 mg Oral BID    latanoprost  1 drop Both Eyes Nightly    metFORMIN  500 mg Oral BID WC    rivaroxaban  10 mg Oral Daily    dexamethasone  6 mg Oral Daily    insulin lispro  0-12 Units SubCUTAneous TID WC    insulin lispro  0-6 Units SubCUTAneous Nightly    ascorbic acid  1,000 mg Oral 4x Daily    famotidine  20 mg Oral BID    zinc sulfate  100 mg Oral Daily    vitamin D  50,000 Units Oral Weekly       Social History:     Social History     Socioeconomic History    Marital status:      Spouse name: Not on file    Number of children: Not on file    Years of education: Not on file    Highest education level: Not on file   Occupational History    Not on file   Tobacco Use    Smoking status: Never Smoker    Smokeless tobacco: Never Used   Vaping Use    Vaping Use: Never used   Substance and Sexual Activity    Alcohol use: No    Drug use: No    Sexual activity: Not on file   Other Topics Concern    Not on file   Social History Narrative    Not on file     Social Determinants of Health     Financial Resource Strain:     Difficulty of Paying Living Expenses: Not on file   Food Insecurity:     Worried About Running Out of Food in the Last Year: Not on file    Ran Out of Food in the Last Year: Not on file   Transportation Needs:     Lack of Transportation (Medical): Not on file    Lack of Transportation (Non-Medical): Not on file   Physical Activity:     Days of Exercise per Week: Not on file    Minutes of Exercise per Session: Not on file   Stress:     Feeling of Stress : Not on file   Social Connections:     Frequency of Communication with Friends and Family: Not on file    Frequency of Social Gatherings with Friends and Family: Not on file    Attends Anabaptism Services: Not on file    Active Member of [address] [place] [address] or Organizations: Not on file    Attends Club or Organization Meetings: Not on file    Marital Status: Not on file   Intimate Partner Violence:     Fear of Current or Ex-Partner: Not on file    Emotionally Abused: Not on file    Physically Abused: Not on file    Sexually Abused: Not on file   Housing Stability:     Unable to Pay for Housing in the Last Year: Not on file    Number of [place] in the Last Year: Not on file    Unstable Housing in the Last Year: Not on file       Family History:     Family History   Problem Relation Age of Onset    Heart Disease Mother         CARDIAC MASS    Heart Disease Father     Cancer Paternal Grandfather     Cancer Other         both families, several family members        Allergies:   Aleve [naproxen sodium], Aspirin, Codeine, Nsaids, Align [lactase-lactobacillus], Amitriptyline, Gabapentin, Lidocaine, Lyrica [pregabalin], Omnipaque [iohexol], Other, Tetracaine, and Iodine     Review of Systems:       Constitutional: No fevers or chills. No systemic complaints  Head: No headaches  Eyes: No double vision or blurry vision. No conjunctival inflammation. ENT: No sore throat or runny nose. . No hearing loss, tinnitus or vertigo. Cardiovascular: No chest pain or palpitations. Shortness of breath. MACK  Lung:  Shortness of breath or cough.  No sputum production  Abdomen: No nausea, vomiting, diarrhea, or abdominal pain. [place] No cramps. Genitourinary: No increased urinary frequency, or dysuria. No hematuria. No suprapubic or CVA pain  Musculoskeletal: No muscle aches or pains. No joint effusions, swelling or deformities  Hematologic: No bleeding or bruising. Neurologic: No headache, weakness, numbness, or tingling. Integument: No rash, no ulcers. Psychiatric: No depression. Endocrine: No polyuria, no polydipsia, no polyphagia. Physical Examination :     Patient Vitals for the past 8 hrs:   BP Temp Temp src Pulse Resp SpO2   11/23/21 0730 111/69 97.3 °F (36.3 °C) Temporal 75 16 94 %     General Appearance: Awake, alert, and in no apparent distress  Head:  Normocephalic, no trauma  Eyes: Pupils equal, round, reactive to light; sclera anicteric; conjunctivae pink. No embolic phenomena. ENT: Oropharynx clear, without erythema, exudate, or thrush. No tenderness of sinuses. Mouth/throat: mucosa pink and moist. No lesions. Dentition in good repair. Neck:Supple, without lymphadenopathy. Thyroid normal, No bruits. Pulmonary/Chest: Clear to auscultation, without wheezes, rales, or rhonchi. No dullness to percussion. Cardiovascular: Regular rate and rhythm without murmurs, rubs, or gallops. Abdomen: Soft, non tender. Bowel sounds normal. No organomegaly  All four Extremities: No cyanosis, clubbing, edema, or effusions. Neurologic: No gross sensory or motor deficits. Skin: Warm and dry with good turgor. No signs of peripheral arterial or venous insufficiency. No ulcerations. No open wounds.     Medical Decision Making -Laboratory:   I have independently reviewed/ordered the following labs:    CBC with Differential:   Recent Labs     11/22/21 0620 11/23/21  0555   WBC 10.5 10.1   HGB 13.3 13.5   HCT 40.4 40.1    265   LYMPHOPCT 11* 12*   MONOPCT 4 5     BMP:   Recent Labs     11/22/21 0620 11/23/21  0555    134*   K 4.9 4.9   CL 98 97*   CO2 26 25   BUN 19 20   CREATININE 0.49* 0.50     Hepatic Function Panel:   Recent Labs     11/22/21  0620 11/23/21  0555   PROT 6.3* 6.4   LABALBU 3.4* 3.5   BILITOT 0.41 0.47   ALKPHOS 69 65   ALT 23 26   AST 18 17     No results for input(s): RPR in the last 72 hours. No results for input(s): HIV in the last 72 hours. No results for input(s): BC in the last 72 hours. Lab Results   Component Value Date    MUCUS NOT REPORTED 11/16/2021    RBC 4.35 11/23/2021    RBC 3.98 11/16/2011    TRICHOMONAS NOT REPORTED 11/16/2021    WBC 10.1 11/23/2021    YEAST NOT REPORTED 11/16/2021    TURBIDITY Clear 11/16/2021     Lab Results   Component Value Date    CREATININE 0.50 11/23/2021    GLUCOSE 132 11/23/2021    GLUCOSE 116 11/16/2011       Medical Decision Making-Imaging:     Narrative   EXAMINATION:   ONE XRAY VIEW OF THE CHEST       11/12/2021 6:08 pm       COMPARISON:   None.       HISTORY:   ORDERING SYSTEM PROVIDED HISTORY: weakness   TECHNOLOGIST PROVIDED HISTORY:   weakness       FINDINGS:   There is diffuse prominence of the pulmonary interstitium.  No airspace   consolidation.  No pneumothorax or pleural effusion.  No cardiomegaly. Degenerative changes in the bilateral shoulders and spine.           Impression   Diffuse prominence of the pulmonary interstitium.  This is nonspecific but   can be seen in atypical/viral pneumonia.  Correlate clinically             Narrative   EXAMINATION:   CT OF THE CHEST WITHOUT CONTRAST 11/15/2021 2:30 pm       TECHNIQUE:   CT of the chest was performed without the administration of intravenous   contrast. Multiplanar reformatted images are provided for review.  Dose   modulation, iterative reconstruction, and/or weight based adjustment of the   mA/kV was utilized to reduce the radiation dose to as low as reasonably   achievable.       COMPARISON:   None.       HISTORY:   ORDERING SYSTEM PROVIDED HISTORY: covid   TECHNOLOGIST PROVIDED HISTORY:   covid   Decision Support Exception - unselect if not a suspected or confirmed   emergency medical condition->Emergency Medical Condition (MA)       FINDINGS:   Mediastinum: Mild cardiomegaly.  No pericardial effusion.  Thoracic aorta is   normal in caliber.  Scattered atherosclerotic changes.  Scattered mildly   prominent mediastinal lymph nodes.       Lungs/pleura: Scattered ground-glass infiltrate throughout both lungs.  No   effusion or pneumothorax.       Upper Abdomen: No acute abnormality.  Small sliding hiatal hernia.       Soft Tissues/Bones: No aggressive osseous lesions.  Confluent anterolateral   vertebral body syndesmophyte formation compatible with DISH. Karle Robe acute soft   tissue abnormality.           Impression   Multifocal bilateral ground-glass opacities throughout both lungs compatible   with COVID-19 pneumonia.             Narrative   EXAMINATION:   CT OF THE HEAD WITHOUT CONTRAST  11/15/2021 2:29 pm       TECHNIQUE:   CT of the head was performed without the administration of intravenous   contrast. Dose modulation, iterative reconstruction, and/or weight based   adjustment of the mA/kV was utilized to reduce the radiation dose to as low   as reasonably achievable.       COMPARISON:   03/01/2013       HISTORY:   ORDERING SYSTEM PROVIDED HISTORY: confusion   TECHNOLOGIST PROVIDED HISTORY:   confusion       Decision Support Exception - unselect if not a suspected or confirmed   emergency medical condition->Emergency Medical Condition (MA)       FINDINGS:   BRAIN/VENTRICLES: The ventricles and sulci are diffusely enlarged.  Low   attenuation is seen in the periventricular and subcortical white matter.  No   acute intracranial hemorrhage or acute infarct is identified.       ORBITS: The visualized portion of the orbits demonstrate no acute abnormality.       SINUSES: Small amount of fluid in the right maxillary sinus.  Otherwise the   paranasal sinuses appear clear.       SOFT TISSUES/SKULL:  No acute abnormality of the visualized skull or soft   tissues.         Impression   No acute intracranial abnormality.       Diffuse atrophic changes with findings suggesting chronic microvascular   ischemia                 Medical Decision [facility]:       Medical Decision Making-Other:     Note:  · Labs, medications, radiologic studies were reviewed with personal review of films  ·  Large amounts of data were reviewed  · Discussed with nursing Staff, Discharge planner  · Infection Control and Prevention measures reviewed  · All prior entries were reviewed  · Administer medications as ordered  · Prognosis: Guarded  · Discharge planning reviewed      Thank you for allowing us to participate in the care of this patient. Please call with questions. LARRY Suarez - CNP     ATTESTATION:    I have discussed the case, including pertinent history and exam findings with the APRN. I have evaluated the  History, physical findings and pictures of the patient and the key elements of the encounter have been performed by me. I have reviewed the laboratory data, other diagnostic studies and discussed them with the APRN. I have updated the medical record where necessary. I agree with the assessment, plan and orders as documented by the APRN.     Tobin Minor MD.          Pager: [phone] - Office: [phone] Thrombocytopenia

## 2021-11-24 LAB
ABSOLUTE EOS #: 0.17 K/UL (ref 0–0.44)
ABSOLUTE IMMATURE GRANULOCYTE: 0.18 K/UL (ref 0–0.3)
ABSOLUTE LYMPH #: 1.55 K/UL (ref 1.1–3.7)
ABSOLUTE MONO #: 0.54 K/UL (ref 0.1–1.2)
ALBUMIN SERPL-MCNC: 3.4 G/DL (ref 3.5–5.2)
ALBUMIN/GLOBULIN RATIO: 1.2 (ref 1–2.5)
ALP BLD-CCNC: 69 U/L (ref 35–104)
ALT SERPL-CCNC: 26 U/L (ref 5–33)
ANION GAP SERPL CALCULATED.3IONS-SCNC: 15 MMOL/L (ref 9–17)
AST SERPL-CCNC: 20 U/L
BASOPHILS # BLD: 1 % (ref 0–2)
BASOPHILS ABSOLUTE: 0.05 K/UL (ref 0–0.2)
BILIRUB SERPL-MCNC: 0.43 MG/DL (ref 0.3–1.2)
BUN BLDV-MCNC: 21 MG/DL (ref 8–23)
BUN/CREAT BLD: 34 (ref 9–20)
CALCIUM SERPL-MCNC: 8.9 MG/DL (ref 8.6–10.4)
CHLORIDE BLD-SCNC: 96 MMOL/L (ref 98–107)
CO2: 24 MMOL/L (ref 20–31)
CREAT SERPL-MCNC: 0.62 MG/DL (ref 0.5–0.9)
D-DIMER QUANTITATIVE: 2.05 MG/L FEU (ref 0–0.59)
DIFFERENTIAL TYPE: ABNORMAL
EOSINOPHILS RELATIVE PERCENT: 2 % (ref 1–4)
FERRITIN: 240 UG/L (ref 13–150)
FIBRINOGEN: 191 MG/DL (ref 179–518)
GFR AFRICAN AMERICAN: >60 ML/MIN
GFR NON-AFRICAN AMERICAN: >60 ML/MIN
GFR SERPL CREATININE-BSD FRML MDRD: ABNORMAL ML/MIN/{1.73_M2}
GFR SERPL CREATININE-BSD FRML MDRD: ABNORMAL ML/MIN/{1.73_M2}
GLUCOSE BLD-MCNC: 102 MG/DL (ref 74–100)
GLUCOSE BLD-MCNC: 163 MG/DL (ref 74–100)
GLUCOSE BLD-MCNC: 185 MG/DL (ref 74–100)
GLUCOSE BLD-MCNC: 210 MG/DL (ref 70–99)
GLUCOSE BLD-MCNC: 223 MG/DL (ref 74–100)
HCT VFR BLD CALC: 39.6 % (ref 36.3–47.1)
HEMOGLOBIN: 13.1 G/DL (ref 11.9–15.1)
IMMATURE GRANULOCYTES: 2 %
INR BLD: 1.1
LYMPHOCYTES # BLD: 14 % (ref 24–43)
MCH RBC QN AUTO: 30.8 PG (ref 25.2–33.5)
MCHC RBC AUTO-ENTMCNC: 33.1 G/DL (ref 28.4–34.8)
MCV RBC AUTO: 93 FL (ref 82.6–102.9)
MONOCYTES # BLD: 5 % (ref 3–12)
NRBC AUTOMATED: 0 PER 100 WBC
PARTIAL THROMBOPLASTIN TIME: 23.7 SEC (ref 23.9–33.8)
PDW BLD-RTO: 12.2 % (ref 11.8–14.4)
PLATELET # BLD: 263 K/UL (ref 138–453)
PLATELET ESTIMATE: ABNORMAL
PMV BLD AUTO: 10.2 FL (ref 8.1–13.5)
POTASSIUM SERPL-SCNC: 4.7 MMOL/L (ref 3.7–5.3)
PROTHROMBIN TIME: 14 SEC (ref 11.5–14.2)
RBC # BLD: 4.26 M/UL (ref 3.95–5.11)
RBC # BLD: ABNORMAL 10*6/UL
SEG NEUTROPHILS: 76 % (ref 36–65)
SEGMENTED NEUTROPHILS ABSOLUTE COUNT: 8.26 K/UL (ref 1.5–8.1)
SODIUM BLD-SCNC: 135 MMOL/L (ref 135–144)
TOTAL PROTEIN: 6.3 G/DL (ref 6.4–8.3)
WBC # BLD: 10.8 K/UL (ref 3.5–11.3)
WBC # BLD: ABNORMAL 10*3/UL

## 2021-11-24 PROCEDURE — 6360000002 HC RX W HCPCS: Performed by: NURSE PRACTITIONER

## 2021-11-24 PROCEDURE — 94640 AIRWAY INHALATION TREATMENT: CPT

## 2021-11-24 PROCEDURE — 85730 THROMBOPLASTIN TIME PARTIAL: CPT

## 2021-11-24 PROCEDURE — 80053 COMPREHEN METABOLIC PANEL: CPT

## 2021-11-24 PROCEDURE — 6370000000 HC RX 637 (ALT 250 FOR IP): Performed by: INTERNAL MEDICINE

## 2021-11-24 PROCEDURE — 85025 COMPLETE CBC W/AUTO DIFF WBC: CPT

## 2021-11-24 PROCEDURE — 85610 PROTHROMBIN TIME: CPT

## 2021-11-24 PROCEDURE — APPSS30 APP SPLIT SHARED TIME 16-30 MINUTES: Performed by: NURSE PRACTITIONER

## 2021-11-24 PROCEDURE — 99232 SBSQ HOSP IP/OBS MODERATE 35: CPT | Performed by: INTERNAL MEDICINE

## 2021-11-24 PROCEDURE — 2700000000 HC OXYGEN THERAPY PER DAY

## 2021-11-24 PROCEDURE — 85384 FIBRINOGEN ACTIVITY: CPT

## 2021-11-24 PROCEDURE — 94761 N-INVAS EAR/PLS OXIMETRY MLT: CPT

## 2021-11-24 PROCEDURE — 82947 ASSAY GLUCOSE BLOOD QUANT: CPT

## 2021-11-24 PROCEDURE — 97110 THERAPEUTIC EXERCISES: CPT

## 2021-11-24 PROCEDURE — 1200000000 HC SEMI PRIVATE

## 2021-11-24 PROCEDURE — 97116 GAIT TRAINING THERAPY: CPT

## 2021-11-24 PROCEDURE — 82728 ASSAY OF FERRITIN: CPT

## 2021-11-24 PROCEDURE — 85379 FIBRIN DEGRADATION QUANT: CPT

## 2021-11-24 PROCEDURE — 94669 MECHANICAL CHEST WALL OSCILL: CPT

## 2021-11-24 PROCEDURE — 6370000000 HC RX 637 (ALT 250 FOR IP): Performed by: NURSE PRACTITIONER

## 2021-11-24 PROCEDURE — 36415 COLL VENOUS BLD VENIPUNCTURE: CPT

## 2021-11-24 RX ADMIN — OXYCODONE HYDROCHLORIDE AND ACETAMINOPHEN 1000 MG: 500 TABLET ORAL at 16:09

## 2021-11-24 RX ADMIN — INSULIN LISPRO 4 UNITS: 100 INJECTION, SOLUTION INTRAVENOUS; SUBCUTANEOUS at 16:10

## 2021-11-24 RX ADMIN — FAMOTIDINE 20 MG: 20 TABLET ORAL at 08:53

## 2021-11-24 RX ADMIN — INSULIN LISPRO 2 UNITS: 100 INJECTION, SOLUTION INTRAVENOUS; SUBCUTANEOUS at 12:07

## 2021-11-24 RX ADMIN — FLUTICASONE PROPIONATE 1 SPRAY: 50 SPRAY, METERED NASAL at 08:53

## 2021-11-24 RX ADMIN — BUDESONIDE 500 MCG: 0.5 SUSPENSION RESPIRATORY (INHALATION) at 10:46

## 2021-11-24 RX ADMIN — DEXAMETHASONE 6 MG: 4 TABLET ORAL at 08:52

## 2021-11-24 RX ADMIN — RIVAROXABAN 10 MG: 10 TABLET, FILM COATED ORAL at 08:53

## 2021-11-24 RX ADMIN — BUDESONIDE 500 MCG: 0.5 SUSPENSION RESPIRATORY (INHALATION) at 20:08

## 2021-11-24 RX ADMIN — OXYCODONE HYDROCHLORIDE AND ACETAMINOPHEN 1000 MG: 500 TABLET ORAL at 08:52

## 2021-11-24 RX ADMIN — ZINC SULFATE 220 MG (50 MG) CAPSULE 100 MG: CAPSULE at 08:53

## 2021-11-24 RX ADMIN — DOCUSATE SODIUM 100 MG: 100 CAPSULE ORAL at 20:33

## 2021-11-24 RX ADMIN — OXYCODONE HYDROCHLORIDE AND ACETAMINOPHEN 1000 MG: 500 TABLET ORAL at 12:07

## 2021-11-24 RX ADMIN — METFORMIN HYDROCHLORIDE 500 MG: 500 TABLET ORAL at 08:52

## 2021-11-24 RX ADMIN — METFORMIN HYDROCHLORIDE 500 MG: 500 TABLET ORAL at 16:09

## 2021-11-24 RX ADMIN — INSULIN LISPRO 2 UNITS: 100 INJECTION, SOLUTION INTRAVENOUS; SUBCUTANEOUS at 08:53

## 2021-11-24 RX ADMIN — LATANOPROST 1 DROP: 50 SOLUTION OPHTHALMIC at 20:33

## 2021-11-24 RX ADMIN — OXYCODONE HYDROCHLORIDE AND ACETAMINOPHEN 1000 MG: 500 TABLET ORAL at 20:33

## 2021-11-24 RX ADMIN — FAMOTIDINE 20 MG: 20 TABLET ORAL at 20:33

## 2021-11-24 ASSESSMENT — PAIN SCALES - GENERAL
PAINLEVEL_OUTOF10: 0

## 2021-11-24 NOTE — PROGRESS NOTES
Patient in bed at this time. Vitals and assessment completed. Vitals stable and WDL. Patient denies having needs. Call light within reach. Will continue to monitor.

## 2021-11-24 NOTE — PROGRESS NOTES
Comprehensive Nutrition Assessment    Type and Reason for Visit:  Reassess    Nutrition Recommendations/Plan:  Encourage oral intakes    Nutrition Assessment:  Continued malnutrition r/t altered respiratory status, AEB continued weight declines (minus 6.7% from usual/admission). PO reportedly >75%, without notation whether taking supplements or not. Her glucose remains elevated, with 107-335 mg/dl noted in last 24 hours. Creatinine has improved. She did not answer phone call placed to room this date. Malnutrition Assessment:  Malnutrition Status:  Mild malnutrition    Context:  Acute Illness     Findings of the 6 clinical characteristics of malnutrition:  Energy Intake:  Mild decrease in energy intake (Comment)  Weight Loss:  7 - Greater than 5% over 1 month     Body Fat Loss:  Unable to assess     Muscle Mass Loss:  Unable to assess    Fluid Accumulation:  No significant fluid accumulation     Strength:  Not Performed    Estimated Daily Nutrient Needs:  Energy (kcal):  8075-5094 (20-25); Weight Used for Energy Requirements:  Current     Protein (g):  65-76 (1.2-1.4); Weight Used for Protein Requirements:  Ideal        Fluid (ml/day):  1700; Method Used for Fluid Requirements:  1 ml/kcal      Nutrition Related Findings:  no edema      Wounds:  None       Current Nutrition Therapies:    ADULT DIET; Regular; 4 carb choices (60 gm/meal)  ADULT ORAL NUTRITION SUPPLEMENT; Breakfast, Dinner; Protein Modular    Anthropometric Measures:  · Height: 5' 4\" (162.6 cm)  · Current Body Weight: 139 lb (63 kg)   · Admission Body Weight: 149 lb (67.6 kg)    · Usual Body Weight: 149 lb (67.6 kg)     · Ideal Body Weight: 120 lbs; % Ideal Body Weight 121.4 %   · BMI: 23.8  · Adjusted Body Weight:  ; No Adjustment   · BMI Categories: Normal Weight (BMI 18.5-24. 9)       Nutrition Diagnosis:   · Mild malnutrition related to impaired respiratory function as evidenced by weight loss greater than or equal to 2% in 1 week    Recent Labs     11/22/21  0620 11/22/21  0620 11/23/21  0555 11/24/21  0610     --  134* 135   K 4.9  --  4.9 4.7   CL 98  --  97* 96*   CO2 26  --  25 24   BUN 19  --  20 21   CREATININE 0.49*  --  0.50 0.62   GLUCOSE 131*  --  132* 210*   ALT 23  --  26 26   ALKPHOS 69  --  65 69   GFR              < >                          < > = values in this interval not displayed. Lab Results   Component Value Date    LABALBU 3.4 11/24/2021    LABALBU 4.3 11/16/2011    No results found for: PHOS No results found for: MG No results found for: PREALBUMIN     Recent Labs     11/22/21  1111 11/22/21  1603 11/22/21  2010 11/23/21  0737 11/23/21  1108 11/23/21  1559 11/23/21  1946 11/24/21  0705   POCGLU 162* 234* 184* 107* 215* 193* 335* 163*     Nutrition Interventions:   Food and/or Nutrient Delivery:  Continue Current Diet, Continue Oral Nutrition Supplement  Nutrition Education/Counseling:  No recommendation at this time   Coordination of Nutrition Care:  Continue to monitor while inpatient    Goals:  PO >75% meals and supplements       Nutrition Monitoring and Evaluation:   Behavioral-Environmental Outcomes:  Beliefs and Attitutes   Food/Nutrient Intake Outcomes:  Food and Nutrient Intake, Supplement Intake  Physical Signs/Symptoms Outcomes:  Biochemical Data, Weight     Discharge Planning:     Too soon to determine     Electronically signed by Francisca Leblanc RD, LD on 11/24/21 at 9:49 AM EST    Contact: [phone]

## 2021-11-24 NOTE — PROGRESS NOTES
Physical Therapy  Facility/Department: [facility] AT THE [facility] MED SURG  Daily Treatment Note  NAME: Maria Del Carmen Lindsay  : 1940  MRN: [id number]    Date of Service: 2021    Discharge Recommendations:  Continue to assess pending progress, Home with Home health PT, IP Rehab        Assessment   Treatment Diagnosis: Difficulty walking  Prognosis: Good  Decision Making: Medium Complexity  PT Education: Energy Conservation; PT Role; Goals; Plan of Care  Patient Education: Educated pt on above with good understanding. REQUIRES PT FOLLOW UP: Yes  Activity Tolerance  Activity Tolerance: Patient Tolerated treatment well     Patient Diagnosis(es): The primary encounter diagnosis was Hypoxia. A diagnosis of Pneumonia due to COVID-19 virus was also pertinent to this visit. has a past medical history of Achilles tendon rupture, Cancer ([address] Chronic SI joint pain, Claustrophobia, Diabetes mellitus ([address] Esophageal stricture, [place] (hard of hearing), Hyperlipidemia, Hypertension, Retinal detachment, and Trauma to eye, left.   has a past surgical history that includes Cervical disc surgery (); Lumbar disc surgery (); Hysterectomy (); bladder suspension (); Blepharoplasty (Left, 5/3/2013); Eye surgery (Left, 06/15/2013); Eye surgery (Bilateral, ); Upper gastrointestinal endoscopy; Esophagus dilation; Cataract removal (Bilateral); eye surgery (Left, ,,,); vitrectomy (Left, 10/3/13); Total hip arthroplasty (Left, 2017); joint replacement (Right, 2016); Foot Amputation; Foot Tendon Surgery (Left, 2018); pr foot/toes surgery proc unlisted (Left, 3/9/2018); Finger trigger release (Left, 4/15/2019); proctosigmoidoscopy (N/A, 10/30/2019); proctosigmoidoscopy (10/30/2019); colectomy; Knee Arthroplasty (Right, 08/10/2020); and Total knee arthroplasty (Right, 8/10/2020).     Restrictions  Restrictions/Precautions  Restrictions/Precautions: General Precautions, Fall Risk, Isolation  Subjective General  Chart Reviewed: Yes  Response To Previous Treatment: Patient with no complaints from previous session. Family / Caregiver Present: No  Referring Practitioner: GRETA Del Rio  Subjective  Subjective: Pt. in bed upon arrival and agreeable to therapy at this time. Pain Screening  Patient Currently in Pain: Denies  Vital Signs  Patient Currently in Pain: Denies       Orientation  Orientation  Overall Orientation Status: Within Functional Limits  Cognition      Objective   Bed mobility  Bridging: Stand by assistance; Supervision  Rolling to Left: Stand by assistance; Supervision  Rolling to Right: Stand by assistance; Supervision  Supine to Sit: Stand by assistance; Supervision  Scooting: Stand by assistance; Supervision  Transfers  Sit to Stand: Stand by assistance; Supervision  Stand to sit: Stand by assistance; Supervision  Ambulation  Ambulation?: Yes  Ambulation 1  Surface: level tile  Device: Rolling Walker  Other Apparatus: O2  Assistance: Stand by assistance; Supervision  Distance: 60ftx1,25ftx1  Stairs/Curb  Stairs?: No        Exercises  Comments: Supine and seated exercises B LE x20         Comment: commode use    Goals  Short term goals  Time Frame for Short term goals: 20 days  Short term goal 1: Patient to complete sit/stand and stand pivot transfers with SUP and no LOB to decrease fall risk. Short term goal 2: Patient to ambulate 150ft with FWW or LRAD and SUP with SpO2 >/=90% for improved endurance. Short term goal 3: Patient to tolerate 20-30 min of ther ex/act to improve functional strnegth and endurance. Short term goal 4: Patient to ascend/descend 4 steps with HRx1 and SUP with no LOB to safely enter her home.     Plan    Plan  Times per week: 7  Times per day: Twice a day  Current Treatment Recommendations: Strengthening, Neuromuscular Re-education, Home Exercise Program, ROM, Safety Education & Training, Balance Training, Endurance Training, Patient/Caregiver Education & Training, Functional Mobility Training, Transfer Training, Gait Training, Stair training  Safety Devices  Type of devices:  All fall risk precautions in place, Call light within reach, Patient at risk for falls, Nurse notified, Left in chair     Therapy Time   Individual Concurrent Group Co-treatment   Time In 1000         Time Out 1025         Minutes [address] [place] [address]

## 2021-11-24 NOTE — PROGRESS NOTES
[facility] -Progress Note COVID 19 Patient-Tele-Visit  Today's Date and Time: 11/24/2021, 2:19 PM    Impression :     · COVID 19 Confirmed Infection  · Covid tests:  · 11/12/21: Positive  · Acute hypoxic respiratory distress  · Acute confusion  · Hyponatremia  · Hx colorectal cancer s/p resection  · Hx bilateral hip replacement  · DM  · Hx urinary incontinence  · Hx cataracts s/p eye surgery X 5  · Single blood culture with Micrococcus on 11-15-21: contaminant      Patient evaluated by Telemedicine. Requesting Institution: [facility]  Provider Institution: [facility] [place], [facility]    Recommendations:   · Antibiotic treatment:  · Monitor off antibiotics  · Urine culture  · Covid Rx:  · Remdesivir-Initiated 11/15/21  · Decadron-Initiated 11/15/21  · Actemra-administered 11/17/21      Medical Decision Making/Summary/Discussion:11/24/2021     · Patient admitted with suspected COVID 19 infection  · Covid test confirmed positive. Infection Control Recommendations   · Universal Precautions  · Airborne isolation  · Droplet Isolation    Antimicrobial Stewardship Recommendations     · Discontinuation of therapy  Coordination of Outpatient Care:   · Estimated Length of IV antimicrobials:TBD  · Patient will need Midline Catheter Insertion: TBD  · Patient will need PICC line Insertion: No  · Patient will need: Home IV , [place],  SNF,  LTAC:TBD  · Patient will need outpatient wound care:No    Chief complaint/reason for consultation:   · Concern for COVID infection      History of Present Illness:   Joan Cushing is a 78y.o.-year-old female who was initially admitted on 11/15/2021. Patient seen at the request of Dr. Lianet Fisher:    Patient initially presented through [address] [place] [address] on 11/12/21 with complaints of fatigue, fever and myalgias over the past two days.  She was exposed to Covid from family members she lives with. She denied shortness of breath. She was found to be stable on room air and was discharged on symbicort. On 11/15/21, the patient was brought back to the ED by family members for worsening shortness of breath and confusion. She was found to have an SpO2 of 89% on room air. Imaging revealed bilateral ground-glass opacities. CT head was negative for any acute abnormalities. Abnormal labs include:   Na: 128  D-dimer: 1.47    The patient was started on Decadron and Remdesivir. Patient admitted because of concerns with COVID 19.    CURRENT EVALUATION : 11/24/2021    Afebrile  VS stable    The patient is doing well on 4 L NC  On Decadron. Actemra given on 11/17/21    She remains alert and oriented. No acute changes noted    Urine culture shows no significant growth  Blood culture with Micrococcus = contaminant    Patient exhibiting respiratory distress. yes  Respiratory secretions: no    Patient receiving supplemental oxygen.: 4-->6-->5-->4.5-->4 L NC  RR:16-->18  02 sat: 94-->94 %    QTc:       NEWS Score: 0-4 Low risk group; 5-6: Medium risk group; 7 or above: High risk group  Parameters 3 2 1 0 1 2 3   Age    < 65   ? 65   RR ? 8  9-11 12-20  21-24 ? 25   O2 Sats ?  91 92-93 94-95 ? 96      Suppl O2  Yes  No      SBP ? 90  101-110 111-219   ? 220   HR ? 40  41-50 51-90  111-130 ? 131   Consciousness    Alert   Drowsiness, lethargy, or confusion   Temperature ? 35.0 C (95.0 F)  35.1-36.0 C 95.1-96.9 F 36.1-38.0 C 97.0-100.4 F 38.1-39.0 C 100.5-102.3 F ? 39.1 C ? 102.4 F      NEWS Score:  ·  11/16/21: 7 High risk    Overall Daily Picture:    Improving    Presence of secondary bacterial Infection:    No   Additional antibiotics: No    Labs, X rays reviewed: 11/24/2021    BUN:20-->21  Cr:0.50-->0.62    WBC:10.1-->10.8  Hb:13.5-->13.1  Plat: 265-->263    Absolute Neutrophils:4.60  Absolute Lymphocytes:0.77  Neutrophil/Lymphocyte Ratio: 6 high risk    CRP:126.7-->54.9  Ferritin:408-->296  LDH: 320    Pro Calcitonin:      Cultures:  Urine:  ·   Blood:  ·   Sputum :  ·   Wound:       CXR:   11/12/21        CAT:  11/15/21        Discussed with patient, RN, CC, IM. I have personally reviewed the past medical history, past surgical history, medications, social history, and family history, and I have updated the database accordingly.   Past Medical History:     Past Medical History:   Diagnosis Date    Achilles tendon rupture 2011    LEFT    Cancer ([address]     Chronic SI joint pain     left    Claustrophobia     Diabetes mellitus ([address] 2010    ON ORAL MED    Esophageal stricture 2011    PT HAD DILATATION    [place] (hard of hearing)     WEARS [place] HEARING AIDS    Hyperlipidemia 2012    Hypertension     PT HAD TILT TABLE TEST BP MED DISCONTINUED    Retinal detachment 10/2012    LEFT    Trauma to eye, left        Past Surgical  History:     Past Surgical History:   Procedure Laterality Date    BLADDER SUSPENSION  1994    WITH HYSTERECTOMY    BLEPHAROPLASTY Left 5/3/2013    CATARACT REMOVAL Bilateral     with iol    CERVICAL DISC SURGERY  1984    cervical spine    COLECTOMY      ROBOTIC LOWER COLON RESECTION    ESOPHAGEAL DILATATION      EYE SURGERY Left 06/15/2013    REPAIR OF WOUND, WITH LENS REMOVAL     EYE SURGERY Bilateral 2008    BILAT CATARACT REMOVAL WITH IOL    EYE SURGERY Left ,7/13,12/12,6/13    vitrectomy    FINGER TRIGGER RELEASE Left 4/15/2019    FINGER TRIGGER RELEASE-MIDDLE FINGER performed by Ro Berry [facility] at [facility] Left 03/09/2018    Excision of Os Peroneum, Primary Repair of Peroneus Longus Tendon - Dr. Archie Figueroa Right 06/21/2016    [facility] Dr. Josué Foreman Right 08/10/2020    Dr. Ren Brewer    lower back    [place] FOOT/TOES SURGERY PROC UNLISTED Left 3/9/2018    ACHILLES TENDON LENGTHENING REPAIR/ EXCISION OF OS PERONEUM,PRIMARY REPAIR OF PERONEUS LINGUS TENDON WITH POSSIBLE PERONEAL TENODESIS performed by Krzysztof Delgadillo DPM at [facility] PROCTOSIGMOIDOSCOPY N/A 10/30/2019    ANAL PROCTO SIGMOIDOSCOPY FLEXIBLE with biopsies performed by Stephanie Loera MD at [address]  10/30/2019    -sigmoid colon mass(invasive ulcerated adenocarcinoma)    TOTAL HIP ARTHROPLASTY Left 8/7/2017    HIP TOTAL ARTHROPLASTY performed by Obey Rico MD at [facility] Right 8/10/2020    KNEE TOTAL ARTHROPLASTY performed by Obey Rico MD at [facility] dilation    VITRECTOMY Left 10/3/13       Medications:      fluticasone  1 spray Each Nostril Daily    budesonide  0.5 mg Nebulization BID    docusate sodium  100 mg Oral BID    latanoprost  1 drop Both Eyes Nightly    metFORMIN  500 mg Oral BID WC    rivaroxaban  10 mg Oral Daily    insulin lispro  0-12 Units SubCUTAneous TID WC    insulin lispro  0-6 Units SubCUTAneous Nightly    ascorbic acid  1,000 mg Oral 4x Daily    famotidine  20 mg Oral BID    zinc sulfate  100 mg Oral Daily    vitamin D  50,000 Units Oral Weekly       Social History:     Social History     Socioeconomic History    Marital status:      Spouse name: Not on file    Number of children: Not on file    Years of education: Not on file    Highest education level: Not on file   Occupational History    Not on file   Tobacco Use    Smoking status: Never Smoker    Smokeless tobacco: Never Used   Vaping Use    Vaping Use: Never used   Substance and Sexual Activity    Alcohol use: No    Drug use: No    Sexual activity: Not on file   Other Topics Concern    Not on file   Social History Narrative    Not on file     Social Determinants of Health     Financial Resource Strain:     Difficulty of Paying Living Expenses: Not on file   Food Insecurity:     Worried About Running production  Abdomen: No nausea, vomiting, diarrhea, or abdominal pain. Lawernce Roughen No cramps. Genitourinary: No increased urinary frequency, or dysuria. No hematuria. No suprapubic or CVA pain  Musculoskeletal: No muscle aches or pains. No joint effusions, swelling or deformities  Hematologic: No bleeding or bruising. Neurologic: No headache, weakness, numbness, or tingling. Integument: No rash, no ulcers. Psychiatric: No depression. Endocrine: No polyuria, no polydipsia, no polyphagia. Physical Examination :     Patient Vitals for the past 8 hrs:   BP Temp Temp src Pulse Resp SpO2   11/24/21 1236 97/78 97.2 °F (36.2 °C) Temporal 99 18 94 %   11/24/21 1049 -- -- -- -- 20 92 %   11/24/21 1048 -- -- -- 74 20 92 %   11/24/21 0649 134/64 97.5 °F (36.4 °C) Temporal 69 22 92 %     General Appearance: Awake, alert, and in no apparent distress  Head:  Normocephalic, no trauma  Eyes: Pupils equal, round, reactive to light; sclera anicteric; conjunctivae pink. No embolic phenomena. ENT: Oropharynx clear, without erythema, exudate, or thrush. No tenderness of sinuses. Mouth/throat: mucosa pink and moist. No lesions. Dentition in good repair. Neck:Supple, without lymphadenopathy. Thyroid normal, No bruits. Pulmonary/Chest: Clear to auscultation, without wheezes, rales, or rhonchi. No dullness to percussion. Cardiovascular: Regular rate and rhythm without murmurs, rubs, or gallops. Abdomen: Soft, non tender. Bowel sounds normal. No organomegaly  All four Extremities: No cyanosis, clubbing, edema, or effusions. Neurologic: No gross sensory or motor deficits. Skin: Warm and dry with good turgor. No signs of peripheral arterial or venous insufficiency. No ulcerations. No open wounds.     Medical Decision Making -Laboratory:   I have independently reviewed/ordered the following labs:    CBC with Differential:   Recent Labs     11/23/21  0555 11/24/21  0610   WBC 10.1 10.8   HGB 13.5 13.1   HCT 40.1 39.6    263 LYMPHOPCT 12* 14*   MONOPCT 5 5     BMP:   Recent Labs     11/23/21  0555 11/24/21  0610   * 135   K 4.9 4.7   CL 97* 96*   CO2 25 24   BUN 20 21   CREATININE 0.50 0.62     Hepatic Function Panel:   Recent Labs     11/23/21  0555 11/24/21  0610   PROT 6.4 6.3*   LABALBU 3.5 3.4*   BILITOT 0.47 0.43   ALKPHOS 65 69   ALT 26 26   AST 17 20     No results for input(s): RPR in the last 72 hours. No results for input(s): HIV in the last 72 hours. No results for input(s): BC in the last 72 hours. Lab Results   Component Value Date    MUCUS NOT REPORTED 11/16/2021    RBC 4.26 11/24/2021    RBC 3.98 11/16/2011    TRICHOMONAS NOT REPORTED 11/16/2021    WBC 10.8 11/24/2021    YEAST NOT REPORTED 11/16/2021    TURBIDITY Clear 11/16/2021     Lab Results   Component Value Date    CREATININE 0.62 11/24/2021    GLUCOSE 210 11/24/2021    GLUCOSE 116 11/16/2011       Medical Decision Making-Imaging:     Narrative   EXAMINATION:   ONE XRAY VIEW OF THE CHEST       11/12/2021 6:08 pm       COMPARISON:   None.       HISTORY:   ORDERING SYSTEM PROVIDED HISTORY: weakness   TECHNOLOGIST PROVIDED HISTORY:   weakness       FINDINGS:   There is diffuse prominence of the pulmonary interstitium.  No airspace   consolidation.  No pneumothorax or pleural effusion.  No cardiomegaly. Degenerative changes in the bilateral shoulders and spine.           Impression   Diffuse prominence of the pulmonary interstitium.  This is nonspecific but   can be seen in atypical/viral pneumonia.  Correlate clinically             Narrative   EXAMINATION:   CT OF THE CHEST WITHOUT CONTRAST 11/15/2021 2:30 pm       TECHNIQUE:   CT of the chest was performed without the administration of intravenous   contrast. Multiplanar reformatted images are provided for review.  Dose   modulation, iterative reconstruction, and/or weight based adjustment of the   mA/kV was utilized to reduce the radiation dose to as low as reasonably   achievable.       COMPARISON: None.       HISTORY:   ORDERING SYSTEM PROVIDED HISTORY: covid   TECHNOLOGIST PROVIDED HISTORY:   covid   Decision Support Exception - unselect if not a suspected or confirmed   emergency medical condition->Emergency Medical Condition (MA)       FINDINGS:   Mediastinum: Mild cardiomegaly.  No pericardial effusion.  Thoracic aorta is   normal in caliber.  Scattered atherosclerotic changes.  Scattered mildly   prominent mediastinal lymph nodes.       Lungs/pleura: Scattered ground-glass infiltrate throughout both lungs.  No   effusion or pneumothorax.       Upper Abdomen: No acute abnormality.  Small sliding hiatal hernia.       Soft Tissues/Bones: No aggressive osseous lesions.  Confluent anterolateral   vertebral body syndesmophyte formation compatible with DISH. True Led acute soft   tissue abnormality.           Impression   Multifocal bilateral ground-glass opacities throughout both lungs compatible   with COVID-19 pneumonia.             Narrative   EXAMINATION:   CT OF THE HEAD WITHOUT CONTRAST  11/15/2021 2:29 pm       TECHNIQUE:   CT of the head was performed without the administration of intravenous   contrast. Dose modulation, iterative reconstruction, and/or weight based   adjustment of the mA/kV was utilized to reduce the radiation dose to as low   as reasonably achievable.       COMPARISON:   03/01/2013       HISTORY:   ORDERING SYSTEM PROVIDED HISTORY: confusion   TECHNOLOGIST PROVIDED HISTORY:   confusion       Decision Support Exception - unselect if not a suspected or confirmed   emergency medical condition->Emergency Medical Condition (MA)       FINDINGS:   BRAIN/VENTRICLES: The ventricles and sulci are diffusely enlarged.  Low   attenuation is seen in the periventricular and subcortical white matter.  No   acute intracranial hemorrhage or acute infarct is identified.       ORBITS: The visualized portion of the orbits demonstrate no acute abnormality.       SINUSES: Small amount of fluid in the right maxillary sinus.  Otherwise the   paranasal sinuses appear clear.       SOFT TISSUES/SKULL:  No acute abnormality of the visualized skull or soft   tissues.           Impression   No acute intracranial abnormality.       Diffuse atrophic changes with findings suggesting chronic microvascular   ischemia                 Medical Decision [id number]:       Medical Decision Making-Other:     Note:  · Labs, medications, radiologic studies were reviewed with personal review of films  ·  Large amounts of data were reviewed  · Discussed with nursing Staff, Discharge planner  · Infection Control and Prevention measures reviewed  · All prior entries were reviewed  · Administer medications as ordered  · Prognosis: Guarded  · Discharge planning reviewed      Thank you for allowing us to participate in the care of this patient. Please call with questions. LARRY Simpson - CNP     ATTESTATION:    I have discussed the case, including pertinent history and exam findings with the APRN. I have evaluated the  History, physical findings and pictures of the patient and the key elements of the encounter have been performed by me. I have reviewed the laboratory data, other diagnostic studies and discussed them with the APRN. I have updated the medical record where necessary. I agree with the assessment, plan and orders as documented by the APRN.     Nick De La Vega MD.        Pager: [phone] - Office: [phone]

## 2021-11-24 NOTE — PROGRESS NOTES
Progress Note    SUBJECTIVE:    Patient seen for f/u of COVID-19 virus infection. She resting in bed alert oriented no acute distress. Patient currently on 4 L per nasal cannula. She has no complaints. ROS:   Constitutional: negative  for fevers, and negative for chills. Respiratory: negative for shortness of breath, positive for cough, and negative for wheezing  Cardiovascular: negative for chest pain, and negative for palpitations  Gastrointestinal: negative for abdominal pain, negative for nausea,negative for vomiting, negative for diarrhea, and negative for constipation     All other systems were reviewed with the patient and are negative unless otherwise stated in HPI      OBJECTIVE:      Vitals:   Vitals:    11/24/21 0649   BP: 134/64   Pulse: 69   Resp: 22   Temp: 97.5 °F (36.4 °C)   SpO2: 92%     Weight: 139 lb (63 kg)   Height: 5' 4\" (162.6 cm)     Weight  Wt Readings from Last 3 Encounters:   11/24/21 139 lb (63 kg)   11/12/21 149 lb (67.6 kg)   08/12/20 161 lb 9.6 oz (73.3 kg)     Body mass index is 23.86 kg/m². 24HR INTAKE/OUTPUT:      Intake/Output Summary (Last 24 hours) at 11/24/2021 0944  Last data filed at 11/24/2021 0703  Gross per 24 hour   Intake 600 ml   Output 550 ml   Net 50 ml     -----------------------------------------------------------------  Exam:    GEN:    Awake, alert and oriented x3. EYES:   EOMI, pupils equal   NECK: Supple. No lymphadenopathy. No carotid bruit  CVS:     regular rate and rhythm, no audible murmur  PULM:  Diminished with bibasilar rales, no acute respiratory distress  ABD:     Bowels sounds normal.  Abdomen is soft. No distention. no tenderness to palpation. EXT:     no edema bilaterally . No calf tenderness. NEURO: Moves all extremities. Motor and sensory are grossly intact  SKIN:    No rashes.   No skin lesions.  -----------------------------------------------------------------    Diagnostic Data:      Complete Blood Count:   Recent Labs 11/22/21  0620 11/23/21  0555 11/24/21  0610   WBC 10.5 10.1 10.8   RBC 4.34 4.35 4.26   HGB 13.3 13.5 13.1   HCT 40.4 40.1 39.6   MCV 93.1 92.2 93.0   MCH 30.6 31.0 30.8   MCHC 32.9 33.7 33.1   RDW 12.1 12.3 12.2    265 263   MPV 10.4 10.1 10.2        Last 3 Blood Glucose:   Recent Labs     11/22/21  0620 11/23/21  0555 11/24/21  0610   GLUCOSE 131* 132* 210*        Comprehensive Metabolic Profile:   Recent Labs     11/22/21 0620 11/23/21  0555 11/24/21  0610    134* 135   K 4.9 4.9 4.7   CL 98 97* 96*   CO2 26 25 24   BUN 19 20 21   CREATININE 0.49* 0.50 0.62   GLUCOSE 131* 132* 210*   CALCIUM 8.9 9.1 8.9   PROT 6.3* 6.4 6.3*   LABALBU 3.4* 3.5 3.4*   BILITOT 0.41 0.47 0.43   ALKPHOS 69 65 69   AST 18 17 20   ALT 23 26 26        Urinalysis:   Lab Results   Component Value Date    NITRU NEGATIVE 11/16/2021    COLORU Yellow 11/16/2021    PHUR 6.5 11/16/2021    WBCUA 2 TO 5 11/16/2021    RBCUA 0 TO 2 11/16/2021    MUCUS NOT REPORTED 11/16/2021    TRICHOMONAS NOT REPORTED 11/16/2021    YEAST NOT REPORTED 11/16/2021    BACTERIA 1+ 11/16/2021    SPECGRAV 1.010 11/16/2021    LEUKOCYTESUR SMALL 11/16/2021    UROBILINOGEN Normal 11/16/2021    BILIRUBINUR NEGATIVE 11/16/2021    GLUCOSEU NEGATIVE 11/16/2021    KETUA NEGATIVE 11/16/2021    AMORPHOUS NOT REPORTED 11/16/2021       HgBA1c:    Lab Results   Component Value Date    LABA1C 7.1 08/11/2020       Lactic Acid:   Lab Results   Component Value Date    LACTA 1.7 11/15/2021        Troponin: No results for input(s): TROPONINI in the last 72 hours. Radiology/Imaging:  CT CHEST WO CONTRAST   Final Result   Multifocal bilateral ground-glass opacities throughout both lungs compatible   with COVID-19 pneumonia. CT Head WO Contrast   Final Result   No acute intracranial abnormality.       Diffuse atrophic changes with findings suggesting chronic microvascular   ischemia               ASSESSMENT / PLAN:  COVID-19 virus infection  · Continue current therapy   · Remdesivir-not indicated  · Continue dexamethasone  · Continue vitamin C, D and zinc  · Continue Xarelto  · Trend CRPs-improving  · Received Actemra 11/17/2021  · Appreciate infectious disease  · Blood culture x1+ gram-positive cocci-contaminant  · Acute respiratory failure with hypoxia  · Supplemental oxygen to maintain SPO2 greater than 92%-currently on 4 L  · Acapella  · Prone  · PT/OT  · Nebs  · Type 2 diabetes  · POCT before meals and at bedtime  · Medium dose sliding scale  · Hypoglycemia protocol  · Continue Glucophage  · Nutrition status:   · at risk for malnutrition  · Dietician consult initiated  · Hospital Prophylaxis:   · DVT: Xarelto   · Stress Ulcer: H2 Blocker   · High risk medications: none   · Disposition:    · Discharge plan is pending      LARRY Vallecillo - CNP , LARRY, NP-C  Hospitalist Medicine        11/24/2021, 9:44 AM

## 2021-11-24 NOTE — PROGRESS NOTES
Physical Therapy  Facility/Department: [facility] AT THE [facility] MED SURG  Daily Treatment Note  NAME: Yuri Shah  : 1940  MRN: [id number]    Date of Service: 2021    Discharge Recommendations:  Continue to assess pending progress, Home with Home health PT, IP Rehab        Assessment   Treatment Diagnosis: Difficulty walking  Prognosis: Good  Decision Making: Medium Complexity  PT Education: Energy Conservation; PT Role; Goals; Plan of Care  Patient Education: Educated pt on above with good understanding. REQUIRES PT FOLLOW UP: Yes  Activity Tolerance  Activity Tolerance: Patient Tolerated treatment well     Patient Diagnosis(es): The primary encounter diagnosis was Hypoxia. A diagnosis of Pneumonia due to COVID-19 virus was also pertinent to this visit. has a past medical history of Achilles tendon rupture, Cancer ([address] Chronic SI joint pain, Claustrophobia, Diabetes mellitus ([address] Esophageal stricture, [place] (hard of hearing), Hyperlipidemia, Hypertension, Retinal detachment, and Trauma to eye, left.   has a past surgical history that includes Cervical disc surgery (); Lumbar disc surgery (); Hysterectomy (); bladder suspension (); Blepharoplasty (Left, 5/3/2013); Eye surgery (Left, 06/15/2013); Eye surgery (Bilateral, ); Upper gastrointestinal endoscopy; Esophagus dilation; Cataract removal (Bilateral); eye surgery (Left, ,,,); vitrectomy (Left, 10/3/13); Total hip arthroplasty (Left, 2017); joint replacement (Right, 2016); Foot Amputation; Foot Tendon Surgery (Left, 2018); pr foot/toes surgery proc unlisted (Left, 3/9/2018); Finger trigger release (Left, 4/15/2019); proctosigmoidoscopy (N/A, 10/30/2019); proctosigmoidoscopy (10/30/2019); colectomy; Knee Arthroplasty (Right, 08/10/2020); and Total knee arthroplasty (Right, 8/10/2020).     Restrictions  Restrictions/Precautions  Restrictions/Precautions: General Precautions, Fall Risk, Isolation  Subjective General  Chart Reviewed: Yes  Response To Previous Treatment: Patient with no complaints from previous session. Family / Caregiver Present: No  Referring Practitioner: GRETA Collado  Subjective  Subjective: Pt. in bed upon arrival and agreeable to therapy at this time. Pain Screening  Patient Currently in Pain: Denies  Vital Signs  Patient Currently in Pain: Denies       Orientation  Orientation  Overall Orientation Status: Within Functional Limits  Cognition      Objective   Bed mobility  Bridging: Stand by assistance; Supervision  Rolling to Left: Stand by assistance; Supervision  Rolling to Right: Stand by assistance; Supervision  Supine to Sit: Stand by assistance; Supervision  Sit to Supine: Stand by assistance; Supervision  Scooting: Stand by assistance; Supervision  Transfers  Sit to Stand: Stand by assistance; Supervision  Stand to sit: Stand by assistance; Supervision  Ambulation  Ambulation?: Yes  Ambulation 1  Surface: level tile  Device: Rolling Walker  Other Apparatus: O2  Assistance: Stand by assistance; Supervision  Distance: 90ftx1  Stairs/Curb  Stairs?: No        Exercises  Comments: Seated exercises B LE x20. Standing sink exercises B Le x15         Comment: STS x6    Goals  Short term goals  Time Frame for Short term goals: 20 days  Short term goal 1: Patient to complete sit/stand and stand pivot transfers with SUP and no LOB to decrease fall risk. Short term goal 2: Patient to ambulate 150ft with FWW or LRAD and SUP with SpO2 >/=90% for improved endurance. Short term goal 3: Patient to tolerate 20-30 min of ther ex/act to improve functional strnegth and endurance. Short term goal 4: Patient to ascend/descend 4 steps with HRx1 and SUP with no LOB to safely enter her home.     Plan    Plan  Times per week: 7  Times per day: Twice a day  Current Treatment Recommendations: Strengthening, Neuromuscular Re-education, Home Exercise Program, ROM, Safety Education & Training, Balance Training, Endurance Training, Patient/Caregiver Education & Training, Functional Mobility Training, Transfer Training, Gait Training, Stair training  Safety Devices  Type of devices:  All fall risk precautions in place, Call light within reach, Patient at risk for falls, Nurse notified, Bed alarm in place, Left in bed     Therapy Time   Individual Concurrent Group Co-treatment   Time In 1507         Time Out 1530         Minutes [address], [place]

## 2021-11-24 NOTE — PROGRESS NOTES
Patient called out to go to the bathroom. She ambulated fairly well. Patient denied dizziness or increased SOB. Patient's nose is still stuffed up but she is refusing saline and fluticasone. Writer showed patient how to massage her sinuses to help clear her nose up some. Patient's vitals and reassessment are complete and WNL. Patient educated on proning and refuses to prone but will lay on her side. Once on her side, patient's oxygen went up to 93%. Will continue to monitor and assess.

## 2021-11-24 NOTE — PROGRESS NOTES
EC/WS techniques, AE/DME, and general safety to ensure safe return home.        Therapy Time   Individual Concurrent Group Co-treatment   Time In [address]         Time Out [address] [facility]         Minutes [address], [place]

## 2021-11-25 VITALS
SYSTOLIC BLOOD PRESSURE: 125 MMHG | RESPIRATION RATE: 18 BRPM | OXYGEN SATURATION: 97 % | HEIGHT: 64 IN | HEART RATE: 72 BPM | TEMPERATURE: 97 F | WEIGHT: 139 LBS | DIASTOLIC BLOOD PRESSURE: 61 MMHG | BODY MASS INDEX: 23.73 KG/M2

## 2021-11-25 LAB
ABSOLUTE EOS #: 0.05 K/UL (ref 0–0.44)
ABSOLUTE IMMATURE GRANULOCYTE: 0.11 K/UL (ref 0–0.3)
ABSOLUTE LYMPH #: 1.58 K/UL (ref 1.1–3.7)
ABSOLUTE MONO #: 0.51 K/UL (ref 0.1–1.2)
ALBUMIN SERPL-MCNC: 3.6 G/DL (ref 3.5–5.2)
ALBUMIN/GLOBULIN RATIO: 1.2 (ref 1–2.5)
ALP BLD-CCNC: 67 U/L (ref 35–104)
ALT SERPL-CCNC: 29 U/L (ref 5–33)
ANION GAP SERPL CALCULATED.3IONS-SCNC: 11 MMOL/L (ref 9–17)
AST SERPL-CCNC: 20 U/L
BASOPHILS # BLD: 0 % (ref 0–2)
BASOPHILS ABSOLUTE: 0.04 K/UL (ref 0–0.2)
BILIRUB SERPL-MCNC: 0.63 MG/DL (ref 0.3–1.2)
BUN BLDV-MCNC: 19 MG/DL (ref 8–23)
BUN/CREAT BLD: 36 (ref 9–20)
CALCIUM SERPL-MCNC: 8.9 MG/DL (ref 8.6–10.4)
CHLORIDE BLD-SCNC: 92 MMOL/L (ref 98–107)
CO2: 26 MMOL/L (ref 20–31)
CREAT SERPL-MCNC: 0.53 MG/DL (ref 0.5–0.9)
DIFFERENTIAL TYPE: ABNORMAL
EOSINOPHILS RELATIVE PERCENT: 1 % (ref 1–4)
GFR AFRICAN AMERICAN: >60 ML/MIN
GFR NON-AFRICAN AMERICAN: >60 ML/MIN
GFR SERPL CREATININE-BSD FRML MDRD: ABNORMAL ML/MIN/{1.73_M2}
GFR SERPL CREATININE-BSD FRML MDRD: ABNORMAL ML/MIN/{1.73_M2}
GLUCOSE BLD-MCNC: 107 MG/DL (ref 74–100)
GLUCOSE BLD-MCNC: 110 MG/DL (ref 70–99)
GLUCOSE BLD-MCNC: 120 MG/DL (ref 74–100)
HCT VFR BLD CALC: 40.6 % (ref 36.3–47.1)
HEMOGLOBIN: 13.5 G/DL (ref 11.9–15.1)
IMMATURE GRANULOCYTES: 1 %
LYMPHOCYTES # BLD: 15 % (ref 24–43)
MCH RBC QN AUTO: 30.8 PG (ref 25.2–33.5)
MCHC RBC AUTO-ENTMCNC: 33.3 G/DL (ref 28.4–34.8)
MCV RBC AUTO: 92.5 FL (ref 82.6–102.9)
MONOCYTES # BLD: 5 % (ref 3–12)
NRBC AUTOMATED: 0 PER 100 WBC
PDW BLD-RTO: 12.4 % (ref 11.8–14.4)
PLATELET # BLD: 266 K/UL (ref 138–453)
PLATELET ESTIMATE: ABNORMAL
PMV BLD AUTO: 10.5 FL (ref 8.1–13.5)
POTASSIUM SERPL-SCNC: 4.5 MMOL/L (ref 3.7–5.3)
RBC # BLD: 4.39 M/UL (ref 3.95–5.11)
RBC # BLD: ABNORMAL 10*6/UL
SEG NEUTROPHILS: 78 % (ref 36–65)
SEGMENTED NEUTROPHILS ABSOLUTE COUNT: 8.3 K/UL (ref 1.5–8.1)
SODIUM BLD-SCNC: 129 MMOL/L (ref 135–144)
TOTAL PROTEIN: 6.5 G/DL (ref 6.4–8.3)
WBC # BLD: 10.6 K/UL (ref 3.5–11.3)
WBC # BLD: ABNORMAL 10*3/UL

## 2021-11-25 PROCEDURE — 2700000000 HC OXYGEN THERAPY PER DAY

## 2021-11-25 PROCEDURE — 99232 SBSQ HOSP IP/OBS MODERATE 35: CPT | Performed by: INTERNAL MEDICINE

## 2021-11-25 PROCEDURE — 97110 THERAPEUTIC EXERCISES: CPT

## 2021-11-25 PROCEDURE — 94618 PULMONARY STRESS TESTING: CPT

## 2021-11-25 PROCEDURE — 6360000002 HC RX W HCPCS: Performed by: NURSE PRACTITIONER

## 2021-11-25 PROCEDURE — 85025 COMPLETE CBC W/AUTO DIFF WBC: CPT

## 2021-11-25 PROCEDURE — 82947 ASSAY GLUCOSE BLOOD QUANT: CPT

## 2021-11-25 PROCEDURE — 80053 COMPREHEN METABOLIC PANEL: CPT

## 2021-11-25 PROCEDURE — 94761 N-INVAS EAR/PLS OXIMETRY MLT: CPT

## 2021-11-25 PROCEDURE — 97116 GAIT TRAINING THERAPY: CPT

## 2021-11-25 PROCEDURE — 94640 AIRWAY INHALATION TREATMENT: CPT

## 2021-11-25 PROCEDURE — 36415 COLL VENOUS BLD VENIPUNCTURE: CPT

## 2021-11-25 PROCEDURE — 6370000000 HC RX 637 (ALT 250 FOR IP): Performed by: NURSE PRACTITIONER

## 2021-11-25 RX ORDER — ZINC SULFATE 50(220)MG
100 CAPSULE ORAL DAILY
Qty: 28 CAPSULE | Refills: 0 | COMMUNITY
Start: 2021-11-26

## 2021-11-25 RX ADMIN — DOCUSATE SODIUM 100 MG: 100 CAPSULE ORAL at 08:20

## 2021-11-25 RX ADMIN — RIVAROXABAN 10 MG: 10 TABLET, FILM COATED ORAL at 08:20

## 2021-11-25 RX ADMIN — OXYCODONE HYDROCHLORIDE AND ACETAMINOPHEN 1000 MG: 500 TABLET ORAL at 08:20

## 2021-11-25 RX ADMIN — ZINC SULFATE 220 MG (50 MG) CAPSULE 100 MG: CAPSULE at 08:20

## 2021-11-25 RX ADMIN — BUDESONIDE 500 MCG: 0.5 SUSPENSION RESPIRATORY (INHALATION) at 08:23

## 2021-11-25 RX ADMIN — FAMOTIDINE 20 MG: 20 TABLET ORAL at 08:20

## 2021-11-25 RX ADMIN — METFORMIN HYDROCHLORIDE 500 MG: 500 TABLET ORAL at 08:20

## 2021-11-25 NOTE — PROGRESS NOTES
Home evaluation completed: At rest SpO2 = 91% on RA  With activity SpO2=85% on RA   With activity SpO2 =92% on 3L NC. Pt tolerated activity well. Pt did so increased WOB with RR=22 and . Placed NC with 2L on pt and increased to 3L. RR decreased to 20 HR decreased to 107 with oxygen.

## 2021-11-25 NOTE — PROGRESS NOTES
Occupational Therapy  Facility/Department: [facility] AT THE [facility] MED SURG  Daily Treatment Note  NAME: Saul Leyva  : 1940  MRN: [id number]    Date of Service: 2021    Discharge Recommendations:  Continue to assess pending progress, Subacute/Skilled Nursing Facility, Home with Home health OT       Assessment      Activity Tolerance  Activity Tolerance: Patient limited by fatigue; Patient Tolerated treatment well  Safety Devices  Safety Devices in place: Yes  Type of devices: All fall risk precautions in place; Call light within reach; Left in bed         Patient Diagnosis(es): The primary encounter diagnosis was Hypoxia. Diagnoses of Pneumonia due to COVID-19 virus, Acute respiratory failure with hypoxia ([facility] and COVID-19 virus infection / No vaccine were also pertinent to this visit. has a past medical history of Achilles tendon rupture, Cancer ([facility] Chronic SI joint pain, Claustrophobia, Diabetes mellitus ([facility] [address] [facility] Esophageal stricture, [place] (hard of hearing), Hyperlipidemia, Hypertension, Retinal detachment, and Trauma to eye, left.   has a past surgical history that includes Cervical disc surgery (); Lumbar disc surgery (); Hysterectomy (); bladder suspension (); Blepharoplasty (Left, 5/3/2013); Eye surgery (Left, 06/15/2013); Eye surgery (Bilateral, ); Upper gastrointestinal endoscopy; Esophagus dilation; Cataract removal (Bilateral); eye surgery (Left, ,,,); vitrectomy (Left, 10/3/13); Total hip arthroplasty (Left, 2017); joint replacement (Right, 2016); Foot Amputation; Foot Tendon Surgery (Left, 2018); pr foot/toes surgery proc unlisted (Left, 3/9/2018); Finger trigger release (Left, 4/15/2019); proctosigmoidoscopy (N/A, 10/30/2019); proctosigmoidoscopy (10/30/2019); colectomy; Knee Arthroplasty (Right, 08/10/2020); and Total knee arthroplasty (Right, 8/10/2020).     Restrictions  Restrictions/Precautions  Restrictions/Precautions: General Precautions, Fall Risk, Isolation  Subjective   General  Chart Reviewed: Yes  Patient assessed for rehabilitation services?: Yes  Response to previous treatment: Patient with no complaints from previous session  Family / Caregiver Present: No  Referring Practitioner: Chloe Olguin  Diagnosis: covid  Subjective  Subjective: Pt denies pain this date. Complains of SOB and fatigue. Declines ADLs but agreeable to B UE ther ex while supine in bed with head of bed elevated. General Comment  Comments: Agreeable to OT tx with ther ex only while in bed. Vital Signs  Patient Currently in Pain: Denies   Orientation     Objective                                                          Additional Activities Comment  Additional Activities: Discussed d/c folder and pt stated she just needs to get stronger and she will be good. Type of ROM/Therapeutic Exercise  Type of ROM/Therapeutic Exercise: Free weights  Comment: Pt completed B UE ther ex with 1# dumbell, 15 reps x 1 set x 5 planes  to increase strength for transfers and ADLs. . Pt required frequent rest breaks. Plan   Plan  Times per week: 7  Times per day: Daily  Current Treatment Recommendations: Strengthening, Balance Training, Functional Mobility Training, Endurance Training, Patient/Caregiver Education & Training, Safety Education & Training, Self-Care / ADL  G-Code     OutComes Score                                                  AM-PAC Score             Goals  Short term goals  Time Frame for Short term goals: 21 visits  Short term goal 1: Patient to complete self care routine c Mod I c use of AE/DME and EC/WS techniques as needed to ensure safe return home. Short term goal 2: Patient to engage in 15 minutes of ther ex/ther act s significant shortness of breath to improve strength as well as activity tolerance for self care tasks. Short term goal 3: Patient to be educated on d/c folder, AE/DME, and general safety to ensure safe return home.   Short term goal 4: Patient to be educated on d/c folder, EC/WS techniques, AE/DME, and general safety to ensure safe return home.        Therapy Time   Individual Concurrent Group Co-treatment   Time In 1230         Time Out 1245         Minutes 15         Timed Code Treatment Minutes: [address] [place] [address]

## 2021-11-25 NOTE — DISCHARGE SUMMARY
Discharge Summary    Georgia Rollins  :  1940  MRN:  [id number]    Admit date:  11/15/2021      Discharge date: 2021     Admitting Physician:  Albino Pinto MD    Discharge Diagnoses:    Principal Problem:    COVID-19 virus infection / No vaccine  Active Problems:    Type 2 diabetes mellitus without complication (HCC)    Mild malnutrition ([address]    Pneumonia due to COVID-19 virus    Acute respiratory failure with hypoxia (HCC)  Resolved Problems:    * No resolved hospital problems. *      Hospital Course:   Georgia Rollins is a 78 y.o. female admitted with acute respiratory failure with hypoxia due to Covid pneumonia. She presented to the emergency room with family due to worsening symptoms of Covid. Patient was diagnosed with COVID-19 on . Patient stated that her symptoms had been ongoing for at least 2 weeks. Patient stated she woke up and had some confusion and increased shortness of breath. Upon arrival to the emergency room patient was found to be hypoxic at 89%. Patient was oriented to place and self at that time but did show some minor confusion per family history. CT of her head was negative. CT of her chest showed multifocal bilateral groundglass opacities throughout both lungs compatible with COVID-19 pneumonia. Patient is not Covid vaccinated. Patient was admitted and infectious disease was consulted. Patient completed course of remdesivir and received a dose of Actemra. Patient was also placed on Decadron steroids. Patient continued to do well. Patient's oxygen demands were weaned. She is currently tolerating therapy well and is stable on her feet. Patient will be discharged home today with 3 L of oxygen that she needs to use at activity. Patient is agreeable to this plan of care she will be discharged home as she lives with her children.     Consultants:  Dr. Mindy Espinoza, ID    Procedures: none    Complications: none    Discharge Condition: fair    Exam:  GEN:    Awake, alert and oriented x3. EYES:   EOMI, pupils equal   NECK: Supple. No lymphadenopathy.  No carotid bruit  CVS:     regular rate and rhythm, no audible murmur  PULM:  Diminished with bibasilar rales, no acute respiratory distress  ABD:     Bowels sounds normal.  Abdomen is soft.  No distention.  no tenderness to palpation. EXT:     no edema bilaterally .  No calf tenderness. NEURO: Moves all extremities.  Motor and sensory are grossly intact  SKIN:    No rashes.  No skin lesions. Significant Diagnostic Studies:   Lab Results   Component Value Date    WBC 10.6 11/25/2021    HGB 13.5 11/25/2021     11/25/2021       Lab Results   Component Value Date    BUN 19 11/25/2021    CREATININE 0.53 11/25/2021     (L) 11/25/2021    K 4.5 11/25/2021    CALCIUM 8.9 11/25/2021    CL 92 (L) 11/25/2021    CO2 26 11/25/2021    LABGLOM >60 11/25/2021       Lab Results   Component Value Date    WBCUA 2 TO 5 11/16/2021    RBCUA 0 TO 2 11/16/2021    EPITHUA 2 TO 5 11/16/2021    LEUKOCYTESUR SMALL (A) 11/16/2021    SPECGRAV 1.010 11/16/2021    GLUCOSEU NEGATIVE 11/16/2021    KETUA NEGATIVE 11/16/2021    PROTEINU NEGATIVE 11/16/2021    HGBUR NEGATIVE 11/16/2021    CASTUA NOT REPORTED 11/16/2021    CRYSTUA NOT REPORTED 11/16/2021    BACTERIA 1+ (A) 11/16/2021    YEAST NOT REPORTED 11/16/2021       CT Head WO Contrast    Result Date: 11/15/2021  EXAMINATION: CT OF THE HEAD WITHOUT CONTRAST  11/15/2021 2:29 pm TECHNIQUE: CT of the head was performed without the administration of intravenous contrast. Dose modulation, iterative reconstruction, and/or weight based adjustment of the mA/kV was utilized to reduce the radiation dose to as low as reasonably achievable.  COMPARISON: 03/01/2013 HISTORY: ORDERING SYSTEM PROVIDED HISTORY: confusion TECHNOLOGIST PROVIDED HISTORY: confusion Decision Support Exception - unselect if not a suspected or confirmed emergency medical condition->Emergency Medical Condition (MA) FINDINGS: BRAIN/VENTRICLES: The ventricles and sulci are diffusely enlarged. Low attenuation is seen in the periventricular and subcortical white matter. No acute intracranial hemorrhage or acute infarct is identified. ORBITS: The visualized portion of the orbits demonstrate no acute abnormality. SINUSES: Small amount of fluid in the right maxillary sinus. Otherwise the paranasal sinuses appear clear. SOFT TISSUES/SKULL:  No acute abnormality of the visualized skull or soft tissues. No acute intracranial abnormality. Diffuse atrophic changes with findings suggesting chronic microvascular ischemia     CT CHEST WO CONTRAST    Result Date: 11/15/2021  EXAMINATION: CT OF THE CHEST WITHOUT CONTRAST 11/15/2021 2:30 pm TECHNIQUE: CT of the chest was performed without the administration of intravenous contrast. Multiplanar reformatted images are provided for review. Dose modulation, iterative reconstruction, and/or weight based adjustment of the mA/kV was utilized to reduce the radiation dose to as low as reasonably achievable. COMPARISON: None. HISTORY: ORDERING SYSTEM PROVIDED HISTORY: covid TECHNOLOGIST PROVIDED HISTORY: covid Decision Support Exception - unselect if not a suspected or confirmed emergency medical condition->Emergency Medical Condition (MA) FINDINGS: Mediastinum: Mild cardiomegaly. No pericardial effusion. Thoracic aorta is normal in caliber. Scattered atherosclerotic changes. Scattered mildly prominent mediastinal lymph nodes. Lungs/pleura: Scattered ground-glass infiltrate throughout both lungs. No effusion or pneumothorax. Upper Abdomen: No acute abnormality. Small sliding hiatal hernia. Soft Tissues/Bones: No aggressive osseous lesions. Confluent anterolateral vertebral body syndesmophyte formation compatible with DISH. Mai Crumble No acute soft tissue abnormality. Multifocal bilateral ground-glass opacities throughout both lungs compatible with COVID-19 pneumonia.        Assessment and Plan:  Patient Active Problem List    Diagnosis Date Noted    Type 2 diabetes mellitus without complication ([address] 10/69/6080    Closed right hip fracture ([address] 06/20/2016    Essential hypertension 06/20/2016    COVID-19 virus infection / No vaccine 11/18/2021    Acute respiratory failure with hypoxia ([address] 11/16/2021    Pneumonia due to COVID-19 virus 11/15/2021    S/P total knee arthroplasty, right 08/10/2020    Malignant neoplasm of sigmoid colon ([address]     Status post left hip replacement 08/08/2017    Status post total replacement of right hip 62/76/4172    Diastolic dysfunction with chronic heart failure ([address] 06/21/2016    Mild malnutrition ([address] 06/21/2016    Partial recent retinal detachment with multiple defects 10/03/2013    Aphakia of eye, left 07/23/2013    Corneal edema, secondary, left 07/23/2013        Discharge Medications:         Medication List      START taking these medications    ascorbic acid 1000 MG tablet  Commonly known as: VITAMIN C  Take 1 tablet by mouth 2 times daily for 14 days     zinc sulfate 220 (50 Zn) MG capsule  Commonly known as: ZINCATE  Take 2 capsules by mouth daily  Start taking on: November 26, 2021        CONTINUE taking these medications    budesonide 180 MCG/ACT Aepb inhaler  Commonly known as: PULMICORT  Inhale 2 puffs into the lungs 2 times daily     docusate sodium 100 MG capsule  Commonly known as: COLACE     FIBER PO     latanoprost 0.005 % ophthalmic solution  Commonly known as: XALATAN     metFORMIN 500 MG tablet  Commonly known as: GLUCOPHAGE     rivaroxaban 10 MG Tabs tablet  Commonly known as: XARELTO  Take 1 tablet by mouth daily     TEARS PLUS OP        STOP taking these medications    acetaminophen 325 MG tablet  Commonly known as: TYLENOL           Where to Get Your Medications      These medications were sent to [facility] [place], [place] - [address]  [place], [place]    Phone: [phone]   · ascorbic acid 1000 MG tablet     You can get these medications from any pharmacy    You don't need a prescription for these medications  · zinc sulfate 220 (50 Zn) MG capsule         Patient Instructions:    Activity: activity as tolerated  Diet: regular diet  Wound Care: none needed  Other: None    Disposition:   Discharge to Home    Follow up:  Patient will be followed by Liam Ohara MD in 1-2 weeks    CORE MEASURES on Discharge (if applicable)  ACE/ARB in CHF: NA  Statin in MI: NA  ASA in MI: NA  Statin in CVA: NA  Antiplatelet in CVA: NA    Total time spent on discharge services: 40 minutes    Including the following activities:  Evaluation and Management of patient  Discussion with patient and/or surrogate about current care plan  Coordination with Case Management and/or   Coordination of care with Consultants (if applicable)   Coordination of care with Receiving Facility Physician (if applicable)  Completion of DME forms (if applicable)  Preparation of Discharge Summary  Preparation of Medication Reconciliation  Preparation of Discharge Prescriptions    Signed:  Pryor Kawasaki, APRN - CNP, LARRY, NP-C  11/25/2021, 12:25 PM

## 2021-11-25 NOTE — PROGRESS NOTES
Patient Education    BRIGHT FUTURES HANDOUT- PARENT  4 MONTH VISIT  Here are some suggestions from [employer] experts that may be of value to your family.     HOW YOUR FAMILY IS DOING  Learn if your home or drinking water has lead and take steps to get rid of it. Lead is toxic for everyone.  Take time for yourself and with your partner. Spend time with family and friends.  Choose a mature, trained, and responsible  or caregiver.  You can talk with us about your  choices.    FEEDING YOUR BABY    For babies at 4 months of age, breast milk or iron-fortified formula remains the best food. Solid foods are discouraged until about 6 months of age.    Avoid feeding your baby too much by following the baby s signs of fullness, such as  Leaning back  Turning away  If Breastfeeding  Providing only breast milk for your baby for about the first 6 months after birth provides ideal nutrition. It supports the best possible growth and development.  Be proud of yourself if you are still breastfeeding. Continue as long as you and your baby want.  Know that babies this age go through growth spurts. They may want to breastfeed more often and that is normal.  If you pump, be sure to store your milk properly so it stays safe for your baby. We can give you more information.  Give your baby vitamin D drops (400 IU a day).  Tell us if you are taking any medications, supplements, or herbal preparations.  If Formula Feeding  Make sure to prepare, heat, and store the formula safely.  Feed on demand. Expect him to eat about 30 to 32 oz daily.  Hold your baby so you can look at each other when you feed him.  Always hold the bottle. Never prop it.  Don t give your baby a bottle while he is in a crib.    YOUR CHANGING BABY    Create routines for feeding, nap time, and bedtime.    Calm your baby with soothing and gentle touches when she is fussy.    Make time for quiet play.    Hold your baby and talk with her.    Read to  Progress Note    SUBJECTIVE:    Patient seen for f/u of COVID-19 virus infection. She resting in bed alert oriented no acute distress. Patient currently on 4 L per nasal cannula at 97% decreasing to 2L and monitoring. She has no complaints. ROS:   Constitutional: negative  for fevers, and negative for chills. Respiratory: negative for shortness of breath, positive for cough, and negative for wheezing  Cardiovascular: negative for chest pain, and negative for palpitations  Gastrointestinal: negative for abdominal pain, negative for nausea,negative for vomiting, negative for diarrhea, and negative for constipation     All other systems were reviewed with the patient and are negative unless otherwise stated in HPI      OBJECTIVE:      Vitals:   Vitals:    11/25/21 0300   BP: 125/61   Pulse: 72   Resp: 20   Temp: 97 °F (36.1 °C)   SpO2: 97%     Weight: 139 lb (63 kg)   Height: 5' 4\" (162.6 cm)     Weight  Wt Readings from Last 3 Encounters:   11/24/21 139 lb (63 kg)   11/12/21 149 lb (67.6 kg)   08/12/20 161 lb 9.6 oz (73.3 kg)     Body mass index is 23.86 kg/m². 24HR INTAKE/OUTPUT:      Intake/Output Summary (Last 24 hours) at 11/25/2021 0711  Last data filed at 11/24/2021 1718  Gross per 24 hour   Intake --   Output 400 ml   Net -400 ml     -----------------------------------------------------------------  Exam:    GEN:    Awake, alert and oriented x3. EYES:   EOMI, pupils equal   NECK: Supple. No lymphadenopathy. No carotid bruit  CVS:     regular rate and rhythm, no audible murmur  PULM:  Diminished with bibasilar rales, no acute respiratory distress  ABD:     Bowels sounds normal.  Abdomen is soft. No distention. no tenderness to palpation. EXT:     no edema bilaterally . No calf tenderness. NEURO: Moves all extremities. Motor and sensory are grossly intact  SKIN:    No rashes.   No skin lesions.  -----------------------------------------------------------------    Diagnostic Data:      Complete Blood Count:   Recent Labs     11/23/21  0555 11/24/21  0610   WBC 10.1 10.8   RBC 4.35 4.26   HGB 13.5 13.1   HCT 40.1 39.6   MCV 92.2 93.0   MCH 31.0 30.8   MCHC 33.7 33.1   RDW 12.3 12.2    263   MPV 10.1 10.2        Last 3 Blood Glucose:   Recent Labs     11/23/21  0555 11/24/21  0610   GLUCOSE 132* 210*        Comprehensive Metabolic Profile:   Recent Labs     11/23/21  0555 11/24/21  0610   * 135   K 4.9 4.7   CL 97* 96*   CO2 25 24   BUN 20 21   CREATININE 0.50 0.62   GLUCOSE 132* 210*   CALCIUM 9.1 8.9   PROT 6.4 6.3*   LABALBU 3.5 3.4*   BILITOT 0.47 0.43   ALKPHOS 65 69   AST 17 20   ALT 26 26        Urinalysis:   Lab Results   Component Value Date    NITRU NEGATIVE 11/16/2021    COLORU Yellow 11/16/2021    PHUR 6.5 11/16/2021    WBCUA 2 TO 5 11/16/2021    RBCUA 0 TO 2 11/16/2021    MUCUS NOT REPORTED 11/16/2021    TRICHOMONAS NOT REPORTED 11/16/2021    YEAST NOT REPORTED 11/16/2021    BACTERIA 1+ 11/16/2021    SPECGRAV 1.010 11/16/2021    LEUKOCYTESUR SMALL 11/16/2021    UROBILINOGEN Normal 11/16/2021    BILIRUBINUR NEGATIVE 11/16/2021    GLUCOSEU NEGATIVE 11/16/2021    KETUA NEGATIVE 11/16/2021    AMORPHOUS NOT REPORTED 11/16/2021       HgBA1c:    Lab Results   Component Value Date    LABA1C 7.1 08/11/2020       Lactic Acid:   Lab Results   Component Value Date    LACTA 1.7 11/15/2021        Troponin: No results for input(s): TROPONINI in the last 72 hours. Radiology/Imaging:  CT CHEST WO CONTRAST   Final Result   Multifocal bilateral ground-glass opacities throughout both lungs compatible   with COVID-19 pneumonia. CT Head WO Contrast   Final Result   No acute intracranial abnormality.       Diffuse atrophic changes with findings suggesting chronic microvascular   ischemia               ASSESSMENT / PLAN:  COVID-19 virus infection  · Continue current therapy   · Remdesivir-not indicated  · Continue dexamethasone  · Continue vitamin C, D and zinc  · Continue Xarelto  · Trend your baby often.    Encourage active play.    Offer floor gyms and colorful toys to hold.    Put your baby on her tummy for playtime. Don t leave her alone during tummy time or allow her to sleep on her tummy.    Don t have a TV on in the background or use a TV or other digital media to calm your baby.    HEALTHY TEETH    Go to your own dentist twice yearly. It is important to keep your teeth healthy so you don t pass bacteria that cause cavities on to your baby.    Don t share spoons with your baby or use your mouth to clean the baby s pacifier.    Use a cold teething ring if your baby s gums are sore from teething.    Don t put your baby in a crib with a bottle.    Clean your baby s gums and teeth (as soon as you see the first tooth) 2 times per day with a soft cloth or soft toothbrush and a small smear of fluoride toothpaste (no more than a grain of rice).    SAFETY  Use a rear-facing-only car safety seat in the back seat of all vehicles.  Never put your baby in the front seat of a vehicle that has a passenger airbag.  Your baby s safety depends on you. Always wear your lap and shoulder seat belt. Never drive after drinking alcohol or using drugs. Never text or use a cell phone while driving.  Always put your baby to sleep on her back in her own crib, not in your bed.  Your baby should sleep in your room until she is at least 6 months of age.  Make sure your baby s crib or sleep surface meets the most recent safety guidelines.  Don t put soft objects and loose bedding such as blankets, pillows, bumper pads, and toys in the crib.    Drop-side cribs should not be used.    Lower the crib mattress.    If you choose to use a mesh playpen, get one made after February 28, 2013.    Prevent tap water burns. Set the water heater so the temperature at the faucet is at or below 120 F /49 C.    Prevent scalds or burns. Don t drink hot drinks when holding your baby.    Keep a hand on your baby on any surface from which she  CRPs-improving  · Received Actemra 11/17/2021  · Appreciate infectious disease  · Blood culture x1+ gram-positive cocci-contaminant  · Acute respiratory failure with hypoxia  · Supplemental oxygen to maintain SPO2 greater than 92%-currently on 2 L  · Acapella  · Prone  · PT/OT  · Nebs  · Type 2 diabetes  · POCT before meals and at bedtime  · Medium dose sliding scale  · Hypoglycemia protocol  · Continue Glucophage  · Nutrition status:   · at risk for malnutrition  · Dietician consult initiated  · Hospital Prophylaxis:   · DVT: Xarelto   · Stress Ulcer: H2 Blocker   · High risk medications: none   · Disposition:    · Discharge plan is home today    FACE TO FACE: Patient qualified for home O2. Discussed with patient the medical necessity of home O2. Patient voiced understanding and agreement.     LARRY Sierra CNP, LARRY, NP-C  11/25/2021, 7:12 AM        LARRY Sierra CNP, APRN, NP-C  [facility]        11/25/2021, 7:11 AM might fall and get hurt, such as a changing table, couch, or bed.    Never leave your baby alone in bathwater, even in a bath seat or ring.    Keep small objects, small toys, and latex balloons away from your baby.    Don t use a baby walker.    WHAT TO EXPECT AT YOUR BABY S 6 MONTH VISIT  We will talk about  Caring for your baby, your family, and yourself  Teaching and playing with your baby  Brushing your baby s teeth  Introducing solid food    Keeping your baby safe at home, outside, and in the car        Helpful Resources:  Information About Car Safety Seats: www.safercar.gov/parents  Toll-free Auto Safety Hotline: 512.708.3403  Consistent with Bright Futures: Guidelines for Health Supervision of Infants, Children, and Adolescents, 4th Edition  For more information, go to https://brightfutures.aap.org.           Patient Education

## 2021-11-25 NOTE — PROGRESS NOTES
Rn asked patient if she wanted to get a bath.  Patient refused other than cleaning her back and face

## 2021-11-25 NOTE — PROGRESS NOTES
[facility] -Progress Note COVID 19 Patient-Tele-Visit  Today's Date and Time: 11/25/2021, 9:37 AM    Impression :     · COVID 19 Confirmed Infection  · Covid tests:  · 11/12/21: Positive  · Acute hypoxic respiratory distress  · Acute confusion  · Hyponatremia  · Hx colorectal cancer s/p resection  · Hx bilateral hip replacement  · DM  · Hx urinary incontinence  · Hx cataracts s/p eye surgery X 5  · Single blood culture with Micrococcus on 11-15-21: contaminant      Patient evaluated by Telemedicine. Requesting Institution: [facility]  Provider Institution: [facility],[address], [address] Person at [address] [place]: MIYA Rosen    Recommendations:   · Antibiotic treatment:  · Monitor off antibiotics  · Covid Rx:  · Remdesivir-Initiated 11/15/21  · Decadron-Initiated 11/15/21  · Actemra-administered 11/17/21      Medical Decision Making/Summary/Discussion:11/25/2021     · Patient admitted with suspected COVID 19 infection  · Covid test confirmed positive. Infection Control Recommendations   · Universal Precautions  · Airborne isolation  · Droplet Isolation    Antimicrobial Stewardship Recommendations     · Discontinuation of therapy  Coordination of Outpatient Care:   · Estimated Length of IV antimicrobials:TBD  · Patient will need Midline Catheter Insertion: TBD  · Patient will need PICC line Insertion: No  · Patient will need: Home IV , [place],  SNF,  LTAC:TBD  · Patient will need outpatient wound care:No    Chief complaint/reason for consultation:   · Concern for COVID infection      History of Present Illness:   Shruthi Vazquez is a 78y.o.-year-old female who was initially admitted on 11/15/2021. Patient seen at the request of Dr. Daisy Conner:    Patient initially presented through [address] [place] [address] on 11/12/21 with complaints of fatigue, fever and myalgias over the past two days.  She was exposed to Covid from family members she lives with. She denied shortness of breath. She was found to be stable on room air and was discharged on symbicort. On 11/15/21, the patient was brought back to the ED by family members for worsening shortness of breath and confusion. She was found to have an SpO2 of 89% on room air. Imaging revealed bilateral ground-glass opacities. CT head was negative for any acute abnormalities. Abnormal labs include:   Na: 128  D-dimer: 1.47    The patient was started on Decadron and Remdesivir. Patient admitted because of concerns with COVID 19.    CURRENT EVALUATION : 11/25/2021    Afebrile  VS stable    The patient is doing well on 4 L NC  On Decadron. Actemra given on 11/17/21    She remains alert and oriented. No acute changes noted    Urine culture shows no significant growth  Blood culture with Micrococcus = contaminant    Patient exhibiting respiratory distress. yes  Respiratory secretions: no    Patient receiving supplemental oxygen.: 4-->6-->5-->4.5-->4 L NC  RR:16-->18-->20  02 sat: 94-->94 -->97 %    QTc:       NEWS Score: 0-4 Low risk group; 5-6: Medium risk group; 7 or above: High risk group  Parameters 3 2 1 0 1 2 3   Age    < 65   ? 65   RR ? 8  9-11 12-20  21-24 ? 25   O2 Sats ?  91 92-93 94-95 ? 96      Suppl O2  Yes  No      SBP ? 90  101-110 111-219   ? 220   HR ? 40  41-50 51-90  111-130 ? 131   Consciousness    Alert   Drowsiness, lethargy, or confusion   Temperature ? 35.0 C (95.0 F)  35.1-36.0 C 95.1-96.9 F 36.1-38.0 C 97.0-100.4 F 38.1-39.0 C 100.5-102.3 F ? 39.1 C ? 102.4 F      NEWS Score:  ·  11/16/21: 7 High risk    Overall Daily Picture:    Improving    Presence of secondary bacterial Infection:    No   Additional antibiotics: No    Labs, X rays reviewed: 11/25/2021    BUN:20-->26  Cr:0.50-->0.62-->0.53    WBC:10.1-->10.6  Hb:13.5-->13.5  Plat: 265-->263-->266    Absolute Neutrophils:4.60  Absolute Lymphocytes:0.77  Neutrophil/Lymphocyte Ratio: 6 high REPAIR/ EXCISION OF OS PERONEUM,PRIMARY REPAIR OF PERONEUS LINGUS TENDON WITH POSSIBLE PERONEAL TENODESIS performed by Renetta Vides DPM at [facility] PROCTOSIGMOIDOSCOPY N/A 10/30/2019    ANAL PROCTO SIGMOIDOSCOPY FLEXIBLE with biopsies performed by Micaela Miranda MD at [address]  10/30/2019    -sigmoid colon mass(invasive ulcerated adenocarcinoma)    TOTAL HIP ARTHROPLASTY Left 8/7/2017    HIP TOTAL ARTHROPLASTY performed by Chauncey Landers MD at [address] Right 8/10/2020    KNEE TOTAL ARTHROPLASTY performed by Chauncey Landers MD at [facility] dilation    VITRECTOMY Left 10/3/13       Medications:      fluticasone  1 spray Each Nostril Daily    budesonide  0.5 mg Nebulization BID    docusate sodium  100 mg Oral BID    latanoprost  1 drop Both Eyes Nightly    metFORMIN  500 mg Oral BID WC    rivaroxaban  10 mg Oral Daily    insulin lispro  0-12 Units SubCUTAneous TID WC    insulin lispro  0-6 Units SubCUTAneous Nightly    ascorbic acid  1,000 mg Oral 4x Daily    famotidine  20 mg Oral BID    zinc sulfate  100 mg Oral Daily    vitamin D  50,000 Units Oral Weekly       Social History:     Social History     Socioeconomic History    Marital status:      Spouse name: Not on file    Number of children: Not on file    Years of education: Not on file    Highest education level: Not on file   Occupational History    Not on file   Tobacco Use    Smoking status: Never Smoker    Smokeless tobacco: Never Used   Vaping Use    Vaping Use: Never used   Substance and Sexual Activity    Alcohol use: No    Drug use: No    Sexual activity: Not on file   Other Topics Concern    Not on file   Social History Narrative    Not on file     Social Determinants of Health     Financial Resource Strain:     Difficulty of Paying Living Expenses: Not on file   Food Insecurity:     Worried About Running Out of Food in the Last Year: Not on file    Ran Out of Food in the Last Year: Not on file   Transportation Needs:     Lack of Transportation (Medical): Not on file    Lack of Transportation (Non-Medical): Not on file   Physical Activity:     Days of Exercise per Week: Not on file    Minutes of Exercise per Session: Not on file   Stress:     Feeling of Stress : Not on file   Social Connections:     Frequency of Communication with Friends and Family: Not on file    Frequency of Social Gatherings with Friends and Family: Not on file    Attends Mandaen Services: Not on file    Active Member of [address] [place] [address] [employer] or Organizations: Not on file    Attends Club or Organization Meetings: Not on file    Marital Status: Not on file   Intimate Partner Violence:     Fear of Current or Ex-Partner: Not on file    Emotionally Abused: Not on file    Physically Abused: Not on file    Sexually Abused: Not on file   Housing Stability:     Unable to Pay for Housing in the Last Year: Not on file    Number of [place] in the Last Year: Not on file    Unstable Housing in the Last Year: Not on file       Family History:     Family History   Problem Relation Age of Onset    Heart Disease Mother         CARDIAC MASS    Heart Disease Father     Cancer Paternal Grandfather     Cancer Other         both families, several family members        Allergies:   Aleve [naproxen sodium], Aspirin, Codeine, Nsaids, Align [lactase-lactobacillus], Amitriptyline, Gabapentin, Lidocaine, Lyrica [pregabalin], Omnipaque [iohexol], Other, Tetracaine, and Iodine     Review of Systems:       Constitutional: No fevers or chills. No systemic complaints  Head: No headaches  Eyes: No double vision or blurry vision. No conjunctival inflammation. ENT: No sore throat or runny nose. . No hearing loss, tinnitus or vertigo. Cardiovascular: No chest pain or palpitations. Shortness of breath. MACK  Lung:  Shortness of breath or cough.  No sputum production  Abdomen: No nausea, vomiting, diarrhea, or abdominal pain. Curt Gutter No cramps. Genitourinary: No increased urinary frequency, or dysuria. No hematuria. No suprapubic or CVA pain  Musculoskeletal: No muscle aches or pains. No joint effusions, swelling or deformities  Hematologic: No bleeding or bruising. Neurologic: No headache, weakness, numbness, or tingling. Integument: No rash, no ulcers. Psychiatric: No depression. Endocrine: No polyuria, no polydipsia, no polyphagia. Physical Examination :     Patient Vitals for the past 8 hrs:   BP Temp Temp src Pulse Resp SpO2   11/25/21 0815 -- 97 °F (36.1 °C) Temporal -- 20 --   11/25/21 0300 125/61 97 °F (36.1 °C) Temporal 72 20 97 %     General Appearance: Awake, alert, and in no apparent distress  Head:  Normocephalic, no trauma  Eyes: Pupils equal, round, reactive to light; sclera anicteric; conjunctivae pink. No embolic phenomena. ENT: Oropharynx clear, without erythema, exudate, or thrush. No tenderness of sinuses. Mouth/throat: mucosa pink and moist. No lesions. Dentition in good repair. Neck:Supple, without lymphadenopathy. Thyroid normal, No bruits. Pulmonary/Chest: Clear to auscultation, without wheezes, rales, or rhonchi. No dullness to percussion. Cardiovascular: Regular rate and rhythm without murmurs, rubs, or gallops. Abdomen: Soft, non tender. Bowel sounds normal. No organomegaly  All four Extremities: No cyanosis, clubbing, edema, or effusions. Neurologic: No gross sensory or motor deficits. Skin: Warm and dry with good turgor. No signs of peripheral arterial or venous insufficiency. No ulcerations. No open wounds.     Medical Decision Making -Laboratory:   I have independently reviewed/ordered the following labs:    CBC with Differential:   Recent Labs     11/24/21 0610 11/25/21 0636   WBC 10.8 10.6   HGB 13.1 13.5   HCT 39.6 40.6    266   LYMPHOPCT 14* 15*   MONOPCT 5 5     BMP:   Recent Labs     11/24/21 0610 11/25/21 0636  129*   K 4.7 4.5   CL 96* 92*   CO2 24 26   BUN 21 19   CREATININE 0.62 0.53     Hepatic Function Panel:   Recent Labs     11/24/21  0610 11/25/21  0636   PROT 6.3* 6.5   LABALBU 3.4* 3.6   BILITOT 0.43 0.63   ALKPHOS 69 67   ALT 26 29   AST 20 20     No results for input(s): RPR in the last 72 hours. No results for input(s): HIV in the last 72 hours. No results for input(s): BC in the last 72 hours. Lab Results   Component Value Date    MUCUS NOT REPORTED 11/16/2021    RBC 4.39 11/25/2021    RBC 3.98 11/16/2011    TRICHOMONAS NOT REPORTED 11/16/2021    WBC 10.6 11/25/2021    YEAST NOT REPORTED 11/16/2021    TURBIDITY Clear 11/16/2021     Lab Results   Component Value Date    CREATININE 0.53 11/25/2021    GLUCOSE 110 11/25/2021    GLUCOSE 116 11/16/2011       Medical Decision Making-Imaging:     Narrative   EXAMINATION:   ONE XRAY VIEW OF THE CHEST       11/12/2021 6:08 pm       COMPARISON:   None.       HISTORY:   ORDERING SYSTEM PROVIDED HISTORY: weakness   TECHNOLOGIST PROVIDED HISTORY:   weakness       FINDINGS:   There is diffuse prominence of the pulmonary interstitium.  No airspace   consolidation.  No pneumothorax or pleural effusion.  No cardiomegaly. Degenerative changes in the bilateral shoulders and spine.           Impression   Diffuse prominence of the pulmonary interstitium.  This is nonspecific but   can be seen in atypical/viral pneumonia.  Correlate clinically             Narrative   EXAMINATION:   CT OF THE CHEST WITHOUT CONTRAST 11/15/2021 2:30 pm       TECHNIQUE:   CT of the chest was performed without the administration of intravenous   contrast. Multiplanar reformatted images are provided for review.  Dose   modulation, iterative reconstruction, and/or weight based adjustment of the   mA/kV was utilized to reduce the radiation dose to as low as reasonably   achievable.       COMPARISON:   None.       HISTORY:   ORDERING SYSTEM PROVIDED HISTORY: covid   TECHNOLOGIST PROVIDED HISTORY:   covid   Decision Support Exception - unselect if not a suspected or confirmed   emergency medical condition->Emergency Medical Condition (MA)       FINDINGS:   Mediastinum: Mild cardiomegaly.  No pericardial effusion.  Thoracic aorta is   normal in caliber.  Scattered atherosclerotic changes.  Scattered mildly   prominent mediastinal lymph nodes.       Lungs/pleura: Scattered ground-glass infiltrate throughout both lungs.  No   effusion or pneumothorax.       Upper Abdomen: No acute abnormality.  Small sliding hiatal hernia.       Soft Tissues/Bones: No aggressive osseous lesions.  Confluent anterolateral   vertebral body syndesmophyte formation compatible with DISH. Thomasena Ian acute soft   tissue abnormality.           Impression   Multifocal bilateral ground-glass opacities throughout both lungs compatible   with COVID-19 pneumonia.             Narrative   EXAMINATION:   CT OF THE HEAD WITHOUT CONTRAST  11/15/2021 2:29 pm       TECHNIQUE:   CT of the head was performed without the administration of intravenous   contrast. Dose modulation, iterative reconstruction, and/or weight based   adjustment of the mA/kV was utilized to reduce the radiation dose to as low   as reasonably achievable.       COMPARISON:   03/01/2013       HISTORY:   ORDERING SYSTEM PROVIDED HISTORY: confusion   TECHNOLOGIST PROVIDED HISTORY:   confusion       Decision Support Exception - unselect if not a suspected or confirmed   emergency medical condition->Emergency Medical Condition (MA)       FINDINGS:   BRAIN/VENTRICLES: The ventricles and sulci are diffusely enlarged.  Low   attenuation is seen in the periventricular and subcortical white matter.  No   acute intracranial hemorrhage or acute infarct is identified.       ORBITS: The visualized portion of the orbits demonstrate no acute abnormality.       SINUSES: Small amount of fluid in the right maxillary sinus.  Otherwise the   paranasal sinuses appear clear.       SOFT TISSUES/SKULL:  No acute abnormality of the visualized skull or soft   tissues.           Impression   No acute intracranial abnormality.       Diffuse atrophic changes with findings suggesting chronic microvascular   ischemia                 Medical Decision Eakogj-Okhhyrqa-Cviqf:       Medical Decision Making-Other:     Note:  · Labs, medications, radiologic studies were reviewed with personal review of films  ·  Large amounts of data were reviewed  · Discussed with nursing Staff, Discharge planner  · Infection Control and Prevention measures reviewed  · All prior entries were reviewed  · Administer medications as ordered  · Prognosis: Guarded  · Discharge planning reviewed      Thank you for allowing us to participate in the care of this patient. Please call with questions.     Dolores Rebolledo MD           Pager: [phone] - Office: [phone]

## 2021-11-25 NOTE — PROGRESS NOTES
Physical Therapy  Facility/Department: [facility] MED SURG  Daily Treatment Note  NAME: Jus Perry  : 1940  MRN: [id number]    Date of Service: 2021    Discharge Recommendations:  Continue to assess pending progress, Home with Home health PT, IP Rehab        Assessment   Treatment Diagnosis: Difficulty walking  Prognosis: Good  Decision Making: Medium Complexity  PT Education: Energy Conservation; PT Role; Goals; Plan of Care  Patient Education: Educated pt on above with good understanding. REQUIRES PT FOLLOW UP: Yes  Activity Tolerance  Activity Tolerance: Patient Tolerated treatment well     Patient Diagnosis(es): The primary encounter diagnosis was Hypoxia. A diagnosis of Pneumonia due to COVID-19 virus was also pertinent to this visit. has a past medical history of Achilles tendon rupture, Cancer ([address] Chronic SI joint pain, Claustrophobia, Diabetes mellitus ([address] Esophageal stricture, [place] (hard of hearing), Hyperlipidemia, Hypertension, Retinal detachment, and Trauma to eye, left.   has a past surgical history that includes Cervical disc surgery (); Lumbar disc surgery (); Hysterectomy (); bladder suspension (); Blepharoplasty (Left, 5/3/2013); Eye surgery (Left, 06/15/2013); Eye surgery (Bilateral, ); Upper gastrointestinal endoscopy; Esophagus dilation; Cataract removal (Bilateral); eye surgery (Left, ,,,); vitrectomy (Left, 10/3/13); Total hip arthroplasty (Left, 2017); joint replacement (Right, 2016); Foot Amputation; Foot Tendon Surgery (Left, 2018); pr foot/toes surgery proc unlisted (Left, 3/9/2018); Finger trigger release (Left, 4/15/2019); proctosigmoidoscopy (N/A, 10/30/2019); proctosigmoidoscopy (10/30/2019); colectomy; Knee Arthroplasty (Right, 08/10/2020); and Total knee arthroplasty (Right, 8/10/2020).     Restrictions  Restrictions/Precautions  Restrictions/Precautions: General Precautions, Fall Risk, Isolation  Subjective General  Chart Reviewed: Yes  Response To Previous Treatment: Patient with no complaints from previous session. Family / Caregiver Present: No  Referring Practitioner: GRETA Nuñez  Subjective  Subjective: Pt. in bed upon arrival and reports no pain at this time. Pt. stated she may be able to go home today. Orientation  Orientation  Overall Orientation Status: Within Functional Limits  Cognition      Objective   Bed mobility  Bridging: Supervision  Rolling to Right: Supervision  Supine to Sit: Supervision  Scooting: Supervision  Transfers  Sit to Stand: Supervision  Stand to sit: Supervision  Ambulation  Ambulation?: Yes  Ambulation 1  Surface: level tile  Device: Rolling Walker  Assistance: Stand by assistance; Supervision  Distance: 90ftx1  Stairs/Curb  Stairs?: No        Exercises  Comments: Supine and seated exercises B Le x20     Goals  Short term goals  Time Frame for Short term goals: 20 days  Short term goal 1: Patient to complete sit/stand and stand pivot transfers with SUP and no LOB to decrease fall risk. Short term goal 2: Patient to ambulate 150ft with FWW or LRAD and SUP with SpO2 >/=90% for improved endurance. Short term goal 3: Patient to tolerate 20-30 min of ther ex/act to improve functional strnegth and endurance. Short term goal 4: Patient to ascend/descend 4 steps with HRx1 and SUP with no LOB to safely enter her home. Plan    Plan  Times per week: 7  Times per day: Twice a day  Current Treatment Recommendations: Strengthening, Neuromuscular Re-education, Home Exercise Program, ROM, Safety Education & Training, Balance Training, Endurance Training, Patient/Caregiver Education & Training, Functional Mobility Training, Transfer Training, Gait Training, Stair training  Safety Devices  Type of devices:  All fall risk precautions in place, Call light within reach, Patient at risk for falls, Nurse notified, Left in chair     Therapy Time   Individual Concurrent Group Co-treatment   Time In 0741         Time Out 0804         Minutes [address], [place]

## 2022-02-28 NOTE — DISCHARGE SUMMARY
Occupational [address]  Occupational Therapy Discharge Note    Date: 2022      Patient: Biride Petersen  : 1940  MRN: [id number]    Referring Practitioner: Mary Lou Chin MD   Medical Diagnosis: M25.641 Stiffness of R hand  Rehab Diagnosis/ICD-10#s: Weakness, M62.81; Lack of coordination, R27.8; Stiffness R hand, M25.641  Insurance: OT Insurance Information: Neo [employer] / [employer]  Total # of Visits to Date: 11   Onset Date:   21    Next Dr. Delvis Medellin: unknown  Total visits attended: 6  Cancels/No shows:  0  Date of initial visit: 21                  Date of final visit: 21  [facility] #: [id number]      Subjective:  Pain:  [] Yes  [x] No  Location:  N/A  Pain Rating: (0-10 scale) 0/10  Pain altered Tx:  [x] No  [] Yes  Action:  Comments:        Goals:     Time Frame for Long term goals : 6 weeks       Long term goal 1: Patient to state <30% impairments throughout daily tasks, as measured by the DASH. MET 27.5%  [x]??????????Met  []?????????? Partially met  []???????? ?? Not met   Long term goal 2: Patient to improve JIMENEZ R digits >210 to improve ability to make full composite fist and improve functional use of hand throughout daily tasks. Continue as HEP- Progressed since last measurement     See below        []??????????Met  [x]?????????? Partially met  []???????? ?? Not met   Long term goal 3: Patient to improve R wrist flexion to 45 and UD to 30 to improve ROM for functional use. MET [x]??????????Met  []?????????? Partially met  []???????? ?? Not met   Long term goal 4: Patient to improve R  to >20#, 2 pt to 4# and 3 pt to 4# to improve strength of R hand for improved functional use. MET [x]??????????Met  []?????????? Partially met  []???????? ?? Not met   Long term goal 5: Patient to demonstrate decreased edema PIP of RLF <7.0 cm and PIP of RRF to <6.5 cm. Continue as HEP  RLF: 7.0  RRF: 6.4 - Met    []??????????Met  [x]?????????? Partially met  []???????? ?? Not met Time Frame for Short term goals: 4 weeks       Short term goal 1: Patient to state compliance and independence c HEP. Met - upgrade as patient progresses [x]??????????Met  []?????????? Partially met  []???????? ?? Not met   Short term goal 2: Patient to improve JIMENEZ R digits 3-5 to >140 to improve functional use. Met    [x]??????????Met  []?????????? Partially met  []???????? ?? Not met   ADDITIONAL COMMENTS          RIGHT HAND Index Long Ring Small   MP 0-76 0-75 0-75 0-91   PIP 0-105   40-95   DIP 0-55 0-47 0-36 0-50   JIMENEZ 236 211 202 196          Treatment to Date:  [x] Therapeutic Exercise    [x] Modalities:  [x] Therapeutic Activity    [x] Ultrasound  [x] Electrical Stimulation  [] Ortho refit/check             [] Massage   [x] Fluidotherapy  [] Neuromuscular Re-education [x] Cold/hotpack [] Iontophoresis: 4 mg/mL  [x] Instruction in Home Exercise Program                     Dexamethasone Sodium  [x] Manual Therapy             Phosphate 40-80 mAmin          [x] Paraffin        [] Other:    Discharge Status:     [] Pt recovered from conditions. Treatment goals were met. [x] Pt received maximum benefit. No further therapy indicated at this time. [x] Pt to continue exercise/home instructions independently. [] Therapy interrupted due to:    [] Pt has 2 or more no shows/cancels, is discontinued per our policy. [] Pt has completed prescribed number of treatment sessions. [] Other:         Electronically signed by MIKE Castro,  2/28/2022 5:56 PM    If you have any questions or concerns, please don't hesitate to call.   Thank you for your referral.

## 2023-11-21 ENCOUNTER — HOSPITAL ENCOUNTER (EMERGENCY)
Age: 83
Discharge: HOME | End: 2023-11-21
Attending: EMERGENCY MEDICINE
Payer: MEDICARE

## 2023-11-21 ENCOUNTER — APPOINTMENT (OUTPATIENT)
Dept: GENERAL RADIOLOGY | Age: 83
End: 2023-11-21
Payer: MEDICARE

## 2023-11-21 VITALS
TEMPERATURE: 97.8 F | RESPIRATION RATE: 11 BRPM | HEART RATE: 71 BPM | SYSTOLIC BLOOD PRESSURE: 141 MMHG | OXYGEN SATURATION: 96 % | DIASTOLIC BLOOD PRESSURE: 80 MMHG

## 2023-11-21 DIAGNOSIS — R00.2 PALPITATIONS: Primary | ICD-10-CM

## 2023-11-21 LAB
ANION GAP SERPL CALCULATED.3IONS-SCNC: 13 MMOL/L (ref 9–17)
BASOPHILS # BLD: 0.04 K/UL (ref 0–0.2)
BASOPHILS NFR BLD: 0 % (ref 0–2)
BUN SERPL-MCNC: 13 MG/DL (ref 8–23)
BUN/CREAT SERPL: 19 (ref 9–20)
CALCIUM SERPL-MCNC: 9.4 MG/DL (ref 8.6–10.4)
CHLORIDE SERPL-SCNC: 97 MMOL/L (ref 98–107)
CO2 SERPL-SCNC: 25 MMOL/L (ref 20–31)
CREAT SERPL-MCNC: 0.7 MG/DL (ref 0.5–0.9)
EOSINOPHIL # BLD: 0.07 K/UL (ref 0–0.44)
EOSINOPHILS RELATIVE PERCENT: 1 % (ref 1–4)
ERYTHROCYTE [DISTWIDTH] IN BLOOD BY AUTOMATED COUNT: 12.1 % (ref 11.8–14.4)
GFR SERPL CREATININE-BSD FRML MDRD: >60 ML/MIN/1.73M2
GLUCOSE SERPL-MCNC: 216 MG/DL (ref 70–99)
HCT VFR BLD AUTO: 40.9 % (ref 36.3–47.1)
HGB BLD-MCNC: 13.3 G/DL (ref 11.9–15.1)
IMM GRANULOCYTES # BLD AUTO: 0.06 K/UL (ref 0–0.3)
IMM GRANULOCYTES NFR BLD: 1 %
LYMPHOCYTES NFR BLD: 1.9 K/UL (ref 1.1–3.7)
LYMPHOCYTES RELATIVE PERCENT: 16 % (ref 24–43)
MAGNESIUM SERPL-MCNC: 1.8 MG/DL (ref 1.6–2.6)
MCH RBC QN AUTO: 32 PG (ref 25.2–33.5)
MCHC RBC AUTO-ENTMCNC: 32.5 G/DL (ref 28.4–34.8)
MCV RBC AUTO: 98.6 FL (ref 82.6–102.9)
MONOCYTES NFR BLD: 0.76 K/UL (ref 0.1–1.2)
MONOCYTES NFR BLD: 6 % (ref 3–12)
NEUTROPHILS NFR BLD: 76 % (ref 36–65)
NEUTS SEG NFR BLD: 9.16 K/UL (ref 1.5–8.1)
NRBC BLD-RTO: 0 PER 100 WBC
PLATELET # BLD AUTO: 241 K/UL (ref 138–453)
PMV BLD AUTO: 12 FL (ref 8.1–13.5)
POTASSIUM SERPL-SCNC: 3.7 MMOL/L (ref 3.7–5.3)
RBC # BLD AUTO: 4.15 M/UL (ref 3.95–5.11)
SODIUM SERPL-SCNC: 135 MMOL/L (ref 135–144)
TROPONIN I SERPL HS-MCNC: 10 NG/L (ref 0–14)
TROPONIN I SERPL HS-MCNC: 9 NG/L (ref 0–14)
WBC OTHER # BLD: 12 K/UL (ref 3.5–11.3)

## 2023-11-21 PROCEDURE — 83735 ASSAY OF MAGNESIUM: CPT

## 2023-11-21 PROCEDURE — 36415 COLL VENOUS BLD VENIPUNCTURE: CPT

## 2023-11-21 PROCEDURE — 84484 ASSAY OF TROPONIN QUANT: CPT

## 2023-11-21 PROCEDURE — 96374 THER/PROPH/DIAG INJ IV PUSH: CPT

## 2023-11-21 PROCEDURE — 2500000003 HC RX 250 WO HCPCS: Performed by: EMERGENCY MEDICINE

## 2023-11-21 PROCEDURE — 80048 BASIC METABOLIC PNL TOTAL CA: CPT

## 2023-11-21 PROCEDURE — 85025 COMPLETE CBC W/AUTO DIFF WBC: CPT

## 2023-11-21 PROCEDURE — 71045 X-RAY EXAM CHEST 1 VIEW: CPT

## 2023-11-21 PROCEDURE — 99285 EMERGENCY DEPT VISIT HI MDM: CPT

## 2023-11-21 PROCEDURE — 93005 ELECTROCARDIOGRAM TRACING: CPT | Performed by: EMERGENCY MEDICINE

## 2023-11-21 RX ORDER — METOPROLOL TARTRATE 1 MG/ML
5 INJECTION, SOLUTION INTRAVENOUS ONCE
Status: COMPLETED | OUTPATIENT
Start: 2023-11-21 | End: 2023-11-21

## 2023-11-21 RX ORDER — GLIPIZIDE 5 MG/1
5 TABLET ORAL
COMMUNITY

## 2023-11-21 RX ADMIN — METOPROLOL TARTRATE 5 MG: 5 INJECTION, SOLUTION INTRAVENOUS at 10:42

## 2023-11-21 ASSESSMENT — ENCOUNTER SYMPTOMS
SHORTNESS OF BREATH: 0
BACK PAIN: 0
CHEST TIGHTNESS: 0
SORE THROAT: 0
ABDOMINAL DISTENTION: 0

## 2023-11-21 ASSESSMENT — PAIN - FUNCTIONAL ASSESSMENT: PAIN_FUNCTIONAL_ASSESSMENT: NONE - DENIES PAIN

## 2023-11-21 NOTE — DISCHARGE INSTRUCTIONS
Please take your medications as prescribed to you especially the metoprolol XL which is really important to control your heart rate and for preserve your heart health. Please follow-up with Dr. Te Mckeon next week. Return if you have new or worsening symptoms.

## 2023-11-22 LAB
EKG ATRIAL RATE: 108 BPM
EKG ATRIAL RATE: 78 BPM
EKG P AXIS: 49 DEGREES
EKG P AXIS: 53 DEGREES
EKG P-R INTERVAL: 136 MS
EKG P-R INTERVAL: 136 MS
EKG Q-T INTERVAL: 350 MS
EKG Q-T INTERVAL: 386 MS
EKG QRS DURATION: 78 MS
EKG QRS DURATION: 82 MS
EKG QTC CALCULATION (BAZETT): 440 MS
EKG QTC CALCULATION (BAZETT): 469 MS
EKG R AXIS: 32 DEGREES
EKG R AXIS: 33 DEGREES
EKG T AXIS: 39 DEGREES
EKG T AXIS: 47 DEGREES
EKG VENTRICULAR RATE: 108 BPM
EKG VENTRICULAR RATE: 78 BPM

## 2023-11-22 PROCEDURE — 93010 ELECTROCARDIOGRAM REPORT: CPT | Performed by: FAMILY MEDICINE

## 2024-05-26 ENCOUNTER — APPOINTMENT (OUTPATIENT)
Dept: GENERAL RADIOLOGY | Age: 84
End: 2024-05-26
Payer: MEDICARE

## 2024-05-26 ENCOUNTER — HOSPITAL ENCOUNTER (EMERGENCY)
Age: 84
Discharge: HOME | End: 2024-05-26
Attending: EMERGENCY MEDICINE
Payer: MEDICARE

## 2024-05-26 VITALS
HEART RATE: 74 BPM | SYSTOLIC BLOOD PRESSURE: 152 MMHG | TEMPERATURE: 98 F | OXYGEN SATURATION: 97 % | DIASTOLIC BLOOD PRESSURE: 85 MMHG | RESPIRATION RATE: 18 BRPM

## 2024-05-26 DIAGNOSIS — S60.222A CONTUSION OF LEFT HAND, INITIAL ENCOUNTER: Primary | ICD-10-CM

## 2024-05-26 PROCEDURE — 73130 X-RAY EXAM OF HAND: CPT

## 2024-05-26 PROCEDURE — 99283 EMERGENCY DEPT VISIT LOW MDM: CPT

## 2024-05-26 RX ORDER — METOPROLOL TARTRATE 50 MG/1
50 TABLET, FILM COATED ORAL ONCE
COMMUNITY

## 2024-05-26 ASSESSMENT — PAIN SCALES - GENERAL: PAINLEVEL_OUTOF10: 1

## 2024-05-26 NOTE — ED PROVIDER NOTES
[facility] ED  EMERGENCY DEPARTMENT ENCOUNTER      Pt Name: Melissa Ty  MRN: [id number]  Birthdate 1940  Date of evaluation: 5/26/2024  Provider: Alexandra Carreon DO    CHIEF COMPLAINT       Chief Complaint   Patient presents with    Hand Injury     Patient states that she tripped over a fan cord in her bedroom today and caught herself with her left hand. Patient was concerned she broke the hand because it was \"hanging weird\".   Pt denies head injury or LOC.          HISTORY OF PRESENT ILLNESS   (Location/Symptom, Timing/Onset, Context/Setting, Quality, Duration, Modifying Factors, Severity)  Note limiting factors.   Melissa Ty is a 83 y.o. female who presents to the emergency department right lower complaining of left hand injury.  Patient states that she fell and tripped in her bedroom earlier today and landed on her left hand.  She complains of pain to the base of the fifth digit and the distal tip of the fifth metacarpal.  Patient does not have any obvious swelling.  There is no obvious bruising or deformity noticed.  She is able to make a fist normally.  She denies any other injuries.  She denies head injury or loss of consciousness.    REVIEW OF SYSTEMS    (2-9 systems for level 4, 10 or more for level 5)     Review of Systems   Constitutional:  Negative for fatigue and fever.   HENT:  Negative for congestion and sore throat.    Eyes:  Negative for visual disturbance.   Respiratory:  Negative for shortness of breath.    Cardiovascular:  Negative for chest pain and palpitations.   Gastrointestinal:  Negative for abdominal distention.   Genitourinary:  Negative for dysuria.   Musculoskeletal:  Negative for back pain.        Left hand injury   Skin:  Negative for rash.   Psychiatric/Behavioral:  Negative for suicidal ideas.        Except as noted above the remainder of the review of systems was reviewed and negative.       PAST MEDICAL HISTORY     Past Medical History:   Diagnosis Date

## 2024-05-26 NOTE — DISCHARGE INSTRUCTIONS
Ice to the area 20 minutes at a time multiple times a day.  Tylenol as needed.  Patient follow-up with your doctor in 1 week.

## 2024-08-26 ENCOUNTER — HOSPITAL ENCOUNTER (EMERGENCY)
Age: 84
Discharge: HOME | End: 2024-08-26
Attending: EMERGENCY MEDICINE
Payer: MEDICARE

## 2024-08-26 ENCOUNTER — APPOINTMENT (OUTPATIENT)
Dept: CT IMAGING | Age: 84
End: 2024-08-26
Payer: MEDICARE

## 2024-08-26 VITALS
SYSTOLIC BLOOD PRESSURE: 130 MMHG | TEMPERATURE: 97.9 F | OXYGEN SATURATION: 98 % | BODY MASS INDEX: 26.98 KG/M2 | RESPIRATION RATE: 15 BRPM | WEIGHT: 158 LBS | HEIGHT: 64 IN | HEART RATE: 64 BPM | DIASTOLIC BLOOD PRESSURE: 65 MMHG

## 2024-08-26 DIAGNOSIS — R51.9 NONINTRACTABLE HEADACHE, UNSPECIFIED CHRONICITY PATTERN, UNSPECIFIED HEADACHE TYPE: Primary | ICD-10-CM

## 2024-08-26 LAB
ANION GAP SERPL CALCULATED.3IONS-SCNC: 12 MMOL/L (ref 9–17)
BASOPHILS # BLD: 0.03 K/UL (ref 0–0.2)
BASOPHILS NFR BLD: 0 % (ref 0–2)
BILIRUB UR QL STRIP: NEGATIVE
BUN SERPL-MCNC: 18 MG/DL (ref 8–23)
BUN/CREAT SERPL: 30 (ref 9–20)
CALCIUM SERPL-MCNC: 9 MG/DL (ref 8.6–10.4)
CHLORIDE SERPL-SCNC: 98 MMOL/L (ref 98–107)
CLARITY UR: CLEAR
CO2 SERPL-SCNC: 25 MMOL/L (ref 20–31)
COLOR UR: YELLOW
CREAT SERPL-MCNC: 0.6 MG/DL (ref 0.5–0.9)
EKG ATRIAL RATE: 70 BPM
EKG P AXIS: 48 DEGREES
EKG P-R INTERVAL: 148 MS
EKG Q-T INTERVAL: 384 MS
EKG QRS DURATION: 76 MS
EKG QTC CALCULATION (BAZETT): 414 MS
EKG R AXIS: 25 DEGREES
EKG T AXIS: 36 DEGREES
EKG VENTRICULAR RATE: 70 BPM
EOSINOPHIL # BLD: 0.29 K/UL (ref 0–0.44)
EOSINOPHILS RELATIVE PERCENT: 3 % (ref 1–4)
EPI CELLS #/AREA URNS HPF: NORMAL /HPF (ref 0–25)
ERYTHROCYTE [DISTWIDTH] IN BLOOD BY AUTOMATED COUNT: 12.6 % (ref 11.8–14.4)
GFR, ESTIMATED: 88 ML/MIN/1.73M2
GLUCOSE SERPL-MCNC: 113 MG/DL (ref 70–99)
GLUCOSE UR STRIP-MCNC: NEGATIVE MG/DL
HCT VFR BLD AUTO: 37.9 % (ref 36.3–47.1)
HGB BLD-MCNC: 12.9 G/DL (ref 11.9–15.1)
HGB UR QL STRIP.AUTO: NEGATIVE
IMM GRANULOCYTES # BLD AUTO: 0.04 K/UL (ref 0–0.3)
IMM GRANULOCYTES NFR BLD: 1 %
KETONES UR STRIP-MCNC: NEGATIVE MG/DL
LEUKOCYTE ESTERASE UR QL STRIP: NEGATIVE
LYMPHOCYTES NFR BLD: 2.19 K/UL (ref 1.1–3.7)
LYMPHOCYTES RELATIVE PERCENT: 25 % (ref 24–43)
MCH RBC QN AUTO: 32.8 PG (ref 25.2–33.5)
MCHC RBC AUTO-ENTMCNC: 34 G/DL (ref 28.4–34.8)
MCV RBC AUTO: 96.4 FL (ref 82.6–102.9)
MONOCYTES NFR BLD: 0.79 K/UL (ref 0.1–1.2)
MONOCYTES NFR BLD: 9 % (ref 3–12)
NEUTROPHILS NFR BLD: 62 % (ref 36–65)
NEUTS SEG NFR BLD: 5.35 K/UL (ref 1.5–8.1)
NITRITE UR QL STRIP: NEGATIVE
NRBC BLD-RTO: 0 PER 100 WBC
PH UR STRIP: 7 [PH] (ref 5–9)
PLATELET # BLD AUTO: 241 K/UL (ref 138–453)
PMV BLD AUTO: 12.2 FL (ref 8.1–13.5)
POTASSIUM SERPL-SCNC: 4.7 MMOL/L (ref 3.7–5.3)
PROT UR STRIP-MCNC: NEGATIVE MG/DL
RBC # BLD AUTO: 3.93 M/UL (ref 3.95–5.11)
RBC #/AREA URNS HPF: NORMAL /HPF (ref 0–2)
SODIUM SERPL-SCNC: 135 MMOL/L (ref 135–144)
SP GR UR STRIP: 1.01 (ref 1.01–1.02)
TROPONIN I SERPL HS-MCNC: 12 NG/L (ref 0–14)
TROPONIN I SERPL HS-MCNC: 8 NG/L (ref 0–14)
UROBILINOGEN UR STRIP-ACNC: NORMAL EU/DL (ref 0–1)
WBC #/AREA URNS HPF: NORMAL /HPF (ref 0–5)
WBC OTHER # BLD: 8.7 K/UL (ref 3.5–11.3)

## 2024-08-26 PROCEDURE — 70450 CT HEAD/BRAIN W/O DYE: CPT

## 2024-08-26 PROCEDURE — 99284 EMERGENCY DEPT VISIT MOD MDM: CPT

## 2024-08-26 PROCEDURE — 81001 URINALYSIS AUTO W/SCOPE: CPT

## 2024-08-26 PROCEDURE — 85025 COMPLETE CBC W/AUTO DIFF WBC: CPT

## 2024-08-26 PROCEDURE — 80048 BASIC METABOLIC PNL TOTAL CA: CPT

## 2024-08-26 PROCEDURE — 84484 ASSAY OF TROPONIN QUANT: CPT

## 2024-08-26 ASSESSMENT — PAIN DESCRIPTION - LOCATION: LOCATION: HEAD

## 2024-08-26 ASSESSMENT — LIFESTYLE VARIABLES
HOW MANY STANDARD DRINKS CONTAINING ALCOHOL DO YOU HAVE ON A TYPICAL DAY: PATIENT DOES NOT DRINK
HOW OFTEN DO YOU HAVE A DRINK CONTAINING ALCOHOL: NEVER

## 2024-08-26 ASSESSMENT — PAIN SCALES - GENERAL: PAINLEVEL_OUTOF10: 10

## 2024-08-26 ASSESSMENT — PAIN - FUNCTIONAL ASSESSMENT: PAIN_FUNCTIONAL_ASSESSMENT: 0-10

## 2024-08-26 ASSESSMENT — PAIN DESCRIPTION - DESCRIPTORS: DESCRIPTORS: SHARP

## 2024-08-26 NOTE — DISCHARGE INSTRUCTIONS
Please follow-up with your primary care provider, and with your neurology appointment in 4 weeks, return to ER for worsening symptoms.

## 2024-08-26 NOTE — ED PROVIDER NOTES
[facility] ED  Emergency Department Encounter  Emergency Medicine      Pt Name:Melissa Ty  MRN: [id number]  Birthdate 1940  Date of evaluation: 8/26/24  PCP:  Ayush Henderson MD  3:00 AM EDT      CHIEF COMPLAINT       Chief Complaint   Patient presents with    Headache     Pt. Reports headache starting today around 0000. Intermittent episode ongoing for \"years\" but has become more consistent over the past 4-6 months.        HISTORY OF PRESENT ILLNESS  (Location/Symptom, Timing/Onset, Context/Setting, Quality, Duration, Modifying Factors, Severity.)      Melissa Ty is a 83 y.o. female who presents with numbness to the top part of her head.  Patient feeling like her head was going to explode earlier tonight, called EMS, initial 150 systolic and then repeat was 169 systolic,   That symptoms has since resolved.  She denies a h/o HTN  She reports that she has been having head numbness for the pat months, and is scheduled to see the neurologist in 1 month  Earlier tonight she was sleeping and reports symptoms were severe enough they woke her up from sleep.  No real previous HA.  Reports she is allergic to every pain medication  No extremity numbness or tingling.   PAST MEDICAL / SURGICAL / SOCIAL / FAMILY HISTORY      has a past medical history of Achilles tendon rupture, Cancer (HCC), Chronic SI joint pain, Claustrophobia, Diabetes mellitus (HCC), Esophageal stricture, [place] (hard of hearing), Hyperlipidemia, Hypertension, Retinal detachment, and Trauma to eye, left.       has a past surgical history that includes Cervical disc surgery (1984); Lumbar disc surgery (1974); Hysterectomy (1994); bladder suspension (1994); Blepharoplasty (Left, 5/3/2013); Eye surgery (Left, 06/15/2013); Eye surgery (Bilateral, 2008); Upper gastrointestinal endoscopy; Esophagus dilation; Cataract removal (Bilateral); eye surgery (Left, ,7/13,12/12,6/13); vitrectomy (Left, 10/3/13); Total hip arthroplasty (Left,

## (undated) DEVICE — PEN: MARKING STD 100/CS: Brand: MEDICAL ACTION INDUSTRIES

## (undated) DEVICE — SHEET,DRAPE,53X77,STERILE: Brand: MEDLINE

## (undated) DEVICE — PAD,ABDOMINAL,8"X10",ST,LF: Brand: MEDLINE

## (undated) DEVICE — BASIC PACK: Brand: MEDLINE INDUSTRIES, INC.

## (undated) DEVICE — Device

## (undated) DEVICE — PADDING,UNDERCAST,COTTON, 4"X4YD STERILE: Brand: MEDLINE

## (undated) DEVICE — NEEDLE HYPO 22GA L1.5IN BLK S STL HUB POLYPR SHLD REG BVL

## (undated) DEVICE — HAND AND FT PK

## (undated) DEVICE — BLADE SAW W12.5XL70MM THK0.8MM CUT THK1.12MM S STL RECIP

## (undated) DEVICE — SOLUTION SCRB 4OZ 4% CHG CLN BASE FOR PT SKIN ANTISEPSIS

## (undated) DEVICE — TOTAL HIP

## (undated) DEVICE — FORCEPS BX L240CM JAW DIA2.4MM ORNG L CAP W/ NDL DISP RAD

## (undated) DEVICE — DRAPE,EXTREMITY,89X128,STERILE: Brand: MEDLINE

## (undated) DEVICE — MEDI-VAC NON-CONDUCTIVE TUBING7MM X 30.5 (100FT): Brand: CARDINAL HEALTH

## (undated) DEVICE — PENCIL ES L3M BTTN SWCH HOLSTER W/ BLDE ELECTRD EDGE

## (undated) DEVICE — SYRINGE BULB 50/CS 48/PLT: Brand: MEDEGEN MEDICAL PRODUCTS, LLC

## (undated) DEVICE — OCCLUSIVE GAUZE STRIP,3% BISMUTH TRIBROMOPHENATE IN PETROLATUM BLEND: Brand: XEROFORM

## (undated) DEVICE — GLOVE SURG SZ 75 L12IN FNGR THK87MIL WHT LTX FREE

## (undated) DEVICE — BANDAGE COMPR W4INXL9FT EXSANG SGL LAYERED NO CLSR ESMARCH

## (undated) DEVICE — SPONGE GZ W4XL4IN COT 12 PLY TYP VII WVN C FLD DSGN

## (undated) DEVICE — SPONGE LAP W18XL18IN WHT COT 4 PLY FLD STRUNG RADPQ DISP ST

## (undated) DEVICE — SOLUTION IV IRRIG POUR BRL 0.9% SODIUM CHL 2F7124

## (undated) DEVICE — SUTURE ETHLN SZ 3-0 L18IN NONABSORBABLE BLK L24MM PS-1 3/8 1663G

## (undated) DEVICE — SUTURE MCRYL SZ 3-0 L27IN ABSRB UD L24MM PS-1 3/8 CIR PRIM Y936H

## (undated) DEVICE — SCREW BNE L48MM CONSTRN CNDYL KNEE HEX HD STEM FOR LEG NXGN
Type: IMPLANTABLE DEVICE | Site: KNEE | Status: NON-FUNCTIONAL
Removed: 2020-08-10

## (undated) DEVICE — Z DISCONTINUED BY MEDLINE USE 2711682 TRAY SKIN PREP DRY W/ PREM GLV

## (undated) DEVICE — STRIP,CLOSURE,WOUND,MEDI-STRIP,1/2X4: Brand: MEDLINE

## (undated) DEVICE — DUAL CUT SAGITTAL BLADE

## (undated) DEVICE — TUBING, SUCTION, 9/32" X 12', STRAIGHT: Brand: MEDLINE INDUSTRIES, INC.

## (undated) DEVICE — THREE-QUARTER SHEET: Brand: CONVERTORS

## (undated) DEVICE — KIT EVAC 0.13IN RECT TB DIA10FR 400CC PVC 3 SPR Y CONN DRN

## (undated) DEVICE — CANNULA ORAL NSL AD CO2 N INTUB O2 DEL DISP TRU LNK

## (undated) DEVICE — DISCONTINUED NO SUB IDED TG GLOVE SURG SENSICARE ALOE LT LF PF ST GRN SZ 8

## (undated) DEVICE — COVER,TABLE,HEAVY DUTY,77"X90",STRL: Brand: MEDLINE

## (undated) DEVICE — 3 BONE CEMENT MIXER WITH SMALL SPATULA: Brand: MIXEVAC

## (undated) DEVICE — CONTROL SYRINGE LUER-LOCK TIP: Brand: MONOJECT

## (undated) DEVICE — ST. VAGINAL PACKING X-RAY DETECTABLE: Brand: DEROYAL

## (undated) DEVICE — CHLORAPREP 26ML ORANGE

## (undated) DEVICE — PADDING CAST W2INXL4YD COT LO LINTING WYTEX

## (undated) DEVICE — SUTURE VCRL + SZ 4-0 L27IN ABSRB UD L26MM SH 1/2 CIR VCP415H

## (undated) DEVICE — GLOVE SURG SZ 75 L12IN FNGR THK87MIL DK GRN LTX FREE ISOLEX

## (undated) DEVICE — HYPODERMIC SAFETY NEEDLE: Brand: MAGELLAN

## (undated) DEVICE — COAXIAL FEMORAL CANAL TIP

## (undated) DEVICE — DRESSING PETRO W3XL8IN OIL EMUL N ADH GZ KNIT IMPREG CELOS

## (undated) DEVICE — BANDAGE COMPR W6INXL5YD WHT BGE POLY COT M E WRP WV HK AND

## (undated) DEVICE — CUFF TOURNIQUET 34 SNG BLADDER DUAL PORT

## (undated) DEVICE — STERILE PATIENT PROTECTIVE PAD FOR IMP® KNEE POSITIONERS & COHESIVE WRAP (10 / CASE): Brand: DE MAYO KNEE POSITIONER®

## (undated) DEVICE — NEEDLE SPNL 20GA L3.5IN YEL HUB S STL REG WALL FIT STYL W/

## (undated) DEVICE — SYRINGE MED 10ML TRNSLUC BRL PLUNG BLK MRK POLYPR CTRL

## (undated) DEVICE — SUTURE ETHLN SZ 5-0 L18IN NONABSORBABLE BLK L13MM P-3 3/8 698H

## (undated) DEVICE — SUTURE VCRL SZ 1 L36IN ABSRB UD L36MM CT-1 1/2 CIR J947H

## (undated) DEVICE — ZIPPERED TOGA, X-LARGE: Brand: FLYTE, SURGICOOL

## (undated) DEVICE — ELECTRODE ES AD CRD L15FT DISP FOR PT BELOW 30LB REM

## (undated) DEVICE — 3M™ STERI-DRAPE™ U-DRAPE 1015: Brand: STERI-DRAPE™

## (undated) DEVICE — 3000CC GUARDIAN II: Brand: GUARDIAN

## (undated) DEVICE — YANKAUER,FLEXIBLE HANDLE,REGLR CAPACITY: Brand: MEDLINE INDUSTRIES, INC.

## (undated) DEVICE — BLADE SURG NO10 C STL STR DISP GLASSVAN

## (undated) DEVICE — BANDAGE COMPR W4INXL5YD WHT BGE POLY COT M E WRP WV HK AND

## (undated) DEVICE — FAN SPRAY KIT: Brand: PULSAVAC®

## (undated) DEVICE — INTENDED FOR TISSUE SEPARATION, AND OTHER PROCEDURES THAT REQUIRE A SHARP SURGICAL BLADE TO PUNCTURE OR CUT.: Brand: BARD-PARKER ® CARBON RIB-BACK BLADES

## (undated) DEVICE — SYRINGE, LUER LOCK, 10ML: Brand: MEDLINE

## (undated) DEVICE — SOLUTION IV 100ML 0.9% SOD CHL PLAS CONT USP VIAFLX 1 PER

## (undated) DEVICE — SUTURE VCRL SZ 3-0 L27IN ABSRB UD L26MM SH 1/2 CIR J416H

## (undated) DEVICE — SYRINGE MED 20ML STD CLR PLAS LUERLOCK TIP N CTRL DISP

## (undated) DEVICE — SUTURE VCRL SZ 2-0 L36IN ABSRB UD L40MM CT 1/2 CIR J957H

## (undated) DEVICE — GLOVE ORANGE PI 7 1/2   MSG9075

## (undated) DEVICE — BANDAGE,GAUZE,BULKEE II,4.5"X4.1YD,STRL: Brand: MEDLINE

## (undated) DEVICE — BANDAGE COMPR M W6INXL10YD WHT BGE VELC E MTRX HK AND LOOP

## (undated) DEVICE — GAUZE,SPONGE,FLUFF,6"X6.75",STRL,5/TRAY: Brand: MEDLINE

## (undated) DEVICE — CEMENT MIXING SYSTEM WITH FEMORAL BREAKWAY NOZZLE: Brand: REVOLUTION

## (undated) DEVICE — SOLUTION IV IRRIG 0.9% NACL 3000ML BAG 2B7477